# Patient Record
Sex: FEMALE | Race: WHITE
[De-identification: names, ages, dates, MRNs, and addresses within clinical notes are randomized per-mention and may not be internally consistent; named-entity substitution may affect disease eponyms.]

---

## 2018-05-11 ENCOUNTER — HOSPITAL ENCOUNTER (EMERGENCY)
Dept: HOSPITAL 97 - ER | Age: 83
Discharge: HOME | End: 2018-05-11
Payer: COMMERCIAL

## 2018-05-11 VITALS — DIASTOLIC BLOOD PRESSURE: 37 MMHG | SYSTOLIC BLOOD PRESSURE: 115 MMHG

## 2018-05-11 VITALS — OXYGEN SATURATION: 100 %

## 2018-05-11 VITALS — TEMPERATURE: 98.5 F

## 2018-05-11 DIAGNOSIS — I10: ICD-10-CM

## 2018-05-11 DIAGNOSIS — E78.5: ICD-10-CM

## 2018-05-11 DIAGNOSIS — Z88.8: ICD-10-CM

## 2018-05-11 DIAGNOSIS — N39.0: Primary | ICD-10-CM

## 2018-05-11 DIAGNOSIS — Z86.73: ICD-10-CM

## 2018-05-11 DIAGNOSIS — Z79.82: ICD-10-CM

## 2018-05-11 LAB
ALBUMIN SERPL BCP-MCNC: 3.4 G/DL (ref 3.2–5.5)
ALP SERPL-CCNC: 115 IU/L (ref 42–121)
ALT SERPL W P-5'-P-CCNC: 30 IU/L (ref 10–60)
AST SERPL W P-5'-P-CCNC: 36 IU/L (ref 10–42)
BUN BLD-MCNC: 84 MG/DL (ref 6–20)
GLUCOSE SERPLBLD-MCNC: 229 MG/DL (ref 65–120)
HCT VFR BLD CALC: 30.9 % (ref 36–45)
LYMPHOCYTES # SPEC AUTO: 1.1 K/UL (ref 0.7–4.9)
MCH RBC QN AUTO: 28.3 PG (ref 27–35)
MCV RBC: 84.8 FL (ref 80–100)
PMV BLD: 7.6 FL (ref 7.6–11.3)
POTASSIUM SERPL-SCNC: 4.7 MEQ/L (ref 3.6–5)
RBC # BLD: 3.65 M/UL (ref 3.86–4.86)
UA COMPLETE W REFLEX CULTURE PNL UR: (no result)
UA DIPSTICK W REFLEX MICRO PNL UR: (no result)

## 2018-05-11 PROCEDURE — 83970 ASSAY OF PARATHORMONE: CPT

## 2018-05-11 PROCEDURE — 82040 ASSAY OF SERUM ALBUMIN: CPT

## 2018-05-11 PROCEDURE — 87088 URINE BACTERIA CULTURE: CPT

## 2018-05-11 PROCEDURE — 36415 COLL VENOUS BLD VENIPUNCTURE: CPT

## 2018-05-11 PROCEDURE — 82962 GLUCOSE BLOOD TEST: CPT

## 2018-05-11 PROCEDURE — 80048 BASIC METABOLIC PNL TOTAL CA: CPT

## 2018-05-11 PROCEDURE — 82570 ASSAY OF URINE CREATININE: CPT

## 2018-05-11 PROCEDURE — 87186 SC STD MICRODIL/AGAR DIL: CPT

## 2018-05-11 PROCEDURE — 96360 HYDRATION IV INFUSION INIT: CPT

## 2018-05-11 PROCEDURE — 81015 MICROSCOPIC EXAM OF URINE: CPT

## 2018-05-11 PROCEDURE — 80053 COMPREHEN METABOLIC PANEL: CPT

## 2018-05-11 PROCEDURE — 81003 URINALYSIS AUTO W/O SCOPE: CPT

## 2018-05-11 PROCEDURE — 99284 EMERGENCY DEPT VISIT MOD MDM: CPT

## 2018-05-11 PROCEDURE — 87077 CULTURE AEROBIC IDENTIFY: CPT

## 2018-05-11 PROCEDURE — 85025 COMPLETE CBC W/AUTO DIFF WBC: CPT

## 2018-05-11 PROCEDURE — 82043 UR ALBUMIN QUANTITATIVE: CPT

## 2018-05-11 PROCEDURE — 87086 URINE CULTURE/COLONY COUNT: CPT

## 2018-05-11 PROCEDURE — 84550 ASSAY OF BLOOD/URIC ACID: CPT

## 2018-05-11 PROCEDURE — 84100 ASSAY OF PHOSPHORUS: CPT

## 2018-05-11 NOTE — ER
Nurse's Notes                                                                                     

 [facility]                                                                

Name: Deirdre Sauer                                                                               

Age: 84 yrs                                                                                       

Sex: Female                                                                                       

: 1933                                                                                   

MRN: [id number]                                                                                   

Arrival Date: 2018                                                                          

Time: 19:32                                                                                       

Account#: [id number]                                                                            

Bed 7                                                                                             

Private MD: Amanda Ledesma F                                                                     

Diagnosis: Hyperglycemia, unspecified;Urinary tract infection, site not specified                 

                                                                                                  

Presentation:                                                                                     

                                                                                             

19:37 Presenting complaint: Patient states: Sent by home health for blood sugar of 403 at       

      home. Transition of care: patient was not received from another setting of care. Onset      

      of symptoms was May 11, 2018. Care prior to arrival: None.                                  

19:37 Method Of Arrival: Ambulatory                                                             

19:37 Acuity: MESFIN 3                                                                             

21:39 Initial Sepsis Screen: Does the patient meet any 2 criteria? No. Patient's initial      jd3 

      sepsis screen is negative. Does the patient have a suspected source of infection? No.       

      Patient's initial sepsis screen is negative.                                                

                                                                                                  

Triage Assessment:                                                                                

19:38 General: Appears in no apparent distress. comfortable, Behavior is calm, cooperative,   aj  

      appropriate for age. Pain: Denies pain. Neuro: Level of Consciousness is awake, alert,      

      obeys commands, Oriented to person, place, time, situation, Appropriate for age.            

      Respiratory: Airway is patent Respiratory effort is even, unlabored, Respiratory            

      pattern is regular, symmetrical. GI: No signs and/or symptoms were reported involving       

      the gastrointestinal system. Derm: Skin is intact, is healthy with good turgor, Skin is     

      pink, warm \T\ dry. normal.                                                                 

                                                                                                  

Historical:                                                                                       

- Allergies:                                                                                      

19:38 blood thinners except ASA;                                                              aj  

19:38 Statins-Hmg-Coa Reductase Inhibitors;                                                     

- Home Meds:                                                                                      

19:38 aspirin 81 mg Oral TbEC 1 tab once daily [Active]; carvedilol 12.5 mg Oral tab 1 tab 2  aj  

      times per day [Active]; Claritin-D 24 Hour  mg Oral Tb24 1 tab once daily             

      [Active]; ezetimibe Oral once daily [Active]; Flonase 50 mcg/actuation Nasal spsn 1         

      spray 2 times per day [Active]; furosemide 40 mg Oral tab 1 tab once daily [Active];        

      glimepiride 4 mg Oral tab 1 tab twice a day [Active]; omeprazole 40 mg Oral cpDR 1 cap      

      once daily [Active]; pravastatin 10 mg Oral tab 1 tab once daily [Active]; Spectravite      

      Ultra Women's Sr 8 mg iron-400 mcg-300 mcg Oral tab [Active];                               

- PMHx:                                                                                           

19:38 CVA; Diabetes - NIDDM; Hyperlipidemia; Hypertension;                                    aj  

- PSHx:                                                                                           

19:38 Hysterectomy;                                                                           aj  

                                                                                                  

- Immunization history:: Adult Immunizations up to date.                                          

- Social history:: Smoking status: Patient/guardian denies using tobacco.                         

                                                                                                  

                                                                                                  

Screenin:39 Abuse screen: Denies threats or abuse. Nutritional screening: No deficits noted.        jd3 

      Tuberculosis screening: No symptoms or risk factors identified. Fall Risk Ambulatory        

      Aid- Crutches/Cane/Walker (15 pts). Gait- Weak (10 pts.). Mental Status- Oriented to        

      own ability (0 pts). Total Mckoy Fall Scale indicates Low Risk Score (25-44 pts). Fall      

      prevention measures have been instituted. Side Rails Up X 2 Placed close to Nursing         

      Station Frequent Obs/Assesments occuring Family Present and informed to notify staff if     

      they need to leave bedside.                                                                 

                                                                                                  

Assessment:                                                                                       

19:55 General: Appears in no apparent distress. Behavior is calm, cooperative, appropriate    jd3 

      for age, Reports high blood sugar. Pain: Denies pain. Neuro: Level of Consciousness is      

      awake, alert, obeys commands, Oriented to person, place, time, situation.                   

      Cardiovascular: Heart tones S1 S2 present Capillary refill < 3 seconds Patient's skin       

      is warm and dry. Respiratory: Airway is patent Respiratory effort is even, unlabored,       

      Respiratory pattern is regular, symmetrical, Breath sounds are clear bilaterally. GI:       

      Abdomen is round Bowel sounds present X 4 quads. Abd is soft and non tender X 4 quads.      

      Reports nausea. : No signs and/or symptoms were reported regarding the genitourinary      

      system. EENT: No signs and/or symptoms were reported regarding the EENT system. Derm:       

      Skin is intact, Skin is dry, Skin is normal, Skin temperature is warm. Musculoskeletal:     

      Circulation, motion, and sensation intact. Range of motion: intact in all extremities.      

20:30 Reassessment: Patient appears in no apparent distress at this time. Patient and/or      jd3 

      family updated on plan of care and expected duration. Pain level reassessed. Patient is     

      alert, oriented x 3, equal unlabored respirations, skin warm/dry/pink.                      

21:30 Reassessment: Patient appears in no apparent distress at this time. Patient and/or      jd3 

      family updated on plan of care and expected duration. Pain level reassessed. Patient is     

      alert, oriented x 3, equal unlabored respirations, skin warm/dry/pink. Patient denies       

      pain at this time. Patient states feeling better.                                           

22:30 Reassessment: Patient appears in no apparent distress at this time. Patient and/or      jd3 

      family updated on plan of care and expected duration. Pain level reassessed. Patient is     

      alert, oriented x 3, equal unlabored respirations, skin warm/dry/pink.                      

22:55 Reassessment: Patient appears in no apparent distress at this time. Patient and/or      jd3 

      family updated on plan of care and expected duration. Pain level reassessed. Patient is     

      alert, oriented x 3, equal unlabored respirations, skin warm/dry/pink. pt reported          

      understanding of discharge instructions, even and steady gait upon discharge. Patient       

      denies pain at this time. Patient states feeling better.                                    

                                                                                                  

Vital Signs:                                                                                      

19:38  / 56; Pulse 66; Resp 16; Temp 98.5; Pulse Ox 100% on R/A; Weight 69.4 kg; Height aj  

      5 ft. 2 in. (157.48 cm); Pain 0/10;                                                         

20:30  / 42; Pulse 60; Resp 16 S; Pulse Ox 99% on R/A; Pain 0/10;                       jd3 

21:37  / 49; Pulse 61; Resp 17; Pulse Ox 100% on R/A; Pain 0/10;                        jd3 

22:37  / 37; Pulse 63; Resp 15; Pulse Ox 100% on R/A;                                   mw2 

19:38 Body Mass Index 27.98 (69.40 kg, 157.48 cm)                                               

                                                                                                  

ED Course:                                                                                        

19:32 Patient arrived in ED.                                                                  am2 

19:32 Amanda Ledesma MD is Private Physician.                                                am2 

19:38 Triage completed.                                                                       aj  

19:38 Arm band placed on right wrist. Patient placed.                                         aj  

19:47 Abiel Cheney MD is Attending Physician.                                            tw4 

19:53 Karthik Charles, RN is Primary Nurse.                                                  jd3 

21:39 Patient has correct armband on for positive identification. Bed in low position. Call   jd3 

      light in reach. Side rails up X2. Adult w/ patient.                                         

22:13 No provider procedures requiring assistance completed. Inserted saline lock: 22 gauge   bp  

      in left forearm, using aseptic technique. Blood collected.                                  

22:45 Amanda Ledesma MD is Referral Physician.                                               tw4 

22:56 IV discontinued, intact, bleeding controlled, No redness/swelling at site. Pressure     jd3 

      dressing applied.                                                                           

                                                                                                  

Administered Medications:                                                                         

22:14 Drug: NS 0.9% 500 ml Route: IV; Rate: bolus; Site: left forearm;                        bp  

22:57 Follow up: Response: No adverse reaction; IV Status: Completed infusion; IV Intake:     jd3 

      500ml                                                                                       

                                                                                                  

                                                                                                  

Point of Care Testing:                                                                            

      Blood Glucose:                                                                              

19:53 Blood Glucose: 260 mg/dL;                                                               jd3 

      Ranges:                                                                                     

                                                                                                  

Intake:                                                                                           

22:57 IV: 500ml; Total: 500ml.                                                                jd3 

                                                                                                  

Outcome:                                                                                          

22:45 Discharge ordered by MD.                                                                tw4 

22:56 Discharged to home ambulatory, with family.                                             jd3 

22:56 Condition: stable                                                                           

22:56 Discharge instructions given to patient, family, Instructed on discharge instructions,      

      follow up and referral plans. medication usage, Demonstrated understanding of               

      instructions, follow-up care, medications, Prescriptions given X 1.                         

22:57 Patient left the ED.                                                                    jd3 

                                                                                                  

Addendum:                                                                                         

2018                                                                                        

     07:42 Addendum: Culture Results: Positive urine culture. No further action required. Bacteria i
w

           sensitive to prescribed antibiotic.                                                    

                                                                                                  

Signatures:                                                                                       

Yue Landry, RN                       Alissa Thompson RN RN iw Moreno, Amanda                               amKarthik Pina RN RN jd3 Peltier, Brian, RN RN bp Wadley, Terrence, MD MD   tw4                                                  

Murray Prakash                            2                                                  

                                                                                                  

**************************************************************************************************

## 2018-05-11 NOTE — EDPHYS
Physician Documentation                                                                           

 [facility]                                                                

Name: Deirdre Sauer                                                                               

Age: 84 yrs                                                                                       

Sex: Female                                                                                       

: 1933                                                                                   

MRN: [id number]                                                                                   

Arrival Date: 2018                                                                          

Time: 19:32                                                                                       

Account#: [id number]                                                                            

Bed 7                                                                                             

Private MD: Amanda Ledesma F                                                                     

ED Physician Abiel Cheney                                                                     

HPI:                                                                                              

                                                                                             

21:52 This 84 yrs old  Female presents to ER via Ambulatory with complaints of High  tw4 

      Blood Sugar.                                                                                

21:52 The patient or guardian reports hyperglycemia, that was potentially precipitated by no  tw4 

      particular event. Onset: The symptoms/episode began/occurred today. Associated signs        

      and symptoms: Pertinent positives: None. Pertinent negatives: None. Current symptoms:       

      In the emergency department the patient's symptoms have improved, blood sugar lower.        

      The patient has not experienced similar symptoms in the past.                               

                                                                                                  

Historical:                                                                                       

- Allergies:                                                                                      

19:38 blood thinners except ASA;                                                              aj  

19:38 Statins-Hmg-Coa Reductase Inhibitors;                                                   aj  

- Home Meds:                                                                                      

19:38 aspirin 81 mg Oral TbEC 1 tab once daily [Active]; carvedilol 12.5 mg Oral tab 1 tab 2  aj  

      times per day [Active]; Claritin-D 24 Hour  mg Oral Tb24 1 tab once daily             

      [Active]; ezetimibe Oral once daily [Active]; Flonase 50 mcg/actuation Nasal spsn 1         

      spray 2 times per day [Active]; furosemide 40 mg Oral tab 1 tab once daily [Active];        

      glimepiride 4 mg Oral tab 1 tab twice a day [Active]; omeprazole 40 mg Oral cpDR 1 cap      

      once daily [Active]; pravastatin 10 mg Oral tab 1 tab once daily [Active]; Spectravite      

      Ultra Women's Sr 8 mg iron-400 mcg-300 mcg Oral tab [Active];                               

- PMHx:                                                                                           

19:38 CVA; Diabetes - NIDDM; Hyperlipidemia; Hypertension;                                    aj  

- PSHx:                                                                                           

19:38 Hysterectomy;                                                                           aj  

                                                                                                  

- Immunization history:: Adult Immunizations up to date.                                          

- Social history:: Smoking status: Patient/guardian denies using tobacco.                         

                                                                                                  

                                                                                                  

ROS:                                                                                              

21:52 Constitutional: Negative for fever, chills, and weight loss, Cardiovascular: Negative   tw4 

      for chest pain, palpitations, and edema, Respiratory: Negative for shortness of breath,     

      cough, wheezing, and pleuritic chest pain, Back: Negative for injury and pain, :          

      Negative for injury, bleeding, discharge, and swelling, MS/Extremity: Negative for          

      injury and deformity.                                                                       

21:52 Endocrine: Negative for goiter, cold intolerance, heat intolerance, polydipsia,             

      polyphagia, polyuria, weight gain, weight loss.                                             

                                                                                                  

Exam:                                                                                             

21:52 Constitutional:  This is a well developed, well nourished patient who is awake, alert,  tw4 

      and in no acute distress. Head/Face:  Normocephalic, atraumatic. Chest/axilla:  Normal      

      chest wall appearance and motion.  Nontender with no deformity.  No lesions are             

      appreciated. Cardiovascular:  Regular rate and rhythm with a normal S1 and S2.  No          

      gallops, murmurs, or rubs.  Normal PMI, no JVD.  No pulse deficits. Respiratory:  Lungs     

      have equal breath sounds bilaterally, clear to auscultation and percussion.  No rales,      

      rhonchi or wheezes noted.  No increased work of breathing, no retractions or nasal          

      flaring. Abdomen/GI:  Soft, non-tender, with normal bowel sounds.  No distension or         

      tympany.  No guarding or rebound.  No evidence of tenderness throughout. MS/ Extremity:     

       Pulses equal, no cyanosis.  Neurovascular intact.  Full, normal range of motion.           

      Neuro:  Awake and alert, GCS 15, oriented to person, place, time, and situation.            

      Cranial nerves II-XII grossly intact.  Motor strength 5/5 in all extremities.  Sensory      

      grossly intact.  Cerebellar exam normal.  Normal gait.                                      

                                                                                                  

Vital Signs:                                                                                      

19:38  / 56; Pulse 66; Resp 16; Temp 98.5; Pulse Ox 100% on R/A; Weight 69.4 kg; Height aj  

      5 ft. 2 in. (157.48 cm); Pain 0/10;                                                         

20:30  / 42; Pulse 60; Resp 16 S; Pulse Ox 99% on R/A; Pain 0/10;                       jd3 

21:37  / 49; Pulse 61; Resp 17; Pulse Ox 100% on R/A; Pain 0/10;                        jd3 

22:37  / 37; Pulse 63; Resp 15; Pulse Ox 100% on R/A;                                   mw2 

19:38 Body Mass Index 27.98 (69.40 kg, 157.48 cm)                                             aj  

                                                                                                  

MDM:                                                                                              

19:47 Patient medically screened.                                                             4 

21:52 Data reviewed: vital signs, nurses notes. Counseling: I had a detailed discussion with  tw4 

      the patient and/or guardian regarding: the historical points, exam findings, and any        

      diagnostic results supporting the discharge/admit diagnosis.                                

                                                                                                  

                                                                                             

20:39 Order name: CBC with Diff; Complete Time: 21:47                                         tw4 

                                                                                             

20:39 Order name: CMP; Complete Time: 21:47                                                   tw4 

                                                                                             

20:39 Order name: Urinalysis; Complete Time: 21:47                                            [facility] 

                                                                                             

21:14 Order name: Urine Dipstick--Ancillary (enter results); Complete Time: 21:47             eb  

                                                                                             

21:27 Order name: Urine Microscopic Only; Complete Time: 21:47                                EDMS

                                                                                             

21:30 Order name: Urine Culture                                                               EDMS

                                                                                                  

Administered Medications:                                                                         

22:14 Drug: NS 0.9% 500 ml Route: IV; Rate: bolus; Site: left forearm;                        bp  

22:57 Follow up: Response: No adverse reaction; IV Status: Completed infusion; IV Intake:     jd3 

      500ml                                                                                       

                                                                                                  

                                                                                                  

Point of Care Testing:                                                                            

      Blood Glucose:                                                                              

19:53 Blood Glucose: 260 mg/dL;                                                               jd3 

      Ranges:                                                                                     

      Critical Glucose Levels:Adult <50 mg/dl or >400 mg/dl  <40 mg/dl or >180 mg/dl       

Disposition:                                                                                      

18 22:45 Discharged to Home. Impression: Hyperglycemia, unspecified, Urinary tract          

  infection, site not specified.                                                                  

- Condition is Stable.                                                                            

- Discharge Instructions: Hyperglycemia, Urinary Tract Infection, Urinary Tract                   

  Infection, Easy-to-Read, Hyperglycemia, Easy-to-Read.                                           

- Prescriptions for Macrobid 100 mg Oral Capsule - take 1 capsule by ORAL route every             

  12 hours for 10 days; 20 capsule.                                                               

- Medication Reconciliation Form, Thank You Letter, Antibiotic Education, Prescription            

  Opioid Use form.                                                                                

- Follow up: Amanda Ledesma MD; When: As needed; Reason: Recheck today's complaints,             

  Continuance of care, Re-evaluation by your physician.                                           

- Problem is new.                                                                                 

- Symptoms have improved.                                                                         

                                                                                                  

                                                                                                  

                                                                                                  

Signatures:                                                                                       

Dispatcher [employer]                           EDYue Thompson RN RN aj Davies, Jonathon, RN RN jd3 Peltier, Brian, RN RN bp Wadley, Terrence, MD MD   4                                                  

                                                                                                  

Corrections: (The following items were deleted from the chart)                                    

22:57 22:45 2018 22:45 Discharged to Home. Impression: Hyperglycemia, unspecified;      jd3 

      Urinary tract infection, site not specified. Condition is Stable. Forms are Medication      

      Reconciliation Form, Thank You Letter, Antibiotic Education, Prescription Opioid Use.       

      Follow up: Amanda Ledesma; When: As needed; Reason: Recheck today's complaints,              

      Continuance of care, Re-evaluation by your physician. Problem is new. Symptoms have         

      improved. tw4                                                                               

                                                                                                  

**************************************************************************************************

## 2018-09-22 ENCOUNTER — HOSPITAL ENCOUNTER (EMERGENCY)
Dept: HOSPITAL 97 - ER | Age: 83
Discharge: HOME | End: 2018-09-22
Payer: COMMERCIAL

## 2018-09-22 VITALS — OXYGEN SATURATION: 99 % | DIASTOLIC BLOOD PRESSURE: 55 MMHG | SYSTOLIC BLOOD PRESSURE: 146 MMHG

## 2018-09-22 VITALS — TEMPERATURE: 97.7 F

## 2018-09-22 DIAGNOSIS — Z88.8: ICD-10-CM

## 2018-09-22 DIAGNOSIS — R06.00: Primary | ICD-10-CM

## 2018-09-22 LAB
ALBUMIN SERPL BCP-MCNC: 2.6 G/DL (ref 3.4–5)
ALP SERPL-CCNC: 101 U/L (ref 45–117)
ALT SERPL W P-5'-P-CCNC: 55 U/L (ref 12–78)
AST SERPL W P-5'-P-CCNC: 62 U/L (ref 15–37)
BLD SMEAR INTERP: (no result)
BUN BLD-MCNC: 43 MG/DL (ref 7–18)
GLUCOSE SERPLBLD-MCNC: 228 MG/DL (ref 74–106)
HCT VFR BLD CALC: 28.4 % (ref 36–45)
INR BLD: 1.21
LYMPHOCYTES # SPEC AUTO: 0.6 K/UL (ref 0.7–4.9)
MAGNESIUM SERPL-MCNC: 2.1 MG/DL (ref 1.8–2.4)
MCH RBC QN AUTO: 30.4 PG (ref 27–35)
MCV RBC: 90.4 FL (ref 80–100)
MORPHOLOGY BLD-IMP: (no result)
NT-PROBNP SERPL-MCNC: 220 PG/ML (ref ?–450)
PMV BLD: 8.3 FL (ref 7.6–11.3)
POTASSIUM SERPL-SCNC: 4.8 MMOL/L (ref 3.5–5.1)
RBC # BLD: 3.14 M/UL (ref 3.86–4.86)
TROPONIN (EMERG DEPT USE ONLY): < 0.02 NG/ML (ref 0–0.04)

## 2018-09-22 PROCEDURE — 83880 ASSAY OF NATRIURETIC PEPTIDE: CPT

## 2018-09-22 PROCEDURE — 85610 PROTHROMBIN TIME: CPT

## 2018-09-22 PROCEDURE — 71045 X-RAY EXAM CHEST 1 VIEW: CPT

## 2018-09-22 PROCEDURE — 83735 ASSAY OF MAGNESIUM: CPT

## 2018-09-22 PROCEDURE — 36415 COLL VENOUS BLD VENIPUNCTURE: CPT

## 2018-09-22 PROCEDURE — 93005 ELECTROCARDIOGRAM TRACING: CPT

## 2018-09-22 PROCEDURE — 99284 EMERGENCY DEPT VISIT MOD MDM: CPT

## 2018-09-22 PROCEDURE — 85025 COMPLETE CBC W/AUTO DIFF WBC: CPT

## 2018-09-22 PROCEDURE — 80076 HEPATIC FUNCTION PANEL: CPT

## 2018-09-22 PROCEDURE — 84484 ASSAY OF TROPONIN QUANT: CPT

## 2018-09-22 PROCEDURE — 80048 BASIC METABOLIC PNL TOTAL CA: CPT

## 2018-09-22 NOTE — ER
Nurse's Notes                                                                                     

 [facility]                                                                

Name: Deirdre Sauer                                                                               

Age: 84 yrs                                                                                       

Sex: Female                                                                                       

: 1933                                                                                   

MRN: [id number]                                                                                   

Arrival Date: 2018                                                                          

Time: 12:57                                                                                       

Account#: [id number]                                                                            

Bed 19                                                                                            

Private MD: Amanda Ledesma F                                                                     

Diagnosis: Dyspnea, unspecified                                                                   

                                                                                                  

Presentation:                                                                                     

                                                                                             

12:59 Presenting complaint: Patient states: My kidney doctor took me off my lasix because my  la1 

      kidney function was not good but I have gained about 14 pounds in the last 10 days. Pt      

      states she gets SOB when she walks. Transition of care: patient was not received from       

      another setting of care. Onset of symptoms was 2018. Risk Assessment: Do      

      you want to hurt yourself or someone else? Patient reports no desire to harm self or        

      others. Initial Sepsis Screen: Does the patient meet any 2 criteria? No. Patient's          

      initial sepsis screen is negative. Does the patient have a suspected source of              

      infection? No. Patient's initial sepsis screen is negative. Care prior to arrival: None.    

12:59 Method Of Arrival: Ambulatory                                                           la1 

12:59 Acuity: MESFIN 3                                                                           la1 

                                                                                                  

Historical:                                                                                       

- Allergies:                                                                                      

12:59 blood thinners except ASA;                                                              la1 

12:59 Statins-Hmg-Coa Reductase Inhibitors;                                                   la1 

- PMHx:                                                                                           

12:59 CVA; Diabetes - NIDDM; Hyperlipidemia; Hypertension;                                    la1 

                                                                                                  

- Immunization history:: Adult Immunizations up to date.                                          

- Social history:: Smoking status: Patient/guardian denies using tobacco.                         

- Ebola Screening: : No symptoms or risks identified at this time.                                

                                                                                                  

                                                                                                  

Screenin:12 Abuse screen: Denies threats or abuse. Denies injuries from another. Nutritional        aj1 

      screening: No deficits noted. Tuberculosis screening: No symptoms or risk factors           

      identified.                                                                                 

15:12 Fall Risk None identified.                                                              aj1 

                                                                                                  

Assessment:                                                                                       

14:09 General: Appears in no apparent distress. comfortable, Behavior is calm, cooperative,   aj1 

      appropriate for age. Pain: Denies pain. Neuro: Level of Consciousness is awake, alert,      

      obeys commands. Cardiovascular: Patient's skin is warm and dry. Edema in bilateral          

      lower legs. Respiratory: Airway is patent Respiratory effort is even, unlabored,            

      Respiratory pattern is regular, symmetrical. GI: No signs and/or symptoms were reported     

      involving the gastrointestinal system. : No signs and/or symptoms were reported           

      regarding the genitourinary system. EENT: No signs and/or symptoms were reported            

      regarding the EENT system. Derm: No signs and/or symptoms reported regarding the            

      dermatologic system. Skin is pink, warm \T\ dry. normal. Musculoskeletal: No signs and/or   

      symptoms reported regarding the musculoskeletal system. Circulation, motion, and            

      sensation intact.                                                                           

15:01 Reassessment: Patient appears in no apparent distress at this time. No changes from     aj1 

      previously documented assessment. Patient and/or family updated on plan of care and         

      expected duration. Pain level reassessed. Patient is alert, oriented x 3, equal             

      unlabored respirations, skin warm/dry/pink.                                                 

15:06 Reassessment: Patient ambulated around unit with respiratory therapist.                 aj1 

16:15 Reassessment: No changes from previously documented assessment. General: Appears in no  aj1 

      apparent distress. comfortable, Behavior is calm, cooperative, appropriate for age.         

      Pain: Denies pain. Neuro: Level of Consciousness is awake, alert, obeys commands.           

      Cardiovascular: Patient's skin is warm and dry. Respiratory: Airway is patent               

      Respiratory effort is even, unlabored, Respiratory pattern is regular, symmetrical. GI:     

      No signs and/or symptoms were reported involving the gastrointestinal system. EENT: No      

      signs and/or symptoms were reported regarding the EENT system. Derm: Skin is pink, warm     

      \T\ dry. normal. Musculoskeletal: Circulation, motion, and sensation intact.                

17:31 Reassessment: Patient appears in no apparent distress at this time. No changes from     aj1 

      previously documented assessment. Patient and/or family updated on plan of care and         

      expected duration. Pain level reassessed. Patient is alert, oriented x 3, equal             

      unlabored respirations, skin warm/dry/pink.                                                 

                                                                                                  

Vital Signs:                                                                                      

13:03  / 50; Pulse 68; Resp 16; Temp 97.7(O); Pulse Ox 100% on R/A; Weight 83.46 kg;    la1 

14:09  / 62; Pulse 68; Resp 20; Pulse Ox 97% on R/A;                                    aj1 

15:01  / 61; Pulse 63; Resp 16; Pulse Ox 99% on R/A;                                    aj1 

16:15  / 65; Pulse 64; Resp 18; Pulse Ox 97% ;                                          aj1 

17:31  / 55; Pulse 62; Resp 18; Pulse Ox 99% ;                                          aj1 

                                                                                                  

ED Course:                                                                                        

12:57 Patient arrived in ED.                                                                  sb2 

12:58 Amanda Ledesma MD is Private Physician.                                                sb2 

13:00 Triage completed.                                                                       la1 

13:00 Arm band placed on left wrist.                                                          la1 

13:41 Fitz Hunt PA is PHCP.                                                              jmm 

13:41 Abraham Goldberg MD is Attending Physician.                                             jmm 

14:00 Stephanie Julio RN is Primary Nurse.                                                   aj1 

14:12 Patient has correct armband on for positive identification. Placed in gown. Bed in low  aj1 

      position. Call light in reach. Cardiac monitor on. Pulse ox on. NIBP on.                    

14:12 No provider procedures requiring assistance completed.                                  aj1 

14:17 Initial lab(s) drawn, by me, sent to lab. Inserted saline lock: 20 gauge in right       dh3 

      antecubital area, using aseptic technique. Blood collected.                                 

14:20 XRAY Chest (1 view) In Process Unspecified.                                             EDMS

16:55 Marjyo Rayo MD is Referral Physician.                                              jmm 

17:31 IV discontinued, intact, bleeding controlled, No redness/swelling at site. Pressure     aj1 

      dressing applied.                                                                           

                                                                                                  

Administered Medications:                                                                         

No medications were administered                                                                  

                                                                                                  

                                                                                                  

Outcome:                                                                                          

16:55 Discharge ordered by MD.                                                                [facility] 

17:33 Discharged to home ambulatory.                                                          aj1 

17:33 Condition: good                                                                             

17:33 Discharge instructions given to patient, Instructed on discharge instructions, follow       

      up and referral plans. Demonstrated understanding of instructions, follow-up care.          

17:33 Patient left the ED.                                                                    aj1 

                                                                                                  

Signatures:                                                                                       

Dispatcher [employer]                           EDMS                                                 

Stephanie Julio, RN                     RN   cookie1                                                  

Fitz Hunt PA PA   [facility]                                                  

Gil Parnell RN                         RN   la1                                                  

Nataly Whelan                              [facility]                                                  

Digna Mariscal                               sb2                                                  

                                                                                                  

Corrections: (The following items were deleted from the chart)                                    

17:32 15:11 Patient did not have IV access during this emergency room visit. aj1              aj1 

17:33 15:12 Discharged to home via wheelchair, aj1                                            aj1 

: 15:12 Condition: good aj1                                                               aj1 

17:33 15:12 Discharge instructions given to patient, Instructed on discharge instructions,    aj1 

      follow up and referral plans. medication usage, Demonstrated understanding of               

      instructions, follow-up care, medications, Prescriptions given X 3, aj1                     

                                                                                                  

**************************************************************************************************

## 2018-09-23 NOTE — RAD REPORT
EXAM DESCRIPTION:  RAD - Chest Single View - 9/22/2018 11:29 pm

 

CLINICAL HISTORY:  Chest pain, shortness of breath

 

 The final report was delayed due to PACs and/or Fluency technical problems that existed at the time 
of the study or during expected/usual dictation time period.

 

COMPARISON:  March 1, 2018

 

TECHNIQUE:  AP portable chest image was obtained 1414 hours .

 

FINDINGS:  No peripheral mass or consolidation. Interstitial markings are prominent. Pattern is sligh
tly less than the March comparison. Left pleural effusion is present but diminished compared to the p
rior study. Heart size and vasculature are mildly prominent. Heart size is slightly less than the com
parison. No pneumothorax. No acute aortic findings suspected.

 

IMPRESSION:  Heart, vasculature and lung markings are all prominent but less pronounced than seen in 
March. And minimal failure/ volume overload is suspected.

 

Small left pleural effusion much smaller than seen in March.

## 2018-09-24 NOTE — EKG
Test Date:    2018-09-22               Test Time:    14:27:44

Technician:   SBS                                    

                                                     

MEASUREMENT RESULTS:                                       

Intervals:                                           

Rate:         62                                     

IA:           230                                    

QRSD:         80                                     

QT:           432                                    

QTc:          438                                    

Axis:                                                

P:            33                                     

IA:           230                                    

QRS:          -8                                     

T:            16                                     

                                                     

INTERPRETIVE STATEMENTS:                                       

                                                     

Sinus rhythm with sinus arrhythmia with 1st degree AV block

Low voltage QRS

Cannot rule out Anterior infarct, age undetermined

Abnormal ECG

Compared to ECG 03/01/2018 15:08:05

First degree AV block now present

Low QRS voltage now present

Myocardial infarct finding still present



Electronically Signed On 09-24-18 08:16:08 CDT by Solitario Gomez

## 2018-11-27 ENCOUNTER — HOSPITAL ENCOUNTER (EMERGENCY)
Dept: HOSPITAL 97 - ER | Age: 83
Discharge: HOME | End: 2018-11-27
Payer: COMMERCIAL

## 2018-11-27 DIAGNOSIS — I10: ICD-10-CM

## 2018-11-27 DIAGNOSIS — Z88.3: ICD-10-CM

## 2018-11-27 DIAGNOSIS — J01.90: Primary | ICD-10-CM

## 2018-11-27 DIAGNOSIS — Z88.8: ICD-10-CM

## 2018-11-27 LAB
ALBUMIN SERPL BCP-MCNC: 3 G/DL (ref 3.4–5)
ALP SERPL-CCNC: 98 U/L (ref 45–117)
ALT SERPL W P-5'-P-CCNC: 30 U/L (ref 12–78)
AST SERPL W P-5'-P-CCNC: 31 U/L (ref 15–37)
BUN BLD-MCNC: 38 MG/DL (ref 7–18)
GLUCOSE SERPLBLD-MCNC: 244 MG/DL (ref 74–106)
HCT VFR BLD CALC: 34 % (ref 36–45)
LYMPHOCYTES # SPEC AUTO: 0.3 K/UL (ref 0.7–4.9)
MCH RBC QN AUTO: 29.2 PG (ref 27–35)
MCV RBC: 87.3 FL (ref 80–100)
PMV BLD: 8.1 FL (ref 7.6–11.3)
POTASSIUM SERPL-SCNC: 4.1 MMOL/L (ref 3.5–5.1)
RBC # BLD: 3.89 M/UL (ref 3.86–4.86)

## 2018-11-27 PROCEDURE — 80048 BASIC METABOLIC PNL TOTAL CA: CPT

## 2018-11-27 PROCEDURE — 71046 X-RAY EXAM CHEST 2 VIEWS: CPT

## 2018-11-27 PROCEDURE — 96374 THER/PROPH/DIAG INJ IV PUSH: CPT

## 2018-11-27 PROCEDURE — 87040 BLOOD CULTURE FOR BACTERIA: CPT

## 2018-11-27 PROCEDURE — 80076 HEPATIC FUNCTION PANEL: CPT

## 2018-11-27 PROCEDURE — 36415 COLL VENOUS BLD VENIPUNCTURE: CPT

## 2018-11-27 PROCEDURE — 87804 INFLUENZA ASSAY W/OPTIC: CPT

## 2018-11-27 PROCEDURE — 99284 EMERGENCY DEPT VISIT MOD MDM: CPT

## 2018-11-27 PROCEDURE — 85025 COMPLETE CBC W/AUTO DIFF WBC: CPT

## 2018-11-27 NOTE — EDPHYS
Physician Documentation                                                                           

 [facility]                                                                

Name: Deirdre Sauer                                                                               

Age: 85 yrs                                                                                       

Sex: Female                                                                                       

: 1933                                                                                   

MRN: [id number]                                                                                   

Arrival Date: 2018                                                                          

Time: 16:55                                                                                       

Account#: [id number]                                                                            

Bed 28                                                                                            

Private MD: Amanda Ledesma F                                                                     

ED Physician Abraham Goldberg                                                                      

HPI:                                                                                              

                                                                                             

20:00 This 85 yrs old  Female presents to ER via Wheelchair with complaints of       pm1 

      Fever, Cough, Sinus Pain.                                                                   

20:00 The patient reports fever, that was measured at 101 degrees Fahrenheit. Onset: The      pm1 

      symptoms/episode began/occurred 2 week(s) ago. Associated signs and symptoms: Pertinent     

      positives: cough, sinus congestion, sinus drainage, Pertinent negatives: chest pain,        

      shortness of breath. Severity of symptoms: in the emergency department the symptoms are     

      worse. The patient has been recently seen by a physician: the patient's primary care        

      provider, instructed to take allergy medications for sinus congestion.                      

20:00 Sinus congestion for two weeks with post nasal drainage and cough.                      pm1 

                                                                                                  

Historical:                                                                                       

- Allergies:                                                                                      

17:46 Statins-Hmg-Coa Reductase Inhibitors;                                                   aj1 

17:46 blood thinners except ASA;                                                              aj1 

17:46 Streptomycin;                                                                           aj1 

- PMHx:                                                                                           

17:46 CVA; Diabetes - NIDDM; Hyperlipidemia; Hypertension;                                    aj1 

                                                                                                  

- Immunization history:: Adult Immunizations up to date.                                          

- Social history:: Smoking status: Patient/guardian denies using tobacco.                         

                                                                                                  

                                                                                                  

ROS:                                                                                              

20:00 Constitutional: Negative for fever, chills, and weight loss.                            pm1 

20:00 Eyes: Negative for injury, pain, redness, and discharge, Neck: Negative for injury,         

      pain, and swelling, Cardiovascular: Negative for chest pain, palpitations, and edema.       

20:00 Abdomen/GI: Negative for abdominal pain, nausea, vomiting, diarrhea, and constipation,      

      Back: Negative for injury and pain, : Negative for injury, bleeding, discharge, and       

      swelling, MS/Extremity: Negative for injury and deformity, Skin: Negative for injury,       

      rash, and discoloration, Neuro: Negative for headache, weakness, numbness, tingling,        

      and seizure.                                                                                

20:00 ENT: Positive for sinus congestion, sinus pain, Negative for ear pain, difficulty           

      swallowing, difficulty handling secretions, hoarseness.                                     

20:00 Respiratory: Positive for cough, Negative for shortness of breath, sputum production,       

      wheezing.                                                                                   

                                                                                                  

Exam:                                                                                             

20:00 Constitutional:  This is a well developed, well nourished patient who is awake, alert,  pm1 

      and in no acute distress. Eyes:  Pupils equal round and reactive to light, extra-ocular     

      motions intact.  Lids and lashes normal.  Conjunctiva and sclera are non-icteric and        

      not injected.  Cornea within normal limits.  Periorbital areas with no swelling,            

      redness, or edema.                                                                          

20:00 ENT:  Nares patent. No nasal discharge, no septal abnormalities noted.  Tympanic            

      membranes are normal and external auditory canals are clear.  Oropharynx with no            

      redness, swelling, or masses, exudates, or evidence of obstruction, uvula midline.          

      Mucous membranes moist. Neck:  Trachea midline, no thyromegaly or masses palpated, and      

      no cervical lymphadenopathy.  Supple, full range of motion without nuchal rigidity, or      

      vertebral point tenderness.  No Meningismus. Chest/axilla:  Normal chest wall               

      appearance and motion.  Nontender with no deformity.  No lesions are appreciated.           

      Cardiovascular:  Regular rate and rhythm with a normal S1 and S2.  No gallops, murmurs,     

      or rubs.  No pulse deficits. Respiratory:  Lungs have equal breath sounds bilaterally,      

      clear to auscultation and percussion.  No rales, rhonchi or wheezes noted.  No              

      increased work of breathing, no retractions or nasal flaring. Abdomen/GI:  Soft,            

      non-tender, with normal bowel sounds.  No distension or tympany.  No guarding or            

      rebound.  No evidence of tenderness throughout. Back:  No spinal tenderness.  No            

      costovertebral tenderness.  Full range of motion. Skin:  Warm, dry with normal turgor.      

      Normal color with no rashes, no lesions, and no evidence of cellulitis. MS/ Extremity:      

      Pulses equal, no cyanosis.  Neurovascular intact.  Full, normal range of motion.            

20:00 Head/face: Sinus tenderness, that is moderate, is located over the  right frontal sinus     

      and left frontal sinus.                                                                     

20:00 Neuro: Orientation: is normal, Motor: is normal, moves all fours.                           

                                                                                                  

Vital Signs:                                                                                      

17:46  / 41; Pulse 81; Resp 18; Temp 98.9; Pulse Ox 95% on R/A; Weight 77.11 kg (R);    aj1 

      Height 5 ft. 2 in. (157.48 cm) (R); Pain 0/10;                                              

19:42  / 50; Pulse 83; Resp 18; Pulse Ox 94% on R/A; Pain 0/10;                         aa1 

20:34  / 58; Pulse 80; Resp 18; Pulse Ox 97% on R/A; Pain 0/10;                         aa1 

21:22  / 82; Pulse 82; Resp 18; Pulse Ox 97% on R/A; Pain 0/10;                         aa1 

22:30  / 71; Pulse 83; Resp 18; Temp 99.0; Pulse Ox 97% on R/A; Pain 0/10;              aa1 

17:46 Body Mass Index 31.09 (77.11 kg, 157.48 cm)                                             aj1 

                                                                                                  

MDM:                                                                                              

19:00 Patient medically screened.                                                             pm1 

21:53 Data reviewed: vital signs. Data interpreted: Pulse oximetry: on room air is 97 %.      pm1 

      Interpretation: normal. Counseling: I had a detailed discussion with the patient and/or     

      guardian regarding: the historical points, exam findings, and any diagnostic results        

      supporting the discharge/admit diagnosis, lab results, radiology results, the need for      

      outpatient follow up, to return to the emergency department if symptoms worsen or           

      persist or if there are any questions or concerns that arise at home.                       

                                                                                                  

                                                                                             

19:09 Order name: Basic Metabolic Panel; Complete Time: 20:20                                 pm1 

                                                                                             

19:09 Order name: CBC with Diff; Complete Time: 20:20                                         pm1 

                                                                                             

19:09 Order name: LFT's; Complete Time: 20:20                                                 pm1 

                                                                                             

19:09 Order name: Blood Culture Adult (2)                                                     pm1 

                                                                                             

19:09 Order name: Flu; Complete Time: 20:20                                                   pm1 

                                                                                             

19:09 Order name: Chest Pa And Lat (2 Views) XRAY                                             pm1 

                                                                                             

19:09 Order name: IV Saline Lock; Complete Time: 19:59                                        pm1 

                                                                                             

19:09 Order name: Labs collected and sent; Complete Time: 20:00                               pm1 

                                                                                             

19:09 Order name: O2 Per Protocol; Complete Time: 19:19                                       pm1 

                                                                                             

19:09 Order name: O2 Sat Monitoring; Complete Time: 19:19                                     pm1 

                                                                                                  

Administered Medications:                                                                         

22:15 Drug: Rocephin 1 grams Route: IV; Rate: calculated rate; Site: left antecubital;        aa1 

22:30 Follow up: IV Status: Completed infusion                                                aa1 

                                                                                                  

                                                                                                  

Disposition:                                                                                      

                                                                                             

05:46 Co-signature as Attending Physician, Abraham Goldberg MD I agree with the assessment and  cookie 

      plan of care.                                                                               

                                                                                                  

Disposition:                                                                                      

18 21:55 Discharged to Home. Impression: Acute sinusitis, Cough.                            

- Condition is Stable.                                                                            

- Discharge Instructions: Sinusitis, Adult, Cough, Adult.                                         

- Prescriptions for Zithromax Z- Kai 250 mg Oral Tablet - take 1 tablet by ORAL route             

  as directed for 5 days Day 1 - take two (2) tablets one time. Day 2, 3, 4 , 5 take              

  one (1) tablet once daily.; 6 tablet. Zyrtec- D 5-120 mg Oral Tablet Sustained                  

  Release 12 hr - take 1 tablet by ORAL route every 12 hours As needed; 20 tablet.                

- Medication Reconciliation Form, Thank You Letter, Antibiotic Education form.                    

- Follow up: Emergency Department; When: As needed; Reason: Worsening of condition.               

  Follow up: Amanda Ledesma MD; When: 2 - 3 days; Reason: Recheck today's complaints,            

  Continuance of care, Re-evaluation by your physician.                                           

- Problem is new.                                                                                 

- Symptoms have improved.                                                                         

                                                                                                  

                                                                                                  

                                                                                                  

Signatures:                                                                                       

Dispatcher [employer]                           EDMS                                                 

Stephanie Julio RN                     RN   aj1                                                  

Lida Gutiérrez RN                        RN   aa1                                                  

Abraham Goldberg MD MD cha Marinas, Patrick, MORIAH                    NP   pm1                                                  

                                                                                                  

Corrections: (The following items were deleted from the chart)                                    

                                                                                             

22:32 21:55 2018 21:55 Discharged to Home. Impression: Acute sinusitis; Cough.          aa1 

      Condition is Stable. Forms are Medication Reconciliation Form, Thank You Letter,            

      Antibiotic Education, Prescription Opioid Use. Follow up: Emergency Department; When:       

      As needed; Reason: Worsening of condition. Follow up: Amanda Ledesma; When: 2 - 3 days;      

      Reason: Recheck today's complaints, Continuance of care, Re-evaluation by your              

      physician. Problem is new. Symptoms have improved. pm1                                      

                                                                                                  

**************************************************************************************************

## 2018-11-28 VITALS — TEMPERATURE: 98.9 F

## 2018-11-28 VITALS — DIASTOLIC BLOOD PRESSURE: 82 MMHG | SYSTOLIC BLOOD PRESSURE: 123 MMHG

## 2018-11-28 VITALS — OXYGEN SATURATION: 97 %

## 2018-11-28 NOTE — RAD REPORT
EXAM DESCRIPTION:  RAD - Chest Pa And Lat (2 Views) - 11/27/2018 9:42 pm

 

CLINICAL HISTORY:  Cough, fever

 

COMPARISON:  September 20 seconds

 

TECHNIQUE:  PA and lateral views of the chest were obtained.

 

FINDINGS:  The lungs are normal volume.  Patient has diffusely prominent interstitial markings not cl
early different from comparison. Early interstitial edema or infiltrate could be masked in this setti
ng. Heart size is normal and central vasculature is within normal limits.  No pleural effusion or pne
umothorax seen.  No acute bony finding noted.  No aortic abnormality.

 

Final reporting was delayed due to technical malfunction.

 

IMPRESSION:  No focal consolidation or focal acute chest finding.

 

Chronic interstitial lung disease is present. This is not clearly different from prior imaging and co
uld potentially mask early interstitial edema or infiltrate.

## 2019-10-29 NOTE — ER
Nurse's Notes                                                                                     

 [facility]                                                                

Name: Deirdre Sauer                                                                               

Age: 85 yrs                                                                                       

Sex: Female                                                                                       

: 1933                                                                                   

MRN: [id number]                                                                                   

Arrival Date: 2018                                                                          

Time: 16:55                                                                                       

Account#: [id number]                                                                            

Bed 28                                                                                            

Private MD: Amanda Ledesma F                                                                     

Diagnosis: Acute sinusitis;Cough                                                                  

                                                                                                  

Presentation:                                                                                     

                                                                                             

17:43 Presenting complaint: Patient states: "I've had a sinus infection for the past 2 weeks. aj1 

      It was getting better but now its getting worse" Reports sinus pressure, cough, nasal       

      congestion. Reports fever up to 101 at home. Denies SOB. Transition of care: patient        

      was not received from another setting of care. Onset of symptoms was 2018.         

      Risk Assessment: Do you want to hurt yourself or someone else? Patient reports no           

      desire to harm self or others. Initial Sepsis Screen: Does the patient meet any 2           

      criteria? No. Patient's initial sepsis screen is negative. Does the patient have a          

      suspected source of infection? Yes: Productive cough/pneumonia. Care prior to arrival:      

      None.                                                                                       

17:43 Method Of Arrival: Wheelchair                                                           aj1 

17:43 Acuity: MESFIN 3                                                                           aj1 

                                                                                                  

Triage Assessment:                                                                                

17:46 Headache History: Other Denies headache. General: Appears in no apparent distress.      aj1 

      comfortable, Behavior is calm, cooperative, appropriate for age. Pain: Denies pain.         

      Neuro: Level of Consciousness is awake, alert, obeys commands. Cardiovascular:              

      Patient's skin is warm and dry. Respiratory: Airway is patent Respiratory effort is         

      even, unlabored, Respiratory pattern is regular, symmetrical.                               

                                                                                                  

Historical:                                                                                       

- Allergies:                                                                                      

17:46 Statins-Hmg-Coa Reductase Inhibitors;                                                   aj1 

17:46 blood thinners except ASA;                                                              aj1 

17:46 Streptomycin;                                                                           aj1 

- PMHx:                                                                                           

17:46 CVA; Diabetes - NIDDM; Hyperlipidemia; Hypertension;                                    aj1 

                                                                                                  

- Immunization history:: Adult Immunizations up to date.                                          

- Social history:: Smoking status: Patient/guardian denies using tobacco.                         

                                                                                                  

                                                                                                  

Screenin:10 Abuse screen: Denies threats or abuse. Denies injuries from another. Nutritional        aa1 

      screening: No deficits noted. Tuberculosis screening: No symptoms or risk factors           

      identified. Fall Risk None identified.                                                      

                                                                                                  

Assessment:                                                                                       

19:10 General: Appears in no apparent distress. comfortable, Behavior is calm, cooperative,   aa1 

      appropriate for age. Pain: Denies pain. Neuro: Level of Consciousness is awake, alert,      

      obeys commands, Oriented to person, place, time, situation, Moves all extremities.          

      Speech is normal. Cardiovascular: Denies chest pain, Heart tones S1 S2 present Rhythm       

      is regular. Respiratory: Reports cough that is productive, Airway is patent Respiratory     

      effort is even, unlabored, Respiratory pattern is regular, symmetrical, Breath sounds       

      are clear bilaterally. the patient has moderate shortness of breath. GI: No signs           

      and/or symptoms were reported involving the gastrointestinal system. : No signs           

      and/or symptoms were reported regarding the genitourinary system. EENT: Throat is clear     

      Reports nasal congestion. Derm: Skin is intact, is healthy with good turgor, Skin is        

      pink, warm \T\ dry. Musculoskeletal: Circulation, motion, and sensation intact. Capillary   

      refill < 3 seconds.                                                                         

19:43 Reassessment: Patient appears in no apparent distress at this time. Patient is alert,   aa1 

      oriented x 3, equal unlabored respirations, skin warm/dry/pink. Pt taken to x-ray at        

      this time.                                                                                  

20:00 Reassessment: Patient appears in no apparent distress at this time. Patient and/or      aa1 

      family updated on plan of care and expected duration. Pain level reassessed. Patient is     

      alert, oriented x 3, equal unlabored respirations, skin warm/dry/pink. Pt back from         

      x-ray. Awaiting lab results.                                                                

20:34 Reassessment: Patient appears in no apparent distress at this time. Patient and/or      aa1 

      family updated on plan of care and expected duration. Pain level reassessed. Patient is     

      alert, oriented x 3, equal unlabored respirations, skin warm/dry/pink. Awaiting x-ray       

      results.                                                                                    

21:35 Reassessment: Patient appears in no apparent distress at this time. Patient and/or      aa1 

      family updated on plan of care and expected duration. Pain level reassessed. Patient is     

      alert, oriented x 3, equal unlabored respirations, skin warm/dry/pink. Awaiting             

      provider reassessment.                                                                      

22:30 Reassessment: Patient appears in no apparent distress at this time. Patient is alert,   aa1 

      oriented x 3, equal unlabored respirations, skin warm/dry/pink. Discussed d/c \T\ f/u       

      instructions with pt \T\ family; denies questions or concerns at this time Patient denies   

      pain at this time.                                                                          

                                                                                                  

Vital Signs:                                                                                      

17:46  / 41; Pulse 81; Resp 18; Temp 98.9; Pulse Ox 95% on R/A; Weight 77.11 kg (R);    aj1 

      Height 5 ft. 2 in. (157.48 cm) (R); Pain 0/10;                                              

19:42  / 50; Pulse 83; Resp 18; Pulse Ox 94% on R/A; Pain 0/10;                         aa1 

20:34  / 58; Pulse 80; Resp 18; Pulse Ox 97% on R/A; Pain 0/10;                         aa1 

21:22  / 82; Pulse 82; Resp 18; Pulse Ox 97% on R/A; Pain 0/10;                         aa1 

22:30  / 71; Pulse 83; Resp 18; Temp 99.0; Pulse Ox 97% on R/A; Pain 0/10;              aa1 

17:46 Body Mass Index 31.09 (77.11 kg, 157.48 cm)                                             aj1 

                                                                                                  

ED Course:                                                                                        

16:55 Patient arrived in ED.                                                                  rg4 

16:56 Amanda Ledesma MD is Private Physician.                                                rg4 

17:45 Triage completed.                                                                       aj1 

17:46 Arm band placed on Patient placed in waiting room, Patient notified of wait time.       aj1 

19:00 Marco A Lee NP is PHCP.                                                           pm1 

19:00 Abraham Goldberg MD is Attending Physician.                                             pm1 

19:10 Patient has correct armband on for positive identification. Bed in low position. Call   aa1 

      light in reach. Side rails up X2. Pulse ox on. NIBP on. Warm blanket given.                 

19:15 Initial lab(s) drawn, by me, sent to lab. First set of blood cultures drawn by me.      aa1 

      Inserted saline lock: 20 gauge in left antecubital area, using aseptic technique. Blood     

      collected.                                                                                  

19:17 Lida Gutiérrez, RN is Primary Nurse.                                                      aa1 

19:33 Second set of blood cultures drawn by me.                                               aa1 

19:35 Flu and/or RSV swab sent to lab.                                                        aa1 

19:52 Chest Pa And Lat (2 Views) XRAY In Process Unspecified.                                 EDMS

20:00 Diet: Patient given ice chips.                                                          aa1 

21:54 Amanda Ledesma MD is Referral Physician.                                               pm1 

22:30 No provider procedures requiring assistance completed. IV discontinued, intact,         aa1 

      bleeding controlled, No redness/swelling at site. Pressure dressing applied.                

                                                                                                  

Administered Medications:                                                                         

22:15 Drug: Rocephin 1 grams Route: IV; Rate: calculated rate; Site: left antecubital;        aa1 

22:30 Follow up: IV Status: Completed infusion                                                aa1 

                                                                                                  

                                                                                                  

Outcome:                                                                                          

21:55 Discharge ordered by MD.                                                                pm1 

22:30 Discharged to home via wheelchair, with family.                                         aa1 

22:30 Condition: good                                                                             

22:30 Discharge instructions given to patient, family, Instructed on discharge instructions,      

      follow up and referral plans. medication usage, Demonstrated understanding of               

      instructions, follow-up care, medications, Prescriptions given X 2.                         

22:32 Patient left the ED.                                                                    aa1 

                                                                                                  

Signatures:                                                                                       

Dispatcher [employer]                           EDStephanie Murry RN                     RN   cookie1                                                  

Lida Gutiérrez RN                        RN   aa1                                                  

Marco A Lee NP                    NP   pm1                                                  

Stephanie Nunez                                 rg4                                                  

                                                                                                  

************************************************************************************************** What Type Of Note Output Would You Prefer (Optional)?: Bullet Format How Severe Is Your Acne?: mild Is This A New Presentation, Or A Follow-Up?: Acne

## 2020-02-27 ENCOUNTER — HOSPITAL ENCOUNTER (INPATIENT)
Dept: HOSPITAL 97 - ER | Age: 85
LOS: 2 days | Discharge: HOME | DRG: 291 | End: 2020-02-29
Attending: FAMILY MEDICINE | Admitting: HOSPITALIST
Payer: COMMERCIAL

## 2020-02-27 VITALS — BODY MASS INDEX: 33.4 KG/M2

## 2020-02-27 DIAGNOSIS — J18.9: ICD-10-CM

## 2020-02-27 DIAGNOSIS — N18.3: ICD-10-CM

## 2020-02-27 DIAGNOSIS — I11.0: ICD-10-CM

## 2020-02-27 DIAGNOSIS — I50.33: ICD-10-CM

## 2020-02-27 DIAGNOSIS — E11.22: ICD-10-CM

## 2020-02-27 DIAGNOSIS — I13.0: Primary | ICD-10-CM

## 2020-02-27 DIAGNOSIS — N39.0: ICD-10-CM

## 2020-02-27 DIAGNOSIS — E11.9: ICD-10-CM

## 2020-02-27 DIAGNOSIS — D64.9: ICD-10-CM

## 2020-02-27 DIAGNOSIS — I10: ICD-10-CM

## 2020-02-27 DIAGNOSIS — E78.5: ICD-10-CM

## 2020-02-27 DIAGNOSIS — Z86.73: ICD-10-CM

## 2020-02-27 DIAGNOSIS — D69.6: ICD-10-CM

## 2020-02-27 DIAGNOSIS — I50.9: ICD-10-CM

## 2020-02-27 LAB
ALBUMIN SERPL BCP-MCNC: 2.7 G/DL (ref 3.4–5)
ALP SERPL-CCNC: 72 U/L (ref 45–117)
ALT SERPL W P-5'-P-CCNC: 15 U/L (ref 12–78)
AST SERPL W P-5'-P-CCNC: 21 U/L (ref 15–37)
BUN BLD-MCNC: 50 MG/DL (ref 7–18)
GLUCOSE SERPLBLD-MCNC: 152 MG/DL (ref 74–106)
HCT VFR BLD CALC: 32.9 % (ref 36–45)
INR BLD: 1.3
LYMPHOCYTES # SPEC AUTO: 0.5 K/UL (ref 0.7–4.9)
NT-PROBNP SERPL-MCNC: 433 PG/ML (ref ?–450)
PMV BLD: 9.1 FL (ref 7.6–11.3)
POTASSIUM SERPL-SCNC: 3.6 MMOL/L (ref 3.5–5.1)
RBC # BLD: 3.69 M/UL (ref 3.86–4.86)
TROPONIN (EMERG DEPT USE ONLY): < 0.02 NG/ML (ref 0–0.04)
UA COMPLETE W REFLEX CULTURE PNL UR: (no result)

## 2020-02-27 PROCEDURE — 84145 PROCALCITONIN (PCT): CPT

## 2020-02-27 PROCEDURE — 80076 HEPATIC FUNCTION PANEL: CPT

## 2020-02-27 PROCEDURE — 93306 TTE W/DOPPLER COMPLETE: CPT

## 2020-02-27 PROCEDURE — 83880 ASSAY OF NATRIURETIC PEPTIDE: CPT

## 2020-02-27 PROCEDURE — 96375 TX/PRO/DX INJ NEW DRUG ADDON: CPT

## 2020-02-27 PROCEDURE — 87804 INFLUENZA ASSAY W/OPTIC: CPT

## 2020-02-27 PROCEDURE — 81003 URINALYSIS AUTO W/O SCOPE: CPT

## 2020-02-27 PROCEDURE — 80048 BASIC METABOLIC PNL TOTAL CA: CPT

## 2020-02-27 PROCEDURE — 82947 ASSAY GLUCOSE BLOOD QUANT: CPT

## 2020-02-27 PROCEDURE — 85610 PROTHROMBIN TIME: CPT

## 2020-02-27 PROCEDURE — 99284 EMERGENCY DEPT VISIT MOD MDM: CPT

## 2020-02-27 PROCEDURE — 85730 THROMBOPLASTIN TIME PARTIAL: CPT

## 2020-02-27 PROCEDURE — 93005 ELECTROCARDIOGRAM TRACING: CPT

## 2020-02-27 PROCEDURE — 84484 ASSAY OF TROPONIN QUANT: CPT

## 2020-02-27 PROCEDURE — 87088 URINE BACTERIA CULTURE: CPT

## 2020-02-27 PROCEDURE — 83605 ASSAY OF LACTIC ACID: CPT

## 2020-02-27 PROCEDURE — 71046 X-RAY EXAM CHEST 2 VIEWS: CPT

## 2020-02-27 PROCEDURE — 85025 COMPLETE CBC W/AUTO DIFF WBC: CPT

## 2020-02-27 PROCEDURE — 80053 COMPREHEN METABOLIC PANEL: CPT

## 2020-02-27 PROCEDURE — 84100 ASSAY OF PHOSPHORUS: CPT

## 2020-02-27 PROCEDURE — 87086 URINE CULTURE/COLONY COUNT: CPT

## 2020-02-27 PROCEDURE — 36415 COLL VENOUS BLD VENIPUNCTURE: CPT

## 2020-02-27 PROCEDURE — 87040 BLOOD CULTURE FOR BACTERIA: CPT

## 2020-02-27 PROCEDURE — 83735 ASSAY OF MAGNESIUM: CPT

## 2020-02-27 PROCEDURE — 96374 THER/PROPH/DIAG INJ IV PUSH: CPT

## 2020-02-27 PROCEDURE — 71045 X-RAY EXAM CHEST 1 VIEW: CPT

## 2020-02-27 PROCEDURE — 81015 MICROSCOPIC EXAM OF URINE: CPT

## 2020-02-27 RX ADMIN — Medication SCH ML: at 21:44

## 2020-02-27 RX ADMIN — POTASSIUM BICARBONATE SCH MEQ: 25 TABLET, EFFERVESCENT ORAL at 21:48

## 2020-02-27 NOTE — EDPHYS
Physician Documentation                                                                           

 [facility]                                                                 

Name: Deirdre Sauer                                                                               

Age: 86 yrs                                                                                       

Sex: Female                                                                                       

: 1933                                                                                   

MRN: [id number]                                                                                   

Arrival Date: 2020                                                                          

Time: 17:53                                                                                       

Account#: [id number]                                                                            

Bed 25                                                                                            

Private MD:                                                                                       

ED Physician Timoteo Lara                                                                      

HPI:                                                                                              

                                                                                             

18:30 This 86 yrs old  Female presents to ER via EMS with complaints of Shortness Of jr8 

      Breath.                                                                                     

18:30 The patient has shortness of breath at rest. Onset: The symptoms/episode began/occurred jr8 

      gradually, 1 week(s) ago. Duration: The symptoms are continuous. The patient's              

      shortness of breath is aggravated by talking, walking, is alleviated by nothing.            

      Associated signs and symptoms: Pertinent positives: productive cough, fever. Severity       

      of symptoms: At their worst the symptoms were mild in the emergency department the          

      symptoms are unchanged. The patient has not experienced similar symptoms in the past.       

      The patient has not recently seen a physician.                                              

                                                                                                  

Historical:                                                                                       

- Allergies:                                                                                      

18:10 Statins-Hmg-Coa Reductase Inhibitors;                                                   vc  

18:10 blood thinners except ASA;                                                              vc  

18:10 Streptomycin;                                                                           vc  

- Home Meds:                                                                                      

18:10 aspirin 81 mg Oral TbEC 1 tab once daily [Active]; pravastatin 10 mg Oral tab 1 tab     vc  

      once daily [Active]; omeprazole 40 mg Oral cpDR 1 cap once daily [Active]; glimepiride      

      4 mg Oral tab 1 tab twice a day [Active]; furosemide 40 mg Oral tab 1 tab once daily        

      [Active]; Albuterol Inhl [Active]; Allopurinol Oral [Active]; Coreg Oral [Active];          

      Tradjenta [Active];                                                                         

- PMHx:                                                                                           

18:10 CVA; Diabetes - NIDDM; Hypertension; Hyperlipidemia;                                    vc  

- PSHx:                                                                                           

18:10 Hysterectomy;                                                                           vc  

                                                                                                  

- Immunization history:: Adult Immunizations up to date.                                          

- Social history:: Smoking status: Patient denies any tobacco usage or history of.                

                                                                                                  

                                                                                                  

ROS:                                                                                              

18:30 Eyes: Negative for injury, pain, redness, and discharge, Neck: Negative for injury,     jr8 

      pain, and swelling, Cardiovascular: Negative for chest pain, palpitations, and edema,       

      Abdomen/GI: Negative for abdominal pain, nausea, vomiting, diarrhea, and constipation,      

      Back: Negative for injury and pain, MS/Extremity: Negative for injury and deformity,        

      Skin: Negative for injury, rash, and discoloration, Neuro: Negative for headache,           

      weakness, numbness, tingling, and seizure.                                                  

18:30 Constitutional: Positive for fever.                                                         

18:30 ENT: Positive for rhinorrhea.                                                               

18:30 Respiratory: Positive for cough, shortness of breath.                                       

                                                                                                  

Exam:                                                                                             

18:30 Eyes:  Pupils equal round and reactive to light, extra-ocular motions intact.  Lids and jr8 

      lashes normal.  Conjunctiva and sclera are non-icteric and not injected.  Cornea within     

      normal limits.  Periorbital areas with no swelling, redness, or edema. ENT:  Nares          

      patent. No nasal discharge, no septal abnormalities noted.  Tympanic membranes are          

      normal and external auditory canals are clear.  Oropharynx with no redness, swelling,       

      or masses, exudates, or evidence of obstruction, uvula midline.  Mucous membranes           

      moist. Neck:  Trachea midline, no thyromegaly or masses palpated, and no cervical           

      lymphadenopathy.  Supple, full range of motion without nuchal rigidity, or vertebral        

      point tenderness.  No Meningismus. Cardiovascular:  Regular rate and rhythm with a          

      normal S1 and S2.  No gallops, murmurs, or rubs.  Normal PMI, no JVD.  No pulse             

      deficits. Abdomen/GI:  Soft, non-tender, with normal bowel sounds.  No distension or        

      tympany.  No guarding or rebound.  No evidence of tenderness throughout. Back:  No          

      spinal tenderness.  No costovertebral tenderness.  Full range of motion. Skin:  Warm,       

      dry with normal turgor.  Normal color with no rashes, no lesions, and no evidence of        

      cellulitis. MS/ Extremity:  Pulses equal, no cyanosis.  Neurovascular intact.  Full,        

      normal range of motion. Neuro:  Awake and alert, GCS 15, oriented to person, place,         

      time, and situation.  Cranial nerves II-XII grossly intact.  Motor strength 5/5 in all      

      extremities.  Sensory grossly intact.  Cerebellar exam normal.  Normal gait.                

18:30 Respiratory: the patient does not display signs of respiratory distress,  Respirations:     

      normal, symetrical, no use of accessory muscles, no grunting, no evidence of nasal          

      flaring, no prolonged exhalations, no pursed lip breathing, no retractions, no shallow      

      respirations, no splinting, no tachypnea, Breath sounds: rhonchi, that are moderate,        

      are heard diffusely.                                                                        

                                                                                                  

Vital Signs:                                                                                      

17:54  / 49; Pulse 82; Resp 14; Temp 99.5(O); Pulse Ox 92% on R/A; Weight 83.01 kg;     vc  

      Height 5 ft. 2 in. (157.48 cm);                                                             

18:10  / 49; Pulse 82; Resp 14; Temp 99.5(O); Pulse Ox 92% on R/A; Weight 83.01 kg (R); vc  

      Height 5 ft. 2 in. (157.48 cm) (R);                                                         

18:10 Body Mass Index 33.47 (83.01 kg, 157.48 cm)                                             vc  

                                                                                                  

MDM:                                                                                              

17:59 Patient medically screened.                                                             jr8 

19:30 Data reviewed: vital signs, nurses notes, lab test result(s), EKG, radiologic studies,  jr8 

      plain films. Data interpreted: Pulse oximetry: on room air is 91 %. Interpretation:         

      hypoxia. Counseling: I had a detailed discussion with the patient and/or guardian           

      regarding: the historical points, exam findings, and any diagnostic results supporting      

      the discharge/admit diagnosis, lab results, radiology results, the need for further         

      work-up and treatment in the hospital. Physician consultation: Gagan Coffey MD was         

      called at 19:31, was contacted at 19:31, regarding admission, to the telemetry unit.        

      consult, patient's condition, and will see patient.                                         

                                                                                                  

                                                                                             

17:59 Order name: Basic Metabolic Panel; Complete Time: 19:10                                                                                                                              

17:59 Order name: Blood Culture Adult (2)                                                                                                                                                  

17:59 Order name: CBC with Diff; Complete Time: 18:48                                                                                                                                      

17:59 Order name: Lactate; Complete Time: 19:02                                                                                                                                            

17:59 Order name: LFT's; Complete Time: 19:10                                                                                                                                              

17:59 Order name: Procalcitonin; Complete Time: 19:34                                                                                                                                      

17:59 Order name: Protime (+inr); Complete Time: 19:02                                                                                                                                     

17:59 Order name: Ptt, Activated; Complete Time: 19:02                                                                                                                                     

17:59 Order name: Troponin (emerg Dept Use Only); Complete Time: 19:10                                                                                                                     

17:59 Order name: Urine Microscopic Only; Complete Time: 20:51                                27                                                                                             

17:59 Order name: BNP; Complete Time: 19:10                                                   [facility] 

                                                                                             

17:59 Order name: Influenza Screen (a \T\ B); Complete Time: 19:29                                                                                                                           

18:28 Order name: Glucose, Ancillary Testing; Complete Time: 18:32                            [facility]

                                                                                             

20:13 Order name: CBC with Automated Diff                                                     [facility]

                                                                                             

17:59 Order name: Chest Single View XRAY; Complete Time: 18:48                                [facility] 

                                                                                             

20:13 Order name: Echo with Doppler                                                           [facility]

                                                                                             

20:13 Order name: Echo with Doppler                                                           EDMS

                                                                                             

20:13 Order name: CBC with Automated Diff                                                     [facility]MS

                                                                                             

20:13 Order name: Comprehensive Metabolic Panel                                               EDMS

                                                                                             

20:13 Order name: Comprehensive Metabolic Panel                                               MS

                                                                                             

20:13 Order name: Magnesium                                                                   [facility]

                                                                                             

20:13 Order name: Magnesium                                                                   [facility]

                                                                                             

20:13 Order name: Phosphorus                                                                  [facility]MS

                                                                                             

20:13 Order name: Phosphorus                                                                  EDMS

                                                                                             

20:13 Order name: NT PRO-BNP                                                                  MS

                                                                                             

20:13 Order name: NT PRO-BNP                                                                  MS

                                                                                             

20:13 Order name: Troponin I                                                                  [facility]

                                                                                             

20:13 Order name: Troponin I                                                                  [facility]

                                                                                             

20:13 Order name: Troponin I                                                                  [facility]

                                                                                             

20:23 Order name: Urine Dipstick--Ancillary (enter results); Complete Time: 20:27             [facility] 

                                                                                             

17:59 Order name: Accucheck; Complete Time: 18:26                                                                                                                                          

17:59 Order name: Cardiac monitoring; Complete Time: 18:26                                                                                                                                 

17:59 Order name: EKG - Nurse/Tech; Complete Time: 18:36                                                                                                                                   

17:59 Order name: IV Saline Lock - Large Bore; Complete Time: 18:41                                                                                                                        

17:59 Order name: Labs collected and sent; Complete Time: 18:26                                                                                                                            

17:59 Order name: O2 Per Protocol; Complete Time: 18:26                                                                                                                                    

17:59 Order name: O2 Sat Monitoring; Complete Time: 18:26                                                                                                                                  

17:59 Order name: Urine Dipstick-Ancillary (obtain specimen); Complete Time: 21:02            jr 

                                                                                             

20:13 Order name: CONS Physician Consult                                                      [facility]

                                                                                             

20:13 Order name: Heart Healthy                                                               EDMS

                                                                                                  

Administered Medications:                                                                         

18:49 CANCELLED (Physician Discretion): NS 0.9% (30 ml/kg) 30 ml/kg IV at bolus once; Sepsis  8 

      Protocol                                                                                    

19:23 Drug: Lasix 40 mg Route: IVP; Site: left antecubital;                                   vc  

20:00 Follow up: Response: No adverse reaction                                                vc  

21:00 Drug: LevaQUIN 500 mg Volume: 100 ml; Route: IVPB; Infused Over: 60 mins; Site: right   vc  

      forearm;                                                                                    

21:00 Follow up: IV Status: Infusion continued upon admission                                 vc  

                                                                                                  

                                                                                                  

Disposition:                                                                                      

20 19:31 Hospitalization ordered by Gagan Coffey for Observation. Preliminary             

  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                   

  heart failure, Prerenal azotemia, Urinary tract infection, site not specified.                  

- Bed requested for Telemetry/MedSurg (observation).                                              

- Status is Observation.                                                                      mw2 

- Condition is Stable.                                                                            

- Problem is new.                                                                                 

- Symptoms have improved.                                                                         

                                                                                                  

                                                                                                  

                                                                                                  

Signatures:                                                                                       

Dispatcher [employer]                           EDMS                                                 

Federico Estrada PA PA   jr8                                                  

Murray Prakash                            mw2                                                  

Anu Garcia RN                    RN   vc                                                   

                                                                                                  

Corrections: (The following items were deleted from the chart)                                    

18:49 17:59 NS 0.9% (30 ml/kg) 30 ml/kg IV at bolus once; Sepsis Protocol ordered. 8        8 

20:17 19:31 Hospitalization Ordered by Gagan Coffey MD for Observation. Preliminary          mw2 

      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          

      failure; Prerenal azotemia. Bed requested for Telemetry/MedSurg (observation). Status       

      is Observation. Condition is Stable. Problem is new. Symptoms have improved. jr8            

20:51 20:17 2020 19:31 Hospitalization Ordered by Gagan Coffey MD for Observation.     jr8 

      Preliminary diagnosis is Acute combined systolic (congestive) and diastolic                 

      (congestive) heart failure; Prerenal azotemia. Bed requested for Telemetry/MedSurg          

      (observation). Status is Observation. Condition is Stable. Problem is new. Symptoms         

      have improved. mw2                                                                          

21:11 20:51 2020 19:31 Hospitalization Ordered by Gagan Coffey MD for Observation.     mw2 

      Preliminary diagnosis is Acute combined systolic (congestive) and diastolic                 

      (congestive) heart failure; Prerenal azotemia; Urinary tract infection, site not            

      specified. Bed requested for Telemetry/MedSurg (observation). Status is Observation.        

      Condition is Stable. Problem is new. Symptoms have improved. jr8                            

                                                                                                  

**************************************************************************************************

## 2020-02-27 NOTE — ER
Nurse's Notes                                                                                     

 [facility]                                                                 

Name: Deirdre Sauer                                                                               

Age: 86 yrs                                                                                       

Sex: Female                                                                                       

: 1933                                                                                   

MRN: [id number]                                                                                   

Arrival Date: 2020                                                                          

Time: 17:53                                                                                       

Account#: [id number]                                                                            

Bed 25                                                                                            

Private MD:                                                                                       

Diagnosis: Acute combined systolic (congestive) and diastolic (congestive) heart failure;Prerenal 

  azotemia;Urinary tract infection, site not specified                                            

                                                                                                  

Presentation:                                                                                     

                                                                                             

17:54 Chief complaint: Patient states: "I feel short of breath and I feel like my breathing   vc  

      is labored." EMS states: "The patients home health nurse called stating she was             

      severely hypertensive, on arrival blood pressure was 145/68. Patient was also running a     

      temperature of 101 and took some Tylenol at 1530, patient was complaining she has been      

      coughing up mucus and has some post nasal dripping.". Coronavirus screen: The patient       

      has NOT traveled to [place] in the past 14 days. Coronavirus screen: The patient has NOT      

      traveled to [place] in the past 14 days. Proceed with normal triage procedures. Ebola         

      Screen: No symptoms or risks identified at this time. Initial Sepsis Screen: Does the       

      patient meet any 2 criteria? No. Patient's initial sepsis screen is negative. Does the      

      patient have a suspected source of infection? Yes: Productive cough/pneumonia. Risk         

      Assessment: Do you want to hurt yourself or someone else? Patient reports no desire to      

      harm self or others.                                                                        

17:54 Method Of Arrival: EMS: [facility]                                                vc  

17:54 Acuity: M[facility] 3                                                                           vc  

                                                                                                  

Historical:                                                                                       

- Allergies:                                                                                      

18:10 Statins-Hmg-Coa Reductase Inhibitors;                                                   vc  

18:10 blood thinners except ASA;                                                              vc  

18:10 Streptomycin;                                                                           vc  

- Home Meds:                                                                                      

18:10 aspirin 81 mg Oral TbEC 1 tab once daily [Active]; pravastatin 10 mg Oral tab 1 tab     vc  

      once daily [Active]; omeprazole 40 mg Oral cpDR 1 cap once daily [Active]; glimepiride      

      4 mg Oral tab 1 tab twice a day [Active]; furosemide 40 mg Oral tab 1 tab once daily        

      [Active]; Albuterol Inhl [Active]; Allopurinol Oral [Active]; Coreg Oral [Active];          

      Tradjenta [Active];                                                                         

- PMHx:                                                                                           

18:10 CVA; Diabetes - NIDDM; Hypertension; Hyperlipidemia;                                    vc  

- PSHx:                                                                                           

18:10 Hysterectomy;                                                                           vc  

                                                                                                  

- Immunization history:: Adult Immunizations up to date.                                          

- Social history:: Smoking status: Patient denies any tobacco usage or history of.                

                                                                                                  

                                                                                                  

Screenin:00 Abuse screen: Denies threats or abuse. Nutritional screening: No deficits noted.        vc  

      Tuberculosis screening: No symptoms or risk factors identified. Fall Risk None              

      identified.                                                                                 

                                                                                                  

Vital Signs:                                                                                      

17:54  / 49; Pulse 82; Resp 14; Temp 99.5(O); Pulse Ox 92% on R/A; Weight 83.01 kg;     vc  

      Height 5 ft. 2 in. (157.48 cm);                                                             

18:10  / 49; Pulse 82; Resp 14; Temp 99.5(O); Pulse Ox 92% on R/A; Weight 83.01 kg (R); vc  

      Height 5 ft. 2 in. (157.48 cm) (R);                                                         

18:10 Body Mass Index 33.47 (83.01 kg, 157.48 cm)                                             vc  

                                                                                                  

ED Course:                                                                                        

17:53 Patient arrived in ED.                                                                  vc  

17:59 Federico Estrada PA is PHCP.                                                               jr8 

17:59 Timoteo Lara MD is Attending Physician.                                             jr8 

18:00 Triage completed.                                                                       vc  

18:21 Initial lab(s) drawn, by me, sent to lab. First set of blood cultures drawn by me,      lt1 

      Second set of blood cultures drawn by me, Flu and/or RSV swab sent to lab.                  

18:25 Inserted saline lock: 22 gauge in left antecubital area, using aseptic technique.       lt1 

18:26 Influenza Screen (a \T\ B) Sent.                                                          lt1

18:37 Chest Single View XRAY In Process Unspecified.                                          EDMS

18:41 Anu Garcia, OTTONIEL is Primary Nurse.                                                  vc  

19:31 Gagan Coffey MD is Hospitalizing Provider.                                           jr8 

                                                                                                  

Administered Medications:                                                                         

18:49 CANCELLED (Physician Discretion): NS 0.9% (30 ml/kg) 30 ml/kg IV at bolus once; Sepsis  jr8 

      Protocol                                                                                    

19:23 Drug: Lasix 40 mg Route: IVP; Site: left antecubital;                                   vc  

20:00 Follow up: Response: No adverse reaction                                                vc  

21:00 Drug: LevaQUIN 500 mg Volume: 100 ml; Route: IVPB; Infused Over: 60 mins; Site: right   vc  

      forearm;                                                                                    

21:00 Follow up: IV Status: Infusion continued upon admission                                 vc  

                                                                                                  

                                                                                                  

Outcome:                                                                                          

19:31 Decision to Hospitalize by Provider.                                                    jr8 

21:11 Patient left the ED.                                                                    mw2 

                                                                                                  

Signatures:                                                                                       

Dispatcher [employer]                           EDMS                                                 

Federico Estrada PA PA   jr8                                                  

Murray Prakash                            mw2                                                  

Chyna Smith                                   lt1                                                  

Anu Garcia RN                    RN   vc                                                   

                                                                                                  

**************************************************************************************************

## 2020-02-27 NOTE — RAD REPORT
EXAM DESCRIPTION:  RAD - Chest Single View - 2/27/2020 6:37 pm

 

CLINICAL HISTORY:  Dyspnea;Fever

Chest pain.

 

COMPARISON:  Chest Pa And Lat (2 Views) dated 11/27/2018; Chest Single View dated 9/22/2018; Chest Si
ngle View dated 3/1/2018; Chest Single View dated 12/22/2017

 

FINDINGS:  Portable technique limits examination quality.

 

Mild interstitial pulmonary edema is seen. The heart is mildly enlarged with a small left pleural eff
usion. No displaced fractures.

 

IMPRESSION:  Mild CHF suspected.

## 2020-02-28 LAB
ALBUMIN SERPL BCP-MCNC: 2.5 G/DL (ref 3.4–5)
ALP SERPL-CCNC: 72 U/L (ref 45–117)
ALT SERPL W P-5'-P-CCNC: 16 U/L (ref 12–78)
AST SERPL W P-5'-P-CCNC: 23 U/L (ref 15–37)
BLD SMEAR INTERP: (no result)
BUN BLD-MCNC: 43 MG/DL (ref 7–18)
GLUCOSE SERPLBLD-MCNC: 129 MG/DL (ref 74–106)
HCT VFR BLD CALC: 31.9 % (ref 36–45)
LYMPHOCYTES # SPEC AUTO: 0.6 K/UL (ref 0.7–4.9)
MAGNESIUM SERPL-MCNC: 1.8 MG/DL (ref 1.8–2.4)
MORPHOLOGY BLD-IMP: (no result)
NT-PROBNP SERPL-MCNC: 493 PG/ML (ref ?–450)
PMV BLD: 9 FL (ref 7.6–11.3)
POTASSIUM SERPL-SCNC: 3.7 MMOL/L (ref 3.5–5.1)
RBC # BLD: 3.55 M/UL (ref 3.86–4.86)
TROPONIN I: < 0.02 NG/ML (ref 0–0.04)

## 2020-02-28 RX ADMIN — FUROSEMIDE SCH MG: 10 INJECTION, SOLUTION INTRAVENOUS at 20:35

## 2020-02-28 RX ADMIN — Medication SCH ML: at 08:44

## 2020-02-28 RX ADMIN — POTASSIUM BICARBONATE SCH MEQ: 25 TABLET, EFFERVESCENT ORAL at 20:36

## 2020-02-28 RX ADMIN — POTASSIUM & SODIUM PHOSPHATES POWDER PACK 280-160-250 MG SCH PKT: 280-160-250 PACK at 08:44

## 2020-02-28 RX ADMIN — Medication SCH ML: at 20:37

## 2020-02-28 RX ADMIN — POTASSIUM BICARBONATE SCH MEQ: 25 TABLET, EFFERVESCENT ORAL at 08:44

## 2020-02-28 RX ADMIN — FUROSEMIDE SCH MG: 10 INJECTION, SOLUTION INTRAVENOUS at 08:50

## 2020-02-28 RX ADMIN — ASPIRIN SCH MG: 81 TABLET, COATED ORAL at 08:44

## 2020-02-28 RX ADMIN — POTASSIUM & SODIUM PHOSPHATES POWDER PACK 280-160-250 MG SCH PKT: 280-160-250 PACK at 10:47

## 2020-02-28 RX ADMIN — POTASSIUM & SODIUM PHOSPHATES POWDER PACK 280-160-250 MG SCH PKT: 280-160-250 PACK at 09:31

## 2020-02-28 NOTE — CON
Date of Consultation:  02/28/2020



Admitted on 02/27/2020 by Dr. Wagner.  I saw the patient on 02/28/2020.



Reason For Consultation:  Congestive heart failure.



History Of Present Illness:  Ms. Sauer is an 86-year-old woman.  She is known to me from previous off
ice visits and admission.  She has a history of diabetes, hypertension, congestive heart failure, dys
lipidemia, and CVA, came in with dyspnea on exertion.  Chest x-ray showed mild congestive heart failu
re.  EKG shows sinus rhythm with low voltage.  Denied chest pain, nausea, vomiting, diaphoresis.  Den
ied any orthopnea.  She had pedal edema.  She had no palpitation.  She had no syncope.  She has alrea
dy ruled out for an MI.  She is improving on IV Lasix.



Allergies:  SHE IS ALLERGIC TO STATIN AND BLOOD THINNERS.



Past Medical History:  Diabetes, hypertension, diastolic congestive heart failure, history of CVA, an
d dyslipidemia.



Medications:  Allopurinol, Coreg, Pravachol, Tradjenta, Amaryl, Lasix, and aspirin.



Review of Systems:

Negative.



Social History:  Negative.



Family History:  Negative.



Physical Examination:

General:  She was very pleasant. 

Vital Signs:  Stable, afebrile. 

HEENT:  Negative. 

Neck:  Supple.  No bruit. 

Chest:  Actually was clear to me to auscultation and percussion. 

Cardiac:  Revealed a regular rhythm and rate with S4 gallops. 

Abdomen:  Benign. 

Extremities:  Revealed no clubbing or cyanosis.  She had trace edema.



Diagnostic Data:  As stated earlier, but also she had a creatinine of 2.01.  Hemoglobin of 10.5.  Glu
cose was 207.  Her BNP was 493. 



Echocardiogram that was done was normal without any wall motion abnormalities.  No effusions.



Impression:  

1.Acute on chronic diastolic congestive heart failure.

2.Renal insufficiency.

3.Diabetes.

4.Hypertension, well controlled.

5.Dyslipidemia.

6.History of cerebrovascular accident.

7.Anemia.



Plan:  I would continue her present regimen.  Consider renal consultation.  Patient is not on any nep
hrotoxic drugs except for her Lasix.  I am comfortable with her going home whenever it is okay with LASHONDA Wagner.  I will see her in the office in the next week or 2.





NB/MODL

DD:  02/28/2020 16:29:36Voice ID:  [id number]

DT:  02/28/2020 21:29:01Report ID:  [id number]

## 2020-02-28 NOTE — RAD REPORT
EXAM DESCRIPTION:  RAD - Chest Pa And Lat (2 Views) - 2/28/2020 7:16 am

 

CLINICAL HISTORY:  follow up CHF

 

COMPARISON:  Chest Single View dated 2/27/2020; Chest Pa And Lat (2 Views) dated 11/27/2018

 

TECHNIQUE:  Frontal and lateral views of the chest were obtained.

 

FINDINGS:  The lungs are better aerated. Interstitial opacification is similar to the 2018 study. The
re is minimal remnant medial left base opacification that is probably atelectasis and a small amount 
of pleural fluid.   Heart size is normal range. Vasculature within normal limits.

 

IMPRESSION:  Mild CHF pattern has improved. Minimal remnant lung base pleural and parenchymal opacifi
cation.

## 2020-02-28 NOTE — P.PN
Subjective


Date of Service: 02/28/20


Primary Care Provider: Dr. Holley; Nephrology-Dr. Rayo


Chief Complaint: Shortness of breath


Subjective: Improving, Doing well





Physical Examination





- Vital Signs


Temperature: 99.2 F


Blood Pressure: 141/65


Pulse: 70


Respirations: 18


Pulse Ox (%): 93





- Physical Exam


General: Alert, In no apparent distress, Oriented x3, Cooperative


HEENT: Atraumatic


Neck: Supple


Respiratory: Crackles/rales (Minimal crackles to the bases)


Cardiovascular: Normal pulses, Regular rate/rhythm


Gastrointestinal: Normal bowel sounds, Soft and benign, Non-distended, No masses

, No rebound, No guarding


Musculoskeletal: No erythema, No tenderness, No warmth


Integumentary: No tenderness/swelling, No erythema, No warmth, No cyanosis


Neurological: Normal speech, Normal strength at 5/5 x4 extr, Normal tone, 

Normal affect





- Studies


Laboratory Data (last 24 hrs)





02/27/20 18:14: PT 15.2 H, INR 1.30, APTT 27.9


02/27/20 18:14: WBC 4.5, Hgb 10.7 L, Hct 32.9 L, Plt Count 62 L


02/27/20 18:14: Sodium 141, Potassium 3.6, BUN 50 H, Creatinine 2.03 H, Glucose 

152 H, Total Bilirubin 1.1 H, AST 21, ALT 15, Alkaline Phosphatase 72





Microbiology Data (last 24 hrs): 








02/27/20 18:43   Nasopharnyx   Influenza Type A Antigen Screen - Final


02/27/20 18:43   Nasopharnyx   Influenza Type B Antigen Screen - Final





Medications List Reviewed: Yes





Assessment & Plan


Discharge Plan: Home


Plan to discharge in: 24 Hours


Physician Review Additional Text: 





Impression:


Shortness of breath secondary to acute on chronic diastolic CHF


Fatigue with elevated pro calcitonin suspect related to left base pneumonia 

with possible UTI


Chronic renal disease stage II


Hypertension


Hyperlipidemia


Chronic thrombocytopenia





Plan:


Shortness of breath secondary to acute on chronic diastolic CHF:  Repeat x-ray 

shows improvement.  Patient on room air.  Continue diuresis.  Will teach on 

1500 cc per day fluid restriction and low-salt diet.  Patient not on a fluid 

restriction at home.  Patient takes Lasix at home.  Will ambulate.  Patient 

also receiving antibiotic therapy for possible pneumonia/UTI.  Pulmonology 

consulted.  Echocardiogram ordered.  Possible discharge as early as today with 

significant improvement.  Will discuss with pulmonology and nephrology.


Fatigue with elevated pro calcitonin suspect related to left base pneumonia 

with possible UTI:  Urine culture obtained.  Patient on Levaquin.  Continue 

antibiotic therapy.


Chronic renal disease stage II:  Overall stable.  Nephrology consulted.  

Continue diuresis.  Will discuss further with nephrology.


Hypertension:  Restart home medication.


Hyperlipidemia:  Restart home medication.


Thrombocytopenia, chronic:  This appears chronic.  Will send for peripheral 

smear.


Time Spent Managing Pts Care (In Minutes): 55

## 2020-02-28 NOTE — ECHO
HEIGHT: 5 ft 2 in   WEIGHT: 182 lb 14.4 oz   DATE OF STUDY: 02/28/2020   REFER DR: Gagan Coffey MD

2-DIMENSIONAL: YES

     M.MODE: YES

 DOPPLER: YES

COLOR FLOW: YES



                    TDS:  YES

PORTABLE: 

 DEFINITY:  

BUBBLE STUDY: 





DIAGNOSIS:  ACUTE CONGESTIVE HEART FAILURE



CARDIAC HISTORY:  

CATHERIZATION: NO

SURGERY: NO

PROSTHETIC VALVE: NO

PACEMAKER: NO





MEASUREMENTS (cm)

    DIASTOLIC (NORMALS)             SYSTOLIC (NORMALS)

IVSd                 1.0 (0.6-1.2)                    LA Diam 2.9 (1.9-4.0)     LVEF       
  64%  

LVIDd               3.9 (3.5-5.7)                        LVIDs      2.5 (2.0-3.5)     %FS  
        35%

LVPWd             1.0 (0.6-1.2)

Ao Diam           3.3 (2.0-3.7)



2 DIMENSIONAL ASSESSMENT:

RIGHT ATRIUM:                   NORMAL

LEFT ATRIUM:       NORMAL



RIGHT VENTRICLE:            NORMAL

LEFT VENTRICLE: NORMAL



TRICUSPID VALVE:             NORMAL

MITRAL VALVE:     MITRAL ANNULAR CALCIFICATION



PULMONIC VALVE:             NORMAL

AORTIC VALVE:     NORMAL



PERICARDIAL EFFUSION: NONE

AORTIC ROOT:      NORMAL





LEFT VENTRICULAR WALL MOTION:     NORMAL



DOPPLER/COLOR FLOW:     NORMAL



COMMENTS:      NORMAL LEFT VENTRICULAR SIZE AND FUNCTION.  MITRAL ANNULAR CALCIFICATION.  

                            NO WALL MOTION ABNORMALITY.  NO EFFUSION.



TECHNOLOGIST:   DAYO WOMACK

## 2020-02-28 NOTE — P.HP
Certification for Inpatient


Patient admitted to: Inpatient


With expected LOS: >2 Midnights


Patient will require the following post-hospital care: None


Practitioner: I am a practitioner with admitting privileges, knowledge of 

patient current condition, hospital course, and medical plan of care.


Services: Services provided to patient in accordance with Admission 

requirements found in Title 42 Section 412.3 of the Code of Federal Regulations





Patient History


Date of Service: 02/27/20


Reason for admission: Shortness of breath


History of Present Illness: 





Patient is an 86-year-old female came to the hospital with shortness of breath.

  Patient states she has been sick for the last couple of days and has been 

getting progressively worse.  She came into the emergency room for further 

evaluation.  In the ER she was found have congestive heart failure with 

bilateral pulmonary edema. However, she did also have an elevated procalcitonin 

level.  She does state that she has been coughing up some purulent sputum which 

has been greenish in color.  She has had some shakes and chills as well.  Will 

cover her with some antibiotics and get an echocardiogram.  She also has some 

renal dysfunction and will get her nephrologist to evaluate her so we can 

monitor her renal function closely while we diurese her.  Echocardiogram is 

pending at this time.  Patient will be admitted for further evaluation.


Allergies





blood thinner Allergy (Mild, Uncoded 02/27/20 21:12)


 swelling


blood thinners Allergy (Uncoded 02/27/20 21:12)


 Unknown


Statins-Hmg-Coa Redu Allergy (Uncoded 02/27/20 21:12)


 Unknown





Home Medications: 








Aspirin [Aspirin EC 81 MG] 81 mg PO DAILY 03/02/18 


Furosemide [Lasix*] 1.5 mg PO DAILY 03/02/18 


Glimepiride [Amaryl] 4 mg PO BID 03/02/18 


Pravastatin Sodium 10 mg PO BEDTIME 03/02/18 


carvediloL [Coreg*] 6.25 mg PO BEDTIME 03/02/18 


Allopurinol 100 mg PO DAILY 02/27/20 


Cetirizine HCl 10 mg PO DAILY 02/27/20 


Linagliptin [Tradjenta] 1 tab PO DAILY 02/27/20 








- Past Medical/Surgical History


Has patient received pneumonia vaccine in the past: Yes


Diabetic: Yes


-: DM


-: HTN


-: Hyperlipidemia


-: CVA 2015


-: Gastric Ulcer


-: Full Hysterectomy





- Family History


  ** Mother


Medical History: Diabetes





- Social History


Smoking Status: Never smoker


Alcohol use: No


CD- Drugs: No


Caffeine use: No


Place of Residence: Home





Review of Systems


10-point ROS is otherwise unremarkable





Physical Examination





- Vital Signs


Temperature: 99.2 F


Blood Pressure: 141/65


Pulse: 70


Respirations: 18


Pulse Ox (%): 93





- Physical Exam


General: Alert, In no apparent distress, Oriented x3


HEENT: Atraumatic, PERRLA, Mucous membr. moist/pink, EOMI, Sclerae nonicteric


Neck: Supple, 2+ carotid pulse no bruit, No LAD, Without JVD or thyroid 

abnormality


Respiratory: Diminished, Crackles/rales


Cardiovascular: Regular rate/rhythm, Normal S1 S2, No murmurs


Gastrointestinal: Normal bowel sounds, Soft and benign, Non-distended, No 

tenderness


Musculoskeletal: No clubbing, No swelling, No tenderness


Integumentary: No rashes


Neurological: Normal gait, Normal speech, Normal strength at 5/5 x4 extr, 

Normal tone, Sensation intact, Cranial nerves 3-12 intact, Normal affect


Lymphatics: No axilla or inguinal lymphadenopathy





- Studies


Laboratory Data (last 24 hrs)





02/27/20 18:14: PT 15.2 H, INR 1.30, APTT 27.9


02/27/20 18:14: WBC 4.5, Hgb 10.7 L, Hct 32.9 L, Plt Count 62 L


02/27/20 18:14: Sodium 141, Potassium 3.6, BUN 50 H, Creatinine 2.03 H, Glucose 

152 H, Total Bilirubin 1.1 H, AST 21, ALT 15, Alkaline Phosphatase 72





Microbiology Data (last 24 hrs): 








02/27/20 18:43   Nasopharnyx   Influenza Type A Antigen Screen - Final


02/27/20 18:43   Nasopharnyx   Influenza Type B Antigen Screen - Final








Assessment & Plan





- Problems (Diagnosis)


(1) Acute exacerbation of CHF (congestive heart failure)


Current Visit: Yes   Status: Acute   





(2) Pneumonia


Current Visit: Yes   Status: Acute   





(3) Elevated procalcitonin


Current Visit: Yes   Status: Acute   





(4) Acute on chronic renal insufficiency


Current Visit: Yes   Status: Acute   





- Plan





1. Echocardiogram 


2. Continue with IV antibiotic therapy


3. Renal consultation


4. Cardiology consultation


5. Aggressive diuresis


6. Strict I's and O's


7. Repeat CXR


8. Daily weights


9. Education regarding diet and treatment of congestive heart failure


Discharge Plan: Home


Plan to discharge in: Greater than 2 days





- Advance Directives


Does patient have a Living Will: Yes


Does patient have a Durable POA for Healthcare: Yes





- Code Status/Comfort Care


Code Status Assessed: Yes


Code Status: Full Code


Critical Care: No


Time Spent Managing PTS Care (In Minutes): 45

## 2020-02-29 VITALS — OXYGEN SATURATION: 93 %

## 2020-02-29 VITALS — DIASTOLIC BLOOD PRESSURE: 53 MMHG | TEMPERATURE: 98.2 F | SYSTOLIC BLOOD PRESSURE: 110 MMHG

## 2020-02-29 LAB
BUN BLD-MCNC: 50 MG/DL (ref 7–18)
GLUCOSE SERPLBLD-MCNC: 140 MG/DL (ref 74–106)
HCT VFR BLD CALC: 29.3 % (ref 36–45)
LYMPHOCYTES # SPEC AUTO: 0.7 K/UL (ref 0.7–4.9)
MAGNESIUM SERPL-MCNC: 1.8 MG/DL (ref 1.8–2.4)
PMV BLD: 9.4 FL (ref 7.6–11.3)
POTASSIUM SERPL-SCNC: 3.8 MMOL/L (ref 3.5–5.1)
RBC # BLD: 3.31 M/UL (ref 3.86–4.86)

## 2020-02-29 RX ADMIN — POTASSIUM BICARBONATE SCH MEQ: 25 TABLET, EFFERVESCENT ORAL at 08:13

## 2020-02-29 RX ADMIN — FUROSEMIDE SCH MG: 10 INJECTION, SOLUTION INTRAVENOUS at 08:14

## 2020-02-29 RX ADMIN — Medication SCH ML: at 08:15

## 2020-02-29 RX ADMIN — POTASSIUM & SODIUM PHOSPHATES POWDER PACK 280-160-250 MG SCH PKT: 280-160-250 PACK at 09:58

## 2020-02-29 RX ADMIN — POTASSIUM & SODIUM PHOSPHATES POWDER PACK 280-160-250 MG SCH PKT: 280-160-250 PACK at 08:12

## 2020-02-29 RX ADMIN — ASPIRIN SCH MG: 81 TABLET, COATED ORAL at 08:13

## 2020-02-29 RX ADMIN — POTASSIUM & SODIUM PHOSPHATES POWDER PACK 280-160-250 MG SCH PKT: 280-160-250 PACK at 09:01

## 2020-02-29 NOTE — CON
Date of Consultation:  02/28/2020



Chief Complaint:  Acute-on-chronic kidney injury.



History Of Present Illness:  Patient is an 86-year-old woman, who came to the hospital because of shaye
rtness of breath.  She was not doing well over last couple days and she developed progressively worse
 fatigue, malaise, chest discomfort, shortness of breath.  She came to the hospital for further evalu
ation.  ER workup showed congestive heart failure with bilateral pulmonary edema.  Patient although w
as found to have elevated calcitonin level and was initiated on antibiotic and workup was started for
 pneumonia and respiratory failure as well as possible sepsis.  Patient had productive cough with yel
lowish sputum.  She also was complaining of chill and rigors. 



Patient has nonoliguric urine output.  Patient has history of chronic kidney disease stage 3 due to d
iabetes mellitus and hypertension.



Review of Systems:

General:  Denies syncope.  Had some fever and chills. 

Eyes:  Denies vision changes. 

Ears, Nose, Mouth and Throat:  Denies sore throat, earache. 

Respiratory:  Denies wheezing, shortness of breath. 

GI:  Denies nausea, vomiting. 

:  Denies dysuria, hematuria. 

Musculoskeletal:  Denies muscle aches or joint swelling. 



All other systems reviewed and all are negative.



Past Medical History:  Diabetes mellitus, hypertension, hyperlipidemia, gastric ulcer, hysterectomy.



Social History:  Denies tobacco, alcohol, or illicit drugs.



Family History:  No kidney disease in the family.



Physical Examination:

Vital Signs:  Blood pressure 141/65, heart rate 70, respiratory rate 18, SpO2 of 93%. 

Eyes:  Anicteric sclerae.  EOMI. 

Ears, Nose, Mouth and Throat:  Oral mucosa moist.  No pallor. 

Neck:  Supple.  No bruits. 

Lungs:  Diminished breath sounds in bases.  Crackles and rales present bilaterally.  No wheezing. 

Gastrointestinal:  Abdomen is soft, benign, nontender.  Normal bowel sounds present. 

Extremities:  Edema present in both legs. 

Neurological:  Moving extremities.  Cranial nerves intact. 

Psychiatric:  Alert, oriented x3.  Normal affect.



Laboratory Data:  __________ Sodium 141, potassium 3.6, BUN __________, creatinine 2.03, glucose 152.
  Hemoglobin 10.7, WBC 4.5.



Assessment:  

1.Acute on chronic congestive heart failure exacerbation, pneumonia, elevated procalcitonin, acute-o
n-chronic kidney injury.  Patient will continue diuretics for cardiorenal syndrome.  Monitor electrol
ytes.  Adjust replacement as needed.

2.Chronic kidney disease, stage 3.  Monitor fluid balance and then check renal ultrasound to rule ou
t an evidence of obstructive uropathy.

3.Patient has congestive heart failure exacerbation.  Patient will require diuretics.  Monitor magne
sium and adjust replacement.

4.Patient has history of chronic kidney disease.  Plan is to monitor albumin level and urine protein
 to creatinine ratio level.

5.Possible sepsis.  Continue antibiotics.  Awaiting cultures.



Plan:  

1.__________ BUN is 31, creatinine is 2.01, sodium 141, potassium 3.7, chloride 105, CO2 of 43, magn
esium 1.8, calcium 6.2.

2.Patient has chronic kidney disease, stage 3.  Baseline creatinine level is ranging from 1.8 to 2.2
.  On arrival to the hospital, patient was found to have creatinine of 2.03 and 2.01.  There is some 
BUN creatinine ratio elevation due to prerenal azotemia.  

Patient will continue diuretic and treatment will be adjusted with adequate diuretic dose as needed.





DANIEL/MODL

DD:  02/28/2020 22:31:17Voice ID:  [id number]

DT:  02/29/2020 03:09:17Report ID:  [id number]

## 2020-02-29 NOTE — P.DS
Admission Date: 02/27/20


Discharge Date: 02/29/20


Primary Care Provider: Dr. Holley; Nephrology-Dr. Rayo


Disposition: ROUTINE DISCHARGE


Discharge Condition: GOOD


Reason for Admission: Shortness of breath


Consultations: 





Cardiology-Dr. Hilario


Nephrology-Dr. Dotson





Procedures: 





CXR: 


FINDINGS:  The lungs are better aerated. Interstitial opacification is similar 

to the 2018 study. There is minimal remnant medial left base opacification that 

is probably atelectasis and a small amount of pleural fluid.   Heart size is 

normal range. Vasculature within normal limits. 


IMPRESSION:  Mild CHF pattern has improved. Minimal remnant lung base pleural 

and parenchymal opacification.





ECHO: 


EF 64%


LEFT VENTRICULAR WALL MOTION:     NORMAL


DOPPLER/COLOR FLOW:     NORMAL


COMMENTS:      NORMAL LEFT VENTRICULAR SIZE AND FUNCTION.  MITRAL ANNULAR 

CALCIFICATION.  


                            NO WALL MOTION ABNORMALITY.  NO EFFUSION.





Medical Problem List: 


Shortness of breath secondary to acute on chronic diastolic CHF


Fatigue with elevated pro calcitonin likely related to UTI


Chronic renal disease stage II


Hypertension


Hyperlipidemia


Chronic thrombocytopenia





Brief History of Present Illness: 





86-year-old  female presented to the emergency room with increasing 

shortness of breath and edema to the lower extremity.  Patient found to have 

acute CHF.  Patient admitted for further evaluation.  Pro calcitonin elevated.  

UTI was suspected.


Hospital Course: 





Patient presented with shortness of breath secondary to acute on chronic 

diastolic CHF.  Patient previously on Lasix.  Patient was admitted for 

treatment.  Patient continue with IV diuretic therapy.  Patient was placed on a 

fluid restriction.  Echocardiogram shows normal ejection fraction.  Cardiology 

was consulted.  No further intervention was required.  Patient has responded 

well to therapy.  At discharge she will continue with a 1500 cc per day fluid 

restriction and low-salt diet.  She is to monitor her weight daily.  If her 

weight increases by more than 5 lb she is to contact cardiology or her PCP to 

further address.  At discharge will increase Lasix to 40 mg 1 pill twice daily.

  Recommend to recheck lab-BMP in 1-2 weeks to monitor progress.  Recommend 

follow up with cardiology in 2-4 weeks to monitor progress.  Recommend follow 

up with PCP in 1 week to follow up this hospitalization.





If patient also reported some fatigue.  Patient with elevated pro calcitonin.  

There was some suspicion of pneumonia but repeat x-ray showed no pneumonia.  

Patient found to have UTI.  Patient has responded well.  Pro calcitonin 

improved.  White count within normal range.  At discharge patient will continue 

with Levaquin 250 mg 1 pill every 48 hr up to 3 doses.  UTI prevention 

provided.  Urine culture pending at discharge. This can be followed up by her 

PCP.  Recommend recheck urinalysis in 1-2 week to monitor resolution.





Patient with chronic renal disease stage II.  This has remained stable.  

Nephrology was consulted.  No intervention was required.  Patient may continue 

with her current medication.  Future medications will need to be renally dosed.

  Recommend no further use of nonsteroidal anti-inflammatories.  Patient may 

continue with allopurinol 100 mg daily.  Patient may follow up with nephrology 

in 1-2 weeks.





Patient with hypertension.  This has remained stable.  At discharge she will 

continue with carvedilol 6.25 mg 1 pill twice daily.  Recommend to maintain 

blood pressures less 150/80.  Further adjustment can be done by her PCP.





Patient with hyperlipidemia.  At discharge she may continue with pravastatin 10 

mg daily.





Patient with diabetes mellitus type 2 non-insulin dependent.  This has remained 

stable.  At discharge she will continue with Amaryl 4 mg twice daily and 

Tradjenta 5 mg daily.  Recommend to maintain blood sugars less 140 fasting and 

less than 200 meals.  Further adjustment can be done by her PCP.  Hypoglycemia 

education will be provided.  May need to hold Amaryl if blood sugar remains 

less than 100. Recommend follow up with her PCP to further address.





Patient with chronic thrombocytopenia.  This appears stable.  Some improvement 

noted. Peripheral smear sent for pathology.  This can be followed up with her 

PCP.  Will recommend evaluation by Hematology as an outpatient to further 

address.  Information on hematology will be provided.


Vital Signs/Physical Exam: 














Temp Pulse Resp BP Pulse Ox


 


 98.2 F   75   20   110/53 L  92 


 


 02/29/20 07:49  02/29/20 08:14  02/29/20 07:49  02/29/20 08:14  02/29/20 07:49








General: Alert, In no apparent distress, Oriented x3, Cooperative


HEENT: Atraumatic


Neck: Supple


Respiratory: Clear to auscultation bilaterally, Normal air movement


Cardiovascular: Normal pulses, Regular rate/rhythm


Gastrointestinal: Normal bowel sounds, Soft and benign, Non-distended, No 

tenderness, No masses, No rebound, No guarding


Musculoskeletal: No erythema, No tenderness, No warmth


Integumentary: No tenderness/swelling, No erythema, No warmth, No cyanosis


Neurological: Normal speech, Normal strength at 5/5 x4 extr, Normal tone, 

Normal affect


Laboratory Data at Discharge: 














WBC  3.2 K/uL (4.3-10.9)  L D 02/29/20  05:13    


 


Hgb  9.7 g/dL (12.0-15.0)  L  02/29/20  05:13    


 


Hct  29.3 % (36.0-45.0)  L  02/29/20  05:13    


 


Plt Count  66 K/uL (152-406)  L  02/29/20  05:13    


 


PT  15.2 SECONDS (9.5-12.5)  H  02/27/20  18:14    


 


INR  1.30   02/27/20  18:14    


 


APTT  27.9 SECONDS (24.3-36.9)   02/27/20  18:14    


 


Sodium  141 mmol/L (136-145)   02/29/20  05:13    


 


Potassium  3.8 mmol/L (3.5-5.1)   02/29/20  05:13    


 


BUN  50 mg/dL (7-18)  H  02/29/20  05:13    


 


Creatinine  2.21 mg/dL (0.55-1.3)  H  02/29/20  05:13    


 


Glucose  140 mg/dL ()  H  02/29/20  05:13    


 


Phosphorus  2.1 mg/dL (2.5-4.9)  L  02/29/20  05:13    


 


Magnesium  1.8 mg/dL (1.8-2.4)   02/29/20  05:13    


 


Total Bilirubin  1.0 mg/dL (0.2-1.0)   02/28/20  05:27    


 


AST  23 U/L (15-37)   02/28/20  05:27    


 


ALT  16 U/L (12-78)   02/28/20  05:27    


 


Alkaline Phosphatase  72 U/L ()   02/28/20  05:27    


 


Troponin I  < 0.02 ng/mL (0.0-0.045)   02/28/20  05:27    








Home Medications: 








Aspirin [Aspirin EC 81 MG] 81 mg PO DAILY 03/02/18 


Glimepiride [Amaryl] 4 mg PO BID 03/02/18 


Pravastatin Sodium 10 mg PO BEDTIME 03/02/18 


carvediloL [Coreg*] 6.25 mg PO BEDTIME 03/02/18 


Allopurinol 100 mg PO DAILY 02/27/20 


Cetirizine HCl 10 mg PO DAILY 02/27/20 


Linagliptin [Tradjenta] 1 tab PO DAILY 02/27/20 


Furosemide [Lasix*] 40 mg PO BID #60 tab 02/29/20 


Multivit with Calcium,Iron,Min [Multiple Vitamins For Women] 1 each PO DAILY #

90 tablet 02/29/20 


levoFLOXacin [Levaquin] 250 mg PO SEECOM #3 tab 02/29/20 





New Medications: 


Furosemide [Lasix*] 40 mg PO BID #60 tab


levoFLOXacin [Levaquin] 250 mg PO SEECOM #3 tab


Multivit with Calcium,Iron,Min [Multiple Vitamins For Women] 1 each PO DAILY #

90 tablet


Patient Discharge Instructions: 1.  Recommend follow up with her PCP in 1 week 

to follow up this hospitalization.  2.  Patient presented with shortness of 

breath secondary to acute on chronic diastolic CHF.  Patient previously on 

Lasix.  Patient was admitted for treatment.  Patient continue with IV diuretic 

therapy.  Patient was placed on a fluid restriction.  Echocardiogram shows 

normal ejection fraction.  Cardiology was consulted.  No further intervention 

was required.  Patient has responded well to therapy.  At discharge she will 

continue with a 1500 cc per day fluid restriction and low-salt diet.  She is to 

monitor her weight daily.  If her weight increases by more than 5 lb she is to 

contact cardiology or her PCP to further address.  At discharge will increase 

Lasix to 40 mg 1 pill twice daily.  Recommend to recheck lab-BMP in 1-2 weeks 

to monitor progress.  Recommend follow up with cardiology in 2-4 weeks to 

monitor progress.  Recommend follow up with PCP in 1 week to follow up this 

hospitalization.  3.  If patient also reported some fatigue.  Patient with 

elevated pro calcitonin.  There was some suspicion of pneumonia but repeat x-

ray showed no pneumonia.  Patient found to have UTI.  Patient has responded 

well.  Pro calcitonin improved.  White count within normal range.  At discharge 

patient will continue with Levaquin 250 mg 1 pill every 48 hr up to 3 doses.  

UTI prevention provided.  Urine culture pending at discharge. This can be 

followed up by her PCP.  Recommend recheck urinalysis in 1-2 week to monitor 

resolution.  4.  Patient with chronic renal disease stage II.  This has 

remained stable.  Nephrology was consulted.  No intervention was required.  

Patient may continue with her current medication.  Future medications will need 

to be renally dosed.  Recommend no further use of nonsteroidal anti-

inflammatories.  Patient may continue with allopurinol 100 mg daily.  Patient 

may follow up with nephrology in 1-2 weeks.  5.  Patient with hypertension.  

This has remained stable.  At discharge she will continue with carvedilol 6.25 

mg 1 pill twice daily.  Recommend to maintain blood pressures less 150/80.  

Further adjustment can be done by her PCP.  6.  Patient with hyperlipidemia.  

At discharge she may continue with pravastatin 10 mg daily.  7.  Patient with 

diabetes mellitus type 2 non-insulin dependent.  This has remained stable.  At 

discharge she will continue with Amaryl 4 mg twice daily and Tradjenta 5 mg 

daily.  Recommend to maintain blood sugars less 140 fasting and less than 200 

meals.  Further adjustment can be done by her PCP.  Hypoglycemia education will 

be provided.  May need to hold Amaryl if blood sugar remains less than 100. 

Recommend follow up with her PCP to further address.  8.  Patient with chronic 

thrombocytopenia.  This appears stable.  Some improvement noted. Peripheral 

smear sent for pathology.  This can be followed up with her PCP.  Will 

recommend evaluation by Hematology as an outpatient to further address.  

Information on hematology will be provided.


Diet: AHA


Activity: Fall precautions


Time spent managing pt's care (in minutes): 55

## 2020-03-02 NOTE — EKG
Test Date:    2020-02-27               Test Time:    18:34:20

Technician:   CAMERONT                                    

                                                     

MEASUREMENT RESULTS:                                       

Intervals:                                           

Rate:         76                                     

MI:           210                                    

QRSD:         90                                     

QT:           402                                    

QTc:          452                                    

Axis:                                                

P:            55                                     

MI:           210                                    

QRS:          -2                                     

T:            13                                     

                                                     

INTERPRETIVE STATEMENTS:                                       

                                                     

Sinus rhythm with 1st degree AV block

Cannot rule out Anterior infarct, age undetermined

Abnormal ECG

Compared to ECG 09/22/2018 14:27:44

Sinus arrhythmia no longer present

Myocardial infarct finding still present



Electronically Signed On 03-02-20 15:40:44 CST by Solitario Gomez

## 2021-03-06 ENCOUNTER — HOSPITAL ENCOUNTER (EMERGENCY)
Dept: HOSPITAL 97 - ER | Age: 86
Discharge: HOME | End: 2021-03-06
Payer: COMMERCIAL

## 2021-03-06 VITALS — OXYGEN SATURATION: 96 %

## 2021-03-06 VITALS — DIASTOLIC BLOOD PRESSURE: 63 MMHG | SYSTOLIC BLOOD PRESSURE: 115 MMHG | TEMPERATURE: 98.9 F

## 2021-03-06 DIAGNOSIS — Z86.73: ICD-10-CM

## 2021-03-06 DIAGNOSIS — E11.9: ICD-10-CM

## 2021-03-06 DIAGNOSIS — I10: ICD-10-CM

## 2021-03-06 DIAGNOSIS — Z88.8: ICD-10-CM

## 2021-03-06 DIAGNOSIS — Z79.82: ICD-10-CM

## 2021-03-06 DIAGNOSIS — Z88.1: ICD-10-CM

## 2021-03-06 DIAGNOSIS — E78.5: ICD-10-CM

## 2021-03-06 DIAGNOSIS — U07.1: Primary | ICD-10-CM

## 2021-03-06 LAB
BUN BLD-MCNC: 39 MG/DL (ref 7–18)
GLUCOSE SERPLBLD-MCNC: 199 MG/DL (ref 74–106)
HCT VFR BLD CALC: 32.9 % (ref 36–45)
LYMPHOCYTES # SPEC AUTO: 1 K/UL (ref 0.7–4.9)
MORPHOLOGY BLD-IMP: (no result)
PMV BLD: 9.7 FL (ref 7.6–11.3)
POTASSIUM SERPL-SCNC: 4 MMOL/L (ref 3.5–5.1)
RBC # BLD: 3.66 M/UL (ref 3.86–4.86)
SARS-COV-2 RNA RESP QL NAA+PROBE: POSITIVE

## 2021-03-06 PROCEDURE — 36415 COLL VENOUS BLD VENIPUNCTURE: CPT

## 2021-03-06 PROCEDURE — 80048 BASIC METABOLIC PNL TOTAL CA: CPT

## 2021-03-06 PROCEDURE — 0240U: CPT

## 2021-03-06 PROCEDURE — 85025 COMPLETE CBC W/AUTO DIFF WBC: CPT

## 2021-03-06 PROCEDURE — 99284 EMERGENCY DEPT VISIT MOD MDM: CPT

## 2021-03-06 NOTE — ER
Nurse's Notes                                                                                     

 [facility]                                                                 

Name: Deirdre Sauer                                                                               

Age: 87 yrs                                                                                       

Sex: Female                                                                                       

: 1933                                                                                   

MRN: [id number]                                                                                   

Arrival Date: 2021                                                                          

Time: 12:44                                                                                       

Account#: [id number]                                                                            

Bed 30                                                                                            

Private MD:                                                                                       

Diagnosis: Coronavirus infection, unspecified                                                     

                                                                                                  

Presentation:                                                                                     

                                                                                             

13:07 Chief complaint: Patient's son or daughter states: pt has had diarrhea and feeling      iw  

      nauseous and no appetite since the beginning of the week, her grandson tested positive      

      for COVID today and he lives in the household , had a temp of 99 today. Coronavirus         

      screen: Client presents with at least one sign or symptom that may indicate                 

      coronavirus-19. Standard/surgical mask placed on the client. Provider contacted for         

      isolation considerations. Ebola Screen: Patient negative for fever greater than or          

      equal to 101.5 degrees Fahrenheit, and additional compatible Ebola Virus Disease            

      symptoms Patient denies exposure to infectious person. Patient denies travel to an          

      Ebola-affected area in the 21 days before illness onset. No symptoms or risks               

      identified at this time. Initial Sepsis Screen: Does the patient meet any 2 criteria?       

      No. Patient's initial sepsis screen is negative. Does the patient have a suspected          

      source of infection? No. Patient's initial sepsis screen is negative. Risk Assessment:      

      Do you want to hurt yourself or someone else? Patient reports no desire to harm self or     

      others. Onset of symptoms was 2021.                                               

13:07 Method Of Arrival: Wheelchair                                                           iw  

13:07 Acuity: MESFIN 3                                                                           iw  

                                                                                                  

Triage Assessment:                                                                                

13:15 General: Appears distressed, uncomfortable, Behavior is cooperative, appropriate for    bp  

      age, anxious. Pain: Complains of pain in right leg and left leg. EENT: No deficits          

      noted. Neuro: Level of Consciousness is awake, alert, obeys commands, Oriented to           

      Appropriate for age Reports weakness. Cardiovascular: Rhythm is sinus rhythm.               

      Respiratory: No deficits noted. GI: Reports diarrhea. : No signs and/or symptoms were     

      reported regarding the genitourinary system. Derm: No deficits noted. Musculoskeletal:      

      No deficits noted.                                                                          

                                                                                                  

Historical:                                                                                       

- Allergies:                                                                                      

13:11 blood thinners except ASA;                                                              iw  

13:11 Statins-Hmg-Coa Reductase Inhibitors;                                                   iw  

13:11 Streptomycin;                                                                           iw  

- Home Meds:                                                                                      

13:11 Albuterol Inhl [Active]; Allopurinol Oral [Active]; aspirin 81 mg Oral TbEC 1 tab once  iw  

      daily [Active]; carvedilol 12.5 mg Oral tab 1 tab 2 times per day [Active]; Claritin-D      

      24 Hour  mg Oral Tb24 1 tab once daily [Active]; Flonase 50 mcg/actuation Nasal       

      spsn 1 spray 2 times per day [Active]; furosemide 40 mg Oral tab 1 tab once daily           

      [Active]; glimepiride 4 mg Oral tab 1 tab twice a day [Active]; omeprazole 40 mg Oral       

      cpDR 1 cap once daily [Active]; pravastatin 10 mg Oral tab 1 tab once daily [Active];       

      Spectravite Ultra Women's Sr 8 mg iron-400 mcg-300 mcg Oral tab [Active]; Tradjenta         

      [Active]; ezetimibe Oral once daily [Active];                                               

- PMHx:                                                                                           

13:11 CVA; Diabetes - NIDDM; Hyperlipidemia; Hypertension;                                    iw  

- PSHx:                                                                                           

13:11 Hysterectomy;                                                                           iw  

                                                                                                  

- Immunization history:: Pneumococcal vaccine is up to date, Flu vaccine is up to date.           

- Social history:: Smoking status: .                                                              

                                                                                                  

                                                                                                  

Screenin:15 Abuse screen: Denies threats or abuse. Denies injuries from another. Nutritional        bp  

      screening: No deficits noted. Tuberculosis screening: No symptoms or risk factors           

      identified. Fall Risk None identified.                                                      

                                                                                                  

Assessment:                                                                                       

13:15 General: SEE TRIAGE NOTE.                                                               bp  

14:57 Reassessment: Patient appears in no apparent distress at this time. No changes from     bp  

      previously documented assessment. Patient and/or family updated on plan of care and         

      expected duration. Pain level reassessed. ALL CURRENT ORDERS COMPLETED.                     

                                                                                                  

Vital Signs:                                                                                      

13:07  / 58; Pulse 65; Resp 16; Temp 99.1; Pulse Ox 96% on R/A; Weight 82.1 kg; Height  iw  

      5 ft. 2 in. (157.48 cm);                                                                    

14:30  / 51; Pulse 67; Resp 16; Pulse Ox 98% ;                                          bp  

15:30  / 56; Pulse 65; Resp 17; Pulse Ox 96% ;                                          bp  

16:30  / 63; Pulse 66; Resp 19; Temp 98.9; Pulse Ox 96% ;                               bp  

13:07 Body Mass Index 33.10 (82.10 kg, 157.48 cm)                                             iw  

                                                                                                  

ED Course:                                                                                        

12:44 Patient arrived in ED.                                                                  as  

13:09 Triage completed.                                                                       iw  

13:11 Arm band placed on.                                                                     iw  

13:15 Patient has correct armband on for positive identification. Bed in low position. Call   bp  

      light in reach. Side rails up X2.                                                           

13:16 Bernard Goins, RN is Primary Nurse.                                                    bp  

13:29 Trupti Camarillo FNP-C is PHCP.                                                        kb  

13:29 Abraham Goldberg MD is Attending Physician.                                             kb  

14:25 Inserted saline lock: 22 gauge in left forearm, using aseptic technique. Blood          bp  

      collected.                                                                                  

                                                                                                  

Administered Medications:                                                                         

No medications were administered                                                                  

                                                                                                  

                                                                                                  

Outcome:                                                                                          

16:38 Discharge ordered by MD.                                                                kb  

18:04 Patient left the ED.                                                                    iw  

                                                                                                  

Signatures:                                                                                       

Trupti Camarillo FNP-C FNP-Toya Glynn                             as                                                   

Alissa Prabhakar, RN                     RN   iw                                                   

Bernard Goins, RN                      RN   bp                                                   

                                                                                                  

**************************************************************************************************

## 2021-03-06 NOTE — EDPHYS
Physician Documentation                                                                           

 [facility]                                                                 

Name: Deirdre Sauer                                                                               

Age: 87 yrs                                                                                       

Sex: Female                                                                                       

: 1933                                                                                   

MRN: [id number]                                                                                   

Arrival Date: 2021                                                                          

Time: 12:44                                                                                       

Account#: [id number]                                                                            

Bed 30                                                                                            

Private MD:                                                                                       

ED Physician Abraham Goldberg                                                                      

HPI:                                                                                              

                                                                                             

13:39 This 87 yrs old  Female presents to ER via Wheelchair with complaints of       kb  

      Fever, Weakness, Leg Pain, Diarrhea.                                                        

13:39 The patient or guardian reports flu symptoms, low-grade fever, myalgias, no appetite.   kb  

      Onset: The symptoms/episode began/occurred 1 week(s) ago. Severity of symptoms: At          

      their worst the symptoms were mild, moderate, in the emergency department the symptoms      

      are unchanged. Modifying factors: The symptoms are alleviated by nothing, the symptoms      

      are aggravated by nothing. Associated signs and symptoms: Pertinent positives:              

      diarrhea, fever, nausea, rhinorrhea. The patient has not experienced similar symptoms       

      in the past. The patient has not recently seen a physician. Pt reports malaise,             

      bodyaches, low grade fever and no appetite for a week. Reports runny nose and               

      congestion for a few days. Has had nausea and diarrhea today. Grandson has covid. .         

                                                                                                  

Historical:                                                                                       

- Allergies:                                                                                      

13:11 blood thinners except ASA;                                                              iw  

13:11 Statins-Hmg-Coa Reductase Inhibitors;                                                   iw  

13:11 Streptomycin;                                                                           iw  

- Home Meds:                                                                                      

13:11 Albuterol Inhl [Active]; Allopurinol Oral [Active]; aspirin 81 mg Oral TbEC 1 tab once  iw  

      daily [Active]; carvedilol 12.5 mg Oral tab 1 tab 2 times per day [Active]; Claritin-D      

      24 Hour  mg Oral Tb24 1 tab once daily [Active]; Flonase 50 mcg/actuation Nasal       

      spsn 1 spray 2 times per day [Active]; furosemide 40 mg Oral tab 1 tab once daily           

      [Active]; glimepiride 4 mg Oral tab 1 tab twice a day [Active]; omeprazole 40 mg Oral       

      cpDR 1 cap once daily [Active]; pravastatin 10 mg Oral tab 1 tab once daily [Active];       

      Spectravite Ultra Women's Sr 8 mg iron-400 mcg-300 mcg Oral tab [Active]; Tradjenta         

      [Active]; ezetimibe Oral once daily [Active];                                               

- PMHx:                                                                                           

13:11 CVA; Diabetes - NIDDM; Hyperlipidemia; Hypertension;                                    iw  

- PSHx:                                                                                           

13:11 Hysterectomy;                                                                           iw  

                                                                                                  

- Immunization history:: Pneumococcal vaccine is up to date, Flu vaccine is up to date.           

- Social history:: Smoking status: .                                                              

                                                                                                  

                                                                                                  

ROS:                                                                                              

13:41 Cardiovascular: Negative for chest pain, palpitations, and edema, Respiratory: Negative kb  

      for shortness of breath, cough, wheezing, and pleuritic chest pain, MS/Extremity:           

      Negative for injury and deformity, Skin: Negative for injury, rash, and discoloration,      

      Neuro: Negative for headache, weakness, numbness, tingling, and seizure.                    

13:41 Constitutional: Positive for body aches, fatigue, fever, malaise, poor PO intake.           

13:41 ENT: Positive for rhinorrhea, sinus congestion.                                             

13:41 Abdomen/GI: Positive for nausea, diarrhea.                                                  

                                                                                                  

Exam:                                                                                             

13:41 Constitutional:  This is a well developed, well nourished patient who is awake, alert,  kb  

      and in no acute distress. Head/Face:  Normocephalic, atraumatic. Chest/axilla:  Normal      

      chest wall appearance and motion.  Cardiovascular:  Regular rate and rhythm with a          

      normal S1 and S2.  No gallops, murmurs, or rubs.  No pulse deficits. Respiratory:           

      Lungs have equal breath sounds bilaterally, clear to auscultation.  No rales, rhonchi       

      or wheezes noted.  No increased work of breathing, no retractions or nasal flaring.         

      Abdomen/GI:  Soft, non-tender, with normal bowel sounds.  No distension.  No guarding       

      or rebound.  No evidence of tenderness throughout. Skin:  Warm, dry with normal turgor.     

       Normal color with no rashes, no lesions, and no evidence of cellulitis. MS/ Extremity:     

       Pulses equal, no cyanosis.  Neurovascular intact.  Full, normal range of motion.           

      Neuro:  Awake and alert, GCS 15, oriented to person, place, time, and situation.            

      Cranial nerves II-XII grossly intact. Moves all extremities. Sensory grossly intact.        

      Cerebellar exam normal.  Normal gait.                                                       

                                                                                                  

Vital Signs:                                                                                      

13:07  / 58; Pulse 65; Resp 16; Temp 99.1; Pulse Ox 96% on R/A; Weight 82.1 kg; Height  iw  

      5 ft. 2 in. (157.48 cm);                                                                    

14:30  / 51; Pulse 67; Resp 16; Pulse Ox 98% ;                                          bp  

15:30  / 56; Pulse 65; Resp 17; Pulse Ox 96% ;                                          bp  

16:30  / 63; Pulse 66; Resp 19; Temp 98.9; Pulse Ox 96% ;                               bp  

13:07 Body Mass Index 33.10 (82.10 kg, 157.48 cm)                                             iw  

                                                                                                  

MDM:                                                                                              

13:29 Patient medically screened.                                                             kb  

13:41 Data reviewed: vital signs, nurses notes. Data interpreted: Pulse oximetry: on room air kb  

      is 98 %. Interpretation: normal.                                                            

16:37 Counseling: I had a detailed discussion with the patient and/or guardian regarding: the kb  

      historical points, exam findings, and any diagnostic results supporting the                 

      discharge/admit diagnosis, lab results, the need for outpatient follow up, a family         

      practitioner, to return to the emergency department if symptoms worsen or persist or if     

      there are any questions or concerns that arise at home.                                     

                                                                                                  

                                                                                             

13:38 Order name: CBC with Diff; Complete Time: 17:47                                         kb  

                                                                                             

13:38 Order name: Basic Metabolic Panel; Complete Time: 14:57                                 kb  

                                                                                             

16:26 Order name: COVID-19/FLU A+B; Complete Time: 16:27                                      EDMS

                                                                                             

16:58 Order name: Manual Differential; Complete Time: 17:47                                   EDMS

                                                                                             

13:38 Order name: IV Start; Complete Time: 14:26                                              kb  

                                                                                                  

Administered Medications:                                                                         

No medications were administered                                                                  

                                                                                                  

                                                                                                  

Disposition:                                                                                      

                                                                                             

09:05 Co-signature as Attending Physician, Abraham Goldberg MD I agree with the assessment and  cookie 

      plan of care.                                                                               

                                                                                                  

Disposition:                                                                                      

21 16:38 Discharged to Home. Impression: Coronavirus infection, unspecified.                

- Condition is Stable.                                                                            

- Discharge Instructions: Viral Respiratory Infection, Easy-To-Read, COVID-19.                    

- Prescriptions for Zofran 4 mg Oral Tablet - take 1 tablet by ORAL route every 6 hours           

  As needed; 20 tablet.                                                                           

- Medication Reconciliation Form, Thank You Letter, Antibiotic Education, Prescription            

  Opioid Use form.                                                                                

- Follow up: Emergency Department; When: As needed; Reason: Worsening of condition.               

  Follow up: Private Physician; When: 2 - 3 days; Reason: Recheck today's complaints,             

  Continuance of care, Re-evaluation by your physician.                                           

                                                                                                  

                                                                                                  

                                                                                                  

Signatures:                                                                                       

Dispatcher [employer]                           EDTong Girardistin, FNP-C                 FNP-Abraham Deluca MD MD cha Williams, Irene, RN                     RN   iw                                                   

                                                                                                  

Corrections: (The following items were deleted from the chart)                                    

03                                                                                             

15:23 13:31 CORONAVIRUS+MR.LAB.BRZ ordered. EDMS                                              EDMS

15:23 13:31 Influenza Screen (A \T\ B)+BA.LAB.BRZ ordered. EDMS                                 EDMS

18:04 16:38 2021 16:38 Discharged to Home. Impression: Coronavirus infection,           iw  

      unspecified. Condition is Stable. Forms are Medication Reconciliation Form, Thank You       

      Letter, Antibiotic Education, Prescription Opioid Use. Follow up: Emergency Department;     

      When: As needed; Reason: Worsening of condition. Follow up: Private Physician; When: 2      

      - 3 days; Reason: Recheck today's complaints, Continuance of care, Re-evaluation by         

      your physician. kb                                                                          

                                                                                                  

**************************************************************************************************

## 2021-07-02 ENCOUNTER — HOSPITAL ENCOUNTER (INPATIENT)
Dept: HOSPITAL 97 - ER | Age: 86
LOS: 2 days | Discharge: HOME | DRG: 177 | End: 2021-07-04
Attending: HOSPITALIST | Admitting: HOSPITALIST
Payer: COMMERCIAL

## 2021-07-02 VITALS — BODY MASS INDEX: 26.4 KG/M2

## 2021-07-02 DIAGNOSIS — I50.43: ICD-10-CM

## 2021-07-02 DIAGNOSIS — E11.22: ICD-10-CM

## 2021-07-02 DIAGNOSIS — Z86.73: ICD-10-CM

## 2021-07-02 DIAGNOSIS — U07.1: Primary | ICD-10-CM

## 2021-07-02 DIAGNOSIS — E78.5: ICD-10-CM

## 2021-07-02 DIAGNOSIS — I13.0: ICD-10-CM

## 2021-07-02 DIAGNOSIS — J12.82: ICD-10-CM

## 2021-07-02 DIAGNOSIS — D61.818: ICD-10-CM

## 2021-07-02 DIAGNOSIS — E11.9: ICD-10-CM

## 2021-07-02 DIAGNOSIS — I10: ICD-10-CM

## 2021-07-02 DIAGNOSIS — K25.9: ICD-10-CM

## 2021-07-02 DIAGNOSIS — N18.30: ICD-10-CM

## 2021-07-02 LAB
ALBUMIN SERPL BCP-MCNC: 2.7 G/DL (ref 3.4–5)
ALP SERPL-CCNC: 71 U/L (ref 45–117)
ALT SERPL W P-5'-P-CCNC: 22 U/L (ref 12–78)
AST SERPL W P-5'-P-CCNC: 36 U/L (ref 15–37)
BLD SMEAR INTERP: (no result)
BUN BLD-MCNC: 45 MG/DL (ref 7–18)
GLUCOSE SERPLBLD-MCNC: 104 MG/DL (ref 74–106)
HCT VFR BLD CALC: 31.9 % (ref 36–45)
INR BLD: 1.28
LYMPHOCYTES # SPEC AUTO: 0.8 K/UL (ref 0.7–4.9)
MAGNESIUM SERPL-MCNC: 2 MG/DL (ref 1.8–2.4)
MORPHOLOGY BLD-IMP: (no result)
NT-PROBNP SERPL-MCNC: 2354 PG/ML (ref ?–450)
PMV BLD: 8.5 FL (ref 7.6–11.3)
POTASSIUM SERPL-SCNC: 4 MMOL/L (ref 3.5–5.1)
RBC # BLD: 3.48 M/UL (ref 3.86–4.86)
TROPONIN (EMERG DEPT USE ONLY): 0.13 NG/ML (ref 0–0.04)

## 2021-07-02 PROCEDURE — 36415 COLL VENOUS BLD VENIPUNCTURE: CPT

## 2021-07-02 PROCEDURE — 99285 EMERGENCY DEPT VISIT HI MDM: CPT

## 2021-07-02 PROCEDURE — 87040 BLOOD CULTURE FOR BACTERIA: CPT

## 2021-07-02 PROCEDURE — 84484 ASSAY OF TROPONIN QUANT: CPT

## 2021-07-02 PROCEDURE — 84145 PROCALCITONIN (PCT): CPT

## 2021-07-02 PROCEDURE — 83735 ASSAY OF MAGNESIUM: CPT

## 2021-07-02 PROCEDURE — 82947 ASSAY GLUCOSE BLOOD QUANT: CPT

## 2021-07-02 PROCEDURE — 80061 LIPID PANEL: CPT

## 2021-07-02 PROCEDURE — 71045 X-RAY EXAM CHEST 1 VIEW: CPT

## 2021-07-02 PROCEDURE — 80076 HEPATIC FUNCTION PANEL: CPT

## 2021-07-02 PROCEDURE — 80048 BASIC METABOLIC PNL TOTAL CA: CPT

## 2021-07-02 PROCEDURE — 85610 PROTHROMBIN TIME: CPT

## 2021-07-02 PROCEDURE — 85025 COMPLETE CBC W/AUTO DIFF WBC: CPT

## 2021-07-02 PROCEDURE — 84100 ASSAY OF PHOSPHORUS: CPT

## 2021-07-02 PROCEDURE — 83880 ASSAY OF NATRIURETIC PEPTIDE: CPT

## 2021-07-02 PROCEDURE — 96365 THER/PROPH/DIAG IV INF INIT: CPT

## 2021-07-02 PROCEDURE — 80069 RENAL FUNCTION PANEL: CPT

## 2021-07-02 PROCEDURE — 93005 ELECTROCARDIOGRAM TRACING: CPT

## 2021-07-02 PROCEDURE — 85379 FIBRIN DEGRADATION QUANT: CPT

## 2021-07-02 PROCEDURE — 96375 TX/PRO/DX INJ NEW DRUG ADDON: CPT

## 2021-07-02 PROCEDURE — 86140 C-REACTIVE PROTEIN: CPT

## 2021-07-02 PROCEDURE — 82728 ASSAY OF FERRITIN: CPT

## 2021-07-02 RX ADMIN — FAMOTIDINE SCH MG: 10 INJECTION, SOLUTION INTRAVENOUS at 20:30

## 2021-07-02 RX ADMIN — Medication SCH ML: at 20:30

## 2021-07-02 RX ADMIN — METHYLPREDNISOLONE SODIUM SUCCINATE SCH MG: 40 INJECTION, POWDER, FOR SOLUTION INTRAMUSCULAR; INTRAVENOUS at 20:30

## 2021-07-02 NOTE — P.HP
Certification for Inpatient


Patient admitted to: Observation


With expected LOS: <2 Midnights


Practitioner: I am a practitioner with admitting privileges, knowledge of 

patient current condition, hospital course, and medical plan of care.


Services: Services provided to patient in accordance with Admission requirements

found in Title 42 Section 412.3 of the Code of Federal Regulations





Patient History


Date of Service: 07/02/21


Reason for admission: Shortness of breath


History of Present Illness: 


87-year-old woman with a history of hypertension, diabetes mellitus, and gastric

ulcer presented to the emergency department with a complaint of shortness of 

breath of 3 days duration, preceded by upper respiratory symptom including nasal

congestion, sneezing, sore throat.  Patient also reports fever and night sweats.

CBC shows leukopenia and anemia.  Blood chemistry unremarkable.  Troponin mildly

elevated to 0.13.  Chest x-ray demonstrates mild CHF pattern, bilateral 

opacities and cardiomegaly.  Patient tested positive for COVID 19. She has 

received 2 doses for Pfizer vaccine.  She also report getting COVID infection in

March 2021. Patient is admitted for further management.





Allergies





blood thinner Allergy (Mild, Uncoded 02/27/20 21:12)


   swelling


blood thinners Allergy (Uncoded 02/27/20 21:12)


   Unknown


Statins-Hmg-Coa Redu Allergy (Uncoded 02/27/20 21:12)


   Unknown





Home Medications: 








Aspirin [Aspirin EC 81 MG] 81 mg PO DAILY 03/02/18 


Glimepiride [Amaryl] 4 mg PO BID 03/02/18 


Pravastatin Sodium 10 mg PO BEDTIME 03/02/18 


carvediloL [Coreg*] 6.25 mg PO BEDTIME 03/02/18 


Allopurinol 100 mg PO DAILY 02/27/20 


Cetirizine HCl 10 mg PO DAILY 02/27/20 


Linagliptin [Tradjenta] 1 tab PO DAILY 02/27/20 


Furosemide [Lasix*] 40 mg PO BID #60 tab 02/29/20 


Multivit with Calcium,Iron,Min [Multiple Vitamins For Women] 1 each PO DAILY #90

tablet 02/29/20 


levoFLOXacin [Levaquin] 250 mg PO SEECOM #3 tab 02/29/20 








- Past Medical/Surgical History


Diabetic: Yes


-: DM


-: HTN


-: Hyperlipidemia


-: CVA 2015


-: Gastric Ulcer


-: Full Hysterectomy





- Family History


  ** Mother


-: Diabetes





- Social History


Alcohol use: No


CD- Drugs: No


Caffeine use: No





Review of Systems


Other: 


Except as documented, all other systems reviewed and negative.








Physical Examination





- Physical Exam


General: Alert, In no apparent distress, Oriented x3


HEENT: Atraumatic, PERRLA, Mucous membr. moist/pink, Sclerae nonicteric


Neck: Supple, No Thyromegaly


Respiratory: Normal air movement, Other (Nonlabored breathing)


Cardiovascular: No edema, Regular rate/rhythm, Normal S1 S2


Gastrointestinal: Normal bowel sounds, Soft and benign, Non-distended, No 

tenderness


Musculoskeletal: No swelling, No erythema


Integumentary: No rashes, No erythema


Neurological: Normal speech, Normal strength at 5/5 x4 extr, Cranial nerves 3-12

 intact


Lymphatics: No axilla or inguinal lymphadenopathy





- Studies


Laboratory Data (last 24 hrs)





07/02/21 16:09: PT 14.8 H, INR 1.28


07/02/21 16:09: WBC 2.70 L, Hgb 10.3 L, Hct 31.9 L, Plt Count 51 L


07/02/21 16:09: Sodium 140, Potassium 4.0, BUN 45 H, Creatinine 1.98 H, Glucose 

104, Magnesium 2.0, Total Bilirubin 0.7, AST 36, ALT 22, Alkaline Phosphatase 71








Assessment and Plan





- Problems (Diagnosis)


(1) Pneumonia due to COVID-19 virus


Current Visit: Yes   Status: Acute   





(2) DM type 2 (diabetes mellitus, type 2)


Current Visit: Yes   Status: Acute   





(3) Acute exacerbation of CHF (congestive heart failure)


Current Visit: No   Status: Acute   





(4) Chronic kidney disease, stage 3


Current Visit: Yes   Status: Acute   





- Plan


Admit to the medical floor.


Start IV steroid, vitamin-C, vitamin-D, zinc supplementation.


Supplemental oxygen as needed


Consult to pulmonary.


Will treat for CHF exacerbation with IV Lasix.


I suspect Elevated troponin is secondary to demand ischemia.


Trend troponin.


Monitor renal function on IV Lasix.


Blood thinner listed as allergy for patient, she also has thrombocytopenia


I will avoid anticoagulation for now.





- Advance Directives


Does patient have a Living Will: Yes


Does patient have a Durable POA for Healthcare: Yes

## 2021-07-02 NOTE — RAD REPORT
EXAM DESCRIPTION:  Francisco Single View7/2/2021 4:27 pm

 

CLINICAL HISTORY:  Shortness breath

 

COMPARISON:  2020

 

FINDINGS:   Mild bilateral pulmonary opacities.

 

Small to moderate left pleural effusion may be present.

 

Heart is enlarged

 

IMPRESSION:   These findings likely represent CHF

## 2021-07-02 NOTE — EDPHYS
Physician Documentation                                                                           

 [facility]                                                                 

Name: Deirdre Sauer                                                                               

Age: 87 yrs                                                                                       

Sex: Female                                                                                       

: 1933                                                                                   

MRN: [id number]                                                                                   

Arrival Date: 2021                                                                          

Time: 14:28                                                                                       

Account#: [id number]                                                                            

Bed 16                                                                                            

Private MD: KOBY PEREZ                                                                         

ED Physician Dmitry Murdock                                                                       

HPI:                                                                                              

                                                                                             

16:49 This 87 yrs old  Female presents to ER via Wheelchair with complaints of       jr8 

      Fever, Cough.                                                                               

16:49 The patient reports fever, with an emergency department temperature of 99.5 degrees     jr8 

      Fahrenheit. Onset: The symptoms/episode began/occurred gradually, 2 day(s) ago, and         

      became worse and became persistent. Modifying factors: there are no obvious modifying       

      factors. Associated signs and symptoms: Pertinent positives: cough. Severity of             

      symptoms: At their worst the symptoms were moderate in the emergency department the         

      symptoms are unchanged. It is unknown whether or not the patient has had similar            

      symptoms in the past. The patient has not recently seen a physician.                        

                                                                                                  

Historical:                                                                                       

- Allergies:                                                                                      

14:59 Streptomycin;                                                                           ll1 

14:59 blood thinners except ASA;                                                              ll1 

14:59 Statins-Hmg-Coa Reductase Inhibitors;                                                   ll1 

- Home Meds:                                                                                      

17:15 carvedilol 6.25 mg oral tab 2 times per day [Active]; allopurinol 100 mg oral tab once  iw  

      daily [Active]; ferrous sulfate 325 mg (65 mg iron) Oral cpER daily [Active];               

      furosemide 40 mg Oral tab 1 tab once daily [Active]; pravastatin 10 mg Oral tab 1 tab       

      once daily [Active]; Tradjenta 5 mg oral tab 1 tab once daily [Active]; montelukast 10      

      mg oral tab 1 tab once daily [Active]; glimepiride 4 mg Oral tab twice a day [Active];      

      aspirin 81 mg Oral TbEC 1 tab once daily [Active]; cetirizine 10 mg oral tab 1 tab once     

      daily [Active]; Vitamin C Oral daily [Active]; Vitamin D Oral daily [Active]; Vitamin       

      B-12 Oral daily [Active];                                                                   

- PMHx:                                                                                           

14:59 CVA; Diabetes - NIDDM; Hyperlipidemia; Hypertension;                                    ll1 

- PSHx:                                                                                           

14:59 hysterectomy;                                                                           ll1 

                                                                                                  

- Immunization history:: Flu vaccine is up to date.                                               

- Social history:: Smoking status: Patient denies any tobacco usage or history of.                

                                                                                                  

                                                                                                  

ROS:                                                                                              

16:49 Eyes: Negative for injury, pain, redness, and discharge, ENT: Negative for injury,      jr8 

      pain, and discharge, Neck: Negative for injury, pain, and swelling, Cardiovascular:         

      Negative for chest pain, palpitations, and edema, Abdomen/GI: Negative for abdominal        

      pain, nausea, vomiting, diarrhea, and constipation, Back: Negative for injury and pain,     

      MS/Extremity: Negative for injury and deformity, Skin: Negative for injury, rash, and       

      discoloration, Neuro: Negative for headache, weakness, numbness, tingling, and seizure.     

16:49 Constitutional: Positive for fever.                                                         

16:49 Respiratory: Positive for cough, shortness of breath, wheezing.                             

                                                                                                  

Exam:                                                                                             

16:49 Constitutional:  This is a well developed, well nourished patient who is awake, alert,  jr8 

      and in no acute distress. ENT:  Nares patent. No nasal discharge, no septal                 

      abnormalities noted.  Tympanic membranes are normal and external auditory canals are        

      clear.  Oropharynx with no redness, swelling, or masses, exudates, or evidence of           

      obstruction, uvula midline.  Mucous membranes moist. Neck:  Trachea midline, no             

      thyromegaly or masses palpated, and no cervical lymphadenopathy.  Supple, full range of     

      motion without nuchal rigidity, or vertebral point tenderness.  No Meningismus.             

      Cardiovascular:  Regular rate and rhythm with a normal S1 and S2.  No gallops, murmurs,     

      or rubs.  Normal PMI, no JVD.  No pulse deficits. Abdomen/GI:  Soft, non-tender, with       

      normal bowel sounds.  No distension or tympany.  No guarding or rebound.  No evidence       

      of tenderness throughout. Back:  No spinal tenderness.  No costovertebral tenderness.       

      Full range of motion. Skin:  Warm, dry with normal turgor.  Normal color with no            

      rashes, no lesions, and no evidence of cellulitis. MS/ Extremity:  Pulses equal, no         

      cyanosis.  Neurovascular intact.  Full, normal range of motion. Neuro:  Awake and           

      alert, GCS 15, oriented to person, place, time, and situation.  Cranial nerves II-XII       

      grossly intact.  Motor strength 5/5 in all extremities.  Sensory grossly intact.            

16:49 Respiratory: the patient does not display signs of respiratory distress,  Respirations:     

      normal, Breath sounds: wheezing: expiratory that is mild, is heard diffusely.               

                                                                                                  

Vital Signs:                                                                                      

15:00  / 34; Pulse 63; Resp 18; Temp 99.5; Pulse Ox 92% on R/A; Weight 81.19 kg; Height ll1 

      5 ft. 2 in. (157.48 cm); Pain 3/10;                                                         

16:32  / 55; Pulse 64; Resp 17 S; Pulse Ox 100% on 2 lpm NC;                            ca1 

17:30  / 41; Pulse 67; Resp 23 S; Pulse Ox 100% on R/A;                                 ca1 

18:30 BP 99 / 71; Pulse 79; Resp 25 S; Pulse Ox 100% on R/A;                                  ca1 

19:27  / 51; Pulse 65; Resp 18 S; Pulse Ox 100% on 2 lpm NC;                            ca1 

15:00 Body Mass Index 32.74 (81.19 kg, 157.48 cm)                                             ll1 

                                                                                                  

MDM:                                                                                              

16:12 Patient medically screened.                                                             jr8 

17:14 Data reviewed: vital signs, nurses notes, lab test result(s), EKG, radiologic studies,  jr8 

      plain films. Data interpreted: Pulse oximetry: on room air is 100 %. Interpretation:        

      normal. Counseling: I had a detailed discussion with the patient and/or guardian            

      regarding: the historical points, exam findings, and any diagnostic results supporting      

      the discharge/admit diagnosis, lab results, radiology results, the need for further         

      work-up and treatment in the hospital. ED course: Dr. concepcion called and will admit .        

                                                                                                  

                                                                                             

15:48 Order name: Basic Metabolic Panel; Complete Time: 16:52                                 [facility] 

                                                                                             

15:48 Order name: CBC with Diff; Complete Time: 17:06                                         [facility] 

                                                                                             

15:48 Order name: LFT's; Complete Time: 16:52                                                                                                                                              

15:48 Order name: Magnesium; Complete Time: 16:52                                             [facility] 

                                                                                             

15:48 Order name: NT PRO-BNP; Complete Time: 16:52                                                                                                                                         

15:48 Order name: PT-INR; Complete Time: 16:52                                                                                                                                             

15:48 Order name: Troponin (emerg Dept Use Only); Complete Time: 16:52                        [facility] 

                                                                                             

15:48 Order name: XRAY Chest (1 view); Complete Time: 17:11                                   [facility] 

                                                                                             

15:48 Order name: Procalcitonin; Complete Time: 17:31                                         8 

                                                                                             

15:48 Order name: Blood Culture Adult (2)                                                     8 

                                                                                             

16:54 Order name: CBC Smear Scan; Complete Time: 17:06                                        [facility]

                                                                                             

17:54 Order name: SARS-COV-2 RT PCR; Complete Time: 17:54                                     [facility]

                                                                                             

15:48 Order name: EKG; Complete Time: 15:49                                                                                                                                                

15:48 Order name: Cardiac monitoring; Complete Time: 16:35                                    8 

                                                                                             

15:48 Order name: EKG - Nurse/Tech; Complete Time: 16:35                                                                                                                                   

15:48 Order name: IV Saline Lock; Complete Time: 16:18                                                                                                                                     

15:48 Order name: Labs collected and sent; Complete Time: 16:18                                                                                                                            

15:48 Order name: O2 Per Protocol; Complete Time: 16:18                                                                                                                                    

15:48 Order name: O2 Sat Monitoring; Complete Time: 16:18                                      

                                                                                                  

Administered Medications:                                                                         

16:40 Drug: LevaQUIN (levofloxacin) 500 mg Volume: 100 ml; Route: IVPB; Infused Over: 60      ca1 

      mins; Site: left antecubital;                                                               

17:40 Follow up: Response: No adverse reaction; IV Status: Completed infusion; IV Intake:     ca1 

      100ml                                                                                       

16:40 Drug: Albuterol - atroVENT (ipratropium) (3:1) (2.5 mg - 0.5 mg) 3 ml Route: Nebulizer; ca1 

18:00 Follow up: Response: No adverse reaction; Marked relief of symptoms                     ca1 

17:12 Drug: Lasix (furosemide) 40 mg Route: IVP; Site: left antecubital;                      ca1 

18:00 Follow up: Response: No adverse reaction                                                ca1 

                                                                                                  

                                                                                                  

Disposition Summary:                                                                              

21 17:15                                                                                    

Hospitalization Ordered                                                                           

      Hospitalization Status: Inpatient Admission                                             jr8 

      Provider: Shoaib Concepcion 

      Condition: Fair                                                                         jr8 

      Problem: new                                                                            jr8 

      Symptoms: have improved                                                                 jr8 

      Bed/Room Type: Standard                                                                 [facility] 

      Location: Intensive Care Unit(21 18:33)                                           iw  

      Room Assignment: 7-(21 18:33)                                                     iw  

      Diagnosis                                                                                   

        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 

      failure                                                                                     

        - Chronic kidney disease, stage 4 (severe)                                            jr8 

        - Other pancytopenia                                                                  jr8 

      Forms:                                                                                      

        - Medication Reconciliation Form                                                      jr8 

        - SBAR form                                                                           jr8 

Addendum:                                                                                         

2021                                                                                        

     13:53 Co-signature as Attending Physician, Dmitry Murdock MD I agree with the assessment and   k
dr

           plan of care.                                                                          

                                                                                                  

Signatures:                                                                                       

Dispatcher [employer]                           EDMS                                                 

Dmitry Murdock MD MD   [facility]                                                  

Alissa Prabhakar RN                     RN                                                      

Federico Estrada PA                        PA   jr8                                                  

Fatoumata Reed RN                        RN   [facility]                                                  

Devin Soria RN                       RN   ll1                                                  

                                                                                                  

Corrections: (The following items were deleted from the chart)                                    

                                                                                             

16:56 15:49 CORONAVIRUS+MR.LAB.BRZ ordered. [facility]

17:18 17:15 Home Meds: Tradjenta; iw                                                          iw  

18:33 17:15 Telemetry/MedSurg (Inpatient) [facility]                                                 iw  

18:33 17:15 jr8                                                                               iw  

                                                                                                  

**************************************************************************************************

## 2021-07-02 NOTE — ER
Nurse's Notes                                                                                     

 [facility]                                                                 

Name: Deirdre Sauer                                                                               

Age: 87 yrs                                                                                       

Sex: Female                                                                                       

: 1933                                                                                   

MRN: [id number]                                                                                   

Arrival Date: 2021                                                                          

Time: 14:28                                                                                       

Account#: [id number]                                                                            

Bed 16                                                                                            

Private MD: KOBY PEREZ                                                                         

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       

  failure;Chronic kidney disease, stage 4 (severe);Other pancytopenia                             

                                                                                                  

Presentation:                                                                                     

                                                                                             

15:00 Chief complaint: Patient states: Started with allergies Monday. Now has cough,          ll1 

      congestion, weakness, fatigue, low grade fever. Coronavirus screen: Client denies           

      travel out of the [place] in the last 14 days. chills, cough unrelated to allergies,           

      fatigue, fever, headache, nausea, Client presents with at least one sign or symptom         

      that may indicate coronavirus-19. Standard/surgical mask placed on the client. Ebola        

      Screen: Patient denies travel to an Ebola-affected area in the 21 days before illness       

      onset. Initial Sepsis Screen: Does the patient meet any 2 criteria? No. Patient's           

      initial sepsis screen is negative. Does the patient have a suspected source of              

      infection? Yes: Productive cough/pneumonia. Risk Assessment: Do you want to hurt            

      yourself or someone else? Patient reports no desire to harm self or others. Onset of        

      symptoms was 2021.                                                                 

15:00 Method Of Arrival: Wheelchair                                                           ll1 

15:00 Acuity: MESFIN 3                                                                           ll1 

                                                                                                  

Historical:                                                                                       

- Allergies:                                                                                      

14:59 Streptomycin;                                                                           ll1 

14:59 blood thinners except ASA;                                                              ll1 

14:59 Statins-Hmg-Coa Reductase Inhibitors;                                                   ll1 

- Home Meds:                                                                                      

17:15 carvedilol 6.25 mg oral tab 2 times per day [Active]; allopurinol 100 mg oral tab once  iw  

      daily [Active]; ferrous sulfate 325 mg (65 mg iron) Oral cpER daily [Active];               

      furosemide 40 mg Oral tab 1 tab once daily [Active]; pravastatin 10 mg Oral tab 1 tab       

      once daily [Active]; Tradjenta 5 mg oral tab 1 tab once daily [Active]; montelukast 10      

      mg oral tab 1 tab once daily [Active]; glimepiride 4 mg Oral tab twice a day [Active];      

      aspirin 81 mg Oral TbEC 1 tab once daily [Active]; cetirizine 10 mg oral tab 1 tab once     

      daily [Active]; Vitamin C Oral daily [Active]; Vitamin D Oral daily [Active]; Vitamin       

      B-12 Oral daily [Active];                                                                   

- PMHx:                                                                                           

14:59 CVA; Diabetes - NIDDM; Hyperlipidemia; Hypertension;                                    ll1 

- PSHx:                                                                                           

14:59 hysterectomy;                                                                           ll1 

                                                                                                  

- Immunization history:: Flu vaccine is up to date.                                               

- Social history:: Smoking status: Patient denies any tobacco usage or history of.                

                                                                                                  

                                                                                                  

Screening:                                                                                        

15:40 Abuse screen: Denies threats or abuse. Denies injuries from another. Nutritional        ca1 

      screening: No deficits noted. Tuberculosis screening: No symptoms or risk factors           

      identified. Fall Risk IV access (20 points). Ambulatory Aid- Crutches/Cane/Walker (15       

      pts). Total Mckoy Fall Scale indicates Low Risk Score (25-44 pts). Fall prevention          

      measures have been instituted. Side Rails Up X 2 Frequent Obs/Assesments occuring As        

      available Patient and Family Educated on Fall Prevention Program and strategies.            

                                                                                                  

Assessment:                                                                                       

15:40 General: Appears in no apparent distress. comfortable, Behavior is calm, cooperative,   ca1 

      appropriate for age, Reports fever for > 3 days, feeling ill for > 3 days. Pain: Denies     

      pain. Neuro: Level of Consciousness is awake, alert, obeys commands, Oriented to            

      person, place, time, situation. Cardiovascular: Heart tones S1 S2 present Capillary         

      refill < 3 seconds Patient's skin is warm and dry. Rhythm is sinus rhythm. Respiratory:     

      Reports shortness of breath cough that is productive, since 5 days PTA Airway is patent     

      Respiratory effort is even, unlabored, Respiratory pattern is regular, symmetrical,         

      Breath sounds with wheezes bilaterally. GI: Abdomen is round non-distended, Bowel           

      sounds present X 4 quads. Abd is soft and non tender X 4 quads. : No signs and/or         

      symptoms were reported regarding the genitourinary system. EENT: Reports nasal              

      congestion nasal discharge since 5 days PTA. Derm: Skin is intact, is healthy with good     

      turgor, Skin is pink, warm \T\ dry. Musculoskeletal: Circulation, motion, and sensation     

      intact. Capillary refill < 3 seconds.                                                       

16:32 Reassessment: Patient appears in no apparent distress at this time. Patient and/or      ca1 

      family updated on plan of care and expected duration. Pain level reassessed. Patient is     

      alert, oriented x 3, equal unlabored respirations, skin warm/dry/pink.                      

17:30 Reassessment: Patient appears in no apparent distress at this time. Patient and/or      ca1 

      family updated on plan of care and expected duration. Pain level reassessed. Patient is     

      alert, oriented x 3, equal unlabored respirations, skin warm/dry/pink.                      

18:32 Reassessment: Patient appears in no apparent distress at this time. Patient and/or      ca1 

      family updated on plan of care and expected duration. Pain level reassessed. Patient is     

      alert, oriented x 3, equal unlabored respirations, skin warm/dry/pink.                      

18:46 Reassessment: Attempted to call report. Nurses on shift change at this time.            ca1 

19:27 Reassessment: Patient appears in no apparent distress at this time. Patient is alert,   ca1 

      oriented x 3, equal unlabored respirations, skin warm/dry/pink.                             

                                                                                                  

Vital Signs:                                                                                      

15:00  / 34; Pulse 63; Resp 18; Temp 99.5; Pulse Ox 92% on R/A; Weight 81.19 kg; Height ll1 

      5 ft. 2 in. (157.48 cm); Pain 3/10;                                                         

16:32  / 55; Pulse 64; Resp 17 S; Pulse Ox 100% on 2 lpm NC;                            ca1 

17:30  / 41; Pulse 67; Resp 23 S; Pulse Ox 100% on R/A;                                 ca1 

18:30 BP 99 / 71; Pulse 79; Resp 25 S; Pulse Ox 100% on R/A;                                  ca1 

19:27  / 51; Pulse 65; Resp 18 S; Pulse Ox 100% on 2 lpm NC;                            ca1 

15:00 Body Mass Index 32.74 (81.19 kg, 157.48 cm)                                             ll1 

                                                                                                  

ED Course:                                                                                        

14:28 Patient arrived in ED.                                                                  ds1 

14:28 KOBY PEREZ is Private Physician.                                                     ds1 

15:02 Triage completed.                                                                       ll1 

15:02 Arm band placed on.                                                                     ll1 

15:24 Patient placed in an exam room, on a stretcher.                                         ca1 

15:26 Fatoumata Reed, RN is Primary Nurse.                                                      ca1 

15:40 Patient has correct armband on for positive identification. Bed in low position. Call   ca1 

      light in reach. Side rails up X2. Pulse ox on. NIBP on. Warm blanket given.                 

15:47 Roszak, Federico, PA is PHCP.                                                               8 

15:47 Dmitry Murdock MD is Attending Physician.                                              [id number] 

16:05 First set of blood cultures drawn by me. Inserted saline lock: 20 gauge in left         [facility] 

      antecubital area, using aseptic technique. Blood collected.                                 

16:09 Initial lab(s) drawn, by me, sent to lab. Second set of blood cultures drawn by me.     [facility] 

16:27 XRAY Chest (1 view) In Process Unspecified.                                             EDMS

16:38 EKG done, by ED staff, reviewed by Federico NORTH.                                      [facility] 

17:14 Shoaib Concepcion is Hospitalizing Provider.                                               [email or web address] 

19:27 No provider procedures requiring assistance completed. Patient admitted, IV remains in  ca1 

      place.                                                                                      

                                                                                                  

Administered Medications:                                                                         

16:40 Drug: LevaQUIN (levofloxacin) 500 mg Volume: 100 ml; Route: IVPB; Infused Over: 60      ca1 

      mins; Site: left antecubital;                                                               

17:40 Follow up: Response: No adverse reaction; IV Status: Completed infusion; IV Intake:     ca1 

      100ml                                                                                       

16:40 Drug: Albuterol - atroVENT (ipratropium) (3:1) (2.5 mg - 0.5 mg) 3 ml Route: Nebulizer; ca1 

18:00 Follow up: Response: No adverse reaction; Marked relief of symptoms                     ca1 

17:12 Drug: Lasix (furosemide) 40 mg Route: IVP; Site: left antecubital;                      ca1 

18:00 Follow up: Response: No adverse reaction                                                ca1 

                                                                                                  

                                                                                                  

Intake:                                                                                           

17:40 IV: 100ml; Total: 100ml.                                                                ca1 

                                                                                                  

Outcome:                                                                                          

17:15 Decision to Hospitalize by Provider.                                                    [id number] 

19:28 Admitted to ICU accompanied by tech, via wheelchair, room 7, with oxygen, with chart,   ca1 

      Report called to  OTTONIEL Mcnulty                                                                  

19:28 Condition: stable                                                                           

19:28 Instructed on the need for admit.                                                           

19:41 Patient left the ED.                                                                    ca1 

                                                                                                  

Signatures:                                                                                       

Dispatcher [employer]                           EDMS                                                 

Amber Chamberlain                                ds1                                                  

Alissa Prabhakar RN RN   iw                                                   

Federico Estrada PA PA   jr8                                                  

Nataly Whelan                              3                                                  

Fatoumata Reed RN RN   ca1                                                  

Devin Soria RN                       RN   ll1                                                  

                                                                                                  

Corrections: (The following items were deleted from the chart)                                    

16:50 15:40 General: Appears in no apparent distress. comfortable, Behavior is calm,          ca1 

      cooperative, appropriate for age, ca1                                                       

16:51 15:40 Respiratory: Reports shortness of breath cough that is productive, since 5 days   ca1 

      PTA Airway is patent Respiratory effort is even, unlabored, Respiratory pattern is          

      regular, symmetrical, tachypnea Breath sounds with wheezes bilaterally. ca1                 

17:18 17:15 Home Meds: Tradjenta; iw                                                          iw  

                                                                                                  

**************************************************************************************************

## 2021-07-03 LAB
ANISOCYTOSIS BLD QL: (no result)
BUN BLD-MCNC: 49 MG/DL (ref 7–18)
CRP SERPL-MCNC: 14.5 MG/L (ref ?–3)
FERRITIN SERPL-MCNC: 89.5 NG/ML (ref 8–388)
GLUCOSE SERPLBLD-MCNC: 170 MG/DL (ref 74–106)
HCT VFR BLD CALC: 31.4 % (ref 36–45)
HDLC SERPL-MCNC: 28 MG/DL (ref 40–60)
LDLC SERPL CALC-MCNC: 73 MG/DL (ref ?–130)
LYMPHOCYTES # SPEC AUTO: 0.3 K/UL (ref 0.7–4.9)
MAGNESIUM SERPL-MCNC: 1.9 MG/DL (ref 1.8–2.4)
MORPHOLOGY BLD-IMP: (no result)
PMV BLD: 9.4 FL (ref 7.6–11.3)
POTASSIUM SERPL-SCNC: 4.3 MMOL/L (ref 3.5–5.1)
RBC # BLD: 3.42 M/UL (ref 3.86–4.86)

## 2021-07-03 RX ADMIN — HUMAN INSULIN SCH UNIT: 100 INJECTION, SOLUTION SUBCUTANEOUS at 16:29

## 2021-07-03 RX ADMIN — Medication SCH ML: at 08:13

## 2021-07-03 RX ADMIN — FAMOTIDINE SCH MG: 10 INJECTION, SOLUTION INTRAVENOUS at 20:23

## 2021-07-03 RX ADMIN — HUMAN INSULIN SCH UNIT: 100 INJECTION, SOLUTION SUBCUTANEOUS at 20:22

## 2021-07-03 RX ADMIN — Medication SCH ML: at 20:22

## 2021-07-03 RX ADMIN — METHYLPREDNISOLONE SODIUM SUCCINATE SCH MG: 40 INJECTION, POWDER, FOR SOLUTION INTRAMUSCULAR; INTRAVENOUS at 08:12

## 2021-07-03 RX ADMIN — METHYLPREDNISOLONE SODIUM SUCCINATE SCH MG: 40 INJECTION, POWDER, FOR SOLUTION INTRAMUSCULAR; INTRAVENOUS at 20:23

## 2021-07-03 NOTE — P.CNS
Date of Consult: 07/03/21


Reason for Consult: Corona virus infection


Chief Complaint: Shortness of breath


History of Present Illness: 


Patient is 87 years of age multiple medical problems diabetes hypertension 

gastric ulcer presented with shortness of breath nasal congestion reporting some

fever she has pancytopenia which appears to be chronic mild interstitial changes

on the x-ray patient has received 2 doses of the Pfizer vaccine also was tested 

positive for corona virus in March showed





Allergies





blood thinner Allergy (Mild, Uncoded 02/27/20 21:12)


   swelling


blood thinners Allergy (Uncoded 02/27/20 21:12)


   Unknown


Statins-Hmg-Coa Redu Allergy (Uncoded 02/27/20 21:12)


   Unknown





Home Medications: 








Aspirin [Aspirin EC 81 MG] 81 mg PO DAILY 03/02/18 


Glimepiride [Amaryl] 4 mg PO BID 03/02/18 


Pravastatin Sodium 10 mg PO BEDTIME 03/02/18 


carvediloL [Coreg*] 6.25 mg PO BEDTIME 03/02/18 


Allopurinol 100 mg PO DAILY 02/27/20 


Cetirizine HCl 10 mg PO DAILY 02/27/20 


Linagliptin [Tradjenta] 1 tab PO DAILY 02/27/20 


Furosemide [Lasix*] 40 mg PO BID #60 tab 02/29/20 


Multivit with Calcium,Iron,Min [Multiple Vitamins For Women] 1 each PO DAILY #90

tablet 02/29/20 


levoFLOXacin [Levaquin] 250 mg PO SEECOM #3 tab 02/29/20 








- Past Medical/Surgical History


Diabetic: Yes


-: DM


-: HTN


-: Hyperlipidemia


-: CVA 2015


-: Gastric Ulcer


-: Chronic renal failure


-: Diastolic heart failure


-: Full Hysterectomy





- Family History


  ** Mother


Medical History: Diabetes





- Social History


Alcohol use: No


CD- Drugs: No


Caffeine use: No





Review of Systems


10-point ROS is otherwise unremarkable


General: Weakness


Respiratory: Shortness of Breath





Physical Examination














Temp Pulse Resp BP Pulse Ox


 


 97.4 F   77   16   114/82   100 


 


 07/03/21 08:00  07/03/21 08:12  07/03/21 08:00  07/03/21 08:12  07/03/21 08:00








Laboratory Data (last 24 hrs)





07/02/21 16:09: PT 14.8 H, INR 1.28


07/02/21 16:09: WBC 2.70 L, Hgb 10.3 L, Hct 31.9 L, Plt Count 51 L


07/02/21 16:09: Sodium 140, Potassium 4.0, BUN 45 H, Creatinine 1.98 H, Glucose 

104, Magnesium 2.0, Total Bilirubin 0.7, AST 36, ALT 22, Alkaline Phosphatase 71








- Problems


(1) Pneumonia due to COVID-19 virus


Current Visit: Yes   Status: Acute   


Plan: 


Patient is 87 years of age admitted with dyspnea mildly hypoxic she has received

 2 doses of the Pfizer vaccine and was tested positive for corona virus in March

 patient has pancytopenia appears that she has had thrombocytopenia before 

chronic renal failure vital signs stable oxygenation appears to be satisfactory 

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 

doubt to this is related to corona virus infection change over to walk oral 

prednisone hematology Consul to

## 2021-07-03 NOTE — P.PN
Subjective


Date of Service: 07/03/21


Chief Complaint: Shortness of breath


Patient complaining of nonproductive cough.


She is tolerating 1 L of oxygen with 100% SaO2.


She is needing some assistance with transfer.








Physical Examination





- Vital Signs


Temperature: 98.8 F


Blood Pressure: 128/41


Pulse: 74


Respirations: 19


Pulse Ox (%): 97





- Physical Exam


General: Alert, In no apparent distress, Oriented x3


HEENT: Mucous membr. moist/pink


Neck: JVD not distended


Respiratory: Other (Nonlabored breathing)


Cardiovascular: No edema, Regular rate/rhythm, Normal S1 S2


Gastrointestinal: Normal bowel sounds, Soft and benign, No tenderness


Musculoskeletal: No swelling


Integumentary: No rashes


Neurological: Normal strength at 5/5 x4 extr, Cranial nerves 3-12 intact





Assessment And Plan





- Current Problems (Diagnosis)


(1) Pneumonia due to COVID-19 virus


Current Visit: Yes   Status: Acute   





(2) DM type 2 (diabetes mellitus, type 2)


Current Visit: Yes   Status: Acute   





(3) Acute exacerbation of CHF (congestive heart failure)


Current Visit: No   Status: Acute   





(4) Chronic kidney disease, stage 3


Current Visit: Yes   Status: Acute   





(5) Pancytopenia


Current Visit: Yes   Status: Acute   





(6) Elevated troponin


Current Visit: Yes   Status: Acute   





- Plan


Continue steroid, vitamin-C, vitamin-D, zinc supplementation.


Supplemental oxygen as needed


Pulmonary input appreciated.


Patient with pancytopenia.  Review of past medical records shows intermittent 

leukopenia and thrombocytopenia.


Avoid anticoagulation.


Status post IV Lasix for CHF exacerbation.


Patient appeared compensated for CHF.


Troponin trended down.  Elevated troponin likely secondary to demand ischemia.


Trend troponin.


Monitor renal function.

## 2021-07-03 NOTE — EKG
Test Date:    2021-07-02               Test Time:    16:31:55

Technician:   ERIC                                     

                                                     

MEASUREMENT RESULTS:                                       

Intervals:                                           

Rate:         65                                     

MA:           234                                    

QRSD:         84                                     

QT:           438                                    

QTc:          455                                    

Axis:                                                

P:            59                                     

MA:           234                                    

QRS:          35                                     

T:            41                                     

                                                     

INTERPRETIVE STATEMENTS:                                       

                                                     

Sinus rhythm with 1st degree AV block

Cannot rule out Anterior infarct, age undetermined

Abnormal ECG

Compared to ECG 02/27/2020 18:34:20

No significant changes



Electronically Signed On 07-03-21 10:25:36 CDT by Raúl Hilario

## 2021-07-04 VITALS — SYSTOLIC BLOOD PRESSURE: 150 MMHG | DIASTOLIC BLOOD PRESSURE: 45 MMHG | TEMPERATURE: 98.4 F

## 2021-07-04 VITALS — OXYGEN SATURATION: 99 %

## 2021-07-04 LAB
ALBUMIN SERPL BCP-MCNC: 2.5 G/DL (ref 3.4–5)
BUN BLD-MCNC: 69 MG/DL (ref 7–18)
CRP SERPL-MCNC: 16 MG/L (ref ?–3)
FERRITIN SERPL-MCNC: 82 NG/ML (ref 8–388)
GLUCOSE SERPLBLD-MCNC: 236 MG/DL (ref 74–106)
HCT VFR BLD CALC: 30.8 % (ref 36–45)
LYMPHOCYTES # SPEC AUTO: 0.4 K/UL (ref 0.7–4.9)
PMV BLD: 8.8 FL (ref 7.6–11.3)
POTASSIUM SERPL-SCNC: 4.2 MMOL/L (ref 3.5–5.1)
RBC # BLD: 3.38 M/UL (ref 3.86–4.86)

## 2021-07-04 RX ADMIN — HUMAN INSULIN SCH UNIT: 100 INJECTION, SOLUTION SUBCUTANEOUS at 12:36

## 2021-07-04 RX ADMIN — METHYLPREDNISOLONE SODIUM SUCCINATE SCH MG: 40 INJECTION, POWDER, FOR SOLUTION INTRAMUSCULAR; INTRAVENOUS at 08:18

## 2021-07-04 RX ADMIN — Medication SCH ML: at 08:19

## 2021-07-04 RX ADMIN — HUMAN INSULIN SCH UNIT: 100 INJECTION, SOLUTION SUBCUTANEOUS at 08:19

## 2021-07-04 NOTE — P.PN
Subjective


Date of Service: 07/04/21


Chief Complaint: Shortness of breath


Subjective: Improving (No new complaints feeling better)





Review of Systems


General: Weakness


Respiratory: Shortness of Breath





Physical Examination





- Vital Signs


Temperature: 97.3 F


Blood Pressure: 135/50


Pulse: 70


Respirations: 21


Pulse Ox (%): 97





- Physical Exam


General: Alert, In no apparent distress, Oriented x3


Respiratory: Clear to auscultation bilaterally, Diminished


Cardiovascular: No edema, Regular rate/rhythm





Assessment & Plan





- Problems (Diagnosis)


(1) Pneumonia due to COVID-19 virus


Current Visit: Yes   Status: Acute   


Plan: 


Patient is improving plan to discharge minimal elevation of the creatinine I 

suspect is due to diuretics patient has chronic renal failure was not require 

home O2 discharged on 10 b.i.d. of prednisone to follow-up with hematology is on

outpatient labs reviewed

## 2021-07-06 ENCOUNTER — HOSPITAL ENCOUNTER (EMERGENCY)
Dept: HOSPITAL 97 - ER | Age: 86
LOS: 1 days | Discharge: HOME | End: 2021-07-07
Payer: COMMERCIAL

## 2021-07-06 DIAGNOSIS — I10: ICD-10-CM

## 2021-07-06 DIAGNOSIS — Z91.018: ICD-10-CM

## 2021-07-06 DIAGNOSIS — E11.65: Primary | ICD-10-CM

## 2021-07-06 DIAGNOSIS — Z79.82: ICD-10-CM

## 2021-07-06 DIAGNOSIS — Z88.8: ICD-10-CM

## 2021-07-06 PROCEDURE — 80076 HEPATIC FUNCTION PANEL: CPT

## 2021-07-06 PROCEDURE — 82947 ASSAY GLUCOSE BLOOD QUANT: CPT

## 2021-07-06 PROCEDURE — 85025 COMPLETE CBC W/AUTO DIFF WBC: CPT

## 2021-07-06 PROCEDURE — 84484 ASSAY OF TROPONIN QUANT: CPT

## 2021-07-06 PROCEDURE — 93005 ELECTROCARDIOGRAM TRACING: CPT

## 2021-07-06 PROCEDURE — 81003 URINALYSIS AUTO W/O SCOPE: CPT

## 2021-07-06 PROCEDURE — 80048 BASIC METABOLIC PNL TOTAL CA: CPT

## 2021-07-06 PROCEDURE — 36415 COLL VENOUS BLD VENIPUNCTURE: CPT

## 2021-07-07 VITALS — OXYGEN SATURATION: 98 %

## 2021-07-07 VITALS — DIASTOLIC BLOOD PRESSURE: 61 MMHG | SYSTOLIC BLOOD PRESSURE: 143 MMHG

## 2021-07-07 VITALS — TEMPERATURE: 98.5 F

## 2021-07-07 LAB
ALBUMIN SERPL BCP-MCNC: 2.8 G/DL (ref 3.4–5)
ALP SERPL-CCNC: 107 U/L (ref 45–117)
ALT SERPL W P-5'-P-CCNC: 47 U/L (ref 12–78)
AST SERPL W P-5'-P-CCNC: 46 U/L (ref 15–37)
BUN BLD-MCNC: 74 MG/DL (ref 7–18)
GLUCOSE SERPLBLD-MCNC: 337 MG/DL (ref 74–106)
HCT VFR BLD CALC: 32.7 % (ref 36–45)
LYMPHOCYTES # SPEC AUTO: 0.3 K/UL (ref 0.7–4.9)
MORPHOLOGY BLD-IMP: (no result)
PMV BLD: 9 FL (ref 7.6–11.3)
POTASSIUM SERPL-SCNC: 4.1 MMOL/L (ref 3.5–5.1)
RBC # BLD: 3.58 M/UL (ref 3.86–4.86)
TROPONIN (EMERG DEPT USE ONLY): < 0.02 NG/ML (ref 0–0.04)

## 2021-07-07 NOTE — ER
Nurse's Notes                                                                                     

 [facility]                                                                 

Name: Deirdre Sauer                                                                               

Age: 87 yrs                                                                                       

Sex: Female                                                                                       

: 1933                                                                                   

MRN: [id number]                                                                                   

Arrival Date: 2021                                                                          

Time: 22:57                                                                                       

Account#: [id number]                                                                            

Bed 15                                                                                            

Private MD:                                                                                       

Diagnosis: Hyperglycemia, unspecified                                                             

                                                                                                  

Presentation:                                                                                     

                                                                                             

23:05 Chief complaint: Patient states: pt recently discharged for COVID pneumonia and was     bb  

      sent home on steroids she was told to follow-up with her physician but has not been         

      able to make an appointment. She was told to go to the ED if her BGL was over 400.          

      Tonight it was 420. Coronavirus screen: pt recently discharged  after stay for        

      COVID pneumonia. Ebola Screen: No symptoms or risks identified at this time. Initial        

      Sepsis Screen: Does the patient meet any 2 criteria? No. Patient's initial sepsis           

      screen is negative. Does the patient have a suspected source of infection? No.              

      Patient's initial sepsis screen is negative. Risk Assessment: Do you want to hurt           

      yourself or someone else? Patient reports no desire to harm self or others. Onset of        

      symptoms was 2021.                                                                 

23:05 Method Of Arrival: Wheelchair                                                           bb  

23:05 Acuity: MESFIN 3                                                                           bb  

                                                                                                  

Triage Assessment:                                                                                

23:10 General: Appears in no apparent distress. Behavior is calm, cooperative. Pain:          bb  

      Complains of pain in back. Neuro: Level of Consciousness is awake, alert, obeys             

      commands, Oriented to person, place, time, situation. Cardiovascular: Capillary refill      

      < 3 seconds Patient's skin is warm and dry. Respiratory: Respiratory effort is even,        

      unlabored, Respiratory pattern is regular. GI: No signs and/or symptoms were reported       

      involving the gastrointestinal system. Derm: Skin is dry, Skin is pale, Skin                

      temperature is warm. Musculoskeletal: Capillary refill < 3 seconds.                         

                                                                                                  

Historical:                                                                                       

- Allergies:                                                                                      

23:10 blood thinners except ASA;                                                              bb  

23:10 Statins-Hmg-Coa Reductase Inhibitors;                                                   bb  

23:10 Streptomycin;                                                                           bb  

23:10 caffeine;                                                                               bb  

- Home Meds:                                                                                      

23:10 Albuterol Inhl [Active]; allopurinol 100 mg Oral tab once daily [Active]; aspirin 81 mg bb  

      Oral TbEC 1 tab once daily [Active]; carvedilol 6.25 mg Oral tab 2 times per day            

      [Active]; cetirizine 10 mg Oral tab 1 tab once daily [Active]; Claritin-D 24 Hour           

       mg Oral Tb24 1 tab once daily [Active]; Coreg Oral [Active]; ezetimibe Oral once     

      daily [Active]; ferrous sulfate 325 mg (65 mg iron) Oral cpER daily [Active]; Flonase       

      50 mcg/actuation Nasal spsn 1 spray 2 times per day [Active]; furosemide 40 mg Oral tab     

      1 tab once daily [Active]; glimepiride 4 mg Oral tab twice a day [Active]; montelukast      

      10 mg Oral tab 1 tab once daily [Active]; omeprazole 40 mg Oral cpDR 1 cap once daily       

      [Active]; pravastatin 10 mg Oral tab 1 tab once daily [Active]; Spectravite Ultra           

      Women's Sr 8 mg iron-400 mcg-300 mcg Oral tab [Active]; Tradjenta 5 mg Oral tab 1 tab       

      once daily [Active]; Tradjenta [Active]; Vitamin B-12 Oral daily [Active]; Vitamin C        

      Oral daily [Active]; Vitamin D Oral daily [Active];                                         

- PMHx:                                                                                           

23:10 CVA; Diabetes - NIDDM; Hyperlipidemia; Hypertension;                                    bb  

- PSHx:                                                                                           

23:10 hysterectomy;                                                                           bb  

                                                                                                  

- Immunization history:: Adult Immunizations up to date.                                          

- Social history:: Smoking status: Patient denies any tobacco usage or history of.                

                                                                                                  

                                                                                                  

Screenin/07                                                                                             

04:02 Abuse screen: Denies threats or abuse. Denies injuries from another. Nutritional        bs2 

      screening: No deficits noted. Tuberculosis screening: No symptoms or risk factors           

      identified. Fall Risk None identified.                                                      

                                                                                                  

Assessment:                                                                                       

02:15 General: Appears in no apparent distress. comfortable, obese, well groomed, well        bs2 

      developed, well nourished, Behavior is calm, cooperative, appropriate for age. General:     

      pt state she was just released from hospital on  and was told to follow up with       

      PCP on Monday, but Dr office was closed on Monday. pt was instructed to come to ER if       

      Blood sugar went over 400, pt states blood sugar at home was 420, she has no other S/S,     

      just had checked her sugar before bed. Pt knows blood sugars may be off d/t her illness     

      and being on steroids for illness.. Pain: Denies pain. Neuro: No deficits noted.            

      Cardiovascular: No deficits noted. GI: No deficits noted. No signs and/or symptoms were     

      reported involving the gastrointestinal system. : No deficits noted. No signs and/or      

      symptoms were reported regarding the genitourinary system.                                  

07:10 Reassessment: Patient appears in no apparent distress at this time. No changes from     tw2 

      previously documented assessment. Patient and/or family updated on plan of care and         

      expected duration. Pain level reassessed. Patient is alert, oriented x 3, equal             

      unlabored respirations, skin warm/dry/pink.                                                 

08:10 Reassessment: Patient appears in no apparent distress at this time. No changes from     tw2 

      previously documented assessment. Patient and/or family updated on plan of care and         

      expected duration. Pain level reassessed. Patient is alert, oriented x 3, equal             

      unlabored respirations, skin warm/dry/pink.                                                 

08:54 Reassessment: provider at bedside at this time.                                         tw2 

09:35 Reassessment: Patient appears in no apparent distress at this time. No changes from     tw2 

      previously documented assessment. Patient and/or family updated on plan of care and         

      expected duration. Pain level reassessed. Patient is alert, oriented x 3, equal             

      unlabored respirations, skin warm/dry/pink.                                                 

                                                                                                  

Vital Signs:                                                                                      

                                                                                             

23:05  / 86; Pulse 76; Resp 16 S; Temp 98.5(O); Pulse Ox 98% on R/A; Weight 80.29 kg    bb  

      (R); Height 5 ft. 2 in. (157.48 cm) (R); Pain 5/10;                                         

                                                                                             

07:20  / 61; Pulse 67; Resp 17; Pulse Ox 97% on R/A;                                    tw2 

08:34 Pulse 66; Resp 17; Pulse Ox 100% on R/A;                                                tw2 

08:54  / 50; Pulse 58; Resp 17; Pulse Ox 98% on R/A;                                    tw2 

09:35  / 61; Pulse 67; Resp 17; Pulse Ox 98% on R/A;                                    tw2 

                                                                                             

23:05 Body Mass Index 32.37 (80.29 kg, 157.48 cm)                                               

                                                                                                  

ED Course:                                                                                        

                                                                                             

22:57 Patient arrived in ED.                                                                  bp1 

23:10 Triage completed.                                                                       bb  

23:10 Arm band placed on Patient placed in waiting room, Patient notified of wait time.       bb  

                                                                                             

02:11 Ventura Jett MD is Attending Physician.                                             [facility] 

02:15 Inserted saline lock: 20 gauge in left antecubital area, using aseptic technique. Blood bs2 

      collected.                                                                                  

04:02 Patient has correct armband on for positive identification. Bed in low position. Call   bs2 

      light in reach. Side rails up X2. Pulse ox on. NIBP on. Door closed. Warm blanket given.    

05:25 Troponin (emerg Dept Use Only) Sent.                                                    bs2 

05:25 Liver (Hepatic) Function Sent.                                                          bs2 

05:25 Basic Metabolic Panel Sent.                                                             bs2 

05:25 CBC with Automated Diff Sent.                                                           bs2 

05:25 LFT's Sent.                                                                             bs2 

05:25 Basic Metabolic Panel Sent.                                                             bs2 

05:25 CBC with Diff Sent.                                                                     bs2 

05:48 Notified ED physician of a critical lab result(s). WBCs of 1.9 Dr Jett notified.      bb  

07:04 Sara Crawley RN is Primary Nurse.                                                        tw2 

07:06 Report received from OTTONIEL Pham, noted 500 ml NS hanging in room and completed at this  tw2 

      time, pt nad.                                                                               

07:47 Fitz Hunt PA is PHCP.                                                              jmm 

09:35 No provider procedures requiring assistance completed. IV discontinued, intact,         tw2 

      bleeding controlled, No redness/swelling at site. Pressure dressing applied.                

                                                                                                  

Administered Medications:                                                                         

04:02 Drug: NS 0.9% 500 ml Route: IV; Rate: bolus; Site: left antecubital;                    bs2 

07:06 Follow up: Response: No adverse reaction; IV Status: Completed infusion; IV Intake:     tw2 

      500ml                                                                                       

07:18 Drug: Insulin Regular Human 6 units {Co-Signature: tr6 (Nelida Tran RN).} Route:   tw2 

      Sub-Q; Site: left upper arm;                                                                

09:36 Follow up: Response: No adverse reaction                                                tw2 

                                                                                                  

                                                                                                  

Point of Care Testing:                                                                            

      Blood Glucose:                                                                              

                                                                                             

23:16 Blood Glucose: 361 mg/dL;                                                               bb  

      Ranges:                                                                                     

                                                                                                  

Intake:                                                                                           

                                                                                             

07:06 IV: 500ml; Total: 500ml.                                                                tw2 

                                                                                                  

Outcome:                                                                                          

09:16 Discharge ordered by MD.                                                                jmm 

09:35 Discharged to home via wheelchair.                                                      tw2 

09:35 Condition: stable                                                                           

09:35 Discharge instructions given to patient, Instructed on discharge instructions, follow       

      up and referral plans. Demonstrated understanding of instructions, follow-up care.          

09:36 Patient left the ED.                                                                    tw2 

                                                                                                  

Signatures:                                                                                       

Fitz Hunt PA PA jmm Ballard, Brenda, RN                     RN   bb                                                   

Sara Crawley RN                          RN   tw2                                                  

Marine Humphrey Maurice, MD MD   7                                                  

Vero Rehman                               2                                                  

Nelida Tran RN                          tr6                                                  

                                                                                                  

**************************************************************************************************

## 2021-07-07 NOTE — EDPHYS
Physician Documentation                                                                           

 [facility]                                                                 

Name: Deirdre Sauer                                                                               

Age: 87 yrs                                                                                       

Sex: Female                                                                                       

: 1933                                                                                   

MRN: [id number]                                                                                   

Arrival Date: 2021                                                                          

Time: 22:57                                                                                       

Account#: [id number]                                                                            

Bed 15                                                                                            

Private MD:                                                                                       

ED Physician Ventura Jett                                                                      

HPI:                                                                                              

                                                                                             

03:28 This 87 yrs old  Female presents to ER via Wheelchair with complaints of High  mh7 

      Blood Sugar.                                                                                

03:28 The patient or guardian reports hyperglycemia, that was potentially precipitated by     mh7 

      Steroids, with the patient's symptoms witnessed by no one, Treatment prior to arrival       

      includes:. Onset: The symptoms/episode began/occurred last night. Associated signs and      

      symptoms: Pertinent negatives: anorexia, constipation, decreased urine output,              

      diaphoresis, diarrhea, dry skin, hair loss, ketones in urine, nausea, polydipsia,           

      polyphagia, polyuria, seizure activity, skin flushing, urinary incontinence, vomiting.      

      Current symptoms: In the emergency department the patient's symptoms are unchanged from     

      the initial presentation.                                                                   

                                                                                                  

Historical:                                                                                       

- Allergies:                                                                                      

                                                                                             

23:10 blood thinners except ASA;                                                              bb  

23:10 Statins-Hmg-Coa Reductase Inhibitors;                                                   bb  

23:10 Streptomycin;                                                                           bb  

23:10 caffeine;                                                                               bb  

- Home Meds:                                                                                      

23:10 Albuterol Inhl [Active]; allopurinol 100 mg Oral tab once daily [Active]; aspirin 81 mg bb  

      Oral TbEC 1 tab once daily [Active]; carvedilol 6.25 mg Oral tab 2 times per day            

      [Active]; cetirizine 10 mg Oral tab 1 tab once daily [Active]; Claritin-D 24 Hour           

       mg Oral Tb24 1 tab once daily [Active]; Coreg Oral [Active]; ezetimibe Oral once     

      daily [Active]; ferrous sulfate 325 mg (65 mg iron) Oral cpER daily [Active]; Flonase       

      50 mcg/actuation Nasal spsn 1 spray 2 times per day [Active]; furosemide 40 mg Oral tab     

      1 tab once daily [Active]; glimepiride 4 mg Oral tab twice a day [Active]; montelukast      

      10 mg Oral tab 1 tab once daily [Active]; omeprazole 40 mg Oral cpDR 1 cap once daily       

      [Active]; pravastatin 10 mg Oral tab 1 tab once daily [Active]; Spectravite Ultra           

      Women's Sr 8 mg iron-400 mcg-300 mcg Oral tab [Active]; Tradjenta 5 mg Oral tab 1 tab       

      once daily [Active]; Tradjenta [Active]; Vitamin B-12 Oral daily [Active]; Vitamin C        

      Oral daily [Active]; Vitamin D Oral daily [Active];                                         

- PMHx:                                                                                           

23:10 CVA; Diabetes - NIDDM; Hyperlipidemia; Hypertension;                                    bb  

- PSHx:                                                                                           

23:10 hysterectomy;                                                                           bb  

                                                                                                  

- Immunization history:: Adult Immunizations up to date.                                          

- Social history:: Smoking status: Patient denies any tobacco usage or history of.                

                                                                                                  

                                                                                                  

ROS:                                                                                              

                                                                                             

03:28 Constitutional: Negative for fever, chills, and weight loss, Eyes: Negative for injury, mh7 

      pain, redness, and discharge, ENT: Negative for injury, pain, and discharge, Neck:          

      Negative for injury, pain, and swelling, Cardiovascular: Negative for chest pain,           

      palpitations, and edema, Respiratory: Negative for shortness of breath, cough,              

      wheezing, and pleuritic chest pain, Abdomen/GI: Negative for abdominal pain, nausea,        

      vomiting, diarrhea, and constipation, Back: Negative for injury and pain, : Negative      

      for injury, bleeding, discharge, and swelling, MS/Extremity: Negative for injury and        

      deformity, Skin: Negative for injury, rash, and discoloration, Neuro: Negative for          

      headache, weakness, numbness, tingling, and seizure, Psych: Negative for depression,        

      anxiety, suicide ideation, homicidal ideation, and hallucinations, Allergy/Immunology:      

      Negative for hives, rash, and allergies, Hematologic/Lymphatic: Negative for swollen        

      nodes, abnormal bleeding, and unusual bruising.                                             

                                                                                                  

Exam:                                                                                             

03:28 Constitutional:  This is a well developed, well nourished patient who is awake, alert,  mh7 

      and in no acute distress. Head/Face:  Normocephalic, atraumatic. Eyes:  Pupils equal        

      round and reactive to light, extra-ocular motions intact.  Lids and lashes normal.          

      Conjunctiva and sclera are non-icteric and not injected.  Cornea within normal limits.      

      Periorbital areas with no swelling, redness, or edema. Neck:  Trachea midline, no           

      thyromegaly or masses palpated, and no cervical lymphadenopathy.  Supple, full range of     

      motion without nuchal rigidity, or vertebral point tenderness.  No Meningismus.             

      Chest/axilla:  Normal chest wall appearance and motion.  Nontender with no deformity.       

      No lesions are appreciated. Cardiovascular:  Regular rate and rhythm with a normal S1       

      and S2.  No gallops, murmurs, or rubs.  Normal PMI, no JVD.  No pulse deficits.             

      Respiratory:  Lungs have equal breath sounds bilaterally, clear to auscultation and         

      percussion.  No rales, rhonchi or wheezes noted.  No increased work of breathing, no        

      retractions or nasal flaring. Abdomen/GI:  Soft, non-tender, with normal bowel sounds.      

      No distension or tympany.  No guarding or rebound.  No evidence of tenderness               

      throughout. Back:  No spinal tenderness.  No costovertebral tenderness.  Full range of      

      motion. Skin:  Warm, dry with normal turgor.  Normal color with no rashes, no lesions,      

      and no evidence of cellulitis. MS/ Extremity:  Pulses equal, no cyanosis.                   

      Neurovascular intact.  Full, normal range of motion. Neuro:  Awake and alert, GCS 15,       

      oriented to person, place, time, and situation.  Cranial nerves II-XII grossly intact.      

      Motor strength 5/5 in all extremities.  Sensory grossly intact.  Cerebellar exam            

      normal.  Normal gait. Psych:  Awake, alert, with orientation to person, place and time.     

       Behavior, mood, and affect are within normal limits.                                       

                                                                                                  

Vital Signs:                                                                                      

                                                                                             

23:05  / 86; Pulse 76; Resp 16 S; Temp 98.5(O); Pulse Ox 98% on R/A; Weight 80.29 kg    bb  

      (R); Height 5 ft. 2 in. (157.48 cm) (R); Pain 5/10;                                         

                                                                                             

07:20  / 61; Pulse 67; Resp 17; Pulse Ox 97% on R/A;                                    tw2 

08:34 Pulse 66; Resp 17; Pulse Ox 100% on R/A;                                                tw2 

08:54  / 50; Pulse 58; Resp 17; Pulse Ox 98% on R/A;                                    tw2 

09:35  / 61; Pulse 67; Resp 17; Pulse Ox 98% on R/A;                                    tw2 

                                                                                             

23:05 Body Mass Index 32.37 (80.29 kg, 157.48 cm)                                               

                                                                                                  

MDM:                                                                                              

07:50 Patient medically screened.                                                             laquita 

08:49 Data reviewed: vital signs, nurses notes.                                               [facility] 

09:14 Counseling: I had a detailed discussion with the patient and/or guardian regarding: the [facility] 

      historical points, exam findings, and any diagnostic results supporting the                 

      discharge/admit diagnosis, the need for outpatient follow up, to return to the              

      emergency department if symptoms worsen or persist or if there are any questions or         

      concerns that arise at home. ED course: Advised to discontinue steroids until she is        

      able to follow up with pcp. Patient is otherwise given strict return precautions.           

      patient understood and agrees with the plan of care. .                                      

                                                                                                  

                                                                                             

23:27 Order name: Glucose, Ancillary Testing; Complete Time: 02:18                            [facility]

                                                                                             

02:43 Order name: CBC with Diff                                                               [facility] 

                                                                                             

02:43 Order name: Basic Metabolic Panel                                                       [facility] 

                                                                                             

02:43 Order name: LFT's                                                                       [facility] 

                                                                                             

02:43 Order name: CBC with Automated Diff; Complete Time: 09:17                               [facility]

                                                                                             

02:43 Order name: Basic Metabolic Panel; Complete Time: 06:00                                 [facility]

                                                                                             

02:43 Order name: Liver (Hepatic) Function; Complete Time: 06:00                              [facility]

                                                                                             

03:35 Order name: Troponin (emerg Dept Use Only)                                              [facility] 

                                                                                             

04:22 Order name: Troponin (Emerg Dept Use Only); Complete Time: 06:00                        [facility]

                                                                                             

05:49 Order name: Manual Differential; Complete Time: 09:17                                   [facility]

                                                                                             

08:13 Order name: Urine Dipstick-Ancillary; Complete Time: 08:19                              [facility]

                                                                                             

02:43 Order name: Urine Dipstick-Ancillary (obtain specimen); Complete Time: 08:30            [facility] 

                                                                                             

02:43 Order name: EKG - Nurse/Tech; Complete Time: 05:25                                      [facility] 

                                                                                             

08:48 Order name: EKG Electrocardiogram                                                       [facility]

                                                                                                  

Administered Medications:                                                                         

04:02 Drug: NS 0.9% 500 ml Route: IV; Rate: bolus; Site: left antecubital;                    bs2 

07:06 Follow up: Response: No adverse reaction; IV Status: Completed infusion; IV Intake:     tw2 

      500ml                                                                                       

07:18 Drug: Insulin Regular Human 6 units {Co-Signature: tr6 (Nelida Tran RN).} Route:   tw2 

      Sub-Q; Site: left upper arm;                                                                

09:36 Follow up: Response: No adverse reaction                                                tw2 

                                                                                                  

                                                                                                  

Point of Care Testing:                                                                            

      Blood Glucose:                                                                              

                                                                                             

23:16 Blood Glucose: 361 mg/dL;                                                               bb  

      Ranges:                                                                                     

      Critical Glucose Levels:Adult <50 mg/dl or >400 mg/dl  <40 mg/dl or >180 mg/dl       

Disposition Summary:                                                                              

21 09:16                                                                                    

Discharge Ordered                                                                                 

      Location: Home                                                                          [facility] 

      Condition: Stable                                                                       jm 

      Diagnosis                                                                                   

        - Hyperglycemia, unspecified                                                          jm 

      Followup:                                                                               [facility] 

        - With: Private Physician                                                                  

        - When: 2 - 3 days                                                                         

        - Reason: Recheck today's complaints, Continuance of care, Re-evaluation by your           

      physician                                                                                   

      Discharge Instructions:                                                                     

        - Discharge Summary Sheet                                                             jm 

        - Hyperglycemia                                                                       [facility] 

      Forms:                                                                                      

        - Medication Reconciliation Form                                                      [facility] 

        - Thank You Letter                                                                    [facility] 

        - Antibiotic Education                                                                [facility] 

        - Prescription Opioid Use                                                             [facility] 

Signatures:                                                                                       

Dispatcher [employer]                           [facility]MS                                                 

Fitz Hunt PA PA jmm Ballard, Brenda RN                     RN   bb                                                   

Sara Crawley RN                          RN   tw2                                                  

Ventura Jett MD MD   [facility]                                                  

Vero Rehman                               2                                                  

Nelida Tran RN                          tr6                                                  

                                                                                                  

Corrections: (The following items were deleted from the chart)                                    

                                                                                             

04:22 03:36 Troponin (Emerg Dept Use Only) ordered. [facility]MS                                      [facility]

                                                                                                  

**************************************************************************************************

## 2021-07-07 NOTE — EKG
Test Date:    2021-07-07               Test Time:    03:27:09

Technician:   KOMAL                                     

                                                     

MEASUREMENT RESULTS:                                       

Intervals:                                           

Rate:         67                                     

SD:           160                                    

QRSD:         82                                     

QT:           430                                    

QTc:          454                                    

Axis:                                                

P:            28                                     

SD:           160                                    

QRS:          7                                      

T:            16                                     

                                                     

INTERPRETIVE STATEMENTS:                                       

                                                     

Normal sinus rhythm

Cannot rule out Anterior infarct, age undetermined

Abnormal ECG

Compared to ECG 07/02/2021 16:31:55

First degree AV block no longer present

Myocardial infarct finding still present



Electronically Signed On 07-07-21 12:58:51 CDT by Raúl Hilario

## 2022-04-26 ENCOUNTER — HOSPITAL ENCOUNTER (INPATIENT)
Dept: HOSPITAL 97 - ER | Age: 87
LOS: 6 days | Discharge: HOME HEALTH | DRG: 292 | End: 2022-05-02
Attending: INTERNAL MEDICINE | Admitting: INTERNAL MEDICINE
Payer: COMMERCIAL

## 2022-04-26 VITALS — BODY MASS INDEX: 30.3 KG/M2

## 2022-04-26 DIAGNOSIS — R79.89: ICD-10-CM

## 2022-04-26 DIAGNOSIS — I50.32: ICD-10-CM

## 2022-04-26 DIAGNOSIS — E11.22: ICD-10-CM

## 2022-04-26 DIAGNOSIS — N18.30: ICD-10-CM

## 2022-04-26 DIAGNOSIS — Z86.16: ICD-10-CM

## 2022-04-26 DIAGNOSIS — D63.8: ICD-10-CM

## 2022-04-26 DIAGNOSIS — E66.9: ICD-10-CM

## 2022-04-26 DIAGNOSIS — Z86.73: ICD-10-CM

## 2022-04-26 DIAGNOSIS — D61.818: ICD-10-CM

## 2022-04-26 DIAGNOSIS — J44.9: ICD-10-CM

## 2022-04-26 DIAGNOSIS — J90: ICD-10-CM

## 2022-04-26 DIAGNOSIS — I13.0: Primary | ICD-10-CM

## 2022-04-26 DIAGNOSIS — Z20.822: ICD-10-CM

## 2022-04-26 LAB
ALBUMIN SERPL BCP-MCNC: 2.8 G/DL (ref 3.4–5)
ALP SERPL-CCNC: 103 U/L (ref 45–117)
ALT SERPL W P-5'-P-CCNC: 18 U/L (ref 12–78)
AST SERPL W P-5'-P-CCNC: 29 U/L (ref 15–37)
BUN BLD-MCNC: 51 MG/DL (ref 7–18)
GLUCOSE SERPLBLD-MCNC: 171 MG/DL (ref 74–106)
HCT VFR BLD CALC: 32.7 % (ref 36–45)
INR BLD: 1.22
LYMPHOCYTES # SPEC AUTO: 0.4 K/UL (ref 0.7–4.9)
MAGNESIUM SERPL-MCNC: 2 MG/DL (ref 1.8–2.4)
NT-PROBNP SERPL-MCNC: 213 PG/ML (ref ?–450)
PMV BLD: 8.2 FL (ref 7.6–11.3)
POTASSIUM SERPL-SCNC: 3.8 MMOL/L (ref 3.5–5.1)
RBC # BLD: 3.78 M/UL (ref 3.86–4.86)
TROPONIN I SERPL HS-MCNC: 6.1 PG/ML (ref ?–58.9)

## 2022-04-26 PROCEDURE — 87015 SPECIMEN INFECT AGNT CONCNTJ: CPT

## 2022-04-26 PROCEDURE — 82945 GLUCOSE OTHER FLUID: CPT

## 2022-04-26 PROCEDURE — 87102 FUNGUS ISOLATION CULTURE: CPT

## 2022-04-26 PROCEDURE — 87040 BLOOD CULTURE FOR BACTERIA: CPT

## 2022-04-26 PROCEDURE — 84165 PROTEIN E-PHORESIS SERUM: CPT

## 2022-04-26 PROCEDURE — 88108 CYTOPATH CONCENTRATE TECH: CPT

## 2022-04-26 PROCEDURE — 87088 URINE BACTERIA CULTURE: CPT

## 2022-04-26 PROCEDURE — 80076 HEPATIC FUNCTION PANEL: CPT

## 2022-04-26 PROCEDURE — 0240U: CPT

## 2022-04-26 PROCEDURE — 84466 ASSAY OF TRANSFERRIN: CPT

## 2022-04-26 PROCEDURE — 84157 ASSAY OF PROTEIN OTHER: CPT

## 2022-04-26 PROCEDURE — 82274 ASSAY TEST FOR BLOOD FECAL: CPT

## 2022-04-26 PROCEDURE — 83970 ASSAY OF PARATHORMONE: CPT

## 2022-04-26 PROCEDURE — 71045 X-RAY EXAM CHEST 1 VIEW: CPT

## 2022-04-26 PROCEDURE — 80048 BASIC METABOLIC PNL TOTAL CA: CPT

## 2022-04-26 PROCEDURE — 82947 ASSAY GLUCOSE BLOOD QUANT: CPT

## 2022-04-26 PROCEDURE — 97161 PT EVAL LOW COMPLEX 20 MIN: CPT

## 2022-04-26 PROCEDURE — 93005 ELECTROCARDIOGRAM TRACING: CPT

## 2022-04-26 PROCEDURE — 83540 ASSAY OF IRON: CPT

## 2022-04-26 PROCEDURE — 87070 CULTURE OTHR SPECIMN AEROBIC: CPT

## 2022-04-26 PROCEDURE — 88305 TISSUE EXAM BY PATHOLOGIST: CPT

## 2022-04-26 PROCEDURE — 85044 MANUAL RETICULOCYTE COUNT: CPT

## 2022-04-26 PROCEDURE — 83880 ASSAY OF NATRIURETIC PEPTIDE: CPT

## 2022-04-26 PROCEDURE — 87116 MYCOBACTERIA CULTURE: CPT

## 2022-04-26 PROCEDURE — 83605 ASSAY OF LACTIC ACID: CPT

## 2022-04-26 PROCEDURE — 85027 COMPLETE CBC AUTOMATED: CPT

## 2022-04-26 PROCEDURE — 94640 AIRWAY INHALATION TREATMENT: CPT

## 2022-04-26 PROCEDURE — 71250 CT THORAX DX C-: CPT

## 2022-04-26 PROCEDURE — 36415 COLL VENOUS BLD VENIPUNCTURE: CPT

## 2022-04-26 PROCEDURE — 51702 INSERT TEMP BLADDER CATH: CPT

## 2022-04-26 PROCEDURE — 85025 COMPLETE CBC W/AUTO DIFF WBC: CPT

## 2022-04-26 PROCEDURE — 87206 SMEAR FLUORESCENT/ACID STAI: CPT

## 2022-04-26 PROCEDURE — 84145 PROCALCITONIN (PCT): CPT

## 2022-04-26 PROCEDURE — 97116 GAIT TRAINING THERAPY: CPT

## 2022-04-26 PROCEDURE — 87086 URINE CULTURE/COLONY COUNT: CPT

## 2022-04-26 PROCEDURE — 84311 SPECTROPHOTOMETRY: CPT

## 2022-04-26 PROCEDURE — 83735 ASSAY OF MAGNESIUM: CPT

## 2022-04-26 PROCEDURE — 82668 ASSAY OF ERYTHROPOIETIN: CPT

## 2022-04-26 PROCEDURE — 82784 ASSAY IGA/IGD/IGG/IGM EACH: CPT

## 2022-04-26 PROCEDURE — 85610 PROTHROMBIN TIME: CPT

## 2022-04-26 PROCEDURE — 32555 ASPIRATE PLEURA W/ IMAGING: CPT

## 2022-04-26 PROCEDURE — 76770 US EXAM ABDO BACK WALL COMP: CPT

## 2022-04-26 PROCEDURE — 99285 EMERGENCY DEPT VISIT HI MDM: CPT

## 2022-04-26 PROCEDURE — 83615 LACTATE (LD) (LDH) ENZYME: CPT

## 2022-04-26 PROCEDURE — 93970 EXTREMITY STUDY: CPT

## 2022-04-26 PROCEDURE — 82728 ASSAY OF FERRITIN: CPT

## 2022-04-26 PROCEDURE — 84484 ASSAY OF TROPONIN QUANT: CPT

## 2022-04-26 PROCEDURE — 89050 BODY FLUID CELL COUNT: CPT

## 2022-04-26 PROCEDURE — 83010 ASSAY OF HAPTOGLOBIN QUANT: CPT

## 2022-04-26 PROCEDURE — 81003 URINALYSIS AUTO W/O SCOPE: CPT

## 2022-04-26 PROCEDURE — 71046 X-RAY EXAM CHEST 2 VIEWS: CPT

## 2022-04-26 PROCEDURE — 83036 HEMOGLOBIN GLYCOSYLATED A1C: CPT

## 2022-04-26 PROCEDURE — 82607 VITAMIN B-12: CPT

## 2022-04-26 PROCEDURE — 83516 IMMUNOASSAY NONANTIBODY: CPT

## 2022-04-26 PROCEDURE — 97530 THERAPEUTIC ACTIVITIES: CPT

## 2022-04-26 PROCEDURE — 94760 N-INVAS EAR/PLS OXIMETRY 1: CPT

## 2022-04-26 NOTE — EDPHYS
Physician Documentation                                                                           

 [facility]                                                                 

Name: Deirdre Sauer                                                                               

Age: 88 yrs                                                                                       

Sex: Female                                                                                       

: 1933                                                                                   

MRN: [id number]                                                                                   

Arrival Date: 2022                                                                          

Time: 22:16                                                                                       

Account#: [id number]                                                                            

Bed 6                                                                                             

Private MD: Porfirio Valdez V                                                                      

ED Physician Abraham Goldberg                                                                      

HPI:                                                                                              

                                                                                             

23:43 This 88 yrs old  Female presents to ER via Wheelchair with complaints of       cookie 

      Cough, Congestion.                                                                          

23:43 The patient or guardian reports cough, difficulty breathing.                            cookie 

                                                                                                  

Historical:                                                                                       

- Allergies:                                                                                      

22:30 blood thinners except ASA;                                                              jb4 

22:30 caffeine;                                                                               jb4 

22:30 Statins-Hmg-Coa Reductase Inhibitors;                                                   jb4 

22:30 Streptomycin;                                                                           jb4 

- PMHx:                                                                                           

22:30 CVA; Hyperlipidemia; Diabetes - NIDDM; Hypertension; CHF; Kidney problems;              jb4 

- PSHx:                                                                                           

22:30 hysterectomy;                                                                           jb4 

                                                                                                  

- Immunization history:: Adult Immunizations up to date.                                          

- Social history:: Smoking status: Patient denies any tobacco usage or history of.                

                                                                                                  

                                                                                                  

ROS:                                                                                              

23:45 Constitutional: Negative for fever, chills, and weight loss, Eyes: Negative for injury, cookie 

      pain, redness, and discharge, ENT: Negative for injury, pain, and discharge, Neck:          

      Negative for injury, pain, and swelling, Cardiovascular: Negative for chest pain,           

      palpitations, and edema, Abdomen/GI: Negative for abdominal pain, nausea, vomiting,         

      diarrhea, and constipation, Back: Negative for injury and pain, : Negative for            

      injury, bleeding, discharge, and swelling, MS/Extremity: Negative for injury and            

      deformity, Skin: Negative for injury, rash, and discoloration, Neuro: Negative for          

      headache, weakness, numbness, tingling, and seizure, Psych: Negative for depression,        

      anxiety, suicide ideation, homicidal ideation, and hallucinations, Allergy/Immunology:      

      Negative for hives, rash, and allergies, Endocrine: Negative for neck swelling,             

      polydipsia, polyuria, polyphagia, and marked weight changes, Hematologic/Lymphatic:         

      Negative for swollen nodes, abnormal bleeding, and unusual bruising.                        

23:45 Respiratory: Positive for cough, shortness of breath, at rest.                              

                                                                                                  

Exam:                                                                                             

23:45 Constitutional:  This is a well developed, well nourished patient who is awake, alert,  cookie 

      and in no acute distress. Head/Face:  Normocephalic, atraumatic. Eyes:  Pupils equal        

      round and reactive to light, extra-ocular motions intact.  Lids and lashes normal.          

      Conjunctiva and sclera are non-icteric and not injected.  Cornea within normal limits.      

      Periorbital areas with no swelling, redness, or edema. ENT:  Nares patent. No nasal         

      discharge, no septal abnormalities noted.  Tympanic membranes are normal and external       

      auditory canals are clear.  Oropharynx with no redness, swelling, or masses, exudates,      

      or evidence of obstruction, uvula midline.  Mucous membranes moist. Neck:  Trachea          

      midline, no thyromegaly or masses palpated, and no cervical lymphadenopathy.  Supple,       

      full range of motion without nuchal rigidity, or vertebral point tenderness.  No            

      Meningismus. Chest/axilla:  Normal chest wall appearance and motion.  Nontender with no     

      deformity.  No lesions are appreciated. Cardiovascular:  Regular rate and rhythm with a     

      normal S1 and S2.  No gallops, murmurs, or rubs.  Normal PMI, no JVD.  No pulse             

      deficits. Abdomen/GI:  Soft, non-tender, with normal bowel sounds.  No distension or        

      tympany.  No guarding or rebound.  No evidence of tenderness throughout. Back:  No          

      spinal tenderness.  No costovertebral tenderness.  Full range of motion. Female :         

      Normal external genitalia. Skin:  Warm, dry with normal turgor.  Normal color with no       

      rashes, no lesions, and no evidence of cellulitis. MS/ Extremity:  Pulses equal, no         

      cyanosis.  Neurovascular intact.  Full, normal range of motion. Neuro:  Awake and           

      alert, GCS 15, oriented to person, place, time, and situation.  Cranial nerves II-XII       

      grossly intact.  Motor strength 5/5 in all extremities.  Sensory grossly intact.            

      Cerebellar exam normal.  Normal gait. Psych:  Awake, alert, with orientation to person,     

      place and time.  Behavior, mood, and affect are within normal limits.                       

23:45 ECG was reviewed by the Attending Physician.                                                

23:45 Respiratory: mild respiratory distress is noted,  Respirations: tachypnea, Breath           

      sounds: bronchial sounds, that are moderate, decreased breath sounds, that are mild,        

      rhonchi, that are mild, are scattered, stridor, is not appreciated, + upper airway          

      congestion. wheezing: expiratory is scattered.                                              

                                                                                                  

Vital Signs:                                                                                      

22:33  / 67; Pulse 92; Resp 29; Temp 98.8(O); Pulse Ox 93% on R/A; Weight 75.3 kg (R);  jb4 

      Height 5 ft. 2 in. (157.48 cm);                                                             

23:30  / 52; Pulse 92; Resp 21; Pulse Ox 100% on 15% Nebulizer Mask;                    al4 

23:45  / 51; Pulse 87; Resp 19 S; Pulse Ox 100% on R/A;                                 al4 

                                                                                             

00:15  / 50; Pulse 85; Resp 16; Pulse Ox 100% ; Pain 0/10;                              al4 

00:30  / 41; Pulse 87; Resp 17; Pulse Ox 99% on R/A; Pain 0/10;                         al4 

01:00  / 92; Pulse 89; Resp 20 S; Pulse Ox 90% on R/A; Pain 0/10;                       al4 

01:30  / 54; Pulse 90; Resp 24; Pulse Ox 98% on 2 lpm NC; Pain 0/10;                    al4 

                                                                                             

22:33 Body Mass Index 30.36 (75.30 kg, 157.48 cm)                                             [facility] 

                                                                                             

23:30 patient getting breathing treatment at this time                                        al4 

                                                                                                  

MDM:                                                                                              

22:41 Patient medically screened.                                                             [facility] 

23:48 Differential Diagnosis: Bronchitis Influenza Upper Respiratory Infection Sinusitis      cookie 

      Pharyngitis Viral Syndrome Pneumonia. Data reviewed: vital signs, nurses notes, lab         

      test result(s), EKG, radiologic studies, CT scan, plain films. Data interpreted:            

      Cardiac monitor: rate is 93 beats/min, rhythm is regular, Pulse oximetry: on room air       

      is 94 %. Test interpretation: by ED physician or midlevel provider: ECG, plain              

      radiologic studies. Counseling: I had a detailed discussion with the patient and/or         

      guardian regarding: the historical points, exam findings, and any diagnostic results        

      supporting the discharge/admit diagnosis, lab results, radiology results, the need for      

      further work-up and treatment in the hospital.                                              

                                                                                                  

                                                                                             

22:47 Order name: Basic Metabolic Panel; Complete Time: 23:53                                 [facility] 

                                                                                             

22:47 Order name: CBC with Diff                                                               [facility] 

                                                                                             

22:47 Order name: LFT's; Complete Time: 23:53                                                 [facility] 

                                                                                             

22:47 Order name: Magnesium; Complete Time: 23:53                                             [facility] 

                                                                                             

22:47 Order name: NT PRO-BNP; Complete Time: 23:53                                            [facility] 

                                                                                             

22:47 Order name: PT-INR; Complete Time: 23:41                                                [facility] 

                                                                                             

22:47 Order name: Troponin HS; Complete Time: 23:53                                           [facility] 

                                                                                             

22:47 Order name: Blood Culture Adult (2)                                                     [facility] 

                                                                                             

22:47 Order name: Lactate; Complete Time: 23:41                                               [facility] 

                                                                                             

22:47 Order name: Procalcitonin                                                               [facility] 

                                                                                             

22:47 Order name: Urine Culture                                                               [facility] 

                                                                                             

22:47 Order name: COVID-19/FLU A+B (Document "Date of Onset" if Symptomatic)                  [facility] 

                                                                                             

23:36 Order name: CBC Smear Scan                                                              [facility]

                                                                                             

01:02 Order name: Urine Dipstick-Ancillary                                                    [facility]

                                                                                             

22:47 Order name: XRAY Chest (1 view)                                                         [facility] 

                                                                                             

22:47 Order name: EKG; Complete Time: 22:47                                                   [facility] 

                                                                                             

22:47 Order name: Cardiac monitoring; Complete Time: 22:50                                    [facility] 

                                                                                             

22:47 Order name: EKG - Nurse/Tech; Complete Time: 22:56                                      [facility] 

                                                                                             

22:47 Order name: IV Saline Lock; Complete Time: 23:00                                        [facility] 

                                                                                             

23:57 Order name: CONS Physician Consult                                                      [facility]

                                                                                             

22:47 Order name: Labs collected and sent; Complete Time: 23:00                               [facility] 

                                                                                             

22:47 Order name: O2 Per Protocol; Complete Time: 22:50                                       [facility] 

                                                                                             

22:47 Order name: O2 Sat Monitoring; Complete Time: 22:50                                     [facility] 

                                                                                             

22:47 Order name: Urine Dipstick-Ancillary (obtain specimen); Complete Time: 01:00            [facility] 

                                                                                                  

EC:45 Rate is 93 beats/min. Rhythm is regular. QRS Axis is Normal. GA interval is normal. QRS cookie 

      interval is normal. QT interval is normal. No Q waves. T waves are Normal. No ST            

      changes noted. Clinical impression: NSR w/ Non-specific ST/T Changes and No evidence of     

      ischemia. Interpreted by me. Reviewed by me.                                                

                                                                                                  

Administered Medications:                                                                         

23:25 Drug: Zithromax (azithromycin) 500 mg Route: IVPB; Infused Over: 1 hrs; Site: left      al4 

      antecubital;                                                                                

                                                                                             

00:30 Follow up: Response: No adverse reaction; IV Status: Completed infusion; IV Intake:     al4 

      250ml                                                                                       

                                                                                             

23:30 Drug: Xopenex (levalbuterol) 1.25 mg Route: Inhalation;                                 al4 

                                                                                             

00:00 Follow up: Response: No adverse reaction                                                al4 

                                                                                             

23:30 Drug: AtroVENT (ipratropium) Aerosol 0.5 mg Route: Inhalation;                          al4 

                                                                                             

00:00 Follow up: Response: No adverse reaction                                                al4 

                                                                                             

23:32 Drug: Rocephin (cefTRIAXone) 1 grams Route: IV; Rate: per protocol; Site: right         al4 

      antecubital;                                                                                

                                                                                             

00:00 Follow up: Response: No adverse reaction; IV Status: Completed infusion; IV Intake: [id number] 

00:02 CANCELLED (Duplicate Order): SOLU-Medrol (methylPrednisoLONE) 125 mg IVP once           cookie 

00:10 Drug: Xopenex (levalbuterol) 1.25 mg Route: Inhalation;                                 al4 

00:43 Follow up: Response: No adverse reaction                                                al4 

00:10 Drug: Pepcid (famotidine) 20 mg Route: IVP; Site: right antecubital;                    al4 

00:43 Follow up: Response: No adverse reaction                                                al4 

00:13 Drug: Zofran (Ondansetron) 4 mg Route: IVP; Site: right antecubital;                    al4 

00:43 Follow up: Response: No adverse reaction; Marked relief of symptoms                     al4 

00:32 Not Given (Patient Refused): SOLU-Medrol (methylPrednisoLONE) 60 mg IVP once            al4 

                                                                                                  

                                                                                                  

Disposition Summary:                                                                              

22 23:50                                                                                    

Hospitalization Ordered                                                                           

      Hospitalization Status: Inpatient Admission                                             cookie 

      Provider: Porfirio Valdez cha 

      Location: Telemetry/MedSurg (Inpatient)                                                 cookie 

      Condition: Fair                                                                         cookie 

      Problem: new                                                                            cookie 

      Symptoms: have improved                                                                 cookie 

      Bed/Room Type: Standard                                                                 cookie 

      Room Assignment: 202(22 00:54)                                                      

      Diagnosis                                                                                   

        - Dyspnea                                                                             cookie 

        - Pneumonia, unspecified organism                                                     cookie 

        - Pleural effusion in other conditions classified elsewhere                           cookie 

        - Unspecified kidney failure - chronic                                                cookie 

      Forms:                                                                                      

        - Medication Reconciliation Form                                                      cookie 

        - SBAR form                                                                           cookie 

Signatures:                                                                                       

Dispatcher [employer]                           EDMS                                                 

Jamila Isidro RN RN mw Anderson, Corey, MD MD cha Attema, Lee, FNP-C                      FNP-Cla1                                                  

Herbert Villalta, RN                       RN   jb4                                                  

Reynaldo Fountain4                                                  

                                                                                                  

Corrections: (The following items were deleted from the chart)                                    

00:02  23:45 SOLU-Medrol (methylPrednisoLONE) 125 mg IVP once ordered. cookie gary 

                                                                                             

00:54  23:50 cookie estrada  

                                                                                                  

**************************************************************************************************

## 2022-04-27 LAB
BLD SMEAR INTERP: (no result)
BLD SMEAR INTERP: (no result)
BUN BLD-MCNC: 52 MG/DL (ref 7–18)
FERRITIN SERPL-MCNC: 58.6 NG/ML (ref 8–388)
GLUCOSE SERPLBLD-MCNC: 203 MG/DL (ref 74–106)
HCT VFR BLD CALC: 30.4 % (ref 36–45)
IRON SERPL-MCNC: 23 UG/DL (ref 50–170)
LYMPHOCYTES # SPEC AUTO: 0.3 K/UL (ref 0.7–4.9)
MORPHOLOGY BLD-IMP: (no result)
MORPHOLOGY BLD-IMP: (no result)
NT-PROBNP SERPL-MCNC: 424 PG/ML (ref ?–450)
PMV BLD: 8.5 FL (ref 7.6–11.3)
POTASSIUM SERPL-SCNC: 4 MMOL/L (ref 3.5–5.1)
RBC # BLD: 3.09 M/UL (ref 3.86–4.86)
RBC # BLD: 3.48 M/UL (ref 3.86–4.86)
SARS-COV-2 RNA RESP QL NAA+PROBE: NEGATIVE
TRANSFERRIN SERPL-MCNC: 168 MG/DL (ref 200–360)

## 2022-04-27 RX ADMIN — CEFTRIAXONE SCH MLS: 1 INJECTION, POWDER, FOR SOLUTION INTRAMUSCULAR; INTRAVENOUS at 20:28

## 2022-04-27 RX ADMIN — APIXABAN SCH: 2.5 TABLET, FILM COATED ORAL at 20:28

## 2022-04-27 RX ADMIN — Medication SCH ML: at 09:00

## 2022-04-27 RX ADMIN — APIXABAN SCH: 2.5 TABLET, FILM COATED ORAL at 09:00

## 2022-04-27 RX ADMIN — GLIMEPIRIDE SCH MG: 2 TABLET ORAL at 09:07

## 2022-04-27 RX ADMIN — GLIMEPIRIDE SCH MG: 2 TABLET ORAL at 17:30

## 2022-04-27 RX ADMIN — MONTELUKAST SODIUM SCH MG: 10 TABLET, FILM COATED ORAL at 09:07

## 2022-04-27 RX ADMIN — CEFTRIAXONE SCH MLS: 1 INJECTION, POWDER, FOR SOLUTION INTRAMUSCULAR; INTRAVENOUS at 08:59

## 2022-04-27 RX ADMIN — HUMAN INSULIN SCH UNIT: 100 INJECTION, SOLUTION SUBCUTANEOUS at 17:30

## 2022-04-27 RX ADMIN — CHOLECALCIFEROL CAP 125 MCG (5000 UNIT) SCH UNIT: 125 CAP at 09:07

## 2022-04-27 RX ADMIN — Medication SCH ML: at 20:25

## 2022-04-27 RX ADMIN — HUMAN INSULIN SCH UNIT: 100 INJECTION, SOLUTION SUBCUTANEOUS at 20:24

## 2022-04-27 NOTE — RAD REPORT
EXAM DESCRIPTION:  USExtrem Venous W Compress Bil4/27/2022 3:14 pm

 

CLINICAL HISTORY:  Leg pain

 

COMPARISON:  2017

 

FINDINGS:  The common femoral, superficial femoral, popliteal and posterior tibial veins bilaterally 
are compressible and demonstrate augmentation.

 

Doppler demonstrates good flow.

 

Grayscale, color and spectral analysis performed on all vessels

 

IMPRESSION:  No evidence of deep venous thrombosis involving  either lower extremity.

## 2022-04-27 NOTE — P.CNS
Date of Consult: 04/27/22


Reason for Consult: Shortness of breath


Chief Complaint: Shortness of breath


History of Present Illness: 





Patient is 88 years of age admitted with acute dyspnea she is having some 

shortness of breath since her COVID infection since July last year not smoke pro

ductive cough lower extremity edema


Allergies





blood thinner Allergy (Mild, Uncoded 02/27/20 21:12)


   swelling


blood thinners Allergy (Uncoded 02/27/20 21:12)


   Unknown


Statins-Hmg-Coa Redu Allergy (Uncoded 02/27/20 21:12)


   Unknown





Home Medications: 








Glimepiride [Amaryl] 4 mg PO BID 03/02/18 


Pravastatin Sodium 10 mg PO BEDTIME 03/02/18 


carvediloL [Coreg*] 6.25 mg PO BID 03/02/18 


Allopurinol 100 mg PO DAILY 02/27/20 


Linagliptin [Tradjenta] 1 tab PO DAILY 02/27/20 


Montelukast Sodium 10 mg PO DAILY 07/03/21 


Ubidecarenone [Co Q-10] 200 mg PO DAILY 07/03/21 


Vit C/Tariq AC/Lut/Copper/Znox [Preservision Lutein Softgel] 2 each PO BID 

07/03/21 


Cholecalciferol (Vitamin D3) [Vitamin D3] 5,000 unit PO DAILY #30 07/04/21 


Furosemide [Lasix*] 40 mg PO DAILY 07/04/21 


Aspirin [Vazalore] 81 mg PO ONCE 04/27/22 


Cyanocobalamin (Vitamin B-12) [Vitamin B12] 1,000 mcg PO DAILY 04/27/22 


Linagliptin [Tradjenta] 5 mg PO ONCE 04/27/22 








- Past Medical/Surgical History


Diabetic: Yes


-: DM


-: HTN


-: Hyperlipidemia


-: CVA 2015


-: Gastric Ulcer


-: Chronic renal failure


-: Diastolic heart failure


-: COVID infection July 2021


-: Full Hysterectomy





- Family History


  ** Mother


Medical History: Diabetes





- Social History


Alcohol use: No


CD- Drugs: No


Caffeine use: No


Place of Residence: Home





Review of Systems


General: Weakness


Respiratory: Cough, Shortness of Breath


Cardiovascular: Edema





Physical Examination














Temp Pulse Resp BP Pulse Ox


 


 98.2 F   94 H  16   124/59 L  97 


 


 04/27/22 12:00  04/27/22 12:00  04/27/22 12:00  04/27/22 12:00  04/27/22 12:00








General: Alert, In no apparent distress, Oriented x3


Respiratory: Clear to auscultation bilaterally, Diminished (On the left side 

predominantly)


Cardiovascular: Regular rate/rhythm, Normal S1 S2, Edema


Gastrointestinal: Normal bowel sounds, Soft and benign


Musculoskeletal: No clubbing, Swelling


Laboratory Data (last 24 hrs)





04/26/22 22:45: PT 13.5 H, INR 1.22


04/26/22 22:45: WBC 4.1 L, Hgb 10.5 L, Hct 32.7 L, Plt Count 61 L


04/26/22 22:45: Sodium 140, Potassium 3.8, BUN 51 H, Creatinine 1.77 H, Glucose 

171 H, Magnesium 2.0, Total Bilirubin 0.9, AST 29, ALT 18, Alkaline Phosphatase 

103








- Problems


(1) Pleural effusion


Current Visit: Yes   Status: Acute   


Plan: 


Patient is 88 years of age admitted with shortness of breath and has a new left-

sided moderate pleural effusion chronic thrombocytopenia mild anemia chronic 

renal failure blood pressure oxygenation satisfactory patient is on diuretics at

 home patient is on Eliquis antibiotics no clinical evidence of sepsis have 

ordered bilateral decubitus of the chest in addition to a CT scan doubt sepsis 

high risk for thromboembolism Daily labs

## 2022-04-27 NOTE — RAD REPORT
EXAM DESCRIPTION:  RAD - Chest Lateral Decubitus - 4/27/2022 3:54 pm

 

CLINICAL HISTORY:  Bilateral decubitus

 

COMPARISON:  Chest Single View dated 4/26/2022

 

FINDINGS:  Lines: None.

Lungs: Presumably underlying atelectasis as a result of the effusion.

Pleural: Layering left pleural effusion in decubitus position.

Cardiac: The heart size is within normal limits.

Bones: No acute fractures.

Other:

 

IMPRESSION:  Moderate free-flowing left pleural effusion.

## 2022-04-27 NOTE — RAD REPORT
EXAM DESCRIPTION:  RAD - Chest Single View - 4/26/2022 11:30 pm

 

CLINICAL HISTORY:  88 years, Female, Cough

 

COMPARISON:  None.

 

FINDINGS:  Single view of the chest was obtained portable. No prior films are available for compariso
n.   There is a moderate-sized left pleural effusion with compressive atelectatic changes/or infiltra
te. The heart is nonenlarged. The thoracic aorta is unremarkable. Right lung is clear. External EKG l
alden within the field-of-view limits diagnosis..   The rest of the soft tissue and bony structures de
monstrate to be unremarkable.

 

IMPRESSION:  Moderate-sized left pleural effusion with compressive atelectatic changes/or infiltrate.


 

Electronically signed by:   German Soliz MD   4/27/2022 12:55 AM CDT Workstation: [id number]

 

 

 

 

Due to temporary technical issues with the PACS/Fluency reporting system, reports are being signed by
 the in house radiologists without review as a courtesy to insure prompt reporting. The interpreting 
radiologist is fully responsible for the content of the report.

## 2022-04-27 NOTE — P.HP
Certification for Inpatient


Patient admitted to: Inpatient


With expected LOS: >2 Midnights


Practitioner: I am a practitioner with admitting privileges, knowledge of 

patient current condition, hospital course, and medical plan of care.


Services: Services provided to patient in accordance with Admission requirements

found in Title 42 Section 412.3 of the Code of Federal Regulations





Patient History


Date of Service: 04/27/22


Reason for admission: Shortness of breath


History of Present Illness: 





FABIOLA IS A DIABETIC WHO SINCE COVID HAS SHORTNESS OF BREATH.  SHE HAS NO COUGH

AND CHEST PAIN. NO FEVER. 


Allergies





blood thinner Allergy (Mild, Uncoded 02/27/20 21:12)


   swelling


blood thinners Allergy (Uncoded 02/27/20 21:12)


   Unknown


Statins-Hmg-Coa Redu Allergy (Uncoded 02/27/20 21:12)


   Unknown





Home Medications: 








Glimepiride [Amaryl] 4 mg PO BID 03/02/18 


Pravastatin Sodium 10 mg PO BEDTIME 03/02/18 


carvediloL [Coreg*] 6.25 mg PO BID 03/02/18 


Allopurinol 100 mg PO DAILY 02/27/20 


Linagliptin [Tradjenta] 1 tab PO DAILY 02/27/20 


Montelukast Sodium 10 mg PO DAILY 07/03/21 


Ubidecarenone [Co Q-10] 200 mg PO DAILY 07/03/21 


Vit C/Tariq AC/Lut/Copper/Znox [Preservision Lutein Softgel] 2 each PO BID 

07/03/21 


Cholecalciferol (Vitamin D3) [Vitamin D3] 5,000 unit PO DAILY #30 07/04/21 


Furosemide [Lasix*] 40 mg PO DAILY 07/04/21 


Aspirin [Vazalore] 81 mg PO ONCE 04/27/22 


Cyanocobalamin (Vitamin B-12) [Vitamin B12] 1,000 mcg PO DAILY 04/27/22 


Linagliptin [Tradjenta] 5 mg PO ONCE 04/27/22 








- Past Medical/Surgical History


Diabetic: Yes


-: DM


-: HTN


-: Hyperlipidemia


-: CVA 2015


-: Gastric Ulcer


-: Chronic renal failure


-: Diastolic heart failure


-: COVID infection July 2021


-: Full Hysterectomy





- Family History


  ** Mother


-: Diabetes





- Social History


Smoking Status: Never smoker


Alcohol use: No


CD- Drugs: No


Caffeine use: No


Place of Residence: Home





Review of Systems


10-point ROS is otherwise unremarkable


General: Weakness


Respiratory: SOB with Excertion, As per HPI





Physical Examination





- Vital Signs


Temperature: 97.5 F


Blood Pressure: 132/60


Pulse: 82


Respirations: 18


Pulse Ox (%): 96





- Physical Exam


General: Oriented x3, Cachectic, Mild distress


HEENT: Atraumatic, PERRLA, Mucous membr. moist/pink, EOMI, Sclerae nonicteric


Neck: Supple, 2+ carotid pulse no bruit, No LAD, Without JVD or thyroid 

abnormality


Respiratory: Clear to auscultation bilaterally, Normal air movement


Cardiovascular: Regular rate/rhythm, Normal S1 S2


Gastrointestinal: Normal bowel sounds, No tenderness


Musculoskeletal: No tenderness


Integumentary: No rashes


Neurological: Normal gait, Normal speech, Normal strength at 5/5 x4 extr, Normal

 tone, Normal affect


Lymphatics: No axilla or inguinal lymphadenopathy





- Studies


Laboratory Data (last 24 hrs)





04/26/22 22:45: PT 13.5 H, INR 1.22


04/26/22 22:45: WBC 4.1 L, Hgb 10.5 L, Hct 32.7 L, Plt Count 61 L


04/26/22 22:45: Sodium 140, Potassium 3.8, BUN 51 H, Creatinine 1.77 H, Glucose 

171 H, Magnesium 2.0, Total Bilirubin 0.9, AST 29, ALT 18, Alkaline Phosphatase 

103








Assessment and Plan





- Problems (Diagnosis)


(1) Dyspnea


Current Visit: Yes   Status: Chronic   


Plan: 


WORSE LATELY. 


SHE HAS L SIDE PLEURAL EFFUSION. 


WILL DO CT SCAN WHEN CREATNINE IS DOWN FROM 1.7


I STOPPED LASIX AND GAVE GENTLE HYDRATION. 








(2) Pleural effusion


Current Visit: Yes   Status: Chronic   


Plan: 


AS ABOVE.








(3) Chronic kidney disease, stage 3


Current Visit: No   Status: Acute   


Plan: 


CREAT COMING DOWN


WILL DO CT WHEN LESS THAN 1.5








(4) DM type 2 (diabetes mellitus, type 2)


Current Visit: No   Status: Chronic   


Qualifiers: 


   Diabetes mellitus complication status: without complication 





(5) Bicytopenia


Current Visit: Yes   Status: Chronic   


Plan: 


ANEMIA IS ABOUT 9-10 GM CHRONIC. 


PLATELETS ABOUT 90K AND NOW IT IS DOWN TO 47K. 





WILL WATCH DAILY. 


MAY BE FROM SEPSIS 


OR HEME DISORDER. 





WILL NEED HEMATOLOGIST. 














- Advance Directives


Does patient have a Living Will: No


Does patient have a Durable POA for Healthcare: No

## 2022-04-28 LAB
BUN BLD-MCNC: 60 MG/DL (ref 7–18)
GLUCOSE SERPLBLD-MCNC: 82 MG/DL (ref 74–106)
HCT VFR BLD CALC: 28.7 % (ref 36–45)
PMV BLD: 8 FL (ref 7.6–11.3)
POTASSIUM SERPL-SCNC: 4.1 MMOL/L (ref 3.5–5.1)
RBC # BLD: 3.26 M/UL (ref 3.86–4.86)

## 2022-04-28 RX ADMIN — SODIUM CHLORIDE SCH MLS: 0.45 INJECTION, SOLUTION INTRAVENOUS at 08:25

## 2022-04-28 RX ADMIN — MONTELUKAST SODIUM SCH MG: 10 TABLET, FILM COATED ORAL at 08:24

## 2022-04-28 RX ADMIN — CHOLECALCIFEROL CAP 125 MCG (5000 UNIT) SCH UNIT: 125 CAP at 08:24

## 2022-04-28 RX ADMIN — GLIMEPIRIDE SCH MG: 2 TABLET ORAL at 08:23

## 2022-04-28 RX ADMIN — HUMAN INSULIN SCH UNIT: 100 INJECTION, SOLUTION SUBCUTANEOUS at 17:25

## 2022-04-28 RX ADMIN — HUMAN INSULIN SCH: 100 INJECTION, SOLUTION SUBCUTANEOUS at 11:30

## 2022-04-28 RX ADMIN — CEFTRIAXONE SCH MLS: 1 INJECTION, POWDER, FOR SOLUTION INTRAMUSCULAR; INTRAVENOUS at 08:25

## 2022-04-28 RX ADMIN — GLIMEPIRIDE SCH MG: 2 TABLET ORAL at 16:24

## 2022-04-28 RX ADMIN — Medication SCH ML: at 08:25

## 2022-04-28 RX ADMIN — Medication SCH ML: at 22:13

## 2022-04-28 RX ADMIN — HUMAN INSULIN SCH UNIT: 100 INJECTION, SOLUTION SUBCUTANEOUS at 22:13

## 2022-04-28 RX ADMIN — HUMAN INSULIN SCH: 100 INJECTION, SOLUTION SUBCUTANEOUS at 07:30

## 2022-04-28 NOTE — RAD REPORT
EXAM DESCRIPTION:  US - Renal Ultrasound-Complete - 4/28/2022 10:17 am

 

CLINICAL HISTORY:  Acute renal failure

 

COMPARISON:  2018

 

FINDINGS:  The right kidney measures 9 cm with an increased echotexture.

 

The left kidney measures 9 cm with an increased echotexture. A 3 millimeter echogenic structure equiv
ocal for a nonobstructing calculus

 

Bilateral renal cortical thinning.

 

Hydronephrosis is not seen.

 

Bladder poorly visualized as it probably is decompressed.

 

Small amount of ascites seen

 

IMPRESSION:  Increased renal echotexture consistent with parenchymal disease

## 2022-04-28 NOTE — P.PN
Subjective


Date of Service: 04/28/22


Chief Complaint: Shortness of breath


Subjective: No C/O voiced





SHE HAS NO NEW COMPLAINTS. I ASKED HER IF SHE WANTS ME TO TALK TO HER FAMILY AND

SHE SAID NOW.  I EXPLAINED TO HER THAT WITH IV FLUIDS HER KIDNEYS WILL IMPROVE 

AND WE MAY BE ABLE TO DO CT SCAN WITH CONTRAST WE NEED.  DR. SALAS WANTS TO DO

THORACENTASIS ALSO. 





Review of Systems


10-point ROS is otherwise unremarkable


General: Weakness





Physical Examination





- Vital Signs


Temperature: 97.8 F


Blood Pressure: 126/41


Pulse: 70


Respirations: 19


Pulse Ox (%): 100





- Physical Exam


General: Oriented x3, Mild distress


HEENT: Atraumatic, PERRLA, EOMI


Neck: Supple, JVD not distended


Respiratory: Clear to auscultation bilaterally, Normal air movement, Inspiratory

wheezes (L ANT CHEST.)


Cardiovascular: Regular rate/rhythm, Normal S1 S2


Gastrointestinal: Normal bowel sounds, No tenderness


Musculoskeletal: No tenderness


Integumentary: No rashes


Neurological: Normal speech, Normal tone, Normal affect


Lymphatics: No axilla or inguinal lymphadenopathy





- Studies


Medications List Reviewed: Yes





Assessment And Plan





- Current Problems (Diagnosis)


(1) Dyspnea


Current Visit: Yes   Status: Chronic   


Plan: 


WORSE LATELY. 


SHE HAS L SIDE PLEURAL EFFUSION. 


WILL DO CT SCAN WHEN CREATNINE IS DOWN FROM 1.7


I STOPPED LASIX AND GAVE GENTLE HYDRATION. 








(2) Pleural effusion


Current Visit: Yes   Status: Chronic   


Plan: 


AS ABOVE.





THORACENTASIS SOON. 








(3) Chronic kidney disease, stage 3


Current Visit: No   Status: Acute   


Plan: 


CREAT COMING DOWN


WILL DO CT WHEN LESS THAN 1.5





RESTART IV. 


WITH LASIX HER BP DROPPED AND CREAT RAISED. 





STOP LASIX.








(4) DM type 2 (diabetes mellitus, type 2)


Current Visit: No   Status: Chronic   


Qualifiers: 


   Diabetes mellitus complication status: without complication 





(5) Bicytopenia


Current Visit: Yes   Status: Chronic   


Plan: 


ANEMIA IS ABOUT 9-10 GM CHRONIC. 


PLATELETS ABOUT 90K AND NOW IT IS DOWN TO 47K. 





WILL WATCH DAILY. 


MAY BE FROM SEPSIS 


OR HEME DISORDER. 





WILL NEED HEMATOLOGIST.

## 2022-04-29 LAB
BUN BLD-MCNC: 64 MG/DL (ref 7–18)
GLUCOSE SERPLBLD-MCNC: 167 MG/DL (ref 74–106)
HCT VFR BLD CALC: 27.2 % (ref 36–45)
PMV BLD: 9.1 FL (ref 7.6–11.3)
POTASSIUM SERPL-SCNC: 4.1 MMOL/L (ref 3.5–5.1)
RBC # BLD: 3.07 M/UL (ref 3.86–4.86)

## 2022-04-29 RX ADMIN — GLIMEPIRIDE SCH MG: 2 TABLET ORAL at 17:30

## 2022-04-29 RX ADMIN — HUMAN INSULIN SCH UNIT: 100 INJECTION, SOLUTION SUBCUTANEOUS at 12:30

## 2022-04-29 RX ADMIN — Medication SCH ML: at 10:00

## 2022-04-29 RX ADMIN — MONTELUKAST SODIUM SCH MG: 10 TABLET, FILM COATED ORAL at 10:00

## 2022-04-29 RX ADMIN — CHOLECALCIFEROL CAP 125 MCG (5000 UNIT) SCH UNIT: 125 CAP at 09:00

## 2022-04-29 RX ADMIN — SODIUM CHLORIDE SCH MLS: 0.45 INJECTION, SOLUTION INTRAVENOUS at 11:40

## 2022-04-29 RX ADMIN — HUMAN INSULIN SCH UNIT: 100 INJECTION, SOLUTION SUBCUTANEOUS at 20:33

## 2022-04-29 RX ADMIN — GLIMEPIRIDE SCH MG: 2 TABLET ORAL at 07:30

## 2022-04-29 RX ADMIN — Medication SCH ML: at 20:33

## 2022-04-29 RX ADMIN — HUMAN INSULIN SCH UNIT: 100 INJECTION, SOLUTION SUBCUTANEOUS at 17:47

## 2022-04-29 RX ADMIN — CEFTRIAXONE SCH MLS: 1 INJECTION, POWDER, FOR SOLUTION INTRAMUSCULAR; INTRAVENOUS at 10:00

## 2022-04-29 RX ADMIN — HUMAN INSULIN SCH: 100 INJECTION, SOLUTION SUBCUTANEOUS at 07:30

## 2022-04-29 NOTE — P.PN
Subjective


Date of Service: 04/29/22


Chief Complaint: Shortness of breath


Subjective: Improving





SHE HAS NO NEW COMPLAINTS. I ASKED HER IF SHE WANTS ME TO TALK TO HER FAMILY AND

SHE SAID NOW.  I EXPLAINED TO HER THAT WITH IV FLUIDS HER KIDNEYS WILL IMPROVE 

AND WE MAY BE ABLE TO DO CT SCAN WITH CONTRAST WE NEED.  DR. SALAS WANTS TO DO

THORACENTASIS ALSO. 





SHE HAS NO NEW ISSUES .


WE ARE WAITING FOR THORACENTASIS AND CT CHEST THAT WILL BE DONE ON MONDAY. 





Physical Examination





- Vital Signs


Temperature: 98.1 F


Blood Pressure: 135/54


Pulse: 75


Respirations: 18


Pulse Ox (%): 100





- Physical Exam


General: Oriented x3, Mild distress


HEENT: Atraumatic, PERRLA, EOMI


Neck: Supple, JVD not distended


Respiratory: Clear to auscultation bilaterally, Normal air movement


Cardiovascular: Regular rate/rhythm, Normal S1 S2


Gastrointestinal: Normal bowel sounds, No tenderness


Musculoskeletal: No tenderness


Integumentary: No rashes


Neurological: Normal speech, Normal tone, Normal affect


Lymphatics: No axilla or inguinal lymphadenopathy





- Studies


Medications List Reviewed: Yes





Assessment And Plan





- Current Problems (Diagnosis)


(1) Dyspnea


Current Visit: Yes   Status: Chronic   


Plan: 


WORSE LATELY. 


SHE HAS L SIDE PLEURAL EFFUSION. 


WILL DO CT SCAN WHEN CREATNINE IS DOWN FROM 1.7


I STOPPED LASIX AND GAVE GENTLE HYDRATION. 





CONT ABX 


CONT NEBS. 


TAP AND CT ON MONDAY. 








(2) Pleural effusion


Current Visit: Yes   Status: Chronic   


Plan: 


AS ABOVE.





THORACENTASIS SOON. 








(3) Chronic kidney disease, stage 3


Current Visit: No   Status: Acute   


Plan: 


CREAT COMING DOWN


WILL DO CT WHEN LESS THAN 1.5





RESTART IV. 


WITH LASIX HER BP DROPPED AND CREAT RAISED. 





STOP LASIX.





MAY COME DOWN TO LESS THAN 1.5 WITH GENTLE HYDRATION 








(4) DM type 2 (diabetes mellitus, type 2)


Current Visit: No   Status: Chronic   


Qualifiers: 


   Diabetes mellitus complication status: without complication 





(5) Bicytopenia


Current Visit: Yes   Status: Chronic   


Plan: 


ANEMIA IS ABOUT 9-10 GM CHRONIC. 


PLATELETS ABOUT 90K AND NOW IT IS DOWN TO 47K. 





WILL WATCH DAILY. 


MAY BE FROM SEPSIS 


OR HEME DISORDER. 





WILL NEED HEMATOLOGIST.

## 2022-04-30 LAB
BUN BLD-MCNC: 58 MG/DL (ref 7–18)
GLUCOSE SERPLBLD-MCNC: 106 MG/DL (ref 74–106)
HCT VFR BLD CALC: 28.2 % (ref 36–45)
PMV BLD: 8.2 FL (ref 7.6–11.3)
POTASSIUM SERPL-SCNC: 3.9 MMOL/L (ref 3.5–5.1)
RBC # BLD: 3.22 M/UL (ref 3.86–4.86)

## 2022-04-30 RX ADMIN — HUMAN INSULIN SCH UNIT: 100 INJECTION, SOLUTION SUBCUTANEOUS at 11:30

## 2022-04-30 RX ADMIN — IPRATROPIUM BROMIDE SCH MG: 0.5 SOLUTION RESPIRATORY (INHALATION) at 13:44

## 2022-04-30 RX ADMIN — ALBUTEROL SULFATE SCH: 2.5 SOLUTION RESPIRATORY (INHALATION) at 10:31

## 2022-04-30 RX ADMIN — MONTELUKAST SODIUM SCH MG: 10 TABLET, FILM COATED ORAL at 08:41

## 2022-04-30 RX ADMIN — HUMAN INSULIN SCH UNIT: 100 INJECTION, SOLUTION SUBCUTANEOUS at 21:16

## 2022-04-30 RX ADMIN — SODIUM CHLORIDE SCH: 0.45 INJECTION, SOLUTION INTRAVENOUS at 13:20

## 2022-04-30 RX ADMIN — ALBUTEROL SULFATE SCH MG: 2.5 SOLUTION RESPIRATORY (INHALATION) at 13:44

## 2022-04-30 RX ADMIN — FLUTICASONE PROPIONATE SCH SPRAY: 50 SPRAY, METERED NASAL at 21:16

## 2022-04-30 RX ADMIN — ALBUTEROL SULFATE PRN MG: 2.5 SOLUTION RESPIRATORY (INHALATION) at 00:40

## 2022-04-30 RX ADMIN — Medication SCH: at 21:00

## 2022-04-30 RX ADMIN — IPRATROPIUM BROMIDE SCH: 0.5 SOLUTION RESPIRATORY (INHALATION) at 10:37

## 2022-04-30 RX ADMIN — IPRATROPIUM BROMIDE PRN MG: 0.5 SOLUTION RESPIRATORY (INHALATION) at 00:40

## 2022-04-30 RX ADMIN — FLUTICASONE PROPIONATE SCH SPRAY: 50 SPRAY, METERED NASAL at 08:39

## 2022-04-30 RX ADMIN — TRAZODONE HYDROCHLORIDE SCH MG: 50 TABLET ORAL at 21:15

## 2022-04-30 RX ADMIN — IPRATROPIUM BROMIDE PRN MG: 0.5 SOLUTION RESPIRATORY (INHALATION) at 08:00

## 2022-04-30 RX ADMIN — GLIMEPIRIDE SCH MG: 2 TABLET ORAL at 16:34

## 2022-04-30 RX ADMIN — Medication SCH ML: at 08:42

## 2022-04-30 RX ADMIN — HUMAN INSULIN SCH UNIT: 100 INJECTION, SOLUTION SUBCUTANEOUS at 16:34

## 2022-04-30 RX ADMIN — CHOLECALCIFEROL CAP 125 MCG (5000 UNIT) SCH UNIT: 125 CAP at 08:41

## 2022-04-30 RX ADMIN — ALBUTEROL SULFATE PRN MG: 2.5 SOLUTION RESPIRATORY (INHALATION) at 08:00

## 2022-04-30 RX ADMIN — SODIUM CHLORIDE SCH MLS: 0.45 INJECTION, SOLUTION INTRAVENOUS at 00:43

## 2022-04-30 RX ADMIN — SODIUM CHLORIDE SCH MLS: 0.45 INJECTION, SOLUTION INTRAVENOUS at 21:16

## 2022-04-30 RX ADMIN — CEFTRIAXONE SCH MLS: 1 INJECTION, POWDER, FOR SOLUTION INTRAMUSCULAR; INTRAVENOUS at 08:42

## 2022-04-30 RX ADMIN — IPRATROPIUM BROMIDE SCH MG: 0.5 SOLUTION RESPIRATORY (INHALATION) at 19:30

## 2022-04-30 RX ADMIN — GLIMEPIRIDE SCH MG: 2 TABLET ORAL at 08:41

## 2022-04-30 RX ADMIN — ALBUTEROL SULFATE SCH MG: 2.5 SOLUTION RESPIRATORY (INHALATION) at 19:30

## 2022-04-30 RX ADMIN — SODIUM CHLORIDE SCH: 0.45 INJECTION, SOLUTION INTRAVENOUS at 00:00

## 2022-04-30 RX ADMIN — HUMAN INSULIN SCH: 100 INJECTION, SOLUTION SUBCUTANEOUS at 07:30

## 2022-04-30 NOTE — P.PN
Subjective


Date of Service: 04/30/22


Chief Complaint: Shortness of breath


Subjective: No new changes





SHE HAS NO NEW COMPLAINTS. I ASKED HER IF SHE WANTS ME TO TALK TO HER FAMILY AND

SHE SAID NOW.  I EXPLAINED TO HER THAT WITH IV FLUIDS HER KIDNEYS WILL IMPROVE 

AND WE MAY BE ABLE TO DO CT SCAN WITH CONTRAST WE NEED.  DR. SALAS WANTS TO DO

THORACENTASIS ALSO. 





SHE HAS NO NEW ISSUES .


WE ARE WAITING FOR THORACENTASIS AND CT CHEST THAT WILL BE DONE ON MONDAY. 





NOSE CONGESTION, SOME BLEEDING FROM OXYGEN CANULA.  WE WILL CANCEL IT FOR NOW AS

OXYGEN IS GOOD. 


SHE HAS NO OTHER COMPLAINTS. 





Review of Systems


10-point ROS is otherwise unremarkable


General: Weakness, As per HPI





Physical Examination





- Vital Signs


Temperature: 97.3 F


Blood Pressure: 132/60


Pulse: 71


Respirations: 19


Pulse Ox (%): 100





- Physical Exam


General: Oriented x3, Mild distress, Obese


HEENT: Atraumatic, PERRLA, EOMI


Neck: Supple, JVD not distended


Respiratory: Clear to auscultation bilaterally, Normal air movement, Other 

(REDUCED BREATH SOUNDS L SIDE. )


Cardiovascular: Regular rate/rhythm, Normal S1 S2


Gastrointestinal: Normal bowel sounds, No tenderness


Musculoskeletal: No tenderness


Integumentary: No rashes


Neurological: Normal speech, Normal tone, Normal affect


Lymphatics: No axilla or inguinal lymphadenopathy





- Studies


Medications List Reviewed: Yes





Assessment And Plan





- Current Problems (Diagnosis)


(1) Dyspnea


Current Visit: Yes   Status: Chronic   


Plan: 


WORSE LATELY. 


SHE HAS L SIDE PLEURAL EFFUSION. 


WILL DO CT SCAN WHEN CREATNINE IS DOWN FROM 1.7


I STOPPED LASIX AND GAVE GENTLE HYDRATION. 





CONT ABX 


CONT NEBS. 


TAP AND CT ON MONDAY. 





AS ABOVE. 


NO CHANGES IN PLAN 


CREAT COMING DOWN.








(2) Pleural effusion


Current Visit: Yes   Status: Chronic   


Plan: 


AS ABOVE.





THORACENTASIS SOON. 








(3) Chronic kidney disease, stage 3


Current Visit: No   Status: Acute   


Plan: 


CREAT COMING DOWN


WILL DO CT WHEN LESS THAN 1.5





RESTART IV. 


WITH LASIX HER BP DROPPED AND CREAT RAISED. 





STOP LASIX.





MAY COME DOWN TO LESS THAN 1.5 WITH GENTLE HYDRATION 





PRERENAL AZOTEMIA. 


CREAT BETTER TODAY DOWN TO 1.7











(4) DM type 2 (diabetes mellitus, type 2)


Current Visit: No   Status: Chronic   


Qualifiers: 


   Diabetes mellitus complication status: without complication 





(5) Bicytopenia


Current Visit: Yes   Status: Chronic   


Plan: 


ANEMIA IS ABOUT 9-10 GM CHRONIC. 


PLATELETS ABOUT 90K AND NOW IT IS DOWN TO 47K. 





WILL WATCH DAILY. 


MAY BE FROM SEPSIS 


OR HEME DISORDER. 





WILL NEED HEMATOLOGIST.

## 2022-04-30 NOTE — P.PN
Subjective


Date of Service: 04/30/22


Chief Complaint: Left-sided pleural effusion wheezing


Subjective: Improving (Patient feels subjectively better although she complains 

of wheezing appetite better has lower extremity edema complaining of cough and 

congestion)





Review of Systems


General: Weakness


Respiratory: Cough, Shortness of Breath





Physical Examination





- Vital Signs


Temperature: 97.3 F


Blood Pressure: 131/60


Pulse: 71


Respirations: 19


Pulse Ox (%): 100





- Physical Exam


General: Alert, Oriented x3, Mild distress


Respiratory: Expiratory wheezes


Cardiovascular: Normal S1 S2, Edema


Gastrointestinal: Normal bowel sounds, Soft and benign





- Studies


Medications List Reviewed: Yes





Assessment And Plan





- Current Problems (Diagnosis)


(1) Pleural effusion


Current Visit: Yes   Status: Chronic   


Plan: 


Patient has left-sided pleural effusion awaiting thoracentesis anticoagulants 

have been stopped renal function platelet count improving








(2) Wheezing


Current Visit: Yes   Status: Acute   


Plan: 


Patient is wheezing have chest congestion some antibiotics and nebulizers

## 2022-05-01 LAB
BUN BLD-MCNC: 51 MG/DL (ref 7–18)
GLUCOSE SERPLBLD-MCNC: 121 MG/DL (ref 74–106)
HCT VFR BLD CALC: 26.3 % (ref 36–45)
LYMPHOCYTES # SPEC AUTO: 0.6 K/UL (ref 0.7–4.9)
MAGNESIUM SERPL-MCNC: 2 MG/DL (ref 1.8–2.4)
PMV BLD: 7.9 FL (ref 7.6–11.3)
POTASSIUM SERPL-SCNC: 3.7 MMOL/L (ref 3.5–5.1)
RBC # BLD: 3.01 M/UL (ref 3.86–4.86)

## 2022-05-01 RX ADMIN — MONTELUKAST SODIUM SCH MG: 10 TABLET, FILM COATED ORAL at 08:59

## 2022-05-01 RX ADMIN — SODIUM CHLORIDE SCH: 0.45 INJECTION, SOLUTION INTRAVENOUS at 01:56

## 2022-05-01 RX ADMIN — GLIMEPIRIDE SCH MG: 2 TABLET ORAL at 09:00

## 2022-05-01 RX ADMIN — ALBUTEROL SULFATE SCH MG: 2.5 SOLUTION RESPIRATORY (INHALATION) at 01:24

## 2022-05-01 RX ADMIN — ALBUTEROL SULFATE SCH MG: 2.5 SOLUTION RESPIRATORY (INHALATION) at 20:00

## 2022-05-01 RX ADMIN — ALBUTEROL SULFATE SCH MG: 2.5 SOLUTION RESPIRATORY (INHALATION) at 14:31

## 2022-05-01 RX ADMIN — FLUTICASONE PROPIONATE SCH SPRAY: 50 SPRAY, METERED NASAL at 09:01

## 2022-05-01 RX ADMIN — HUMAN INSULIN SCH UNIT: 100 INJECTION, SOLUTION SUBCUTANEOUS at 11:24

## 2022-05-01 RX ADMIN — IPRATROPIUM BROMIDE SCH MG: 0.5 SOLUTION RESPIRATORY (INHALATION) at 14:31

## 2022-05-01 RX ADMIN — CHOLECALCIFEROL CAP 125 MCG (5000 UNIT) SCH UNIT: 125 CAP at 09:00

## 2022-05-01 RX ADMIN — HUMAN INSULIN SCH UNIT: 100 INJECTION, SOLUTION SUBCUTANEOUS at 20:58

## 2022-05-01 RX ADMIN — SODIUM CHLORIDE SCH MLS: 0.45 INJECTION, SOLUTION INTRAVENOUS at 17:05

## 2022-05-01 RX ADMIN — FLUTICASONE PROPIONATE SCH SPRAY: 50 SPRAY, METERED NASAL at 20:58

## 2022-05-01 RX ADMIN — ALBUTEROL SULFATE SCH MG: 2.5 SOLUTION RESPIRATORY (INHALATION) at 08:29

## 2022-05-01 RX ADMIN — Medication SCH ML: at 09:00

## 2022-05-01 RX ADMIN — IPRATROPIUM BROMIDE SCH MG: 0.5 SOLUTION RESPIRATORY (INHALATION) at 20:00

## 2022-05-01 RX ADMIN — IPRATROPIUM BROMIDE SCH MG: 0.5 SOLUTION RESPIRATORY (INHALATION) at 08:29

## 2022-05-01 RX ADMIN — Medication SCH: at 20:58

## 2022-05-01 RX ADMIN — HUMAN INSULIN SCH UNIT: 100 INJECTION, SOLUTION SUBCUTANEOUS at 17:04

## 2022-05-01 RX ADMIN — TRAZODONE HYDROCHLORIDE SCH MG: 50 TABLET ORAL at 20:58

## 2022-05-01 RX ADMIN — HUMAN INSULIN SCH: 100 INJECTION, SOLUTION SUBCUTANEOUS at 07:30

## 2022-05-01 RX ADMIN — GLIMEPIRIDE SCH MG: 2 TABLET ORAL at 17:04

## 2022-05-01 RX ADMIN — IPRATROPIUM BROMIDE SCH MG: 0.5 SOLUTION RESPIRATORY (INHALATION) at 01:24

## 2022-05-01 NOTE — P.PN
Subjective


Date of Service: 05/01/22


Chief Complaint: COUGH, WHEEZES


Subjective: Improving





SHE HAS NO NEW COMPLAINTS. I ASKED HER IF SHE WANTS ME TO TALK TO HER FAMILY AND

SHE SAID NOW.  I EXPLAINED TO HER THAT WITH IV FLUIDS HER KIDNEYS WILL IMPROVE 

AND WE MAY BE ABLE TO DO CT SCAN WITH CONTRAST WE NEED.  DR. SALAS WANTS TO DO

THORACENTASIS ALSO. 





SHE HAS NO NEW ISSUES .


WE ARE WAITING FOR THORACENTASIS AND CT CHEST THAT WILL BE DONE ON MONDAY. 





NOSE CONGESTION, SOME BLEEDING FROM OXYGEN CANULA.  WE WILL CANCEL IT FOR NOW AS

OXYGEN IS GOOD. 


SHE HAS NO OTHER COMPLAINTS. 





SHE FEELS A LITTLE BETTER TODAY AFTER FLONASE AND LEVAQUIN. 


SHE IS WALKING TO BATHROOM. 





Review of Systems


10-point ROS is otherwise unremarkable


General: Weakness


Respiratory: Cough





Physical Examination





- Vital Signs


Temperature: 97.1 F


Blood Pressure: 112/53


Pulse: 76


Respirations: 14


Pulse Ox (%): 97





- Physical Exam


General: Oriented x3, Mild distress, Obese


HEENT: Atraumatic, PERRLA, EOMI


Neck: Supple, JVD not distended


Respiratory: Diminished


Cardiovascular: Regular rate/rhythm, Normal S1 S2


Gastrointestinal: Normal bowel sounds, No tenderness


Musculoskeletal: No tenderness


Integumentary: No rashes


Neurological: Normal speech, Normal tone, Normal affect


Lymphatics: No axilla or inguinal lymphadenopathy





- Studies


Medications List Reviewed: Yes





Assessment And Plan





- Current Problems (Diagnosis)


(1) Dyspnea


Current Visit: Yes   Status: Chronic   


Plan: 


WORSE LATELY. 


SHE HAS L SIDE PLEURAL EFFUSION. 


WILL DO CT SCAN WHEN CREATNINE IS DOWN FROM 1.7


I STOPPED LASIX AND GAVE GENTLE HYDRATION. 





CONT ABX 


CONT NEBS. 


TAP AND CT ON MONDAY. 





AS ABOVE. 


NO CHANGES IN PLAN 


CREAT COMING DOWN.





ORDER FOR THORACENTASIS GIVEN. 


WILL DO CT SCAN AFTER CREAT IS DOWN. 








(2) Pleural effusion


Current Visit: Yes   Status: Chronic   


Plan: 


AS ABOVE.





THORACENTASIS SOON. 








(3) Chronic kidney disease, stage 3


Current Visit: No   Status: Acute   


Plan: 


CREAT COMING DOWN


WILL DO CT WHEN LESS THAN 1.5





RESTART IV. 


WITH LASIX HER BP DROPPED AND CREAT RAISED. 





STOP LASIX.





MAY COME DOWN TO LESS THAN 1.5 WITH GENTLE HYDRATION 





PRERENAL AZOTEMIA. 


CREAT BETTER TODAY DOWN TO 1.7








CREAT IS DOWN TO 1.5 NOW.


CONT HYDRATION. 








(4) DM type 2 (diabetes mellitus, type 2)


Current Visit: No   Status: Chronic   


Qualifiers: 


   Diabetes mellitus complication status: without complication 





(5) Bicytopenia


Current Visit: Yes   Status: Chronic   


Plan: 


ANEMIA IS ABOUT 9-10 GM CHRONIC. 


PLATELETS ABOUT 90K AND NOW IT IS DOWN TO 47K. 





WILL WATCH DAILY. 


MAY BE FROM SEPSIS 


OR HEME DISORDER. 





WILL NEED HEMATOLOGIST.

## 2022-05-02 VITALS — DIASTOLIC BLOOD PRESSURE: 52 MMHG | TEMPERATURE: 97.3 F | SYSTOLIC BLOOD PRESSURE: 109 MMHG

## 2022-05-02 VITALS — OXYGEN SATURATION: 98 %

## 2022-05-02 LAB
BUN BLD-MCNC: 49 MG/DL (ref 7–18)
GLUCOSE SERPLBLD-MCNC: 158 MG/DL (ref 74–106)
HCT VFR BLD CALC: 27.2 % (ref 36–45)
INR BLD: 1.28
PMV BLD: 8.4 FL (ref 7.6–11.3)
POTASSIUM SERPL-SCNC: 4.2 MMOL/L (ref 3.5–5.1)
RBC # BLD: 3.13 M/UL (ref 3.86–4.86)

## 2022-05-02 PROCEDURE — 0W9B3ZX DRAINAGE OF LEFT PLEURAL CAVITY, PERCUTANEOUS APPROACH, DIAGNOSTIC: ICD-10-PCS

## 2022-05-02 RX ADMIN — ALBUTEROL SULFATE SCH MG: 2.5 SOLUTION RESPIRATORY (INHALATION) at 14:07

## 2022-05-02 RX ADMIN — HUMAN INSULIN SCH UNIT: 100 INJECTION, SOLUTION SUBCUTANEOUS at 16:55

## 2022-05-02 RX ADMIN — GLIMEPIRIDE SCH MG: 2 TABLET ORAL at 16:55

## 2022-05-02 RX ADMIN — Medication SCH: at 09:00

## 2022-05-02 RX ADMIN — SODIUM CHLORIDE SCH MLS: 0.45 INJECTION, SOLUTION INTRAVENOUS at 09:02

## 2022-05-02 RX ADMIN — SODIUM CHLORIDE SCH: 0.45 INJECTION, SOLUTION INTRAVENOUS at 05:20

## 2022-05-02 RX ADMIN — CHOLECALCIFEROL CAP 125 MCG (5000 UNIT) SCH: 125 CAP at 09:00

## 2022-05-02 RX ADMIN — IPRATROPIUM BROMIDE SCH MG: 0.5 SOLUTION RESPIRATORY (INHALATION) at 14:07

## 2022-05-02 RX ADMIN — ALBUTEROL SULFATE SCH MG: 2.5 SOLUTION RESPIRATORY (INHALATION) at 07:32

## 2022-05-02 RX ADMIN — MONTELUKAST SODIUM SCH: 10 TABLET, FILM COATED ORAL at 09:00

## 2022-05-02 RX ADMIN — IPRATROPIUM BROMIDE SCH MG: 0.5 SOLUTION RESPIRATORY (INHALATION) at 07:32

## 2022-05-02 RX ADMIN — FLUTICASONE PROPIONATE SCH SPRAY: 50 SPRAY, METERED NASAL at 09:03

## 2022-05-02 RX ADMIN — GLIMEPIRIDE SCH: 2 TABLET ORAL at 07:30

## 2022-05-02 RX ADMIN — HUMAN INSULIN SCH: 100 INJECTION, SOLUTION SUBCUTANEOUS at 11:22

## 2022-05-02 RX ADMIN — ALBUTEROL SULFATE SCH MG: 2.5 SOLUTION RESPIRATORY (INHALATION) at 01:55

## 2022-05-02 RX ADMIN — HUMAN INSULIN SCH: 100 INJECTION, SOLUTION SUBCUTANEOUS at 07:30

## 2022-05-02 RX ADMIN — IPRATROPIUM BROMIDE SCH MG: 0.5 SOLUTION RESPIRATORY (INHALATION) at 01:55

## 2022-05-02 NOTE — RAD REPORT
EXAM DESCRIPTION:  CT - Thorax Wo Con - 5/2/2022 1:53 pm

 

CLINICAL HISTORY:  Pleural effusion s/p thoracentesis

 

COMPARISON:  Chest Single View dated 5/2/2022; Thoracentesis w/ US Guide dated 5/2/2022

 

TECHNIQUE:  Axial 5 mm thick images of the chest were obtained without IV contrast.

 

All CT scans are performed using dose optimization technique as appropriate and may include automated
 exposure control or mA/KV adjustment according to patient size.

 

FINDINGS:  A minimal amount of air is present in the pleural cavity in the medial left apex and in th
e anterior left base. This is a less than 5% pneumothorax. A small to moderate amount of left-sided p
leural fluid remains. There is partial atelectasis in the left lower lobe. Airspace opacification is 
present in the lateral aspect of the lingula left upper lobe and in the inferior aspect of the left l
ower lobe. This is probably unresolved atelectasis. Post thoracentesis alveolar edema not.

 

Soft tissue opacification in the posterior gutter could be part of the atelectasis process. A trace a
mount of pneumothorax is possible.

 

Right hemithorax shows small focus of scarring in the posteromedial right upper lung field with no boyle
spicious lung parenchymal finding seen. No right-sided pneumothorax or pleural fluid collection.

 

No abnormal mediastinal or hilar masses or lymphadenopathy seen. No gross aortic or pulmonary artery 
finding suspected.  Assessment is limited in the absence of IV contrast.

 

No chest wall mass or abnormal axillary lymphadenopathy. Prominent thyroid gland is present.

 

 

IMPRESSION:  A 5% or less pneumothorax is present with minimal amounts of air in the pleural cavity a
t the medial left apex and anterior left lung base.

 

Ill-defined opacification in the left posterior gutter could be part of the atelectasis process. Pleu
ral cavity mass is not suspected. A small amount of hemothorax is possible.

 

A small amount of hemothorax (questionable but not definitive) and a very minimal pneumothorax are no
t outside of commonly encountered post thoracentesis findings.

 

Small to moderate residual left-side pleural effusion.

## 2022-05-02 NOTE — RAD REPORT
EXAM DESCRIPTION:  [place] Single View5/2/2022 5:55 am

 

CLINICAL HISTORY:  Chest pain

 

COMPARISON:  April 27, 2022

 

FINDINGS:  The clinical history states status post thoracentesis. However, there does not appear to h
ave been any significant reduction in the size of the moderate left pleural effusion.

 

No pneumothorax.

 

Atelectasis left lung base

 

Right lung appears clear. Heart remains enlarged

## 2022-05-02 NOTE — RAD REPORT
EXAM DESCRIPTION:  US - Thoracentesis w/ US Guide - 5/2/2022 10:19 am

 

CLINICAL HISTORY:  Left pleural effusion

 

COMPARISON:  X-ray April 27, 2022

 

TECHNIQUE:  The risks, benefits alternatives to the procedure were explained to the patient and infor
med consent obtained.

 

Skiin ,subcutaneous tissues and pleura anesthetized with lidocaine.

 

Under sonographic guidance, an 8 Martiniquais catheter was placed into the posterior lower left pleural spa
ce.

 

1.2 liters of yellow fluid removed and given to pathology

 

Patient experienced no immediate complication

 

IMPRESSION:  Thoracentesis

## 2022-05-02 NOTE — P.DS
Admission Date: 04/26/22


Discharge Date: 05/02/22


Disposition: ROUTINE DISCHARGE


Discharge Condition: FAIR


Reason for Admission: COUGH, WHEEZES





- Problems


(1) Dyspnea


Current Visit: Yes   Status: Chronic   





(2) Pleural effusion


Current Visit: Yes   Status: Chronic   





(3) Chronic kidney disease, stage 3


Current Visit: No   Status: Acute   





(4) DM type 2 (diabetes mellitus, type 2)


Current Visit: No   Status: Chronic   


Qualifiers: 


   Diabetes mellitus complication status: without complication 





(5) Bicytopenia


Current Visit: Yes   Status: Chronic   


Brief History of Present Illness: 





FABIOLA IS A DIABETIC WHO SINCE COVID HAS SHORTNESS OF BREATH.  SHE HAS NO COUGH

AND CHEST PAIN. NO FEVER. 


Hospital Course: 





FABIOLA COMES WITH DYSPNEA, HAS L SIDE LOWER PLEURAL EFFUSION.  TAP WAS 

PERFORMED TO DAY DIAGNOSTIC AND THERAPEUTIC TAP. FLUID LOOKS TRANSUDATIVE.  SHE 

NOW HAS BACK PAIN.  SHE WILL WALK BEFORE SHE GOES HOME. SHE HAS BEEN WALKING 

DAILY WITH HELP. SHE IS 88, HAS ARTHRITIS AND COPD WITH OBESITY.  SHE IS STABLE 

TO GO HOME IF SHE IS ABLE TO AMBULATE. 


Vital Signs/Physical Exam: 














Temp Pulse Resp BP Pulse Ox


 


 97.3 F   82   18   109/52 L  98 


 


 05/02/22 16:00  05/02/22 16:00  05/02/22 16:00  05/02/22 16:00  05/02/22 16:00








Laboratory Data at Discharge: 














WBC  Cancelled   05/02/22  07:00    


 


Hgb  Cancelled   05/02/22  07:00    


 


Hct  Cancelled   05/02/22  07:00    


 


Plt Count  Cancelled   05/02/22  07:00    


 


PT  14.1 SECONDS (9.5-12.5)  H  05/02/22  04:15    


 


INR  1.28   05/02/22  04:15    


 


Sodium  138 mmol/L (136-145)   05/02/22  12:15    


 


Potassium  4.2 mmol/L (3.5-5.1)   05/02/22  12:15    


 


BUN  49 mg/dL (7-18)  H  05/02/22  12:15    


 


Creatinine  1.56 mg/dL (0.55-1.3)  H  05/02/22  12:15    


 


Glucose  158 mg/dL ()  H  05/02/22  12:15    


 


Magnesium  2.0 mg/dL (1.8-2.4)   05/01/22  07:24    


 


Total Bilirubin  0.9 mg/dL (0.2-1.0)   04/26/22  22:45    


 


AST  29 U/L (15-37)   04/26/22  22:45    


 


ALT  18 U/L (12-78)   04/26/22  22:45    


 


Alkaline Phosphatase  103 U/L ()   04/26/22  22:45    


 


Cholesterol  Cancelled   05/01/22  10:19    








Home Medications: 








Glimepiride [Amaryl] 4 mg PO BID 03/02/18 


Pravastatin Sodium 10 mg PO BEDTIME 03/02/18 


carvediloL [Coreg*] 6.25 mg PO BID 03/02/18 


Allopurinol 100 mg PO DAILY 02/27/20 


Linagliptin [Tradjenta] 1 tab PO DAILY 02/27/20 


Montelukast Sodium 10 mg PO DAILY 07/03/21 


Ubidecarenone [Co Q-10] 200 mg PO DAILY 07/03/21 


Vit C/Tariq AC/Lut/Copper/Znox [Preservision Lutein Softgel] 2 each PO BID 

07/03/21 


Cholecalciferol (Vitamin D3) [Vitamin D3] 5,000 unit PO DAILY #30 07/04/21 


Aspirin [Vazalore] 81 mg PO ONCE 04/27/22 


Cyanocobalamin (Vitamin B-12) [Vitamin B12] 1,000 mcg PO DAILY 04/27/22 


Linagliptin [Tradjenta] 5 mg PO ONCE 04/27/22 


levoFLOXacin [Levaquin*] 750 mg PO Q48H #3 tab 05/02/22 





New Medications: 


levoFLOXacin [Levaquin*] 750 mg PO Q48H #3 tab


Followup: 


Porfirio Valdez MD [Primary Care Provider] -

## 2022-05-02 NOTE — RAD REPORT
EXAM DESCRIPTION:  Francisco Single View5/2/2022 10:20 am

 

CLINICAL HISTORY:   thoracentesis

 

IMPRESSION:  A left pneumothorax is not present status post thoracentesis.

## 2022-05-03 LAB — IGA FLD-MCNC: 271 MG/DL (ref 70–320)

## 2022-05-04 LAB
ALBUMIN SERPL ELPH-MCNC: 2.9 G/DL (ref 3.8–4.8)
LDH PLR-CCNC: 33 U/L
PROT PATTERN SERPL ELPH-IMP: (no result)
PROT PLR-MCNC: <3 G/DL

## 2022-08-29 ENCOUNTER — HOSPITAL ENCOUNTER (INPATIENT)
Dept: HOSPITAL 97 - ER | Age: 87
LOS: 2 days | Discharge: HOME HEALTH | DRG: 871 | End: 2022-08-31
Attending: INTERNAL MEDICINE | Admitting: INTERNAL MEDICINE
Payer: COMMERCIAL

## 2022-08-29 VITALS — BODY MASS INDEX: 26.9 KG/M2

## 2022-08-29 DIAGNOSIS — Z86.73: ICD-10-CM

## 2022-08-29 DIAGNOSIS — Z90.710: ICD-10-CM

## 2022-08-29 DIAGNOSIS — J15.9: ICD-10-CM

## 2022-08-29 DIAGNOSIS — D61.818: ICD-10-CM

## 2022-08-29 DIAGNOSIS — Z20.822: ICD-10-CM

## 2022-08-29 DIAGNOSIS — N25.81: ICD-10-CM

## 2022-08-29 DIAGNOSIS — Z79.899: ICD-10-CM

## 2022-08-29 DIAGNOSIS — Z95.828: ICD-10-CM

## 2022-08-29 DIAGNOSIS — Z88.1: ICD-10-CM

## 2022-08-29 DIAGNOSIS — Z79.82: ICD-10-CM

## 2022-08-29 DIAGNOSIS — E87.1: ICD-10-CM

## 2022-08-29 DIAGNOSIS — Z91.09: ICD-10-CM

## 2022-08-29 DIAGNOSIS — E78.5: ICD-10-CM

## 2022-08-29 DIAGNOSIS — I13.2: ICD-10-CM

## 2022-08-29 DIAGNOSIS — Z86.16: ICD-10-CM

## 2022-08-29 DIAGNOSIS — D69.59: ICD-10-CM

## 2022-08-29 DIAGNOSIS — I50.32: ICD-10-CM

## 2022-08-29 DIAGNOSIS — A41.9: Primary | ICD-10-CM

## 2022-08-29 DIAGNOSIS — K74.60: ICD-10-CM

## 2022-08-29 DIAGNOSIS — E87.6: ICD-10-CM

## 2022-08-29 DIAGNOSIS — Z79.4: ICD-10-CM

## 2022-08-29 DIAGNOSIS — D46.9: ICD-10-CM

## 2022-08-29 DIAGNOSIS — Z99.2: ICD-10-CM

## 2022-08-29 DIAGNOSIS — E11.22: ICD-10-CM

## 2022-08-29 DIAGNOSIS — Z79.84: ICD-10-CM

## 2022-08-29 DIAGNOSIS — Z88.8: ICD-10-CM

## 2022-08-29 DIAGNOSIS — N18.6: ICD-10-CM

## 2022-08-29 DIAGNOSIS — J96.90: ICD-10-CM

## 2022-08-29 DIAGNOSIS — D63.8: ICD-10-CM

## 2022-08-29 LAB
BLD SMEAR INTERP: (no result)
BUN BLD-MCNC: 33 MG/DL (ref 7–18)
GLUCOSE SERPLBLD-MCNC: 186 MG/DL (ref 74–106)
HCT VFR BLD CALC: 31.2 % (ref 36–45)
LYMPHOCYTES # SPEC AUTO: 0.2 K/UL (ref 0.7–4.9)
MAGNESIUM SERPL-MCNC: 2 MG/DL (ref 1.8–2.4)
MCV RBC: 91 FL (ref 80–100)
MORPHOLOGY BLD-IMP: (no result)
PMV BLD: 7.2 FL (ref 7.6–11.3)
POTASSIUM SERPL-SCNC: 3.8 MMOL/L (ref 3.5–5.1)
RBC # BLD: 3.43 M/UL (ref 3.86–4.86)
TROPONIN I SERPL HS-MCNC: 12.6 PG/ML (ref ?–58.9)

## 2022-08-29 PROCEDURE — 90935 HEMODIALYSIS ONE EVALUATION: CPT

## 2022-08-29 PROCEDURE — 71250 CT THORAX DX C-: CPT

## 2022-08-29 PROCEDURE — 87102 FUNGUS ISOLATION CULTURE: CPT

## 2022-08-29 PROCEDURE — 93005 ELECTROCARDIOGRAM TRACING: CPT

## 2022-08-29 PROCEDURE — 85610 PROTHROMBIN TIME: CPT

## 2022-08-29 PROCEDURE — 36415 COLL VENOUS BLD VENIPUNCTURE: CPT

## 2022-08-29 PROCEDURE — 80048 BASIC METABOLIC PNL TOTAL CA: CPT

## 2022-08-29 PROCEDURE — 82465 ASSAY BLD/SERUM CHOLESTEROL: CPT

## 2022-08-29 PROCEDURE — 99285 EMERGENCY DEPT VISIT HI MDM: CPT

## 2022-08-29 PROCEDURE — 86850 RBC ANTIBODY SCREEN: CPT

## 2022-08-29 PROCEDURE — 87205 SMEAR GRAM STAIN: CPT

## 2022-08-29 PROCEDURE — 83735 ASSAY OF MAGNESIUM: CPT

## 2022-08-29 PROCEDURE — 94760 N-INVAS EAR/PLS OXIMETRY 1: CPT

## 2022-08-29 PROCEDURE — 83880 ASSAY OF NATRIURETIC PEPTIDE: CPT

## 2022-08-29 PROCEDURE — 87116 MYCOBACTERIA CULTURE: CPT

## 2022-08-29 PROCEDURE — 88108 CYTOPATH CONCENTRATE TECH: CPT

## 2022-08-29 PROCEDURE — 85730 THROMBOPLASTIN TIME PARTIAL: CPT

## 2022-08-29 PROCEDURE — 87206 SMEAR FLUORESCENT/ACID STAI: CPT

## 2022-08-29 PROCEDURE — 87040 BLOOD CULTURE FOR BACTERIA: CPT

## 2022-08-29 PROCEDURE — 86901 BLOOD TYPING SEROLOGIC RH(D): CPT

## 2022-08-29 PROCEDURE — 84484 ASSAY OF TROPONIN QUANT: CPT

## 2022-08-29 PROCEDURE — 87804 INFLUENZA ASSAY W/OPTIC: CPT

## 2022-08-29 PROCEDURE — 71045 X-RAY EXAM CHEST 1 VIEW: CPT

## 2022-08-29 PROCEDURE — 89050 BODY FLUID CELL COUNT: CPT

## 2022-08-29 PROCEDURE — 86900 BLOOD TYPING SEROLOGIC ABO: CPT

## 2022-08-29 PROCEDURE — 84157 ASSAY OF PROTEIN OTHER: CPT

## 2022-08-29 PROCEDURE — 82945 GLUCOSE OTHER FLUID: CPT

## 2022-08-29 PROCEDURE — 85379 FIBRIN DEGRADATION QUANT: CPT

## 2022-08-29 PROCEDURE — 82947 ASSAY GLUCOSE BLOOD QUANT: CPT

## 2022-08-29 PROCEDURE — 85025 COMPLETE CBC W/AUTO DIFF WBC: CPT

## 2022-08-29 PROCEDURE — 32555 ASPIRATE PLEURA W/ IMAGING: CPT

## 2022-08-29 PROCEDURE — 83615 LACTATE (LD) (LDH) ENZYME: CPT

## 2022-08-29 PROCEDURE — 87015 SPECIMEN INFECT AGNT CONCNTJ: CPT

## 2022-08-29 PROCEDURE — 87070 CULTURE OTHR SPECIMN AEROBIC: CPT

## 2022-08-29 PROCEDURE — 88305 TISSUE EXAM BY PATHOLOGIST: CPT

## 2022-08-29 PROCEDURE — 78582 LUNG VENTILAT&PERFUS IMAGING: CPT

## 2022-08-29 RX ADMIN — Medication SCH ML: at 21:54

## 2022-08-29 RX ADMIN — CEFTRIAXONE SCH MLS: 1 INJECTION, POWDER, FOR SOLUTION INTRAMUSCULAR; INTRAVENOUS at 21:53

## 2022-08-29 NOTE — RAD REPORT
EXAM DESCRIPTION:  CT - Thorax  Con - 8/29/2022 7:45 pm

 

CLINICAL HISTORY:  Cough, persistent

 

COMPARISON:  Thorax Wo Con dated 5/2/2022

 

TECHNIQUE:  Axial 5 mm thick images of the chest were obtained without IV contrast.

 

All CT scans are performed using dose optimization technique as appropriate and may include automated
 exposure control or mA/KV adjustment according to patient size.

 

FINDINGS:  Right lung field is clear. Left upper lobe is clear. There is a large left pleural effusio
n occupying approximately 50% of the left hemithorax. There is complete atelectasis of the left lower
 lobe. An obstructive mass is not suspected. Endobronchial lesion is not confirmed. No right-sided pl
eural effusion or pleural thickening. No pneumothorax.

 

No abnormal mediastinal or hilar masses or lymphadenopathy seen. No gross aortic or pulmonary artery 
finding suspected.  Assessment is limited in the absence of IV contrast.

 

No chest wall mass or abnormal axillary lymphadenopathy.

 

Limited upper abdomen imaging shows nodular liver capsule contour suspicious for cirrhosis or other d
iffuse hepatic parenchymal disease process. Splenomegaly is present. Ascites is present but not fully
 evaluated.

 

IMPRESSION:  Large left pleural effusion occupying 50% level left hemithorax. There is complete left 
lower lobe atelectasis.

 

No obstructing hilar mass suspected. Right lung field is clear.

 

Partially imaged abdomen shows cirrhosis or diffuse hepatic parenchymal changes with ascites.

## 2022-08-29 NOTE — XMS REPORT
Continuity of Care Document

                           Created on:2022



Patient:FABIOLA CRANE

Sex:Female

:1933

External Reference #:[id number]





Demographics







                          Address                   [address]



                                                    [place] [address]

 

                          Home Phone                [phone]

 

                          Email Address             [email or web address]

 

                          Preferred Language        English

 

                          Marital Status            Unknown

 

                          Jehovah's witness Affiliation     Unknown

 

                          Race                      Unknown

 

                          Additional Race(s)        Unavailable



                                                    Unavailable

 

                          Ethnic Group              Unknown









Author







                          Organization              [facility]

t

 

                          Address                   [address]



                                                    [place] [address]

 

                          Phone                     [phone]









Support







                Name            Relationship    Address         Phone

 

                PATTY KING    RELA            Unavailable     [phone]

 

                NEGIN KING   RELA            [address]  [phone]



                                                [place] [address] 

 

                MAYE ROJAS GC              Unavailable     [phone]









Care Team Providers







                    Name                Role                Phone

 

                    Lyndsay Love Anavella Attending Clinician Unava

ilable

 

                    Alma Mirza Attending Clinician Unavailable

 

                    GC_BAHC_Awais_MARCEL      Attending Clinician Unavailable

 

                    ANA OVALLES        Attending Clinician Unavailable

 

                    Chuy Love Anav Admitting Clinician Unavailable

 

                    Porfirio Valdez     Admitting Clinician Unavailable

 

                    Alma Mirza Admitting Clinician Unavailable

 

                    ТАТЬЯНА_BAHC_Todd_MARCEL      Admitting Clinician Unavailable

 

                    ELSIEO WOMACK    Admitting Clinician Unavailable









Payers







           Payer Name Policy Type Policy Number Effective Date Expiration Date S

eriberto

 

           [facility]        [id number]                       

 

           AET        AET        [id number]                       

 

           MEDICARE [place]:            [id number] 1998-10-01            



           [employer]                       00:00:00              







Problems







       Condition Condition Condition Status Onset  Resolution Last   Treating Co

mments 

Source



       Name   Details Category        Date   Date   Treatment Clinician        



                                                 Date                 

 

       Acute on  Acute on Finding Active -2018               

[facility]



       chronic chronic               3-          19:36:50               l



       congestive congestive               00:00:                             He

rmann



       heart  heart                00                                 



       failure failure                                                  



              Active                                                  



              2018                                                  



              Finding                                                  



              2018                                                  



              [facility]                                                  



              [place]                                                  

 

       Acute on  Acute on Problem                      2021               

[facility]



       chronic chronic                             19:18:00               l



       congestive congestive                                                  He

rmann



       heart  heart                                                   



       failure failure                                                  



              Problem                                                  



              2021                                                  



              [facility]                                                  



              [facility]                                                  



              [place]                                                  

 

       Elevated  Elevated Problem                      2021               

[facility]



       procalcito procalcito                             19:18:00               

l



       isa    isa                                                     [place]



              Problem                                                  



              2021                                                  



              [facility]                                                  



              [facility]                                                  



              [place]                                                  

 

       Acute on   Acute on Problem                      2021              

 [facility]



       chronic chronic                             19:18:00               l



       renal  renal                                                   [place]



       insufficie insufficie                                                  



       ncy    ncy                                                     



              Problem                                                  



              2021                                                  



               [facility]                                                  



              [facility]                                                  



              [place]                                                  

 

       Pneumonia  Pneumonia Problem                      2021             

  [facility]



               Problem                             19:18:00               l



              2021                                                  [facility]

n



              [facility]                                                  



              [facility]                                                  



              [place]                                                  

 

       Acute on  Acute on Condition                      2020             

  [facility]



       chronic chronic                             17:37:00               l



       heart  heart                                                   [place]



       failure failure                                                  



              Condition                                                  



              2020                                                  



               [facility]                                                  



              [facility]                                                  



              [place]                                                  

 

       Mild renal  Mild  Condition                      2020              

 [facility]



       insufficie renal                              17:37:00               l



       ncy    insufficie                                                  [facility]

n



              ncy                                                     



              Condition                                                  



              2020                                                  



              [facility]                                                  



              [facility]                                                  



              [place]                                                  

 

       Elevated  Elevated Condition                      2020             

  [facility]



       brain  brain                              17:37:00               l



       natriureti natriureti                                                  He

rmann



       c peptide c peptide                                                  



       (BNP)  (BNP)                                                   



       level  level                                                   



              Condition                                                  



              2020                                                  



              [facility]                                                  



              [facility]                                                  



              [place]                                                  



                                                                      

 

       Postobstru  Postobstr Condition                      2020          

     [facility]



       ctive  uctive                             17:37:00               l



       pneumonia pneumonia                                                  [facility]

estevan



              Condition                                                  



              2020                                                  



              [facility]                                                  



              [facility]                                                  



              [place]                                                  

 

       Pneumonia  Pneumonia Problem                      2021             

  [facility]



       due to due to                             19:18:00               l



       COVID-19 COVID-19                                                  [facility]

n



       virus  virus                                                   



              Problem                                                  



              2021                                                  



               [facility]                                                  



              [facility]                                                  



              [place]                                                  

 

       Stage 3  Stage 3 Problem                      2021               Me

moria



       chronic chronic                             19:18:00               l



       kidney kidney                                                  [place]



       disease disease                                                  



              Problem                                                  



              2021                                                  



              [facility]                                                  



              [facility]                                                  



              [place]                                                  

 

       Pancytopen  Pancytope Problem                      2021            

   [facility]



       ia     lolly                                19:18:00               l



              Problem                                                  [place]



              2021                                                  



              [facility]                                                  



              [facility]                                                  



              [place]                                                  

 

       Elevated  Elevated Problem                      2021               

[facility]



       troponin troponin                             19:18:00               l



       level  level                                                   [place]



              Problem                                                  



              2021                                                  



               [facility]                                                  



              [facility]                                                  



              [place]                                                  

 

       Stage 3  Stage 3 Condition                      2021               

[facility]



       chronic chronic                             19:18:00               l



       kidney kidney                                                  [place]



       disease disease                                                  



              Condition                                                  



              2021                                                  



              [facility]                                                  



              [facility]                                                  



              [place]                                                  



                                                                      

 

       Type 2  Type 2 Condition                      2021               Me

moria



       diabetes diabetes                             19:18:00               l



       mellitus mellitus                                                  [facility]

n



              Condition                                                  



              2021                                                  



              [facility]                                                  



              [facility]                                                  



              [place]                                                  

 

       Elevated  Elevated Condition                      2021             

  [facility]



       troponin I troponin I                             19:18:00               

l



       level  level                                                   [place]



              Condition                                                  



              2021                                                  



               [facility]                                                  



              [facility] [facility]                                                  



              [place]                                                  

 

       Acquired  Acquired Condition                      2021             

  [facility]



       pancytopen pancytopen                             19:18:00               

l



       ia     ia                                                      [place]



              Condition                                                  



              2021                                                  



              [facility]                                                  



              [facility]                                                  



              [place]                                                  

 

       Infection  Infection Condition                      2021           

    [facility]



       due to due to                             19:18:00               l



       2019 novel 2019 novel                                                  [facility]



       coronaviru coronaviru                                                  



       s      s                                                       



              Condition                                                  



              2021                                                  



              [facility]                                                  



              [facility]                                                  



              [place]                                                  







History of Past Illness







       Condition Condition Condition Status Onset  Resolution Last   Treating Co

mments 

Source



       Name   Details Category        Date   Date   Treatment Clinician        



                                                 Date                 

 

       Abnormal  Abnormal Problem        2021           

    [facility]



       kidney kidney               3-02   19:18:00 19:18:00               l



       function function               00:00:                             Fritz

n



              2018               00                                 



              Problem                                                  



              2021                                                  



              [facility]                                                  



              [place]                                                  



              [place]                                                  







Allergies, Adverse Reactions, Alerts







       Allergy Allergy Status Severity Reaction(s) Onset  Inactive Treating Comm

ents 

Source



       Name   Type                        Date   Date   Clinician        

 

       Statins- Statins- Active               2020-0                      Memori

a



       Hmg-Coa Hmg-Coa                      2-28                        l



       Redu   Redu                        03:12:                      Tha



                                          33                          

 

       blood  blood  Active Mild          2020-0                      [facility]



       thinner thinner                      2-28                        l



                                          03:12:                      Tha



                                          33                          

 

       blood  blood  Active               2020-0                      [facility]



       thinners thinners                      2-28                        l



                                          03:12:                      Tha



                                          33                          







Social History







           Social Habit Start Date Stop Date  Quantity   Comments   Source

 

           Social History 2021                       [facility]

viri



                      19:20:00   19:20:00                         







Medications







       Ordered Filled Start  Stop   Current Ordering Indication Dosage Frequency

 Signature

                    Comments            Components          Source



     Medication Medication Date Date Medication? Clinician                (SIG) 

          



     Name Name                                                   

 

     Cholecalcif            Yes                                     [facility]

a



     clarita      7-04                                              l



     (Vitamin      18:00:                                              Tha



     D3)       12                                                



     (Vitamin                                                        



     D3) 125 MCG                                                        



     Capsule                                                        

 

     Cyanocobala            Yes                                     Mem[facility]

a



     min       7-04                                              l



     (Vitamin      18:00:                                              Tha



     B-12)      12                                                



     (Vitamin                                                        



     B12) 2,500                                                        



     MCG Tablet                                                        

 

     Prednisone            Yes                                     [facility]



     (Deltasone*      7-04                                              l



     ) 10 MG Tab      17:59:                                              Fritz garcia



               25                                                

 

     Furosemide            Yes                                     [facility]



     (Lasix*) 40      7-04                                              l



     MG Tab      05:00:                                              Tha



               45                                                

 

     Ascorbic            Yes                                     [facility]



     Acid      7-04                                              l



     (Vitamin C)      04:47:                                              Fritz

n



     1,000 MG      30                                                



     Tablet                                                        

 

     Cholecalcif            Yes                                     [facility]

a



     clarita      7-04                                              l



     (Vitamin      04:47:                                              Tha



     D3)       30                                                



     (Vitamin                                                        



     D3) 125 MCG                                                        



     Capsule                                                        

 

     Cyanocobala            Yes                                     [facility]

a



     min       7-04                                              l



     (Vitamin      04:47:                                              Tha PARSONS-12)      30                                                



     (Vitamin                                                        



     B12) 2,500                                                        



     MCG Tablet                                                        

 

     Ferrous            Yes                                     [facility]



     Sulfate      7-04                                              l



     (Ferrous      04:47:                                              Tha



     Sulfate*)      30                                                



     325 MG Tab                                                        

 

     Montelukast            Yes                                     [facility]

a



     Sodium      7-04                                              l



               04:47:                                              Tha



               30                                                

 

     Ubidecareno            Yes                                     [facility]

a



     ne (Co      7-04                                              l



     Q-10) 200      04:47:                                              Tha



     MG Capsule      30                                                

 

     Vit C/Tariq            Yes                                     [facility]



     Ac/Lut/Cruz      7-04                                              l



     er/Znox      04:47:                                              Tha



     (Preservisi      30                                                



     on Lutein                                                        



     Softgel) 1                                                        



     EACH                                                        



     Capsule                                                        

 

     Furosemide            Yes                                     [facility]



     (Lasix*) 40      2-29                                              l



     MG Tab      14:39:                                              Tha



               48                                                

 

     Cetirizine      -0      Yes                                     [facility]



     Hcl       2-28                                              l



               03:32:                                              Tha



               57                                                

 

     Cetirizine      -0      Yes                                     [facility]



     Hcl       2-28                                              l



               03:32:                                              Tha



               00                                                

 

     Allopurinol      -0      Yes                                     [facility]

a



               2-28                                              l



               03:25:                                              Tha



               13                                                

 

     Linagliptin      -0      Yes                                     [facility]

a



     (Tradjenta)      2-28                                              l



     5 MG Tablet      03:25:                                              Fritz garcia



               13                                                

 

     Linagliptin      -0      Yes                                     [facility]

a



               2-28                                              l



               03:25:                                              Tha



               00                                                

 

     Spironolact            Yes                                     [facility]

a



     one       3-04                                              l



     (Aldactone*      19:31:                                              Fritz

n



     ) 25 MG Tab      53                                                

 

     Spironolact      2018-0      Yes  Mouin F                TWICE           Me

moria



     one       3-04           Callie                DAILY           l



               00:00:                                                                                              

 

     Carvedilol      2018-0      Yes                                     [facility]



     (Coreg*)      3-02                                              l



     6.25 MG Tab      06:00:                                              Fritz garcia



               08                                                

 

     Glimepiride      2018-0      Yes                                     [facility]

a



     (Amaryl) 4      3-02                                              l



     MG Tablet      06:00:                                              Tha



               08                                                

 

     Furosemide      2018-0      Yes                                     [facility]



     (Lasix*) 40      3-02                                              l



     MG Tab      06:00:                                              [place]



                                                               

 

     Multivitami      2018-0      Yes                                     [facility]

a



     n (Multiple      3-02                                              l



     Vitamins)      06:00:                                              Tha



     Tablet      08                                                

 

     Omeprazole      2018-0      Yes                                     [facility]



     (Prilosec)      3-02                                              l



     40 MG      06:00:                                              Tha



     Capsule.      08                                                

 

     Pravastatin      2018-0      Yes                                     [facility]

a



     Sodium      3-02                                              l



               06:00:                                              [place]                                                

 

     Ezetimibe      2018-0      Yes                                     [facility]



     (Zetia*) 10      3-02                                              l



     MG Tab      06:00:                                              [place]                                                

 

     Aspirin      2018-0      Yes                                     [facility]



               3-02                                              l



               06:00:                                                                                              

 

     Carvedilol      2018-0      Yes                                     [facility]



               3-02                                              l



               06:00:                                                                                              

 

     Glimepiride      2018-0      Yes                                     [facility]

a



               3-02                                              l



               06:00:                                                                                              

 

     Furosemide      2018-0      Yes                                     [facility]



               3-02                                              l



               06:00:                                                                                              

 

     Multivitami      2018-0      Yes                                     [facility]

a



     n         3-02                                              l



               06:00:                                                                                              

 

     Omeprazole      2018-0      Yes                                     [facility]



               3-02                                              l



               06:00:                                                                                              

 

     Pravastatin      2018-0      Yes                                     [facility]

a



     Sodium      3-02                                              l



               06:00:                                                                                              

 

     Ezetimibe      2018-0      Yes                                     [facility]



               3-02                                              l



               06:00:                                                                                              

 

     Aspirin      2018-0      Yes                      DAILY           [facility]



               3-02                                              l



               00:00:                                                                                              

 

     Carvedilol      2018-0      Yes                      AT BEDTIME           M

[facility]



               3-02                                              l



               00:00:                                                                                              

 

     Glimepiride      2018-0      Yes                      TWICE           [facility]

ia



               3-02                               DAILY           l



               00:00:                                                                                              

 

     Furosemide      2018-0      Yes                      DAILY           [facility]

a



               3-02                                              l



               00:00:                                                                                              

 

     Multivitami      2018-0      Yes                      DAILY           [facility]

ia



     n         3-02                                              l



               00:00:                                              Tha



               00                                                

 

     Omeprazole      2018-0      Yes                      DAILY           [facility]

a



               3-02                                              l



               00:00:                                              Tha



               00                                                

 

     Pravastatin      2018-0      Yes                      AT BEDTIME           

[facility]



     Sodium      3-02                                              l



               00:00:                                              [place]



               00                                                

 

     Ezetimibe      2018-0      Yes                      DAILY           [facility]



               3-02                                              l



               00:00:                                              [place]



               00                                                







Vital Signs







             Vital Name   Observation Time Observation Value Comments     Source

 

             Temperature Oral (F) 2021 17:00:00 98.4 F                    

[facility] [place]

 

             Heart Rate   2021 17:00:00                           [facility]

 [facility]

 

             Respitory Rate 2021 17:00:00                           [facility]

al Tha

 

             Systolic (mm Hg) 2021 17:00:00                           [facility]

rial Hta

 

             Diastolic (mm Hg) 2021 17:00:00                           [facility]

[facility]

 

             Height       2021 10:00:00                           [facility]

 [facility]

 

             Weight       2021 10:00:00                           [facility]

 [place]

 

             Heart Rate   2020 14:14:00                           [facility]

 [place]

 

             Systolic (mm Hg) 2020 14:14:00                           [facility]

rial [place]

 

             Diastolic (mm Hg) 2020 14:14:00                           [facility]

orial Tha

 

             Temperature Oral (F) 2020 13:49:00 98.2 F                    

[facility]

 

             Respitory Rate 2020 13:49:00                           [facility]

al [place]

 

             Height       2020 14:51:00                           [facility]

 [facility]

 

             Weight       2020 14:51:00                           [facility]

 [place]

 

             Heart Rate   2018 08:41:00                           [facility]

 [facility]

 

             Systolic (mm Hg) 2018 08:41:00                           Angel

rial Tha

 

             Diastolic (mm Hg) 2018 08:41:00                           [facility]

orial Tha

 

             Temperature Oral (F) 2018 07:45:00 97.6 F                    

[facility]

 

             Respitory Rate 2018 07:45:00                           [facility]

al [place]

 

             Weight       2018 05:00:00                           [facility]

 [place]

 

             Height       2018 12:23:00                           [facility]

 Th[facility]







Procedures







                Procedure       Date / Time Performed Performing Clinician Sour

bala

 

                [id number]         2022 00:00:00                 [facility]

 

                Chest Single View 2021 20:48:00                 [facility]

[facility]

 

                Anaerobic Blood Culture 2021 00:00:00                 Angelgarret kim Tha

 

                Aerobic Blood Culture 2021 00:00:00                 Angelica Baer

 

                Coronavirus COVID-19 PCR 2021 00:00:00                 [facility]

[facility] [place]

 

                [place] Count    2021 00:00:00                 [facility] 

curiel

 

                Chest Pa And Lat (2 2020 00:00:00                 [facility]



                Views)                                          

 

                Influenza Type B Antigen 2020 00:00:00                 [facility]

orial Tha



                Screen                                          

 

                Influenza Type A Antigen 2020 00:00:00                 [facility]

orichun Zamoraann



                Screen                                          

 

                [place] Count    2020 00:00:00                 [facility] curiel

 

                [id number]       2018 00:00:00                 [facility] Her curiel

 

                [place] Count    2018 00:00:00                 [facility]

curiel







Plan of Care







             Planned Activity Planned Date Details      Comments     Source

 

             Future Scheduled Test              Instructions Creatinine         

     [facility]



                                       Congestive Heart Failure              



                                       [code = Instructions              



                                       Creatinine Congestive              



                                       Heart Failure]              

 

             Future Scheduled Test              Instructions Creatinine         

     [facility]



                                       Congestive Heart Failure              



                                       [code = Instructions              



                                       Creatinine Congestive              



                                       Heart Failure]              







Encounters







        Start   End     Encounter Admission Attending Care    Care    Encounter 

Source



        Date/Time Date/Time Type    Type    Clinicians Facility Department ID   

   

 

        2022 Inpatient 3       [facility]   CVA     [id number]

 [facility]



        17:30:00 18:40:00                 yashira                    JimRuben Umana                         

 

        2022 Outpatient         [facility]   LABO    [id number]

[facility]



        12:05:00 12:05:00                 Merna Gillis



                                                                        [facility]

 

        2022 Outpatient         [facility] PRIV    PRIV    241

[id number] [facility]



        09:56:00 09:56:00                 dCheoJ                     [id number] [facility]

l

 

        2022 Outpatient         [facility] PRIV    PRIV    241

[id number] [facility]



        04:43:00 04:43:00                 Mei                     [id number] [facility]

l

 

        2022 Outpatient         [facility] PRIV    PRIV    241

[id number] [facility]



        11:14:00 11:14:00                 dCINDY                     [id number] [facility]

l

 

        2022 Outpatient         [facility] PRIV    PRIV    241

[id number] [facility]



        05:23:00 05:23:00                 d_MARCEL                     [id number] [facility]

l

 

        2022 Inpatient BALA OVALLES  LEYDIBL    MED     [id number]    

BL



        14:52:00 19:20:00                 ANA                           

 

        2022 Inpatient 3       [facility]   CVA     [id number]

 ENC[facility]



        18:20:00 10:58:00                 hez,                    [id number]    



                                        Anavella                         

 

        2021 Discharged                 [facility] [address] 

[id number] [facility]



        23:24:00 19:17:00 Inpatient                 r       [facility]  [facility]      l



                                                        [facility]

[facility]



                                                        INTENSIVE         



                                                        CARE UNIT         

 

        2021 Registered                 Maria Del Rosarioo [address] 

[id number] [facility]



        18:23:00 18:23:00 Referred                 r       [facility]  [facility]      l



                                                        [facility]

[facility]



                                                        LABORATORY         



                                                        NON-PATIENT         

 

        2020 Discharged                 nullKevino [address] 

[id number] [facility]



        02:48:00 17:37:00 Inpatient                 r       [facility]  [facility]      l



                                                        [facility]

est[facility]an



                                                        MED/SURG         



                                                        FLOOR           

 

        2019 Registered                 nullFlavo [address] 

[id number] [facility]



        18:59:00 18:59:00 Referred                 r       [facility]  78      l



                                                        [facility]

est[facility]an



                                                        LABORATORY         



                                                        NON-PATIENT         

 

        2018 Discharged                 nullFlavo [address] 

[id number] [facility]



        16:10:00 13:36:00 Inpatient                 r       [facility]  76      l



                                                        [facility]         Florina

nn

 

        2017 Departed                 nullFlavo [address] [id number]

[id number] [facility]



        10:18:00 16:46:00 Emergency                 r       [facility]  20      l



                                                        [facility]         Florina

nn







Results







           Test Description Test Time  Test Comments Results    Result Comments 

Source









                    CBC W/AUTO DIFF     2022 15:06:00 









                      Test Item  Value      Reference Range Interpretation Comme

nts









             WHITE BLOOD CELL (test code = 2.3 K/mm3    3.5-11.0     LL         

  



             WBC)                                                

 

             RED BLOOD CELL (test code = 2.82 M/mm3   4.70-6.10    L            



             RBC)                                                

 

             HEMOGLOBIN (test code = HGB) 7.3 G/DL     10.4-14.9    L           

 

 

             HEMATOCRIT (test code = HCT) 24.6 %       31.5-44.1    L           

 

 

             MEAN CELL VOLUME (test code = 87.2 Fl      84.5-98.6    N          

  



             MCV)                                                

 

             MEAN CELL HGB (test code = 25.9 pg      27.0-34.2    L            



             MCH)                                                

 

             MEAN CELL HGB CONCETRATION 29.7 G/DL    31.5-34.0    L            



             (test code = MCHC)                                        

 

             RED CELL DISTRIBUTION WIDTH 15.7 SD      11.5-14.5    H            



             (test code = RDW)                                        

 

             PLATELET COUNT (test code = 78 K/mm3     150-450      L            



             PLT)                                                

 

             MEAN PLATELET VOLUME (test 11.00 fL     7.0-10.5     H            



             code = MPV)                                         

 

             NEUTROPHIL % (test code = NT%) 68.6 %       40-76        N         

   

 

             IMMATURE GRANULOCYTE % (test 0.0 %        0.0-5.0      N           

 



             code = IG%)                                         

 

             LYMPHOCYTE % (test code = LY%) 18.1 %       20.5-51.1    L         

   

 

             MONOCYTE % (test code = MO%) 9.5 %        1.7-9.3      H           

 

 

             EOSINOPHIL % (test code = EO%) 3.4 %        0.0-6.0      N         

   

 

             BASOPHIL % (test code = BA%) 0.4 %        0.0-2.0      N           

 

 

             NUCLEATED RBC % (test code = 0.0 /100WBC% 0.0-1.0      N           

 



             NRBC%)                                              

 

             NEUTROPHIL # (test code = NT#) 1.6 K/mm3    1.8-7.6      L         

   

 

             IMMATURE GRANULOCYTE # (test 0.00 x10 3/uL 0.00-0.03    N          

  



             code = IG#)                                         

 

             LYMPHOCYTE # (test code = LY#) 0.4 K/mm3    0.6-3.2      L         

   

 

             MONOCYTE # (test code = MO#) 0.2 K/mm3    0.3-1.1      L           

 

 

             EOSINOPHIL # (test code = EO#) 0.1 K/mm3    0.0-0.4      N         

   

 

             BASOPHIL # (test code = BA#) 0.0 K/mm3    0.0-0.1      N           

 

 

             NUCLEATED RBC # (test code = 0.0 K/mm3    0.0-0.1      N           

 



             NRBC#)                                              

 

             MANUAL DIFF REQUIRED (test NO DIFF/SCN  CRITERIA                  S

LIDE REVIEW CONSISTANT WITH



             code = MDIFF)                                        AUTO DIFFERENT

IAL.



RBC [id number] 15:06:00





             Test Item    Value        Reference Range Interpretation Comments

 

             PLATELET ESTIMATE DECREASED THOUSAND ADEQUATE                  PLAT

ELET COUNT



             (test code =                                        REVIEWED AND



             PLTEST)                                             VERIFIED.PLT ES

T



                                                                 []

 

             PLATELET MORPHOLOGY NORMAL                                 



             (test code =                                        



             PLTMORPH)                                           



CBC W/AUTO [id number] 15:04:00





             Test Item    Value        Reference Range Interpretation Comments

 

             WHITE BLOOD CELL (test code = 3.0 K/mm3    3.5-11.0     L          

  



             WBC)                                                

 

             RED BLOOD CELL (test code = 2.82 M/mm3   4.70-6.10    L            



             RBC)                                                

 

             HEMOGLOBIN (test code = HGB) 7.4 G/DL     10.4-14.9    L           

 

 

             HEMATOCRIT (test code = HCT) 24.8 %       31.5-44.1    L           

 

 

             MEAN CELL VOLUME (test code = 87.9 Fl      84.5-98.6    N          

  



             MCV)                                                

 

             MEAN CELL HGB (test code = MCH) 26.2 pg      27.0-34.2    L        

    

 

             MEAN CELL HGB CONCETRATION 29.8 G/DL    31.5-34.0    L            



             (test code = MCHC)                                        

 

             RED CELL DISTRIBUTION WIDTH 15.9 SD      11.5-14.5    H            



             (test code = RDW)                                        

 

             PLATELET COUNT (test code = 73 K/mm3     150-450      L            



             PLT)                                                

 

             MEAN PLATELET VOLUME (test code 11.10 fL     7.0-10.5     H        

    



             = MPV)                                              

 

             NEUTROPHIL % (test code = NT%) 62.0 %       40-76        N         

   

 

             IMMATURE GRANULOCYTE % (test 1.3 %        0.0-5.0      N           

 



             code = IG%)                                         

 

             LYMPHOCYTE % (test code = LY%) 22.7 %       20.5-51.1    N         

   

 

             MONOCYTE % (test code = MO%) 10.0 %       1.7-9.3      H           

 

 

             EOSINOPHIL % (test code = EO%) 3.0 %        0.0-6.0      N         

   

 

             BASOPHIL % (test code = BA%) 1.0 %        0.0-2.0      N           

 

 

             NUCLEATED RBC % (test code = 0.0 /100WBC% 0.0-1.0      N           

 



             NRBC%)                                              

 

             NEUTROPHIL # (test code = NT#) 1.9 K/mm3    1.8-7.6      N         

   

 

             IMMATURE GRANULOCYTE # (test 0.04 x10 3/uL 0.00-0.03    H          

  



             code = IG#)                                         

 

             LYMPHOCYTE # (test code = LY#) 0.7 K/mm3    0.6-3.2      N         

   

 

             MONOCYTE # (test code = MO#) 0.3 K/mm3    0.3-1.1      N           

 

 

             EOSINOPHIL # (test code = EO#) 0.1 K/mm3    0.0-0.4      N         

   

 

             BASOPHIL # (test code = BA#) 0.0 K/mm3    0.0-0.1      N           

 

 

             NUCLEATED RBC # (test code = 0.0 K/mm3    0.0-0.1      N           

 



             NRBC#)                                              

 

             MANUAL DIFF REQUIRED (test code NO DIFF/SCN  CRITERIA              

    



             = MDIFF)                                            



RBC [id number] 15:04:00





             Test Item    Value        Reference Range Interpretation Comments

 

             ANISOCYTOSIS (test NORMAL       NONE                      



             code = ANISO)                                        

 

             PLATELET ESTIMATE DECREASED    ADEQUATE                  PLT EST CO

UNT



             (test code = PLTEST) THOUSAND                               36,000-

45,000

 

             PLATELET MORPHOLOGY NORMAL                                 



             (test code =                                        



             PLTMORPH)                                           



BASIC METABOLIC [id number] 13:19:00





             Test Item    Value        Reference Range Interpretation Comments

 

             SODIUM (test code = NA) 140 mmol/L   134-147      N            

 

             POTASSIUM (test code = K) 4.1 mmol/L   3.4-5.0      N            

 

             CHLORIDE (test code = CL) 104 mmol/L   100-108      N            

 

             CARBON DIOXIDE (test code = CO2) 28 mmol/L    21-32        N       

     

 

             ANION GAP (test code = GAP) 8.0 GAP calc 4.0-15.0     N            

 

             GLUCOSE (test code = GLU) 314 MG/DL           H            

 

             BLOOD UREA NITROGEN (test code = 57 MG/DL     7-18         H       

     



             BUN)                                                

 

             GLOMERULAR FILTRATION RATE (test 15 estGFR    >60          L       

     



             code = GFR)                                         

 

             CREATININE (test code = CREAT) 3.1 MG/DL    0.6-1.0      H         

   

 

             CALCIUM (test code = CA) 8.6 MG/DL    8.5-10.1     N            



Absolute lymphocyte [id number] 10:01:00





             Test Item    Value        Reference Range Interpretation Comments

 

             Absolute lymphocyte count (test code = 0.4          0.7-4.9        

           



             Absolute lymphocyte count)                                        



[facility] Serum or Plasma by Bromocresol purple 
(BCP) dye binding [id number] 10:01:00





             Test Item    Value        Reference Range Interpretation Comments

 

             Albumin [Mass/volume] in Serum or 2.5          3.4-5.0             

      



             Plasma by Bromocresol purple (BCP) dye                             

           



             binding meth (test code = Albumin                                  

      



             [Mass/volume] in Serum or Plasma by                                

        



             Bromocresol purple (BCP) dye binding                               

         



             meth)                                               



[facility]Bas[facility] %2021 10:01:00





             Test Item    Value        Reference Range Interpretation Comments

 

             Basophil % (test code = 0.2          See_Comment                [Au

tomated message] The



             Basophil %)                                         system which ge

nerated



                                                                 this result tra

nsmitted



                                                                 reference range

: <=1.3.



                                                                 The reference r

sharona was



                                                                 not used to int

erpret



                                                                 this result as



                                                                 normal/abnormal

.



[facility] NoahannBlood erythrocytes count (number/volume)2021 10:01:00





             Test Item    Value        Reference Range Interpretation Comments

 

             Blood erythrocytes count 3.38         3.86-4.86                 



             (number/volume) (test code = Blood                                 

       



             erythrocytes count (number/volume))                                

        



[facility] NoahannBlood hematocrit (volume fraction)2021 10:01:00





             Test Item    Value        Reference Range Interpretation Comments

 

             Blood hematocrit (volume fraction) 30.8         36.0-45.0          

       



             (test code = Blood hematocrit (volume                              

          



             fraction))                                          



[facility] NoahannBlood platelet mean [id number] 10:01:00





             Test Item    Value        Reference Range Interpretation Comments

 

             Blood platelet mean volume (test code = 8.8          7.6-11.3      

            



             Blood platelet mean volume)                                        



[facility] HermannC reactive protein [Mass/volume] in Serum or [id number] 
10:01:00





             Test Item    Value        Reference Range Interpretation Comments

 

             C reactive protein [Mass/volume] in 16.00                          

        



             Serum or Plasma (test code = C reactive                            

            



             protein [Mass/volume] in Serum or                                  

      



             Plasma)                                             



[facility] HermannCalcium [Mass/volume] in Serum or [id number] 10:01:00





             Test Item    Value        Reference Range Interpretation Comments

 

             Calcium [Mass/volume] in Serum or 7.7          8.5-10.1            

      



             Plasma (test code = Calcium                                        



             [Mass/volume] in Serum or Plasma)                                  

      



[facility] HermannCarbon dioxide, total [Moles/volume] in Serum or Plasma
2021 10:01:00





             Test Item    Value        Reference Range Interpretation Comments

 

             Carbon dioxide, total [Moles/volume] in 30           21-32         

            



             Serum or Plasma (test code = Carbon                                

        



             dioxide, total [Moles/volume] in Serum                             

           



             or Plasma)                                          



[facility]annChemistry [id number] 10:01:00





             Test Item    Value        Reference Range Interpretation Comments

 

             Chemistry procedure (test code = 91.1                        

     



             Chemistry procedure)                                        



[facility]annChloride [Moles/volume] in Serum or [id number] 10:01:00





             Test Item    Value        Reference Range Interpretation Comments

 

             Chloride [Moles/volume] in Serum or 104                      

        



             Plasma (test code = Chloride                                       

 



             [Moles/volume] in Serum or Plasma)                                 

       



[facility]annCreatinine [Mass/volume] in Serum or [id number] 10:01:00





             Test Item    Value        Reference Range Interpretation Comments

 

             Creatinine [Mass/volume] in Serum or 2.11         0.55-1.3         

         



             Plasma (test code = Creatinine                                     

   



             [Mass/volume] in Serum or Plasma)                                  

      



[facility]annFerritin [Mass/volume] in Serum or [id number] 10:01:00





             Test Item    Value        Reference Range Interpretation Comments

 

             Ferritin [Mass/volume] in Serum or 82.0         8-388              

       



             Plasma (test code = Ferritin                                       

 



             [Mass/volume] in Serum or Plasma)                                  

      



[facility]annGlucose [Mass/volume] in Serum or [id number] 10:01:00





             Test Item    Value        Reference Range Interpretation Comments

 

             Glucose [Mass/volume] in Serum or 236                        

      



             Plasma (test code = Glucose                                        



             [Mass/volume] in Serum or Plasma)                                  

      



[facility]annLymphocyte [id number] 10:01:00





             Test Item    Value        Reference Range Interpretation Comments

 

             Lymphocyte percent (test code = 3.30         4.3-10.9              

    



             Lymphocyte percent)                                        



[facility]annPhosphate [Mass/volume] in Serum or [id number] 10:01:00





             Test Item    Value        Reference Range Interpretation Comments

 

             Phosphate [Mass/volume] in Serum or 3.0          2.5-4.9           

        



             Plasma (test code = Phosphate                                      

  



             [Mass/volume] in Serum or Plasma)                                  

      



[facility]annPotassium [Moles/volume] in Serum or [id number] 10:01:00





             Test Item    Value        Reference Range Interpretation Comments

 

             Potassium [Moles/volume] in Serum or 4.2          3.5-5.1          

         



             Plasma (test code = Potassium                                      

  



             [Moles/volume] in Serum or Plasma)                                 

       



[facility]annSerum or plasma sodium measurement (moles/volume)2021 
10:01:00





             Test Item    Value        Reference Range Interpretation Comments

 

             Serum or plasma sodium measurement 139          136-145            

       



             (moles/volume) (test code = Serum or                               

         



             plasma sodium measurement                                        



             (moles/volume))                                        



[facility]annUrea nitrogen [Mass/volume] in Serum or [id number] 
10:01:00





             Test Item    Value        Reference Range Interpretation Comments

 

             Urea nitrogen [Mass/volume] in Serum or 69           7-18          

            



             Plasma (test code = Urea nitrogen                                  

      



             [Mass/volume] in Serum or Plasma)                                  

      



[facility]annTotal cholesterol/cholesterol in HDL (percentile)2021 
09:51:00





             Test Item    Value        Reference Range Interpretation Comments

 

             Total cholesterol/cholesterol in HDL 4.25 1                        

         



             (percentile) (test code = Total                                    

    



             cholesterol/cholesterol in HDL                                     

   



             (percentile))                                        



[facility]annTriglyceride [Mass/volume] in Serum or [id number] 09:51:00





             Test Item    Value        Reference Range Interpretation Comments

 

             Triglyceride [Mass/volume] in Serum or 91                          

           



             Plasma (test code = Triglyceride                                   

     



             [Mass/volume] in Serum or Plasma)                                  

      



[facility]annBlood anisocytosis [id number] 09:51:00





             Test Item    Value        Reference Range Interpretation Comments

 

             Blood anisocytosis detection (test code 1+                         

            



             = Blood anisocytosis detection)                                    

    



[facility]annBlood morphology interpretation [id number] 09:51:00





             Test Item    Value        Reference Range Interpretation Comments

 

             Blood morphology interpretation Noted                              

    



             narrative (test code = Blood morphology                            

            



             interpretation narrative)                                        



[facility]annCholesterol in HDL [Mass/volume] in Serum or [id number] 
09:51:00





             Test Item    Value        Reference Range Interpretation Comments

 

             Cholesterol in HDL [Mass/volume] in 28           40-60             

        



             Serum or Plasma (test code =                                       

 



             Cholesterol in HDL [Mass/volume] in                                

        



             Serum or Plasma)                                        



[facility]annCholesterol [Mass/volume] in Serum or [id number] 09:51:00





             Test Item    Value        Reference Range Interpretation Comments

 

             Cholesterol [Mass/volume] in Serum or 119                          

          



             Plasma (test code = Cholesterol                                    

    



             [Mass/volume] in Serum or Plasma)                                  

      



[facility]ne[facility]Lymph[facility] [id number] 09:51:00





             Test Item    Value        Reference Range Interpretation Comments

 

             Lymphocyte percent (test code = 21           15-42                 

    



             Lymphocyte percent)                                        



[facility]annMagnesium [Mass/volume] in Serum or [id number] 09:51:00





             Test Item    Value        Reference Range Interpretation Comments

 

             Magnesium [Mass/volume] in Serum or 1.9          1.8-2.4           

        



             Plasma (test code = Magnesium                                      

  



             [Mass/volume] in Serum or Plasma)                                  

      



[facility]annPlatelet [id number] 09:51:00





             Test Item    Value        Reference Range Interpretation Comments

 

             Platelet estimate (test code = Platelet Decr                       

            



             estimate)                                           



[facility]annSerum or plasma cholesterol in LDL measurement by calculation 
(mass/volume)2021 09:51:00





             Test Item    Value        Reference Range Interpretation Comments

 

             Serum or plasma cholesterol in LDL 73 1                            

       



             measurement by calculation                                        



             (mass/volume) (test code = Serum or                                

        



             plasma cholesterol in LDL measurement                              

          



             by calculation (mass/volume))                                      

  



[facility]annTroponin I.cardiac [Mass/volume] in Serum or [id number] 
05:11:00





             Test Item    Value        Reference Range Interpretation Comments

 

             Troponin I.cardiac 0.08         See_Comment                [Automat

ed message] The



             [Mass/volume] in Serum                                        syste

m which generated



             or Plasma (test code =                                        this 

result transmitted



             Troponin I.cardiac                                        reference

 range: <=0.045.



             [Mass/volume] in Serum                                        The r

eference range was



             or Plasma)                                          not used to int

erpret



                                                                 this result as



                                                                 normal/abnormal

.



[facility] GddfafhCKMM-XfX-6 (COVID-19) RNA [Presence] in Respiratory specimen by 
ANJUM with probe [id number] 21:20:00





             Test Item    Value        Reference Range Interpretation Comments

 

             SARS-CoV-2 (COVID-19) RNA [Presence] Positive                      

         



             in Respiratory specimen by ANJUM with                                

        



             probe detect (test code = SARS-CoV-2                               

         



             (COVID-19) RNA [Presence] in                                       

 



             Respiratory specimen by ANJUM with                                   

     



             probe detect)                                        



[facility]annAlanine aminotransferase [Enzymatic activity/volume] in Serum or
Plasma by With P-5'-2021 21:09:00





             Test Item    Value        Reference Range Interpretation Comments

 

             Alanine aminotransferase [Enzymatic 22           12-78             

        



             activity/volume] in Serum or Plasma by                             

           



             With P-5'- (test code = Alanine                                    

    



             aminotransferase [Enzymatic                                        



             activity/volume] in Serum or Plasma by                             

           



             With P-5'-)                                         



[facility] NoahannAlkaline phosphatase [Enzymatic activity/volume] in Serum or 
[id number] 21:09:00





             Test Item    Value        Reference Range Interpretation Comments

 

             Alkaline phosphatase [Enzymatic 71                           

    



             activity/volume] in Serum or Plasma                                

        



             (test code = Alkaline phosphatase                                  

      



             [Enzymatic activity/volume] in Serum or                            

            



             Plasma)                                             



[facility]annAspartate aminotransferase [Enzymatic activity/volume] in Serum 
or Plasma by With [id number] 21:09:00





             Test Item    Value        Reference Range Interpretation Comments

 

             Aspartate aminotransferase [Enzymatic 36           15-37           

          



             activity/volume] in Serum or Plasma by                             

           



             With P-5 (test code = Aspartate                                    

    



             aminotransferase [Enzymatic                                        



             activity/volume] in Serum or Plasma by                             

           



             With P-5)                                           



[facility]annBilirubin.direct [Mass/volume] in Serum or [id number] 
21:09:00





             Test Item    Value        Reference Range Interpretation Comments

 

             Bilirubin.direct 0.3          See_Comment                [Automated

 message] The



             [Mass/volume] in Serum                                        syste

m which generated



             or Plasma (test code =                                        this 

result transmitted



             Bilirubin.direct                                        reference r

sharona: <=0.2.



             [Mass/volume] in Serum                                        The r

eference range was



             or Plasma)                                          not used to int

erpret this



                                                                 result as fabiano

l/abnormal.



[facility]annBilirubin.total [Mass/volume] in Serum or [id number] 
21:09:00





             Test Item    Value        Reference Range Interpretation Comments

 

             Bilirubin.total [Mass/volume] in Serum 0.7          0.2-1.0        

           



             or Plasma (test code = Bilirubin.total                             

           



             [Mass/volume] in Serum or Plasma)                                  

      



[facility]annINR in Blood by Coagulation [id number] 21:09:00





             Test Item    Value        Reference Range Interpretation Comments

 

             INR in Blood by Coagulation assay 1.28 1                           

      



             (test code = INR in Blood by                                       

 



             Coagulation assay)                                        



[facility] HermannNatriuretic peptide.B prohormone N-Terminal [Mass/volume] in 
Serum or [id number] 21:09:00





             Test Item    Value        Reference Range Interpretation Comments

 

             Natriuretic peptide.B prohormone 2354                              

     



             N-Terminal [Mass/volume] in Serum or                               

         



             Plasma (test code = Natriuretic                                    

    



             peptide.B prohormone N-Terminal                                    

    



             [Mass/volume] in Serum or Plasma)                                  

      



[facility] HermannPeripheral blood smear examination by light [id number]
 21:09:00





             Test Item    Value        Reference Range Interpretation Comments

 

             Peripheral blood smear examination by Ok                           

          



             light microscopy (test code =                                      

  



             Peripheral blood smear examination by                              

          



             light microscopy)                                        



[facility]annProtein [Mass/volume] in Serum or [id number] 21:09:00





             Test Item    Value        Reference Range Interpretation Comments

 

             Protein [Mass/volume] in Serum or 6.6          6.4-8.2             

      



             Plasma (test code = Protein                                        



             [Mass/volume] in Serum or Plasma)                                  

      



[facility]annTroponin I.cardiac [Mass/volume] in Serum or [id number] 
21:09:00





             Test Item    Value        Reference Range Interpretation Comments

 

             Troponin I.cardiac 0.13         See_Comment                [Automat

ed message] The



             [Mass/volume] in Serum                                        syste

m which generated



             or Plasma (test code =                                        this 

result transmitted



             Troponin I.cardiac                                        reference

 range: <=0.045.



             [Mass/volume] in Serum                                        The r

eference range was



             or Plasma)                                          not used to int

erpret



                                                                 this result as



                                                                 normal/abnormal

.



[facility]annAlbumin [Mass/volume] in Serum or Plasma by Bromocresol purple 
(BCP) dye binding [id number] 17:30:00





             Test Item    Value        Reference Range Interpretation Comments

 

             Albumin [Mass/volume] in Serum or 2.8          3.4-5.0             

      



             Plasma by Bromocresol purple (BCP) dye                             

           



             binding meth (test code = Albumin                                  

      



             [Mass/volume] in Serum or Plasma by                                

        



             Bromocresol purple (BCP) dye binding                               

         



             meth)                                               



[facility]annBacteria detection in urine sediment by light microscopy
2021 17:30:00





             Test Item    Value        Reference Range Interpretation Comments

 

             Bacteria detection in urine sediment <20 /HPF                      

         



             by light microscopy (test code =                                   

     



             Bacteria detection in urine sediment                               

         



             by light microscopy)                                        



[facility]annCalcium [Mass/volume] in Serum or [id number] 17:30:00





             Test Item    Value        Reference Range Interpretation Comments

 

             Calcium [Mass/volume] in Serum or 8.2          8.5-10.1            

      



             Plasma (test code = Calcium                                        



             [Mass/volume] in Serum or Plasma)                                  

      



[facility]annCarbon dioxide, total [Moles/volume] in Serum or Plasma
2021 17:30:00





             Test Item    Value        Reference Range Interpretation Comments

 

             Carbon dioxide, total [Moles/volume] in 32           21-32         

            



             Serum or Plasma (test code = Carbon                                

        



             dioxide, total [Moles/volume] in Serum                             

           



             or Plasma)                                          



[facility] Serum or [id number] 17:30:00





             Test Item    Value        Reference Range Interpretation Comments

 

             Chloride [Moles/volume] in Serum or 107                      

        



             Plasma (test code = Chloride                                       

 



             [Moles/volume] in Serum or Plasma)                                 

       



[facility]inine [Mass/volume] in Serum or [id number] 17:30:00





             Test Item    Value        Reference Range Interpretation Comments

 

             Creatinine [Mass/volume] in Serum or 1.51         0.55-1.3         

         



             Plasma (test code = Creatinine                                     

   



             [Mass/volume] in Serum or Plasma)                                  

      



[facility]Glucose [Mass/volume] in Serum or [id number] 17:30:00





             Test Item    Value        Reference Range Interpretation Comments

 

             Glucose [Mass/volume] in Serum or 133                        

      



             Plasma (test code = Glucose                                        



             [Mass/volume] in Serum or Plasma)                                  

      



[facility]annLymphocyte [id number] 17:30:00





             Test Item    Value        Reference Range Interpretation Comments

 

             Lymphocyte percent (test BETWEEN 10,000 and                        

   



             code = Lymphocyte 100,000 CFU/ML                           



             percent)                                            



[facility]annPhosphate [Mass/volume] in Serum or [id number] 17:30:00





             Test Item    Value        Reference Range Interpretation Comments

 

             Phosphate [Mass/volume] in Serum or 3.0          2.5-4.9           

        



             Plasma (test code = Phosphate                                      

  



             [Mass/volume] in Serum or Plasma)                                  

      



[facility]Potassium [Moles/volume] in Serum or [id number] 17:30:00





             Test Item    Value        Reference Range Interpretation Comments

 

             Potassium [Moles/volume] in Serum or 3.6          3.5-5.1          

         



             Plasma (test code = Potassium                                      

  



             [Moles/volume] in Serum or Plasma)                                 

       



[facility]Protein [Mass/volume] in [id number] 17:30:00





             Test Item    Value        Reference Range Interpretation Comments

 

             Protein [Mass/volume] in Urine (test 20                            

         



             code = Protein [Mass/volume] in Urine)                             

           



[facility]annSer[facility] or plasma sodium measurement (moles/volume)2021 
17:30:00





             Test Item    Value        Reference Range Interpretation Comments

 

             Serum or plasma sodium measurement 144          136-145            

       



             (moles/volume) (test code = Serum or                               

         



             plasma sodium measurement                                        



             (moles/volume))                                        



[facility]annUrate [Mass/volume] in Serum or [id number] 17:30:00





             Test Item    Value        Reference Range Interpretation Comments

 

             Urate [Mass/volume] in Serum or Plasma 5.8          2.6-6.0        

           



             (test code = Urate [Mass/volume] in                                

        



             Serum or Plasma)                                        



[facility]/volume[facility] Serum or [id number] 
17:30:00





             Test Item    Value        Reference Range Interpretation Comments

 

             Urea nitrogen [Mass/volume] in Serum or 36           7-18          

            



             Plasma (test code = Urea nitrogen                                  

      



             [Mass/volume] in Serum or Plasma)                                  

      



[facility]annUrinalysis with [id number] 17:30:00





             Test Item    Value        Reference Range Interpretation Comments

 

             Urinalysis with microscopy (test Negative                          

     



             code = Urinalysis with microscopy)                                 

       



[facility] [id number] 17:30:00





             Test Item    Value        Reference Range Interpretation Comments

 

             Urine appearance determination (test Clear                         

         



             code = Urine appearance determination)                             

           



[facility] bilirubin [id number] 17:30:00





             Test Item    Value        Reference Range Interpretation Comments

 

             Urine bilirubin detection (test code Negative                      

         



             = Urine bilirubin detection)                                       

 



[facility] blood [id number] 17:30:00





             Test Item    Value        Reference Range Interpretation Comments

 

             Urine blood detection (test code = Negative                        

       



             Urine blood detection)                                        



[facility]annU[facility] [id number] 17:30:00





             Test Item    Value        Reference Range Interpretation Comments

 

             Urine color (test code = Urine color) Yellow                       

          



[facility] glucose [id number] 17:30:00





             Test Item    Value        Reference Range Interpretation Comments

 

             Urine glucose detection (test code = Negative                      

         



             Urine glucose detection)                                        



[facility]annU[facility] [id number] 17:30:00





             Test Item    Value        Reference Range Interpretation Comments

 

             Urine pH (test code = Urine pH) 5.5 1                              

    



[facility]annU[facility] sediment erythrocyte count by microscopy (number/high 
power field)2021 17:30:00





             Test Item    Value        Reference Range Interpretation Comments

 

             Urine sediment erythrocyte None seen /HPF                          

 



             count by microscopy                                        



             (number/high power field)                                        



             (test code = Urine sediment                                        



             erythrocyte count by                                        



             microscopy (number/high power                                      

  



             field))                                             



[facility]annU[facility] specific gravity [id number] 17:30:00





             Test Item    Value        Reference Range Interpretation Comments

 

             Urine specific gravity measurement 1.015 1                         

       



             (test code = Urine specific gravity                                

        



             measurement)                                        



[facility]annUrine urobilinogen [id number] 17:30:00





             Test Item    Value        Reference Range Interpretation Comments

 

             Urine urobilinogen detection (test code 0.2                        

            



             = Urine urobilinogen detection)                                    

    



[facility]annUrine [id number] 17:30:00





             Test Item    Value        Reference Range Interpretation Comments

 

             Urine culture (test code = Urine MIXED KEVIN.                      

     



             culture)                                            



[facility]annAbsolute lymphocyte [id number] 11:13:00





             Test Item    Value        Reference Range Interpretation Comments

 

             Absolute lymphocyte count (test code = 0.7          0.7-4.9        

           



             Absolute lymphocyte count)                                        



[facility]annBas[facility] %2020 11:13:00





             Test Item    Value        Reference Range Interpretation Comments

 

             Basophil % (test code = 0.6          See_Comment                [Au

tomated message] The



             Basophil %)                                         system which ge

nerated



                                                                 this result tra

nsmitted



                                                                 reference range

: <=1.3.



                                                                 The reference r

sharona was



                                                                 not used to int

erpret



                                                                 this result as



                                                                 normal/abnormal

.



[facility] NoahannBlood erythrocytes count (number/volume)2020 11:13:00





             Test Item    Value        Reference Range Interpretation Comments

 

             Blood erythrocytes count 3.31         3.86-4.86                 



             (number/volume) (test code = Blood                                 

       



             erythrocytes count (number/volume))                                

        



[facility] NoahannBlood hematocrit (volume fraction)2020 11:13:00





             Test Item    Value        Reference Range Interpretation Comments

 

             Blood hematocrit (volume fraction) 29.3         36.0-45.0          

       



             (test code = Blood hematocrit (volume                              

          



             fraction))                                          



[facility]annBlood platelet mean [id number] 11:13:00





             Test Item    Value        Reference Range Interpretation Comments

 

             Blood platelet mean volume (test code = 9.4          7.6-11.3      

            



             Blood platelet mean volume)                                        



[facility]annCalcium [Mass/volume] in Serum or [id number] 11:13:00





             Test Item    Value        Reference Range Interpretation Comments

 

             Calcium [Mass/volume] in Serum or 7.8          8.5-10.1            

      



             Plasma (test code = Calcium                                        



             [Mass/volume] in Serum or Plasma)                                  

      



[facility]annCarbon dioxide, total [Moles/volume] in Serum or Plasma
2020 11:13:00





             Test Item    Value        Reference Range Interpretation Comments

 

             Carbon dioxide, total [Moles/volume] in 33           21-32         

            



             Serum or Plasma (test code = Carbon                                

        



             dioxide, total [Moles/volume] in Serum                             

           



             or Plasma)                                          



[facility]annChemistry [id number] 11:13:00





             Test Item    Value        Reference Range Interpretation Comments

 

             Chemistry procedure (test code = 88.6                        

     



             Chemistry procedure)                                        



[facility]annChloride [Moles/volume] in Serum or [id number] 11:13:00





             Test Item    Value        Reference Range Interpretation Comments

 

             Chloride [Moles/volume] in Serum or 103                      

        



             Plasma (test code = Chloride                                       

 



             [Moles/volume] in Serum or Plasma)                                 

       



[facility]annCreatinine [Mass/volume] in Serum or [id number] 11:13:00





             Test Item    Value        Reference Range Interpretation Comments

 

             Creatinine [Mass/volume] in Serum or 2.21         0.55-1.3         

         



             Plasma (test code = Creatinine                                     

   



             [Mass/volume] in Serum or Plasma)                                  

      



[facility]annGlucose [Mass/volume] in Serum or [id number] 11:13:00





             Test Item    Value        Reference Range Interpretation Comments

 

             Glucose [Mass/volume] in Serum or 140                        

      



             Plasma (test code = Glucose                                        



             [Mass/volume] in Serum or Plasma)                                  

      



[facility]annMagnesium [Mass/volume] in Serum or [id number] 11:13:00





             Test Item    Value        Reference Range Interpretation Comments

 

             Magnesium [Mass/volume] in Serum or 1.8          1.8-2.4           

        



             Plasma (test code = Magnesium                                      

  



             [Mass/volume] in Serum or Plasma)                                  

      



[facility]annPhosphate [Mass/volume] in Serum or [id number] 11:13:00





             Test Item    Value        Reference Range Interpretation Comments

 

             Phosphate [Mass/volume] in Serum or 2.1          2.5-4.9           

        



             Plasma (test code = Phosphate                                      

  



             [Mass/volume] in Serum or Plasma)                                  

      



[facility]annPotassium [Moles/volume] in Serum or [id number] 11:13:00





             Test Item    Value        Reference Range Interpretation Comments

 

             Potassium [Moles/volume] in Serum or 3.8          3.5-5.1          

         



             Plasma (test code = Potassium                                      

  



             [Moles/volume] in Serum or Plasma)                                 

       



[facility]annSer[facility] or plasma sodium measurement (moles/volume)2020 
11:13:00





             Test Item    Value        Reference Range Interpretation Comments

 

             Serum or plasma sodium measurement 141          136-145            

       



             (moles/volume) (test code = Serum or                               

         



             plasma sodium measurement                                        



             (moles/volume))                                        



[facility] HermannUrea nitrogen [Mass/volume] in Serum or [id number] 
11:13:00





             Test Item    Value        Reference Range Interpretation Comments

 

             Urea nitrogen [Mass/volume] in Serum or 50           7-18          

            



             Plasma (test code = Urea nitrogen                                  

      



             [Mass/volume] in Serum or Plasma)                                  

      



[facility]annUrine dipstick testing at point-of-[id number] 11:13:00





             Test Item    Value        Reference Range Interpretation Comments

 

             Urine dipstick testing at point-of-care 3.2          4.3-10.9      

            



             (test code = Urine dipstick testing at                             

           



             point-of-care)                                        



[facility]annAlanine aminotransferase [Enzymatic activity/volume] in Serum or
Plasma by With P-5'-2020 11:27:00





             Test Item    Value        Reference Range Interpretation Comments

 

             Alanine aminotransferase [Enzymatic 16           12-78             

        



             activity/volume] in Serum or Plasma by                             

           



             With P-5'- (test code = Alanine                                    

    



             aminotransferase [Enzymatic                                        



             activity/volume] in Serum or Plasma by                             

           



             With P-5'-)                                         



[facility]annAlbumin [Mass/volume] in Serum or Plasma by Bromocresol purple 
(BCP) dye binding [id number] 11:27:00





             Test Item    Value        Reference Range Interpretation Comments

 

             Albumin [Mass/volume] in Serum or 2.5          3.4-5.0             

      



             Plasma by Bromocresol purple (BCP) dye                             

           



             binding meth (test code = Albumin                                  

      



             [Mass/volume] in Serum or Plasma by                                

        



             Bromocresol purple (BCP) dye binding                               

         



             meth)                                               



[facility]annAlkaline phosphatase [Enzymatic activity/volume] in Serum or 
[id number] 11:27:00





             Test Item    Value        Reference Range Interpretation Comments

 

             Alkaline phosphatase [Enzymatic 72                           

    



             activity/volume] in Serum or Plasma                                

        



             (test code = Alkaline phosphatase                                  

      



             [Enzymatic activity/volume] in Serum or                            

            



             Plasma)                                             



[facility]annAspartate aminotransferase [Enzymatic activity/volume] in Serum 
or Plasma by With [id number] 11:27:00





             Test Item    Value        Reference Range Interpretation Comments

 

             Aspartate aminotransferase [Enzymatic 23           15-37           

          



             activity/volume] in Serum or Plasma by                             

           



             With P-5 (test code = Aspartate                                    

    



             aminotransferase [Enzymatic                                        



             activity/volume] in Serum or Plasma by                             

           



             With P-5)                                           



[facility]annBilirubin.total [Mass/volume] in Serum or [id number] 
11:27:00





             Test Item    Value        Reference Range Interpretation Comments

 

             Bilirubin.total [Mass/volume] in Serum 1.0          0.2-1.0        

           



             or Plasma (test code = Bilirubin.total                             

           



             [Mass/volume] in Serum or Plasma)                                  

      



[facility]annBlood morphology interpretation [id number] 11:27:00





             Test Item    Value        Reference Range Interpretation Comments

 

             Blood morphology interpretation Not seen                           

    



             narrative (test code = Blood                                       

 



             morphology interpretation narrative)                               

         



[facility] HermannNatriuretic peptide.B prohormone N-Terminal [Mass/volume] in 
Serum or [id number] 11:27:00





             Test Item    Value        Reference Range Interpretation Comments

 

             Natriuretic peptide.B prohormone 493                               

     



             N-Terminal [Mass/volume] in Serum or                               

         



             Plasma (test code = Natriuretic                                    

    



             peptide.B prohormone N-Terminal                                    

    



             [Mass/volume] in Serum or Plasma)                                  

      



[facility]annProtein [Mass/volume] in Serum or [id number] 11:27:00





             Test Item    Value        Reference Range Interpretation Comments

 

             Protein [Mass/volume] in Serum or 6.7          6.4-8.2             

      



             Plasma (test code = Protein                                        



             [Mass/volume] in Serum or Plasma)                                  

      



[facility]annTroponin I.cardiac [Mass/volume] in Serum or [id number] 
11:27:00





             Test Item    Value        Reference Range Interpretation Comments

 

             Troponin I.cardiac < 0.02       See_Comment                [Automat

ed message] The



             [Mass/volume] in Serum                                        syste

m which generated



             or Plasma (test code =                                        this 

result transmitted



             Troponin I.cardiac                                        reference

 range: <=0.045.



             [Mass/volume] in Serum                                        The r

eference range was



             or Plasma)                                          not used to int

erpret



                                                                 this result as



                                                                 normal/abnormal

.



[facility]annUrinal[facility] with [id number] 02:23:00





             Test Item    Value        Reference Range Interpretation Comments

 

             Urinalysis with microscopy (test Negative                          

     



             code = Urinalysis with microscopy)                                 

       



[facility] blood [id number] 02:23:00





             Test Item    Value        Reference Range Interpretation Comments

 

             Urine blood detection (test code = Negative                        

       



             Urine blood detection)                                        



[facility] [id number] 02:23:00





             Test Item    Value        Reference Range Interpretation Comments

 

             Urine glucose detection (test code = Negative                      

         



             Urine glucose detection)                                        



[facility] SystemU[facility] [id number] 02:23:00





             Test Item    Value        Reference Range Interpretation Comments

 

             Urine pH (test code = Urine pH) 5.0 1                              

    



[facility]annUrine specific gravity uhkjymlssfb8423-85-01 02:23:00





             Test Item    Value        Reference Range Interpretation Comments

 

             Urine specific gravity measurement 1.015 1                         

       



             (test code = Urine specific gravity                                

        



             measurement)                                        



[facility]annUrinalysis with reflex to [id number] 02:14:00





             Test Item    Value        Reference Range Interpretation Comments

 

             Urinalysis with reflex to culture Reflexed                         

      



             (test code = Urinalysis with reflex                                

        



             to culture)                                         



[facility] by [facility] (number/high 
power field)2020 02:14:00





             Test Item    Value        Reference Range Interpretation Comments

 

             Urine sediment erythrocyte count by <5                             

        



             microscopy (number/high power field)                               

         



             (test code = Urine sediment erythrocyte                            

            



             count by microscopy (number/high power                             

           



             field))                                             



[facility]annBilirubin.direct [Mass/volume] in Serum or [id number] 
00:14:00





             Test Item    Value        Reference Range Interpretation Comments

 

             Bilirubin.direct 0.3          See_Comment                [Automated

 message] The



             [Mass/volume] in Serum                                        syste

m which generated



             or Plasma (test code =                                        this 

result transmitted



             Bilirubin.direct                                        reference r

sharona: <=0.2.



             [Mass/volume] in Serum                                        The r

eference range was



             or Plasma)                                          not used to int

erpret this



                                                                 result as fabiano

l/abnormal.



[facility] Coagulation [id number] 00:14:00





             Test Item    Value        Reference Range Interpretation Comments

 

             INR in Blood by Coagulation assay 1.30 1                           

      



             (test code = INR in Blood by                                       

 



             Coagulation assay)                                        



[facility]annLactate [Moles/volume] in Serum or [id number] 00:14:00





             Test Item    Value        Reference Range Interpretation Comments

 

             Lactate [Moles/volume] in Serum or 1.1          0.4-2.0            

       



             Plasma (test code = Lactate                                        



             [Moles/volume] in Serum or Plasma)                                 

       



[facility]Mass/volume] in Serum or [id number] 
00:14:00





             Test Item    Value        Reference Range Interpretation Comments

 

             Troponin I.cardiac < 0.02       See_Comment                [Automat

ed message] The



             [Mass/volume] in Serum                                        syste

m which generated



             or Plasma (test code =                                        this 

result transmitted



             Troponin I.cardiac                                        reference

 range: <=0.045.



             [Mass/volume] in Serum                                        The r

eference range was



             or Plasma)                                          not used to int

erpret



                                                                 this result as



                                                                 normal/abnormal

.



[facility]annAlbumin [Mass/volume] in Serum or Plasma by Bromocresol purple 
(BCP) dye binding [id number] 17:00:00





             Test Item    Value        Reference Range Interpretation Comments

 

             Albumin [Mass/volume] in Serum or 2.9          3.4-5.0             

      



             Plasma by Bromocresol purple (BCP) dye                             

           



             binding meth (test code = Albumin                                  

      



             [Mass/volume] in Serum or Plasma by                                

        



             Bromocresol purple (BCP) dye binding                               

         



             meth)                                               



[facility]annBlood hematocrit (volume fraction)2019 17:00:00





             Test Item    Value        Reference Range Interpretation Comments

 

             Blood hematocrit (volume fraction) 31.2         36.0-45.0          

       



             (test code = Blood hematocrit (volume                              

          



             fraction))                                          



[facility]annCalcium [Mass/volume] in Serum or [id number] 17:00:00





             Test Item    Value        Reference Range Interpretation Comments

 

             Calcium [Mass/volume] in Serum or 8.6          8.5-10.1            

      



             Plasma (test code = Calcium                                        



             [Mass/volume] in Serum or Plasma)                                  

      



[facility]annCarbon dioxide, total [Moles/volume] in Serum or Plasma
2019 17:00:00





             Test Item    Value        Reference Range Interpretation Comments

 

             Carbon dioxide, total [Moles/volume] in 31           21-32         

            



             Serum or Plasma (test code = Carbon                                

        



             dioxide, total [Moles/volume] in Serum                             

           



             or Plasma)                                          



[facility]annChloride [Moles/volume] in Serum or [id number] 17:00:00





             Test Item    Value        Reference Range Interpretation Comments

 

             Chloride [Moles/volume] in Serum or 108                      

        



             Plasma (test code = Chloride                                       

 



             [Moles/volume] in Serum or Plasma)                                 

       



[facility]annCreatinine [Mass/volume] in Serum or [id number] 17:00:00





             Test Item    Value        Reference Range Interpretation Comments

 

             Creatinine [Mass/volume] in Serum or 1.83         0.55-1.3         

         



             Plasma (test code = Creatinine                                     

   



             [Mass/volume] in Serum or Plasma)                                  

      



[facility]annCreatinine [Mass/volume] in [id number] 17:00:00





             Test Item    Value        Reference Range Interpretation Comments

 

             Creatinine [Mass/volume] in Urine (test 50.0                 

            



             code = Creatinine [Mass/volume] in                                 

       



             Urine)                                              



[facility]annGlucose [Mass/volume] in Serum or [id number] 17:00:00





             Test Item    Value        Reference Range Interpretation Comments

 

             Glucose [Mass/volume] in Serum or 132                        

      



             Plasma (test code = Glucose                                        



             [Mass/volume] in Serum or Plasma)                                  

      



[facility]annParathyrin.intact [Mass/volume] in Serum or [id number] 
17:00:00





             Test Item    Value        Reference Range Interpretation Comments

 

             Parathyrin.intact [Mass/volume] in 59.2         18.4-80.1          

       



             Serum or Plasma (test code =                                       

 



             Parathyrin.intact [Mass/volume] in                                 

       



             Serum or Plasma)                                        



[facility]annPhosphate [Mass/volume] in Serum or [id number] 17:00:00





             Test Item    Value        Reference Range Interpretation Comments

 

             Phosphate [Mass/volume] in Serum or 3.3          2.5-4.9           

        



             Plasma (test code = Phosphate                                      

  



             [Mass/volume] in Serum or Plasma)                                  

      



[facility]annPotassium [Moles/volume] in Serum or [id number] 17:00:00





             Test Item    Value        Reference Range Interpretation Comments

 

             Potassium [Moles/volume] in Serum or 3.5          3.5-5.1          

         



             Plasma (test code = Potassium                                      

  



             [Moles/volume] in Serum or Plasma)                                 

       



[facility]annProtein [Mass/volume] in [id number] 17:00:00





             Test Item    Value        Reference Range Interpretation Comments

 

             Protein [Mass/volume] in Urine (test 6                             

         



             code = Protein [Mass/volume] in Urine)                             

           



[facility]annSerum or plasma sodium measurement (moles/volume)2019 
17:00:00





             Test Item    Value        Reference Range Interpretation Comments

 

             Serum or plasma sodium measurement 145          136-145            

       



             (moles/volume) (test code = Serum or                               

         



             plasma sodium measurement                                        



             (moles/volume))                                        



[facility]annUrate [Mass/volume] in Serum or [id number] 17:00:00





             Test Item    Value        Reference Range Interpretation Comments

 

             Urate [Mass/volume] in Serum or Plasma 10.4         2.6-6.0        

           



             (test code = Urate [Mass/volume] in                                

        



             Serum or Plasma)                                        



[facility]annUrea nitrogen [Mass/volume] in Serum or [id number] 
17:00:00





             Test Item    Value        Reference Range Interpretation Comments

 

             Urea nitrogen [Mass/volume] in Serum or 54           7-18          

            



             Plasma (test code = Urea nitrogen                                  

      



             [Mass/volume] in Serum or Plasma)                                  

      



[facility]of-[id number] 17:00:00





             Test Item    Value        Reference Range Interpretation Comments

 

             Urine dipstick testing at point-of-care 10.5         12.0-15.0     

            



             (test code = Urine dipstick testing at                             

           



             point-of-care)                                        



[facility]2018-03-05 11:19:00





             Test Item    Value        Reference Range Interpretation Comments

 

             Bedside Glucose (test code = Bedside 271                     

         



             Glucose)                                            



[facility]2018-03-04 05:00:00





             Test Item    Value        Reference Range Interpretation Comments

 

             Thyroid Stimulating Hormone (TSH) (test 1.28         0.34-5.60     

            



             code = Thyroid Stimulating Hormone                                 

       



             (TSH))                                              



[facility]2018-03-04 05:00:00





             Test Item    Value        Reference Range Interpretation Comments

 

             Sodium Level (test code = Sodium Level) 139          135-145       

            



[facility]2018-03-04 05:00:00





             Test Item    Value        Reference Range Interpretation Comments

 

             Potassium Level (test code = Potassium 3.8          3.6-5.0        

           



             Level)                                              



[facility]2018-03-04 05:00:00





             Test Item    Value        Reference Range Interpretation Comments

 

             Glucose Level (test code = Glucose 116                       

       



             Level)                                              



[facility]2018-03-04 05:00:00





             Test Item    Value        Reference Range Interpretation Comments

 

             Estimat Glomerular 29           See_Comment                [Automat

ed message] The



             Filtration Rate (test                                        system

 which generated



             code = Estimat                                        this result t

ransmitted



             Glomerular Filtration                                        refere

nce range: >=90.



             Rate)                                               The reference r

sharona was



                                                                 not used to int

erpret



                                                                 this result as



                                                                 normal/abnormal

.



[facility]2018-03-04 05:00:00





             Test Item    Value        Reference Range Interpretation Comments

 

             Creatinine (test code = Creatinine) 1.69         0.44-1.00         

        



[facility]2018-03-04 05:00:00





             Test Item    Value        Reference Range Interpretation Comments

 

             Chloride Level (test code = Chloride 102          101-111          

         



             Level)                                              



[facility]2018-03-04 05:00:00





             Test Item    Value        Reference Range Interpretation Comments

 

             Carbon Dioxide Level (test code = 32           21-31               

      



             Carbon Dioxide Level)                                        



[facility]2018-03-04 05:00:00





             Test Item    Value        Reference Range Interpretation Comments

 

             Calcium Level (test code = Calcium 8.4          8.5-10.5           

       



             Level)                                              



[facility]2018-03-04 05:00:00





             Test Item    Value        Reference Range Interpretation Comments

 

             Blood Urea Nitrogen (test code = Blood 33           6-20           

           



             Urea Nitrogen)                                        



[facility]2018-03-04 05:00:00





             Test Item    Value        Reference Range Interpretation Comments

 

             White Blood Count (test code = White 2.2          4.3-10.9         

         



             Blood Count)                                        



[facility]2018-03-04 05:00:00





             Test Item    Value        Reference Range Interpretation Comments

 

             Red Cell Distribution Width (test code 14.5         12.1-15.2      

           



             = Red Cell Distribution Width)                                     

   



[facility]2018-03-04 05:00:00





             Test Item    Value        Reference Range Interpretation Comments

 

             Red Blood Count (test code = Red Blood 3.18         3.86-4.86      

           



             Count)                                              



[facility]2018-03-04 05:00:00





             Test Item    Value        Reference Range Interpretation Comments

 

             Platelet Count (test code = Platelet 95           152-406          

         



             Count)                                              



[facility]2018-03-04 05:00:00





             Test Item    Value        Reference Range Interpretation Comments

 

             Neutrophils % (test code = Neutrophils 48.8         41.7-73.7      

           



             %)                                                  



[facility]2018-03-04 05:00:00





             Test Item    Value        Reference Range Interpretation Comments

 

             Monocytes % (test code = Monocytes %) 10.2         3.3-12.3        

          



[facility]2018-03-04 05:00:00





             Test Item    Value        Reference Range Interpretation Comments

 

             Mean Platelet Volume (test code = Mean 8.3          7.6-11.3       

           



             Platelet Volume)                                        



[facility]2018-03-04 05:00:00





             Test Item    Value        Reference Range Interpretation Comments

 

             Mean Corpuscular Volume (test code = 82.8                    

         



             Mean Corpuscular Volume)                                        



[facility]2018-03-04 05:00:00





             Test Item    Value        Reference Range Interpretation Comments

 

             Mean Corpuscular Hemoglobin Concent 32.8         32.0-36.0         

        



             (test code = Mean Corpuscular                                      

  



             Hemoglobin Concent)                                        



[facility]2018-03-04 05:00:00





             Test Item    Value        Reference Range Interpretation Comments

 

             Mean Corpuscular Hemoglobin (test 27.1 pg      27.0-35.0           

      



             code = Mean Corpuscular Hemoglobin)                                

        



[facility]2018-03-04 05:00:00





             Test Item    Value        Reference Range Interpretation Comments

 

             Lymphocytes % (test code = Lymphocytes 35.1         15.3-44.8      

           



             %)                                                  



[facility]2018-03-04 05:00:00





             Test Item    Value        Reference Range Interpretation Comments

 

             Hemoglobin (test code = Hemoglobin) 8.6          12.0-15.0         

        



[facility]2018-03-04 05:00:00





             Test Item    Value        Reference Range Interpretation Comments

 

             Hematocrit (test code = Hematocrit) 26.3         36.0-45.0         

        



[facility]2018-03-04 05:00:00





             Test Item    Value        Reference Range Interpretation Comments

 

             Eosinophils % (test code 5.0          See_Comment                [A

utomated message] The



             = Eosinophils %)                                        system [facility] generated



                                                                 this result tra

nsmitted



                                                                 reference range

: <=4.4.



                                                                 The reference r

sharona was



                                                                 not used to int

erpret



                                                                 this result as



                                                                 normal/abnormal

.



[facility]2018-03-04 05:00:00





             Test Item    Value        Reference Range Interpretation Comments

 

             Basophils % (test code 0.9          See_Comment                [Aut

omated message] The



             = Basophils %)                                        system which 

generated



                                                                 this result tra

nsmitted



                                                                 reference range

: <=1.3.



                                                                 The reference r

sharona was



                                                                 not used to int

erpret



                                                                 this result as



                                                                 normal/abnormal

.



[facility]2018-03-04 05:00:00





             Test Item    Value        Reference Range Interpretation Comments

 

             Absolute Neutrophil (test code = 1.1          1.8-8.0              

     



             Absolute Neutrophil)                                        



[facility]2018-03-04 05:00:00





             Test Item    Value        Reference Range Interpretation Comments

 

             Absolute Monocytes (CBC) (test code = 0.2          0.1-1.3         

          



             Absolute Monocytes (CBC))                                        



[facility]2018-03-04 05:00:00





             Test Item    Value        Reference Range Interpretation Comments

 

             Absolute Lymphocytes (CBC) (test code = 0.8          0.7-4.9       

            



             Absolute Lymphocytes (CBC))                                        



[facility] [id number] 05:00:00





             Test Item    Value        Reference Range Interpretation Comments

 

             Absolute Eosinophils 0.1          See_Comment                [Autom

ated message] The



             (CBC) (test code =                                        system wh

ich generated



             Absolute Eosinophils                                        this re

sult transmitted



             (CBC))                                              reference range

: <=0.5.



                                                                 The reference r

sharona was



                                                                 not used to int

erpret



                                                                 this result as



                                                                 normal/abnormal

.



[facility]2018-03-04 05:00:00





             Test Item    Value        Reference Range Interpretation Comments

 

             Absolute Basophils 0.0          See_Comment                [Automat

ed message] The



             (CBC) (test code =                                        system wh

ich generated



             Absolute Basophils                                        this resu

lt transmitted



             (CBC))                                              reference range

: <=0.5.



                                                                 The reference r

sharona was



                                                                 not used to int

erpret



                                                                 this result as



                                                                 normal/abnormal

.



[facility]2018-03-04 03:35:00





             Test Item    Value        Reference Range Interpretation Comments

 

             Urine Random Total Protein (test code = 13                         

            



             Urine Random Total Protein)                                        



[facility]2018-03-04 03:35:00





             Test Item    Value        Reference Range Interpretation Comments

 

             Urine Protein/Creatinine Ratio (test 0.09 1                        

         



             code = Urine Protein/Creatinine Ratio)                             

           



[facility]2018-03-04 03:35:00





             Test Item    Value        Reference Range Interpretation Comments

 

             Urine Creatinine (test code = Urine 143.6                          

        



             Creatinine)                                         



[facility]2018-03-02 04:23:00





             Test Item    Value        Reference Range Interpretation Comments

 

             Blood Morphology Blood Morphology                           



             Comment (test code = Comment                                



             Blood Morphology                                        



             Comment)                                            



[facility]2018-03-02 04:23:00





             Test Item    Value        Reference Range Interpretation Comments

 

             B-Type Natriuretic Peptide (test code = 35                         

            



             B-Type Natriuretic Peptide)                                        



[facility]2018-03-01 22:25:00





             Test Item    Value        Reference Range Interpretation Comments

 

             Urine pH (test code = Urine pH) 7.0 1                              

    



[facility] [id number] 22:25:00





             Test Item    Value        Reference Range Interpretation Comments

 

             Urine WBC (test code = Urine WBC) no gt                            

      



[facility] [id number] 22:25:00





             Test Item    Value        Reference Range Interpretation Comments

 

             Urine Urobilinogen (test code = Urine 1.0                          

          



             Urobilinogen)                                        



[facility] [id number] 22:25:00





             Test Item    Value        Reference Range Interpretation Comments

 

             Urine Total Protein (test Urine Total Protein                      

     



             code = Urine Total                                        



             Protein)                                            



[facility] [id number] 22:25:00





             Test Item    Value        Reference Range Interpretation Comments

 

             Urine Squamous Epithelial Cells (test no gt                        

          



             code = Urine Squamous Epithelial Cells)                            

            



[facility]2018-03-01 22:25:00





             Test Item    Value        Reference Range Interpretation Comments

 

             Urine Specific Gravity (test code = 1.010 1                        

        



             Urine Specific Gravity)                                        



[facility]2018-03-01 22:25:00





             Test Item    Value        Reference Range Interpretation Comments

 

             Urine RBC (test code = Urine RBC) Urine RBC                        

      



[facility] [id number] 22:25:00





             Test Item    Value        Reference Range Interpretation Comments

 

             Urine Nitrite (test code = Urine Nitrite                           



             Urine Nitrite)                                        



[facility]2018-03-01 22:25:00





             Test Item    Value        Reference Range Interpretation Comments

 

             Urine Leukocyte Urine Leukocyte                           



             Esterase (test code = Esterase                               



             Urine Leukocyte                                        



             Esterase)                                           



[facility]2018-03-01 22:25:00





             Test Item    Value        Reference Range Interpretation Comments

 

             Urine Ketones (test code = Urine Ketones                           



             Urine Ketones)                                        



[facility]2018-03-01 22:25:00





             Test Item    Value        Reference Range Interpretation Comments

 

             Urine Glucose (test code = Urine Glucose                           



             Urine Glucose)                                        



[facility]2018-03-01 22:25:00





             Test Item    Value        Reference Range Interpretation Comments

 

             Urine Culture Reflexed Urine Culture Reflexed                      

     



             (test code = Urine                                        



             Culture Reflexed)                                        



[facility]2018-03-01 22:25:00





             Test Item    Value        Reference Range Interpretation Comments

 

             Urine Color (test code = Urine Urine Color                         

   



             Color)                                              



[facility]2018-03-01 22:25:00





             Test Item    Value        Reference Range Interpretation Comments

 

             Urine Blood (test code = Urine Urine Blood                         

   



             Blood)                                              



[facility] [id number] 22:25:00





             Test Item    Value        Reference Range Interpretation Comments

 

             Urine Bilirubin (test code = Urine Bilirubin                       

    



             Urine Bilirubin)                                        



[facility]2018-03-01 22:25:00





             Test Item    Value        Reference Range Interpretation Comments

 

             Urine Bacteria (test code = Urine no gt                            

      



             Bacteria)                                           



[facility]2018-03-01 22:25:00





             Test Item    Value        Reference Range Interpretation Comments

 

             Urine Appearance (test code Urine Appearance                       

    



             = Urine Appearance)                                        



[facility]2018-03-01 15:27:00





             Test Item    Value        Reference Range Interpretation Comments

 

             Prothrombin Time (test code = 14.6         9.5-12.5                

  



             Prothrombin Time)                                        



[facility]2018-03-01 15:27:00





             Test Item    Value        Reference Range Interpretation Comments

 

             INR International Normalized Ratio 1.23 1                          

       



             (test code = INR International                                     

   



             Normalized Ratio)                                        



[facility]2018-03-01 15:27:00





             Test Item    Value        Reference Range Interpretation Comments

 

             Activated Partial Thromboplast Time 27.0         24.3-36.9         

        



             (test code = Activated Partial                                     

   



             Thromboplast Time)                                        



[facility]2018-03-01 15:27:00





             Test Item    Value        Reference Range Interpretation Comments

 

             Magnesium Level (test code = Magnesium 1.6          1.8-2.5        

           



             Level)                                              



[facility]2018-03-01 15:27:00





             Test Item    Value        Reference Range Interpretation Comments

 

             Creatine Kinase (test code = Creatine 31                     

          



             Kinase)                                             



[facility]2018-03-01 15:27:00





             Test Item    Value        Reference Range Interpretation Comments

 

             Rapid Troponin I (test code = Rapid no gt                          

        



             Troponin I)                                         



[facility]2018-03-01 15:27:00





             Test Item    Value        Reference Range Interpretation Comments

 

             Creatine Kinase MB (test code = 0.3          0.3-4.0               

    



             Creatine Kinase MB)                                        



[facility]2017-12-22 12:40:00





             Test Item    Value        Reference Range Interpretation Comments

 

             Urine Amorphous Urine Amorphous                           



             Sediment (test code = Sediment                               



             Urine Amorphous                                        



             Sediment)                                           



[facility]2017-12-22 12:08:00





             Test Item    Value        Reference Range Interpretation Comments

 

             Lactic Acid Level (test code = Lactic 10.2         4.5-19.8        

          



             Acid Level)                                         



[facility]2017-12-22 12:08:00





             Test Item    Value        Reference Range Interpretation Comments

 

             Procalcitonin (test code = 0.13                                   



             Procalcitonin)                                        



[facility]2017-12-22 12:08:00





             Test Item    Value        Reference Range Interpretation Comments

 

             Sedimentation Rate, 78           See_Comment                [Automa

kaela message]



             Westergren (test code =                                        The 

system which



             Sedimentation Rate,                                        generate

d this result



             Westergren)                                         transmitted ref

erence



                                                                 range: <=30. Th

e



                                                                 reference range

 was not



                                                                 used to interpr

et this



                                                                 result as



                                                                 normal/abnormal

.



[facility]2017-12-22 12:08:00





             Test Item    Value        Reference Range Interpretation Comments

 

             Total Bilirubin (test code = Total 1.2          0.3-1.2            

       



             Bilirubin)                                          



[facility]2017-12-22 12:08:00





             Test Item    Value        Reference Range Interpretation Comments

 

             Serum Total Protein (test code = Serum 6.2          6.0-8.3        

           



             Total Protein)                                        



[facility]2017-12-22 12:08:00





             Test Item    Value        Reference Range Interpretation Comments

 

             Globulin (test code = Globulin) 3.3          2.3-3.5               

    



[facility]2017-12-22 12:08:00





             Test Item    Value        Reference Range Interpretation Comments

 

             Direct Bilirubin (test 0.2          See_Comment                [Aut

omated message] The



             code = Direct                                        system which g

enerated



             Bilirubin)                                          this result tra

nsmitted



                                                                 reference range

: <=0.2.



                                                                 The reference r

sharona was



                                                                 not used to int

erpret



                                                                 this result as



                                                                 normal/abnormal

.



[facility]2017-12-22 12:08:00





             Test Item    Value        Reference Range Interpretation Comments

 

             Aspartate Amino Transf (AST/SGOT) (test 53           10-42         

            



             code = Aspartate Amino Transf                                      

  



             (AST/SGOT))                                         



[facility]2017-12-22 12:08:00





             Test Item    Value        Reference Range Interpretation Comments

 

             Alkaline Phosphatase (test code = 85                         

      



             Alkaline Phosphatase)                                        



[facility]2017-12-22 12:08:00





             Test Item    Value        Reference Range Interpretation Comments

 

             Albumin/Globulin Ratio (test code = 0.9 1        1.1-1.8           

        



             Albumin/Globulin Ratio)                                        



[facility]2017-12-22 12:08:00





             Test Item    Value        Reference Range Interpretation Comments

 

             Albumin (test code = Albumin) 2.9          3.2-5.5                 

  



[facility]2017-12-22 12:08:00





             Test Item    Value        Reference Range Interpretation Comments

 

             Alanine Aminotransferase (ALT/SGPT) 29           10-60             

        



             (test code = Alanine Aminotransferase                              

          



             (ALT/SGPT))                                         



[id number] 12[facility]08:00





             Test Item    Value        Reference Range Interpretation Comments

 

             Lipase (test code = Lipase) 17           -                     



[facility]

## 2022-08-29 NOTE — ER
Nurse's Notes                                                                                     

 [facility]                                                                 

Name: Deirdre Sauer                                                                               

Age: 88 yrs                                                                                       

Sex: Female                                                                                       

: 1933                                                                                   

MRN: [id number]                                                                                   

Arrival Date: 2022                                                                          

Time: 17:25                                                                                       

Account#: [id number]                                                                            

Bed 4                                                                                             

Private MD: Porfirio Valdez V                                                                      

Diagnosis: Fever, unspecified;Dyspnea, unspecified                                                

                                                                                                  

Presentation:                                                                                     

                                                                                             

17:35 Chief complaint: Patient states: "I just had dialysis today and got sick while being    aa5 

      there, I got really sick and had vomiting". Pt reports she was able to finish dialysis.     

      Pt also reports fever up to 101.0 today. Reports nasal congestion x 2 weeks ago.            

      Coronavirus screen: congestion, cough unrelated to allergies. Ebola Screen: Patient         

      denies travel to an Ebola-affected area in the 21 days before illness onset. Initial        

      Sepsis Screen: Does the patient meet any 2 criteria? HR > 90 bpm. Does the patient have     

      a suspected source of infection? Yes:. Risk Assessment: Do you want to hurt yourself or     

      someone else? Patient reports no desire to harm self or others. Onset of symptoms was       

      2022.                                                                            

17:35 Acuity: MESFIN 3                                                                           aa5 

17:35 Method Of Arrival: Wheelchair                                                           aa5 

                                                                                                  

Historical:                                                                                       

- Allergies:                                                                                      

17:38 blood thinners except ASA;                                                              aa5 

17:38 caffeine;                                                                               aa5 

17:38 Statins-Hmg-Coa Reductase Inhibitors;                                                   aa5 

17:38 Streptomycin;                                                                           aa5 

- Home Meds:                                                                                      

21:03 allopurinol 100 mg Oral tab once daily [Active]; aspirin 81 mg Oral chew 1 tab once     kl  

      daily [Active]; carvedilol 6.25 mg Oral tab 2 times per day [Active]; cetirizine 10 mg      

      Oral tab 1 tab once daily [Active]; Coreg Oral [Active]; furosemide 40 mg Oral tab 1        

      tab once daily [Active]; glimepiride 4 mg Oral tab twice a day [Active]; Claritin-D 24      

      Hour  mg Oral Tb24 1 tab once daily [Active];                                         

- PMHx:                                                                                           

17:38 CHF; CVA; Diabetes - NIDDM; Hyperlipidemia; Hypertension; kidney problems; Dialysis;    aa5 

- PSHx:                                                                                           

17:38 hysterectomy; Dialysis catheter to right upper chest;                                   aa5 

                                                                                                  

- Immunization history:: Adult Immunizations unknown.                                             

- Social history:: Smoking status: Patient denies any tobacco usage or history of.                

                                                                                                  

                                                                                                  

Screenin:17 Abuse screen: Denies threats or abuse. Denies injuries from another. Nutritional        tp1 

      screening: No deficits noted. Tuberculosis screening: No symptoms or risk factors           

      identified. Fall Risk No fall in past 12 months (0 pts). No secondary diagnosis (0          

      pts). IV access (20 points). Ambulatory Aid- None/Bed Rest/Nurse Assist (0 pts). Gait-      

      Normal/Bed Rest/Wheelchair (0 pts) Mental Status- Oriented to own ability (0 pts).          

                                                                                                  

Assessment:                                                                                       

18:12 General: Appears in no apparent distress. ill, Behavior is calm, cooperative. Pain:     tp1 

      Denies pain. Neuro: Level of Consciousness is awake, alert, obeys commands, Oriented to     

      person, place, time, situation. Neuro: Reports. Cardiovascular: Patient's skin is warm      

      and dry. Cardiovascular: Denies chest pain. Cardiovascular: Reports lightheadedness.        

      Cardiovascular: Reports fatigue. Respiratory: Airway is patent Respiratory effort is        

      even, unlabored, Denies shortness of breath. GI: Abdomen is round non-distended, Abd is     

      soft and non tender X 4 quads. GI: Reports nausea, vomiting. : No signs and/or            

      symptoms were reported regarding the genitourinary system. EENT: No signs and/or            

      symptoms were reported regarding the EENT system. Derm: Skin is pink, warm \T\ dry.         

      Musculoskeletal: Circulation, motion, and sensation intact.                                 

20:26 Reassessment: Patient appears in no apparent distress at this time. Patient is alert,   kl  

      oriented x 3, equal unlabored respirations, skin warm/dry/pink. Patient states symptoms     

      have improved.                                                                              

                                                                                                  

Vital Signs:                                                                                      

17:35  / 51; Pulse 105; Resp 18 S; Temp 99.2(O); Pulse Ox 97% on R/A;                   aa5 

18:10  / 46; Pulse 100; Resp 18; Pulse Ox 98% on R/A;                                   tp1 

20:25  / 55; Pulse 99; Resp 20; Temp 99(O); Pulse Ox 97% on R/A; Pain 0/10;             kl  

                                                                                                  

ED Course:                                                                                        

17:25 Patient arrived in ED.                                                                  as  

17:25 Porfirio Valdez MD is Private Physician.                                                 as  

17:27 [facility]                                                          snw 

17:27 Rony Hoang MD is Attending Physician.                                                snw 

17:35 Arm band placed on.                                                                     aa5 

17:37 Triage completed.                                                                       aa5 

17:58 Placed in gown. Bed in low position. Call light in reach. Side rails up X 1. Door       mb7 

      closed. Noise minimized. Warm blanket given.                                                

17:58 Inserted saline lock: 20 gauge in right antecubital area, using aseptic technique.      mb7 

      Blood collected.                                                                            

17:59 Flu Sent.                                                                               mb7 

17:59 SARS-COV-2 RT PCR (Document "Date of Onset" if Symptomatic) Sent.                       mb7 

17:59 Troponin HS Sent.                                                                       mb7 

17:59 Basic Metabolic Panel Sent.                                                             mb7 

17:59 CBC with Diff Sent.                                                                     mb7 

17:59 D-Dimer Sent.                                                                           mb7 

17:59 Magnesium Sent.                                                                         mb7 

18:08 EKG done, by ED staff, reviewed by Vesna MULTANI.                                 mb7 

18:10 Pulse ox on. NIBP on.                                                                   tp1 

18:12 Nelida Solomon, OTTONIEL is Primary Nurse.                                                   tp1 

18:28 XRAY Chest (1 view) In Process Unspecified.                                             EDMS

19:03 Porfirio Valdez MD is Hospitalizing Provider.                                            snw 

19:11 role handed off by Christine Nunez RN                                                 mw2 

21:02 No provider procedures requiring assistance completed. Patient admitted, IV remains in  kl  

      place.                                                                                      

                                                                                                  

Administered Medications:                                                                         

No medications were administered                                                                  

                                                                                                  

                                                                                                  

Medication:                                                                                       

21:04 VIS not applicable for this client.                                                       

                                                                                                  

Outcome:                                                                                          

19:04 Decision to Hospitalize by Provider.                                                    snw 

21:03 Admitted to Med/surg via wheelchair, room 201, Report called to  Shlomo mccracken  

21:03 Condition: stable                                                                           

21:03 Instructed on the need for admit, Demonstrated understanding of instructions.               

21:46 Patient left the ED.                                                                      

                                                                                                  

Signatures:                                                                                       

Dispatcher [employer]                           EDMS                                                 

Shiela Soria RN RN kl Waters, Shelly, FNP-C FNP-Toya Felton Audri, RN                     RN   aa5                                                  

Murray Prakash                            mw2                                                  

Nelida Solomon, OTTONIEL                     RN   tp1                                                  

Nisa Sanchez                               mb7                                                  

                                                                                                  

**************************************************************************************************

## 2022-08-29 NOTE — EDPHYS
Physician Documentation                                                                           

 [facility]                                                                 

Name: Deirdre Sauer                                                                               

Age: 88 yrs                                                                                       

Sex: Female                                                                                       

: 1933                                                                                   

MRN: [id number]                                                                                   

Arrival Date: 2022                                                                          

Time: 17:25                                                                                       

Account#: [id number]                                                                            

Bed 4                                                                                             

Private MD: Porfirio Valdez V                                                                      

ED Physician Rony Hoang                                                                         

HPI:                                                                                              

                                                                                             

18:10 This 88 yrs old Female presents to ER via Wheelchair with complaints of Fever, Cough,   snw 

      Nausea/Vomiting.                                                                            

18:10 The patient reports fever, that was measured at 101 degrees Fahrenheit. Onset: The      snw 

      symptoms/episode began/occurred suddenly, today. Modifying factors: congestion 2 weeks      

      ago, pt gets dialysis Mon/Wed/Fri - finished dialysis today but became very nauseated       

      and vomited upon getting home. Associated signs and symptoms: Pertinent positives:          

      cough, with clear sputum, sinus congestion. Severity of symptoms: At their worst the        

      symptoms were moderate in the emergency department the symptoms are unchanged. It is        

      unknown whether or not the patient has had similar symptoms in the past. It is unknown      

      whether or not the patient has recently seen a physician.                                   

                                                                                                  

Historical:                                                                                       

- Allergies:                                                                                      

17:38 blood thinners except ASA;                                                              aa5 

17:38 caffeine;                                                                               aa5 

17:38 Statins-Hmg-Coa Reductase Inhibitors;                                                   aa5 

17:38 Streptomycin;                                                                           aa5 

- Home Meds:                                                                                      

21:03 allopurinol 100 mg Oral tab once daily [Active]; aspirin 81 mg Oral chew 1 tab once     kl  

      daily [Active]; carvedilol 6.25 mg Oral tab 2 times per day [Active]; cetirizine 10 mg      

      Oral tab 1 tab once daily [Active]; Coreg Oral [Active]; furosemide 40 mg Oral tab 1        

      tab once daily [Active]; glimepiride 4 mg Oral tab twice a day [Active]; Claritin-D 24      

      Hour  mg Oral Tb24 1 tab once daily [Active];                                         

- PMHx:                                                                                           

17:38 CHF; CVA; Diabetes - NIDDM; Hyperlipidemia; Hypertension; kidney problems; Dialysis;    aa5 

- PSHx:                                                                                           

17:38 hysterectomy; Dialysis catheter to right upper chest;                                   aa5 

                                                                                                  

- Immunization history:: Adult Immunizations unknown.                                             

- Social history:: Smoking status: Patient denies any tobacco usage or history of.                

                                                                                                  

                                                                                                  

ROS:                                                                                              

18:05 Eyes: Negative for injury, pain, redness, and discharge, ENT: Negative for injury,      snw 

      pain, and discharge, Neck: Negative for injury, pain, and swelling, Cardiovascular:         

      Negative for chest pain, palpitations, and edema, Respiratory: Negative for shortness       

      of breath, cough, wheezing, and pleuritic chest pain.                                       

18:05 Back: Negative for injury and pain, : Negative for injury, bleeding, discharge, and       

      swelling, MS/Extremity: Negative for injury and deformity, Skin: Negative for injury,       

      rash, and discoloration, Neuro: Negative for headache, weakness, numbness, tingling,        

      and seizure, Psych: Negative for depression, anxiety, suicide ideation, homicidal           

      ideation, and hallucinations.                                                               

18:05 Constitutional: Positive for fever, poor PO intake, Nausea.                                 

18:05 Abdomen/GI: Positive for nausea and vomiting.                                               

                                                                                                  

Exam:                                                                                             

18:02 Constitutional:  This is a well developed, well nourished patient who is awake, alert,  snw 

      and in no acute distress. Head/Face:  Normocephalic, atraumatic. Eyes:  Pupils equal        

      round and reactive to light, extra-ocular motions intact.  Lids and lashes normal.          

      Conjunctiva and sclera are non-icteric and not injected.  Cornea within normal limits.      

      Periorbital areas with no swelling, redness, or edema. ENT:  Nares patent. No nasal         

      discharge, no septal abnormalities noted.  Tympanic membranes are normal and external       

      auditory canals are clear.  Oropharynx with no redness, swelling, or masses, exudates,      

      or evidence of obstruction, uvula midline.  Mucous membranes moist. Neck:  Trachea          

      midline, no thyromegaly or masses palpated, and no cervical lymphadenopathy.  Supple,       

      full range of motion without nuchal rigidity, or vertebral point tenderness.  No            

      Meningismus. Chest/axilla:  Normal chest wall appearance and motion.  Nontender with no     

      deformity.  No lesions are appreciated.                                                     

18:02 Abdomen/GI:  Soft, non-tender, with normal bowel sounds.  No distension or tympany.  No     

      guarding or rebound.  No evidence of tenderness throughout. Back:  No spinal                

      tenderness.  No costovertebral tenderness.  Full range of motion. Skin:  Warm, dry with     

      normal turgor.  Normal color with no rashes, no lesions, and no evidence of cellulitis.     

      MS/ Extremity:  Pulses equal, no cyanosis.  Neurovascular intact.  Full, normal range       

      of motion. Neuro:  Awake and alert, GCS 15, oriented to person, place, time, and            

      situation.  Cranial nerves II-XII grossly intact.  Motor strength 5/5 in all                

      extremities.  Sensory grossly intact.  Cerebellar exam normal.  Normal gait. Psych:         

      Awake, alert, with orientation to person, place and time.  Behavior, mood, and affect       

      are within normal limits.                                                                   

18:02 Cardiovascular: Rate: tachycardic, Rhythm: regular, Pulses: no pulse deficits are           

      appreciated, Heart sounds: murmur, systolic, Edema: is not appreciated, JVD: is not         

      appreciated, Dialysis shunt: right sided dialysis cath.                                     

18:02 Respiratory: the patient does not display signs of respiratory distress,  Respirations:     

      tachypnea, that is mild, Breath sounds: bronchial sounds, that are mild.                    

                                                                                                  

Vital Signs:                                                                                      

17:35  / 51; Pulse 105; Resp 18 S; Temp 99.2(O); Pulse Ox 97% on R/A;                   aa5 

18:10  / 46; Pulse 100; Resp 18; Pulse Ox 98% on R/A;                                   tp1 

20:25  / 55; Pulse 99; Resp 20; Temp 99(O); Pulse Ox 97% on R/A; Pain 0/10;             kl  

                                                                                                  

MDM:                                                                                              

17:43 Patient medically screened.                                                             snw 

19:02 Data reviewed: vital signs, nurses notes. Data interpreted: Pulse oximetry: on room air snw 

      is 98 %. Interpretation: normal. Counseling: I had a detailed discussion with the           

      patient and/or guardian regarding: the historical points, exam findings, and any            

      diagnostic results supporting the discharge/admit diagnosis, lab results, radiology         

      results, the need for further work-up and treatment in the hospital. Physician              

      consultation: Porfirio Valdez MD was called at 19:03, was contacted at 19:03, regarding        

      admission, to the telemetry unit.                                                           

19:08 ED course: chest x-ray relatively unchanged from . No further thoracentesis. No     snw 

      markers for sepsis noted. Dr. Valdez contacted for admission. Request CT of chest            

      without contrast. Will start rocephin post blood cultures x 2 and sputum culture..          

                                                                                                  

                                                                                             

17: Order name: Basic Metabolic Panel; Complete Time: 18:58                                 snw 

                                                                                             

17: Order name: CBC with Diff; Complete Time: 20:47                                         snw 

                                                                                             

17:29 Order name: D-Dimer; Complete Time: 18:49                                               snw 

                                                                                             

17:29 Order name: Magnesium; Complete Time: 18:58                                             snw 

                                                                                             

17:29 Order name: Troponin HS; Complete Time: 18:58                                           snw 

                                                                                             

17:29 Order name: Flu; Complete Time: 18:49                                                   snw 

                                                                                             

17:29 Order name: XRAY Chest (1 view); Complete Time: 19:41                                   snw 

                                                                                             

17:29 Order name: SARS-COV-2 RT PCR (Document "Date of Onset" if Symptomatic); Complete Time: snw 

      18:49                                                                                       

                                                                                             

18:02 Order name: Urine Culture                                                               snw 

                                                                                             

18:02 Order name: Urine Microscopic Only                                                      snw 

                                                                                             

19:02 Order name: Blood Culture Adult (2)                                                     w 

                                                                                             

19:02 Order name: Sputum Culture                                                              snw 

                                                                                             

20:43 Order name: CBC Smear Scan; Complete Time: 20:47                                        EDMS

                                                                                             

17:29 Order name: EKG; Complete Time: 17:30                                                   snw 

                                                                                             

17:29 Order name: Cardiac monitoring; Complete Time: 17:59                                    snw 

                                                                                             

17:29 Order name: EKG - Nurse/Tech; Complete Time: 18:08                                      snw 

                                                                                             

17:29 Order name: IV Saline Lock; Complete Time: 17:59                                        snw 

                                                                                             

17:29 Order name: Labs collected and sent; Complete Time: 17:59                               snw 

                                                                                             

17:29 Order name: O2 Per Protocol; Complete Time: 17:59                                       snw 

                                                                                             

17:29 Order name: O2 Sat Monitoring; Complete Time: 17:59                                     snw 

                                                                                             

19:06 Order name: Thorax Wo Con; Complete Time: 20:33                                         EDMS

                                                                                                  

EC:09 Rate is 101 beats/min. Rhythm is regular. SD interval is prolonged. QRS interval is     snw 

      normal. QT interval is normal. No Q waves. Clinical impression: NSR w/ Non-specific         

      ST/T Changes.                                                                               

                                                                                                  

Administered Medications:                                                                         

No medications were administered                                                                  

                                                                                                  

                                                                                                  

Disposition Summary:                                                                              

22 19:04                                                                                    

Hospitalization Ordered                                                                           

      Hospitalization Status: Inpatient Admission                                             snw 

      Provider: Porfirio Valdez                                                                 snw 

      Condition: Fair                                                                         snw 

      Problem: an acute exacerbation                                                          snw 

      Symptoms: have worsened                                                                 snw 

      Bed/Room Type: Standard                                                                 snw 

      Location: Telemetry/MedSurg (Inpatient)(22 20:15)                                 mw  

      Room Assignment: 201(22 20:15)                                                    mw  

      Diagnosis                                                                                   

        - Fever, unspecified                                                                  snw 

        - Dyspnea, unspecified                                                                snw 

      Forms:                                                                                      

        - Medication Reconciliation Form                                                      snw 

        - SBAR form                                                                           snw 

Addendum:                                                                                         

2022                                                                                        

     00:00 Co-signature as Attending Physician, Rony Hoang MD I was immediately available on-site r
n

           in the Emergency Department for consultation in the care of the patient..              

                                                                                                  

Signatures:                                                                                       

Dispatcher [employer]                           EDShiela Pop RN                     Jamila Ch RN                        Vesna Herrera, FNP-C                   FNP-Csnw                                                  

Rony Hoang MD MD rn Calderon, Audri RN                     RN   aa5                                                  

                                                                                                  

Corrections: (The following items were deleted from the chart)                                    

                                                                                             

19:08 19:04 Telemetry/MedSurg (Inpatient) snw                                                 mw  

19:08 19:04 snw                                                                               mw  

20:15 19:08 [facility] ER HOLD mw                                                                   mw  

20:15 19:08 ERHOLD- mw                                                                        mw  

                                                                                                  

**************************************************************************************************

## 2022-08-29 NOTE — RAD REPORT
EXAM DESCRIPTION:  RAD - Chest Single View - 8/29/2022 6:27 pm

 

CLINICAL HISTORY:  CONGESTION

 

COMPARISON:  Single-view chest 05/02/2022, CT chest 05/02/2022

 

TECHNIQUE:  AP portable chest image was obtained 8/29/2022 6:27 pm .

 

FINDINGS:  Large left pleural effusion is present substantially increased from the May 2nd imaging. C
hronic interstitial opacification is present. Left side of the heart is obscured by the pleural effus
ion. Vasculature is mildly prominent. Right-sided dialysis catheter is in place. No consolidation of 
the right lung field. No right-sided pleural effusion and no pneumothorax is present. No acute bony a
bnormality seen. No acute aortic findings suspected.

 

IMPRESSION:  Large left pleural effusion substantially increased from May 2 imaging.

## 2022-08-30 LAB
BUN BLD-MCNC: 41 MG/DL (ref 7–18)
GLUCOSE SERPLBLD-MCNC: 178 MG/DL (ref 74–106)
HCT VFR BLD CALC: 27.2 % (ref 36–45)
INR BLD: 1.2
LYMPHOCYTES # SPEC AUTO: 0.5 K/UL (ref 0.7–4.9)
MCV RBC: 91.6 FL (ref 80–100)
NT-PROBNP SERPL-MCNC: 929 PG/ML (ref ?–450)
PMV BLD: 7.3 FL (ref 7.6–11.3)
POTASSIUM SERPL-SCNC: 3.7 MMOL/L (ref 3.5–5.1)
RBC # BLD: 2.97 M/UL (ref 3.86–4.86)

## 2022-08-30 RX ADMIN — Medication SCH ML: at 21:54

## 2022-08-30 RX ADMIN — CEFTRIAXONE SCH MLS: 1 INJECTION, POWDER, FOR SOLUTION INTRAMUSCULAR; INTRAVENOUS at 21:51

## 2022-08-30 RX ADMIN — CEFTRIAXONE SCH MLS: 1 INJECTION, POWDER, FOR SOLUTION INTRAMUSCULAR; INTRAVENOUS at 07:54

## 2022-08-30 RX ADMIN — Medication SCH ML: at 07:55

## 2022-08-30 NOTE — P.CNS
Date of Consult: 08/30/22


Reason for Consult: Pleural effusion


Chief Complaint: Nausea


History of Present Illness: 


Patient is 88 years of age on hemodialysis became sick after hemodialysis 

started vomiting was able to finish her dialysis is started complaining some 

fever nasal congestion for the past 2-week.  To the hospital denies any 

shortness of breath or chest pain was found to have a moderate left-sided 

pleural effusion





Allergies





blood thinner Allergy (Mild, Uncoded 02/27/20 21:12)


   swelling


blood thinners Allergy (Uncoded 02/27/20 21:12)


   Unknown


Statins-Hmg-Coa Redu Allergy (Uncoded 02/27/20 21:12)


   Unknown





Home Medications: 








Glimepiride [Amaryl] 4 mg PO BID 03/02/18 


Pravastatin Sodium 10 mg PO BEDTIME 03/02/18 


carvediloL [Coreg*] 6.25 mg PO BID 03/02/18 


Allopurinol 100 mg PO DAILY 02/27/20 


Linagliptin [Tradjenta] 1 tab PO DAILY 02/27/20 


Montelukast Sodium 10 mg PO DAILY 07/03/21 


Ubidecarenone [Co Q-10] 200 mg PO DAILY 07/03/21 


Vit C/Tariq AC/Lut/Copper/Znox [Preservision Lutein Softgel] 2 each PO BID 0

7/03/21 


Cholecalciferol (Vitamin D3) [Vitamin D3] 5,000 unit PO DAILY #30 07/04/21 


Aspirin [Vazalore] 81 mg PO ONCE 04/27/22 


Cyanocobalamin (Vitamin B-12) [Vitamin B12] 1,000 mcg PO DAILY 04/27/22 


Linagliptin [Tradjenta] 5 mg PO ONCE 04/27/22 


levoFLOXacin [Levaquin*] 750 mg PO Q48H #3 tab 05/02/22 








- Past Medical/Surgical History


Diabetic: Yes


-: DM


-: HTN


-: Hyperlipidemia


-: CVA 2015


-: Gastric Ulcer


-: Chronic renal failure


-: Diastolic heart failure


-: COVID infection July 2021


-: dilaysis MWF


-: Full Hysterectomy


-: R upper chest dialysis cath





- Family History


  ** Mother


Medical History: Diabetes





- Social History


Alcohol use: No


CD- Drugs: No


Caffeine use: No


Place of Residence: Home





Review of Systems


General: Weakness


Gastrointestinal: Nausea





Physical Examination














Temp Pulse Resp BP Pulse Ox


 


 98.4 F   92 H  20   126/59 L  99 


 


 08/30/22 08:00  08/30/22 08:00  08/30/22 08:00  08/30/22 08:00  08/30/22 08:00








General: Alert, In no apparent distress, Oriented x3


Neck: Supple


Respiratory: Clear to auscultation bilaterally, Diminished (The left base)


Cardiovascular: No edema, Regular rate/rhythm


Gastrointestinal: Normal bowel sounds, Soft and benign


Laboratory Data (last 24 hrs)





08/29/22 17:52: WBC 4.20 L, Hgb 10.1 L, Hct 31.2 L, Plt Count 89 L


08/29/22 17:52: Sodium 135 L, Potassium 3.8, BUN 33 H, Creatinine 2.50 H, 

Glucose 186 H, Magnesium 2.0








- Problems


(1) Pleural effusion


Current Visit: Yes   Status: Acute   


Plan: 


Patient is 88 years of age with a history of renal failure hemodialysis admitted

 with nausea she has a moderate left-sided pleural effusion pleural effusion is 

new was not present in May 2022 patient has pancytopenia agree with 

thoracentesis Edenilson's have been ordered oxygenation blood pressure stable mild 

fever since admission possible discharge tomorrow

## 2022-08-30 NOTE — RAD REPORT
EXAM DESCRIPTION:  NM - Vent Perfusion VQ Scan - 8/30/2022 10:11 am

 

CLINICAL HISTORY:   Shortness of breath

 

COMPARISON:  Chest x-ray August 29, 2022

 

TECHNIQUE:

21.7 Mci [id number]  was administered by inhalation. First breath, equilibrium, and washout images of the 
lungs obtained

 

6.6 millicuries Technetium-99 MAA was administered intravenously. Anterior, posterior, lateral and ob
lique views of the lungs were taken.

 

FINDINGS:  Marked diminished radiotracer activity within the left lung secondary to the patient's kno
wn pleural effusion.

 

Right lung demonstrates relatively homogeneous radiotracer activity.

 

 No mismatched segmental or lobar perfusion defects are seen.

 

IMPRESSION:   No evidence of a pulmonary embolus

## 2022-08-30 NOTE — EKG
Test Date:    2022-08-29               Test Time:    18:08:36

Technician:   MB                                     

                                                     

MEASUREMENT RESULTS:                                       

Intervals:                                           

Rate:         101                                    

RI:           198                                    

QRSD:         82                                     

QT:           366                                    

QTc:          474                                    

Axis:                                                

P:            56                                     

RI:           198                                    

QRS:          20                                     

T:            48                                     

                                                     

INTERPRETIVE STATEMENTS:                                       

                                                     

Sinus tachycardia

Right atrial enlargement

Cannot rule out Anterior infarct, age undetermined

Abnormal ECG

Compared to ECG 04/26/2022 22:56:55

Myocardial infarct finding now present

Sinus rhythm no longer present

Ventricular premature complex(es) no longer present

First degree AV block no longer present



Electronically Signed On 08-30-22 08:12:03 CDT by Raúl Hilario

## 2022-08-30 NOTE — P.HP
Certification for Inpatient


Patient admitted to: Inpatient


With expected LOS: >2 Midnights


Practitioner: I am a practitioner with admitting privileges, knowledge of 

patient current condition, hospital course, and medical plan of care.


Services: Services provided to patient in accordance with Admission requirements

found in Title 42 Section 412.3 of the Code of Federal Regulations





Patient History


Date of Service: 08/30/22


Reason for admission: NAUSEA, FEVER


History of Present Illness: 





FABIOLA IS A DM PATIENT WITH CKD5 ON HD.  AFTER HD SHE GOT NASUEAOUS ANDHAD LOW 

GRADE FEVER.  I ASKED NP TO DO CT SCAN THAT REVEALED LARGE EFFUSION ON L SIDE 

AGAIN. 


Allergies





blood thinner Allergy (Mild, Uncoded 02/27/20 21:12)


   swelling


blood thinners Allergy (Uncoded 02/27/20 21:12)


   Unknown


Statins-Hmg-Coa Redu Allergy (Uncoded 02/27/20 21:12)


   Unknown





Home medications list reviewed: Yes


Home Medications: 








Pravastatin Sodium 10 mg PO BEDTIME 03/02/18 


carvediloL [Coreg*] 6.25 mg PO BID 03/02/18 


Cholecalciferol (Vitamin D3) [Vitamin D3] 5,000 unit PO DAILY #30 07/04/21 


Aspirin [Vazalore] 81 mg PO ONCE 04/27/22 


Cetirizine HCl 1 tab PO DAILY 08/30/22 


Cyanocobalamin (Vitamin B-12) [Vitamin B12] 1 tab PO DAILY 08/30/22 


Ezetimibe 1 tab PO DAILY 08/30/22 


Fluticasone Propionate [Flonase Allergy Relief] 1 appl AGUEDA BID 08/30/22 


Insulin Aspart [Novolog Flexpen] 7 units SQ SEECOM 08/30/22 


Insulin Detemir [Levemir Flextouch] 15 units SQ DAILY AT SUPPER 08/30/22 


Melatonin 1 tab PO BEDTIME 08/30/22 


Pantoprazole [Protonix Tab*] 1 tab PO SEECOM 08/30/22 


Simethicone [Gas-X] 1 tab PO DAILY PRN 08/30/22 


Vit A/Vit C/Vit E/Zinc/Copper [Preservision Areds Softgel] 2 cap PO DAILY 

08/30/22 








- Past Medical/Surgical History


Has patient received pneumonia vaccine in the past: Yes


Diabetic: Yes


-: DM


-: HTN


-: Hyperlipidemia


-: CVA 2015


-: Gastric Ulcer


-: Chronic renal failure


-: Diastolic heart failure


-: COVID infection July 2021


-: dilaysis MWF


-: Full Hysterectomy


-: R upper chest dialysis cath





- Family History


  ** Mother


-: Diabetes





- Social History


Smoking Status: Never smoker


Alcohol use: No


CD- Drugs: No


Caffeine use: No


Place of Residence: Home





Review of Systems


10-point ROS is otherwise unremarkable


General: Weakness





Physical Examination





- Vital Signs


Temperature: 97.8 F


Blood Pressure: 105/61


Pulse: 77


Respirations: 16


Pulse Ox (%): 99





- Physical Exam


General: Oriented x3, Mild distress


HEENT: Atraumatic, PERRLA, Mucous membr. moist/pink, EOMI, Sclerae nonicteric


Neck: Supple, 2+ carotid pulse no bruit, No LAD, Without JVD or thyroid 

abnormality


Respiratory: Diminished (L SIDE LOWER HALF.)


Cardiovascular: Regular rate/rhythm, Normal S1 S2


Gastrointestinal: Normal bowel sounds, No tenderness


Musculoskeletal: No tenderness


Integumentary: No rashes


Neurological: Normal gait, Normal speech, Normal strength at 5/5 x4 extr, Normal

tone, Normal affect


Lymphatics: No axilla or inguinal lymphadenopathy





- Studies


Laboratory Data (last 24 hrs)





08/29/22 17:52: WBC 4.20 L, Hgb 10.1 L, Hct 31.2 L, Plt Count 89 L





Microbiology Data (last 24 hrs): 








08/29/22 17:37   Nasopharnyx   Influenza Type A Antigen Screen - Final


08/29/22 17:37   Nasopharnyx   Influenza Type B Antigen Screen - Final








Assessment and Plan





- Problems (Diagnosis)


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


Current Visit: Yes   Status: Acute   


Plan: 


ON HD


CONSULT DR BENNETT








(2) Bacterial pneumonia


Current Visit: Yes   Status: Acute   


Plan: 


FEVER POSSIBLE FROM THIS


CONTINUE ROCEPHIN.


PLEURAL TAP IN AM.








(3) Pleural effusion


Current Visit: Yes   Status: Acute   





(4) Secondary thrombocytopenia


Current Visit: Yes   Status: Chronic   


Plan: 


UNCLEAR ETIOLOGY


IT MAY BE FROM MDS.





BASELINE IS ABOUT 90K.


WILL REDO LAB AFTER SEPSIS IMPROVES. 








- Advance Directives


Does patient have a Living Will: Yes


Does patient have a Durable POA for Healthcare: Yes

## 2022-08-31 VITALS — DIASTOLIC BLOOD PRESSURE: 58 MMHG | SYSTOLIC BLOOD PRESSURE: 114 MMHG | TEMPERATURE: 97.8 F

## 2022-08-31 VITALS — OXYGEN SATURATION: 98 %

## 2022-08-31 LAB
BUN BLD-MCNC: 60 MG/DL (ref 7–18)
GLUCOSE SERPLBLD-MCNC: 216 MG/DL (ref 74–106)
HCT VFR BLD CALC: 24.3 % (ref 36–45)
LYMPHOCYTES # SPEC AUTO: 0.5 K/UL (ref 0.7–4.9)
MCV RBC: 91.4 FL (ref 80–100)
PMV BLD: 7 FL (ref 7.6–11.3)
POTASSIUM SERPL-SCNC: 3.8 MMOL/L (ref 3.5–5.1)
RBC # BLD: 2.66 M/UL (ref 3.86–4.86)

## 2022-08-31 PROCEDURE — 30233R1 TRANSFUSION OF NONAUTOLOGOUS PLATELETS INTO PERIPHERAL VEIN, PERCUTANEOUS APPROACH: ICD-10-PCS

## 2022-08-31 PROCEDURE — 0W9B3ZZ DRAINAGE OF LEFT PLEURAL CAVITY, PERCUTANEOUS APPROACH: ICD-10-PCS

## 2022-08-31 PROCEDURE — 5A1D70Z PERFORMANCE OF URINARY FILTRATION, INTERMITTENT, LESS THAN 6 HOURS PER DAY: ICD-10-PCS

## 2022-08-31 RX ADMIN — INSULIN LISPRO SCH: 100 INJECTION, SOLUTION INTRAVENOUS; SUBCUTANEOUS at 17:00

## 2022-08-31 RX ADMIN — CEFTRIAXONE SCH: 1 INJECTION, POWDER, FOR SOLUTION INTRAMUSCULAR; INTRAVENOUS at 09:00

## 2022-08-31 RX ADMIN — Medication SCH: at 09:00

## 2022-08-31 RX ADMIN — INSULIN LISPRO SCH: 100 INJECTION, SOLUTION INTRAVENOUS; SUBCUTANEOUS at 11:43

## 2022-08-31 NOTE — CON
Date of Consultation:  08/31/2022



Reason For Consultation:  Over volume, end-stage renal disease, elevation in BUN
and creatinine.



History Of Present Illness:  This is a pleasant 88-year-old female, well known 
to me from dialysis with significant past medical history of hypertension, 
cirrhosis, hyperlipidemia, chronic kidney disease, end-stage renal disease, on 
hemodialysis, the patient came to the hospital because of shortness of breath, 
found to have pleural effusion.  The patient is scheduled for thoracocentesis.  
The patient's last dialysis was Monday.  The patient found to have elevation in 
BUN and creatinine.  For that reason, we have been consulted with hyponatremia.



Past Medical History:  Includes;

1.   Hypertension.

2.   Cirrhosis.

3.   Hyperlipidemia.

4.   End-stage renal disease.

5.   Edema.



Allergies:  NO KNOWN DRUG ALLERGIES.



Social History:  Denied smoking.  Denied drinking.  Denied drugs abuse.



Family History:  Positive for hypertension.



Home Medications:  Include pravastatin, carvedilol, cholecalciferol, cetirizine,
breathing treatment, insulin, pantoprazole, multivitamin.



Review of Systems:

Head and Neck:  No red eye.  No ear pain. 

GI:  No nausea.  No vomiting. 

:  No polyuria.  No dysuria.  No hematuria. 

Gyn:  No vaginal discharge. 

Respiratory:  Has shortness breath. 

Cardiovascular:  Has leg swelling. 

Endocrine:  No polydipsia. 

Skin:  No rash. 

Neuro:  Generalized weakness. 

Musculoskeletal:  Low back pain.



Medications:  Current medications in the hospital include ceftriaxone, 
atorvastatin, carvedilol 6.25 b.i.d., Zetia, ipratropium, pantoprazole, 
simethicone.



Physical Examination:

Vital Signs:  When I saw the patient; blood pressure 129/58, pulse of 79, 
afebrile. 

Chest:  Decreased entry on the left base. 

Heart:  S1, S2.  Systolic murmur. 

Abdomen:  Soft, nontender.  Ascites. 

Extremity:  Plus edema. 

Neuro:  Alert, oriented x3.  No focal.



Laboratory Data:  Sodium 135, potassium 3.8, bicarb 27, BUN 60, creatinine 3.6, 
calcium 8.2.  WBC 2.7, H and H 8.1/24.3.



Current Medications:  The patient on include;

1.   Ceftriaxone.

2.   Atorvastatin.

3.   Carvedilol.

4.   Zetia.

5.   Breathing treatment.

6.   Pantoprazole.

7.   Simethicone.

8.   Melatonin.

9.   Cholecalciferol.



Assessment And Plan:  

1.   End-stage renal disease, over volume.  We will arrange for the dialysis 
today.  The patient is Monday, Wednesday, Friday.  We will challenge the 
patient.

2.   Respiratory failure, multifactorial, secondary to over volume/pleural 
effusion.  We will follow up after the pleural effusion.  We will challenge the 
patient on the dialysis.

3.   Hyponatremia, dilutional, secondary to renal failure and cirrhosis.  We 
will continue to monitor the patient.  The patient is going to be corrected with
dialysis.

4.   Anemia of chronic kidney disease.  Resume PEMA.

5.   Secondary hyperparathyroidism, stable.

6.   Hypokalemia.  The patient is going to be dialyzed on high potassium bath.



Time spent examining the patient face-to-face, reviewing the data lab and 
radiology, placing orders, discussing with the patient, explaining risks, 
benefits, alternatives, discussing with the staff member including nursing 
discussing with the hospitalist more than 65 minutes.

KIAN

DD:  08/31/2022 11:30:23   Voice ID:  [id number]

DT:  08/31/2022 12:06:18   Report ID:  [id number]

MTDD

## 2022-08-31 NOTE — RAD REPORT
EXAM DESCRIPTION:  US - Thoracentesis w/ US Guide - 8/31/2022 11:09 am

 

CLINICAL HISTORY:  Pleural effusion.

pleural effusion

 

COMPARISON:  Vent Perfusion VQ Scan dated 8/30/2022

 

FINDINGS:  Preoperative diagnosis: Left pleural effusion

Post operative diagnosis: Same

Conscious Sedation: None.

Estimated blood loss: Minimal

Specimens:A small volume of fluid was sent for requested lab studies.

 

The patient was placed in the upright recumbent position and the left posterior chest was prepped and
 draped in the usual sterile fashion.  1% Lidocaine was infiltrated into the soft tissues for local a
nesthesia.  Under sonographic guidance, a thoracentesis needle and 6 [place] catheter was advanced int
o the left pleural space. Approximately 1.5 liters of yellow slightly cloudy fluid was aspirated. Rahul
ples were sent to pathology for requested analysis. The patient tolerated the procedure without immed
iate complication and transferred to the floor in stable condition.

 

 

IMPRESSION:  Successful ultrasound-guided thoracentesis as detailed.

## 2022-08-31 NOTE — P.DS
Admission Date: 08/29/22


Discharge Date: 08/31/22


Disposition: ROUTINE DISCHARGE


Discharge Condition: FAIR


Reason for Admission: NAUSEA, FEVER





- Problems


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


Current Visit: Yes   Status: Acute   





(2) Bacterial pneumonia


Current Visit: Yes   Status: Acute   





(3) Pleural effusion


Current Visit: Yes   Status: Acute   





(4) Secondary thrombocytopenia


Current Visit: Yes   Status: Chronic   


Brief History of Present Illness: 





FABIOLA IS A DM PATIENT WITH CKD5 ON HD.  AFTER HD SHE GOT NASUEAOUS ANDHAD LOW 

GRADE FEVER.  I ASKED NP TO DO CT SCAN THAT REVEALED LARGE EFFUSION ON L SIDE 

AGAIN. 


Hospital Course: 





MS CRANE HAS HISTORY OF CKD ON HD.  SHE COMES WITH LOW GRADE FEVER AND NAUSEA. I

ASKED HER TO COME TO ER.  SHE HAD LARGE PLEURAL EFFUSION THAT IS DRAINED AND 1.5

LT OF FLUID WAS TAKEN OUT.  SHE HAD ONE DAY OF HD TODAY.  SHE IS STABLE ON ORAL 

ANTIBIOTICS FOR POSSIBEL PNEUMONIA. I DON'T SEE ANY OTHER SOURCE OF FEVER.   SHE

HAS PANCYTOPENIA POSSIBLE FROM MDS.  SHE WILL GO TO HEMATOLOGIST ON OUTPATIENT 

BASIS. UNFORTUNATELY DR. BENÍTEZ IS ALONE HERE AND IS BUSY WITH ONCOLOGY PATIENTS. 

SHE MAY HAVE TO GO TO [place] IN THIS CASE.  SHE IS STABLE FOR DISCHARGE WITH 

ORAL MEDS. 


Vital Signs/Physical Exam: 














Temp Pulse Resp BP Pulse Ox


 


 97.8 F   84   19   114/58 L  100 


 


 08/31/22 19:38  08/31/22 19:38  08/31/22 19:38  08/31/22 19:38  08/31/22 19:38








Laboratory Data at Discharge: 














WBC  Cancelled   08/31/22  11:05    


 


Hgb  Cancelled   08/31/22  11:05    


 


Hct  Cancelled   08/31/22  11:05    


 


Plt Count  Cancelled   08/31/22  11:05    


 


PT  13.2 SECONDS (9.5-12.5)  H  08/30/22  10:20    


 


INR  1.20   08/30/22  10:20    


 


APTT  29.0 SECONDS (24.3-36.9)   08/30/22  10:20    


 


Sodium  135 mmol/L (136-145)  L  08/31/22  05:41    


 


Potassium  3.8 mmol/L (3.5-5.1)   08/31/22  05:41    


 


BUN  60 mg/dL (7-18)  H  08/31/22  05:41    


 


Creatinine  3.60 mg/dL (0.55-1.3)  H  08/31/22  05:41    


 


Glucose  216 mg/dL ()  H  08/31/22  05:41    


 


Magnesium  2.0 mg/dL (1.8-2.4)   08/29/22  17:52    


 


Cholesterol  84 mg/dL (<200)   08/30/22  20:28    








Home Medications: 








Pravastatin Sodium 10 mg PO BEDTIME 03/02/18 


carvediloL [Coreg*] 6.25 mg PO BID 03/02/18 


Cholecalciferol (Vitamin D3) [Vitamin D3] 5,000 unit PO DAILY #30 07/04/21 


Aspirin [Vazalore] 81 mg PO DAILY 04/27/22 


Cetirizine HCl 1 tab PO DAILY 08/30/22 


Cyanocobalamin (Vitamin B-12) [Vitamin B12] 1 tab PO DAILY 08/30/22 


Ezetimibe 1 tab PO DAILY 08/30/22 


Fluticasone Propionate [Flonase Allergy Relief] 1 appl AGUEDA BID 08/30/22 


Insulin Aspart [Novolog Flexpen] 7 units SQ SEECOM 08/30/22 


Insulin Detemir [Levemir Flextouch] 15 units SQ DAILY AT SUPPER 08/30/22 


Melatonin 1 tab PO BEDTIME 08/30/22 


Pantoprazole [Protonix Tab*] 1 tab PO SEECOM 08/30/22 


Simethicone [Gas-X] 1 tab PO DAILY PRN 08/30/22 


Vit A/Vit C/Vit E/Zinc/Copper [Preservision Areds Softgel] 2 cap PO DAILY 

08/30/22 


levoFLOXacin [Levaquin*] 250 mg PO DAILY #5 tab 08/31/22 





New Medications: 


levoFLOXacin [Levaquin*] 250 mg PO DAILY #5 tab


Followup: 


Michael Morales MD [ACTIVE - CAN ADMIT] - 


Porfirio Valdez MD [Primary Care Provider] -

## 2022-08-31 NOTE — RAD REPORT
EXAM DESCRIPTION:  RAD - Chest Single View - 8/31/2022 12:04 pm

 

CLINICAL HISTORY:  Status Post Thorocentesis 8/31

Chest pain.

 

COMPARISON:  Chest Single View dated 8/29/2022; Chest Single View dated 5/2/2022; Chest Single View d
ated 5/2/2022; Chest Single View dated 4/26/2022

 

FINDINGS:  Portable technique limits examination quality.

 

Significant reduction is seen in the left pleural effusion. No pneumothorax. The heart is normal in s
ize. Right-sided venous catheter is unchanged.

 

IMPRESSION:  No postprocedure pneumothorax.

## 2022-09-04 LAB
LDH PLR-CCNC: 91 U/L
PROT PLR-MCNC: <3 G/DL

## 2022-09-05 ENCOUNTER — HOSPITAL ENCOUNTER (INPATIENT)
Dept: HOSPITAL 97 - ER | Age: 87
LOS: 5 days | Discharge: NURSING, REHAB OR LONG-TERM CARE | DRG: 309 | End: 2022-09-10
Attending: INTERNAL MEDICINE | Admitting: INTERNAL MEDICINE
Payer: COMMERCIAL

## 2022-09-05 VITALS — BODY MASS INDEX: 26.9 KG/M2

## 2022-09-05 DIAGNOSIS — R53.81: ICD-10-CM

## 2022-09-05 DIAGNOSIS — E87.6: ICD-10-CM

## 2022-09-05 DIAGNOSIS — E87.5: ICD-10-CM

## 2022-09-05 DIAGNOSIS — N25.81: ICD-10-CM

## 2022-09-05 DIAGNOSIS — Z88.5: ICD-10-CM

## 2022-09-05 DIAGNOSIS — D63.1: ICD-10-CM

## 2022-09-05 DIAGNOSIS — Z90.710: ICD-10-CM

## 2022-09-05 DIAGNOSIS — Z79.01: ICD-10-CM

## 2022-09-05 DIAGNOSIS — Z79.4: ICD-10-CM

## 2022-09-05 DIAGNOSIS — Z88.8: ICD-10-CM

## 2022-09-05 DIAGNOSIS — Z79.899: ICD-10-CM

## 2022-09-05 DIAGNOSIS — I13.2: ICD-10-CM

## 2022-09-05 DIAGNOSIS — E11.22: ICD-10-CM

## 2022-09-05 DIAGNOSIS — Z99.2: ICD-10-CM

## 2022-09-05 DIAGNOSIS — I50.32: ICD-10-CM

## 2022-09-05 DIAGNOSIS — I25.10: ICD-10-CM

## 2022-09-05 DIAGNOSIS — Z20.822: ICD-10-CM

## 2022-09-05 DIAGNOSIS — I24.8: ICD-10-CM

## 2022-09-05 DIAGNOSIS — N17.9: ICD-10-CM

## 2022-09-05 DIAGNOSIS — I48.19: Primary | ICD-10-CM

## 2022-09-05 DIAGNOSIS — N18.5: ICD-10-CM

## 2022-09-05 DIAGNOSIS — J90: ICD-10-CM

## 2022-09-05 DIAGNOSIS — Z91.018: ICD-10-CM

## 2022-09-05 DIAGNOSIS — Z88.1: ICD-10-CM

## 2022-09-05 DIAGNOSIS — E78.5: ICD-10-CM

## 2022-09-05 DIAGNOSIS — Z86.73: ICD-10-CM

## 2022-09-05 DIAGNOSIS — Z86.16: ICD-10-CM

## 2022-09-05 DIAGNOSIS — I48.92: ICD-10-CM

## 2022-09-05 DIAGNOSIS — Z79.82: ICD-10-CM

## 2022-09-05 LAB
BUN BLD-MCNC: 24 MG/DL (ref 7–18)
GLUCOSE SERPLBLD-MCNC: 183 MG/DL (ref 74–106)
HCT VFR BLD CALC: 34.1 % (ref 36–45)
LYMPHOCYTES # SPEC AUTO: 1.1 K/UL (ref 0.7–4.9)
MCV RBC: 91.8 FL (ref 80–100)
PMV BLD: 8 FL (ref 7.6–11.3)
POTASSIUM SERPL-SCNC: 3.2 MMOL/L (ref 3.5–5.1)
RBC # BLD: 3.72 M/UL (ref 3.86–4.86)
TROPONIN I SERPL HS-MCNC: 88.5 PG/ML (ref ?–58.9)

## 2022-09-05 PROCEDURE — 87070 CULTURE OTHR SPECIMN AEROBIC: CPT

## 2022-09-05 PROCEDURE — 84157 ASSAY OF PROTEIN OTHER: CPT

## 2022-09-05 PROCEDURE — 36415 COLL VENOUS BLD VENIPUNCTURE: CPT

## 2022-09-05 PROCEDURE — 97161 PT EVAL LOW COMPLEX 20 MIN: CPT

## 2022-09-05 PROCEDURE — 83615 LACTATE (LD) (LDH) ENZYME: CPT

## 2022-09-05 PROCEDURE — 99285 EMERGENCY DEPT VISIT HI MDM: CPT

## 2022-09-05 PROCEDURE — 71045 X-RAY EXAM CHEST 1 VIEW: CPT

## 2022-09-05 PROCEDURE — 89050 BODY FLUID CELL COUNT: CPT

## 2022-09-05 PROCEDURE — 83880 ASSAY OF NATRIURETIC PEPTIDE: CPT

## 2022-09-05 PROCEDURE — 87102 FUNGUS ISOLATION CULTURE: CPT

## 2022-09-05 PROCEDURE — 82947 ASSAY GLUCOSE BLOOD QUANT: CPT

## 2022-09-05 PROCEDURE — 96375 TX/PRO/DX INJ NEW DRUG ADDON: CPT

## 2022-09-05 PROCEDURE — 85025 COMPLETE CBC W/AUTO DIFF WBC: CPT

## 2022-09-05 PROCEDURE — 87015 SPECIMEN INFECT AGNT CONCNTJ: CPT

## 2022-09-05 PROCEDURE — 97530 THERAPEUTIC ACTIVITIES: CPT

## 2022-09-05 PROCEDURE — 82945 GLUCOSE OTHER FLUID: CPT

## 2022-09-05 PROCEDURE — 87206 SMEAR FLUORESCENT/ACID STAI: CPT

## 2022-09-05 PROCEDURE — 93306 TTE W/DOPPLER COMPLETE: CPT

## 2022-09-05 PROCEDURE — 94640 AIRWAY INHALATION TREATMENT: CPT

## 2022-09-05 PROCEDURE — 80048 BASIC METABOLIC PNL TOTAL CA: CPT

## 2022-09-05 PROCEDURE — 90935 HEMODIALYSIS ONE EVALUATION: CPT

## 2022-09-05 PROCEDURE — 88108 CYTOPATH CONCENTRATE TECH: CPT

## 2022-09-05 PROCEDURE — 84484 ASSAY OF TROPONIN QUANT: CPT

## 2022-09-05 PROCEDURE — 85610 PROTHROMBIN TIME: CPT

## 2022-09-05 PROCEDURE — 88305 TISSUE EXAM BY PATHOLOGIST: CPT

## 2022-09-05 PROCEDURE — 87116 MYCOBACTERIA CULTURE: CPT

## 2022-09-05 PROCEDURE — 87811 SARS-COV-2 COVID19 W/OPTIC: CPT

## 2022-09-05 PROCEDURE — 96365 THER/PROPH/DIAG IV INF INIT: CPT

## 2022-09-05 PROCEDURE — 80069 RENAL FUNCTION PANEL: CPT

## 2022-09-05 PROCEDURE — 97116 GAIT TRAINING THERAPY: CPT

## 2022-09-05 PROCEDURE — 71046 X-RAY EXAM CHEST 2 VIEWS: CPT

## 2022-09-05 PROCEDURE — 93005 ELECTROCARDIOGRAM TRACING: CPT

## 2022-09-05 RX ADMIN — POTASSIUM CHLORIDE SCH MEQ: 10 TABLET, FILM COATED, EXTENDED RELEASE ORAL at 22:37

## 2022-09-05 RX ADMIN — Medication SCH ML: at 19:46

## 2022-09-05 NOTE — ER
Nurse's Notes                                                                                     

 [facility]                                                                 

Name: Deirdre Sauer                                                                               

Age: 88 yrs                                                                                       

Sex: Female                                                                                       

: 1933                                                                                   

MRN: [id number]                                                                                   

Arrival Date: 2022                                                                          

Time: 11:02                                                                                       

Account#: [id number]                                                                            

Bed 6                                                                                             

Private MD:                                                                                       

Diagnosis: Persistent atrial fibrillation-Rapid ventricular response                              

                                                                                                  

Presentation:                                                                                     

                                                                                             

10:55 Chief complaint: EMS states: toned out to dialysis center for tachycardia and           tp1 

      hypotension. Reported 0.2 L removed. Stated PT was released a few days ago from             

      hospital with pneumonia with amoxicillin. reported irregular HR, -150, BP 90/60,      

      T 97.6.                                                                                     

10:55 Coronavirus screen: Vaccine status: Patient reports receiving the 2nd dose of the covid tp1 

      vaccine. Ebola Screen: Patient negative for fever greater than or equal to 101.5            

      degrees Fahrenheit, and additional compatible Ebola Virus Disease symptoms Patient          

      denies exposure to infectious person. Patient denies travel to an Ebola-affected area       

      in the 21 days before illness onset. Initial Sepsis Screen: Does the patient meet any 2     

      criteria? HR > 90 bpm. Does the patient have a suspected source of infection? No.           

      Patient's initial sepsis screen is negative. Risk Assessment: Do you want to hurt           

      yourself or someone else? Patient reports no desire to harm self or others. Onset of        

      symptoms was 2022.                                                            

10:55 Method Of Arrival: EMS: [facility]                                                tp1 

10:55 Acuity: MESFIN 2                                                                           tp1 

                                                                                                  

Triage Assessment:                                                                                

10:55 General: Appears in no apparent distress. comfortable, Behavior is calm, cooperative.   tp1 

      Pain: Denies pain. EENT: No deficits noted. Neuro: Olsen Agitation-Sedation Scale        

      (RASS): 0 - Alert and Calm Level of Consciousness is awake, alert, obeys commands,          

      Oriented to person, place, time, situation. Cardiovascular: Denies chest pain, nausea,      

      vomiting, Patient's skin is warm and dry. dialysis port \T\ right subclavian dressing dry     

      and intact. Respiratory: Airway is patent Respiratory effort is even, unlabored, Denies     

      shortness of breath. GI: Abdomen is round non-distended, Abd is soft and non tender X 4     

      quads. : No signs and/or symptoms were reported regarding the genitourinary system.       

      Derm: Skin is pink, warm \T\ dry. Musculoskeletal: Circulation, motion, and sensation       

      intact.                                                                                     

                                                                                                  

Historical:                                                                                       

- Allergies:                                                                                      

11:21 Streptomycin;                                                                           tp1 

11:21 Statins-Hmg-Coa Reductase Inhibitors;                                                   tp1 

11:21 caffeine;                                                                               tp1 

11:21 blood thinners except ASA;                                                              tp1 

11:21 Alteplase;                                                                              tp1 

11:21 hydrocodone;                                                                            tp1 

11:21 Iodine;                                                                                 tp1 

- Home Meds:                                                                                      

11:21 aspirin 81 mg Oral chew 1 tab once daily [Active]; Claritin-D 24 Hour  mg Oral    tp1 

      Tb24 1 tab once daily [Active]; Coreg Oral [Active]; Potassium Chloride Oral [Active];      

      Augmentin Oral [Active]; pantoprazole oral [Active]; pravastatin oral [Active]; Zetia       

      Oral [Active]; Insulin Glargine Sub-Q [Active];                                             

- PMHx:                                                                                           

11:21 CHF; CVA; Diabetes - NIDDM; Dialysis; Hyperlipidemia; Hypertension; kidney problems;    tp1 

      Anemia; Cellulitis; hyperparathyroidism; malnutition; hernia;                               

                                                                                                  

- Immunization history:: Client reports receiving the 2nd dose of the Covid vaccine.              

- Social history:: Smoking status: Patient denies any tobacco usage or history of.                

                                                                                                  

                                                                                                  

Screenin:49 Abuse screen: Denies threats or abuse. Denies injuries from another. Nutritional        tp1 

      screening: No deficits noted. Tuberculosis screening: No symptoms or risk factors           

      identified. Fall Risk No fall in past 12 months (0 pts). No secondary diagnosis (0          

      pts). IV access (20 points). Ambulatory Aid- None/Bed Rest/Nurse Assist (0 pts). Gait-      

      Normal/Bed Rest/Wheelchair (0 pts) Mental Status- Oriented to own ability (0 pts).          

      Total Mckoy Fall Scale indicates No Risk (0-24 pts).                                        

                                                                                                  

Assessment:                                                                                       

10:55 General: See triage notes.                                                              tp1 

11:30 Reassessment: /53, Lopressor held, provider notifies.                             tp1 

12:00 Reassessment: received VO from Dr. Murdock for NS 250mL bolus IV.                       tp1 

12:02 Reassessment: defibrillator pads applied.                                               tp1 

12:14 Reassessment: Shock delivered, 200 joules.                                              tp1 

12:19 Reassessment: shock delivered, 200 Joules.                                              tp1 

12:44 Reassessment: Patient appears in no apparent distress at this time. No changes from     tp1 

      previously documented assessment. resting with eyes closed.                                 

13:47 Reassessment: Patient appears in no apparent distress at this time. No changes from     tp1 

      previously documented assessment. Patient is alert, oriented x 3, equal unlabored           

      respirations, skin warm/dry/pink. denies chest pain or SOB.                                 

15:47 Reassessment: Patient appears in no apparent distress at this time. No changes from     tp1 

      previously documented assessment. resting in bed with eyes closed. respirations even        

      and unlabored.                                                                              

                                                                                                  

Vital Signs:                                                                                      

10:55  / 77; Pulse 140; Resp 12; Temp 97.6; Pulse Ox 100% ; Weight 52.62 kg; Height 5   tp1 

      ft. 2 in. (157.48 cm);                                                                      

11:30  / 53; Pulse 148;                                                                 tp1 

12:00  / 62; Pulse 139; Resp 24; Pulse Ox 99% on R/A;                                   tp1 

12:38 BP 86 / 48; Pulse 138; Resp 17; Pulse Ox 99% on 2 lpm NC;                               tp1 

12:50 BP 96 / 51; Pulse 114; Resp 23; Pulse Ox 100% on 2 lpm NC;                              tp1 

13:00 BP 99 / 59; Pulse 125; Resp 23; Pulse Ox 100% on 2 lpm NC;                              tp1 

13:15  / 67; Pulse 122; Resp 17; Pulse Ox 100% on 2 lpm NC;                             tp1 

13:25 BP 98 / 57; Pulse 128; Resp 17; Pulse Ox 100% on 2 lpm NC;                              tp1 

13:30 BP 99 / 52; Pulse 76; Resp 17; Pulse Ox 100% on 2 lpm NC;                               tp1 

14:00  / 44; Pulse 72; Resp 20; Pulse Ox 100% on 2 lpm NC;                              tp1 

14:30  / 48; Pulse 72; Resp 20; Pulse Ox 100% on 2 lpm NC;                              tp1 

15:00  / 47; Pulse 71; Resp 20; Pulse Ox 100% on 2 lpm NC;                              tp1 

15:30  / 47; Pulse 74; Resp 20; Pulse Ox 100% on 2 lpm NC;                              tp1 

16:00  / 47; Pulse 77; Resp 20; Pulse Ox 100% on 2 lpm NC;                              tp1 

17:00  / 40; Pulse 76; Resp 21; Pulse Ox 100% on 2 lpm NC;                              tp1 

10:55 Body Mass Index 21.22 (52.62 kg, 157.48 cm)                                             tp1 

                                                                                                  

ED Course:                                                                                        

10:55 Arm band placed on.                                                                     tp1 

11:00 Patient has correct armband on for positive identification. Placed in gown. Bed in low  tp1 

      position. Call light in reach. Side rails up X2.                                            

11:02 Patient arrived in ED.                                                                  eb  

11:05 Dmitry Murdock MD is Attending Physician.                                              kdr 

11:07 EKG done, by ED staff.                                                                  tp1 

11:10 Inserted saline lock: 20 gauge in left forearm, using aseptic technique. Blood          tp1 

      collected.                                                                                  

11:16 Nelida Solomon RN is Primary Nurse.                                                   tp1 

11:21 Triage completed.                                                                       tp1 

11:59 XRAY Chest (1 view) In Process Unspecified.                                             EDMS

12:08 granddaughter Geraldine Kamara called wanting an update/ to please call her at [phone].[phone] 

12:21 EKG done, by ED staff.                                                                  tp1 

12:55 EKG done, by ED staff.                                                                  tp1 

13:25 Porfirio Valdez MD is Hospitalizing Provider.                                            kdr 

13:31 EKG done, by ED staff.                                                                  tp1 

17:11 No provider procedures requiring assistance completed.                                  tp1 

19:22 role handed off by Christine Nunez RN                                                 [id number] 

19:22 Primary Nurse role handed off by Nelida Solomon RN                                    mw2 

19:22 Attending Physician role handed off by Dmitry Murdock MD                               cookie 

19:22 Abraham Goldberg MD is Attending Physician.                                             cookie 

19:47 John Guzman RN is Primary Nurse.                                                    as6 

                                                                                                  

Administered Medications:                                                                         

19:22 Discontinued: amiodarone 900 mg, D5W 500 ml IVPB at 1 mg/min continuous; for 6 hrs,     cookie 

      then change to 0.5 mg/min                                                                   

12:00 Drug: fentaNYL (PF) 100 mcg Route: IVP; Site: left forearm;                             tp1 

16:07 Follow up: Response: No adverse reaction                                                tp1 

12:02 Drug: NS 0.9% 250 ml Route: IV; Rate: bolus; Site: left forearm;                        tp1 

13:00 Follow up: IV Status: Completed infusion; IV Intake: 125ml                              tp1 

12:07 Drug: Versed (midazolam) 2 mg Route: IVP; Site: left forearm;                           tp1 

16:07 Follow up: Response: No adverse reaction                                                tp1 

12:41 Drug: amiodarone 150 mg Route: IVP; Site: left forearm;                                 tp1 

16:03 Follow up: Response: No change in condition                                             tp1 

13:00 Drug: amiodarone 900 mg, D5W 500 ml Route: IVPB; Rate: 1 mg/min; Site: left forearm;    tp1 

13:20 Not Given (Physician Discretion): Lovenox (enoxaparin) 1 mg/kg Sub-Q once               tp1 

16:01 Not Given (Physician Discretion): Lopressor (metoprolol) 5 mg IVP every 5 minutes; Hold tp1 

      for SBP < 100 or HR < 60. x3                                                                

19:31 Drug: amiodarone 400 mg Route: PO;                                                      aa9 

                                                                                                  

                                                                                                  

Medication:                                                                                       

17:11 VIS not applicable for this client.                                                     tp1 

                                                                                                  

Intake:                                                                                           

13:00 IV: 125ml; Total: 125ml.                                                                tp1 

                                                                                                  

Outcome:                                                                                          

13:24 Discharge ordered by MD.                                                                kdr 

13:26 Decision to Hospitalize by Provider.                                                    kdr 

20:39 Patient left the ED.                                                                    vc1 

                                                                                                  

Signatures:                                                                                       

Dispatcher [employer]                           EDMS                                                 

Abraham Goldberg MD MD cha Rittger, Kevin, MD MD kdr Herrera, Deanna                              3                                                  

Murray Prakash                            2                                                  

Bibiana Jean Baptiste Ashby, RN                      RN   as6                                                  

Nelida Solomon RN RN   tp1                                                  

Anu Garcia RN RN   vc1                                                  

Yasmin Duron RN                       RN   aa9                                                  

                                                                                                  

Corrections: (The following items were deleted from the chart)                                    

11 11:21 Home Meds: allopurinol 100 mg Oral tab once daily; tp1                            tp1 

11:28 11:21 Home Meds: carvedilol 6.25 mg Oral tab 2 times per day; tp1                       tp1 

:28 11:21 Home Meds: cetirizine 10 mg Oral tab 1 tab once daily; tp1                        tp1 

11:28 11:21 Home Meds: furosemide 40 mg Oral tab 1 tab once daily; tp1                        tp1 

11:28 11:21 Home Meds: glimepiride 4 mg Oral tab twice a day; tp1                             tp1 

11:47 10:55 Cardiovascular: Denies chest pain, nausea, vomiting, Patient's skin is warm and   tp1 

      dry. tp1                                                                                    

13:26 12:44 Reassessment: Patient appears in no apparent distress at this time. No changes    tp1 

      from previously documented assessment. resting with eyes closed. tp1                        

13:59 12:38 BP 86 / 48; Pulse 138bpm; Resp 17bpm; Pulse Ox 99% RA; tp1                        tp1 

13:59 13:00 BP 99 / 59; Pulse 125bpm; Resp 23bpm; Pulse Ox 100% RA; tp1                       tp1 

16:07 13:57  / 44; Pulse 72bpm; Resp 20bpm; Pulse Ox 100% 2 lpm Nasal Cannula; tp1      tp1 

                                                                                                  

**************************************************************************************************

## 2022-09-05 NOTE — XMS REPORT
Continuity of Care Document

                          Created on:2022



Patient:FABIOLA CRANE

Sex:Female

:1933

External Reference #:[id number]





Demographics







                          Address                   [address]



                                                    [place] [address]

 

                          Home Phone                [phone]

 

                          Email Address             [email or web address]

 

                          Preferred Language        English

 

                          Marital Status            Unknown

 

                          Synagogue Affiliation     Unknown

 

                          Race                      Unknown

 

                          Additional Race(s)        Unavailable



                                                    Unavailable

 

                          Ethnic Group              Unknown









Author







                          Organization              [facility]

t

 

                          Address                   [address]



                                                    [place] [address]

 

                          Phone                     [phone]









Support







                Name            Relationship    Address         Phone

 

                NEGIN KING   GC              [address]  [phone]



                                                [place] [address] 

 

                MAYE ROJAS GC              Unavailable     [phone]

 

                PATTY KING    RELA            Unavailable     [phone]









Care Team Providers







                    Name                Role                Phone

 

                    Lyndsay Love Anavella Attending Clinician Unava

ilable

 

                    Alma Mirza Attending Clinician Unavailable

 

                    ТАТЬЯНА_BAHC_Awais_MARCEL      Attending Clinician Unavailable

 

                    ANA OVALLES        Attending Clinician Unavailable

 

                    Chuy Love Anav Admitting Clinician Unavailable

 

                    Porfirio Valdez     Admitting Clinician Unavailable

 

                    Alma Mirza Admitting Clinician Unavailable

 

                    ТАТЬЯНА_BAHC_Todd_MARCEL      Admitting Clinician Unavailable

 

                    ELISEO WOMACK    Admitting Clinician Unavailable









Payers







           Payer Name Policy Type Policy Number Effective Date Expiration Date S

eriberto

 

           [facility]        [id number]                       

 

           AET        AET        [id number]                       

 

           MEDICARE B-[place]:            [id number] 1998-10-01            



           [employer]                       00:00:00              







Problems







       Condition Condition Condition Status Onset  Resolution Last   Treating Co

mments 

Source



       Name   Details Category        Date   Date   Treatment Clinician        



                                                 Date                 

 

       Acute on  Acute on Finding Active -2018               

[facility]



       chronic chronic               3-          19:36:50               l



       congestive congestive               00:00:                             He

rmann



       heart  heart                00                                 



       failure failure                                                  



              Active                                                  



              2018                                                  



              Finding                                                  



              2018                                                  



              [facility]                                                  



              [place]                                                  



                                                                      

 

       Acute on  Acute on Problem                      2021               

[facility]



       chronic chronic                             19:18:00               l



       congestive congestive                                                  He

rmann



       heart  heart                                                   



       failure failure                                                  



              Problem                                                  



              2021                                                  



              [facility]                                                  



              [facility]                                                  



              [place]                                                  

 

       Elevated  Elevated Problem                      2021               

[facility]



       procalcito procalcito                             19:18:00               

l



       isa    isa                                                     Tha



              Problem                                                  



              2021                                                  



              [facility]                                                  



              [facility]                                                  



              [place]                                                  

 

       Acute on  Acute on Problem                      2021               

[facility]



       chronic chronic                             19:18:00               l



       renal  renal                                                   Tha



       insufficie insufficie                                                  



       ncy    ncy                                                     



              Problem                                                  



              2021                                                  



               [facility]                                                  



              [facility]                                                  



              [place]                                                  

 

       Pneumonia  Pneumonia Problem                      2021             

  [facility]



               Problem                             19:18:00               l



              2021                                                  [facility]

n



              [facility]                                                  



              [facility]                                                  



              [place]                                                  

 

       Acute on  Acute on Condition                      2020             

  [facility]



       chronic chronic                             17:37:00               l



       heart  heart                                                   [place]



       failure failure                                                  



              Condition                                                  



              2020                                                  



              [facility]                                                  



              [facility]                                                  



              [place]                                                  

 

       Mild renal   Mild Condition                      2020              

 [facility]



       insufficie renal                              17:37:00               l



       ncy    insufficie                                                  [facility]

n



              ncy                                                     



              Condition                                                  



              2020                                                  



              [facility]                                                  



              [facility]                                                  



              [place]                                                  

 

       Elevated  Elevated Condition                      2020             

  [facility]



       brain  brain                              17:37:00               l



       natriureti natriureti                                                  He

rmann



       c peptide c peptide                                                  



       (BNP)  (BNP)                                                   



       level  level                                                   



              Condition                                                  



              2020                                                  



              [facility]                                                  



              [facility]                                                  



              [place]                                                  

 

       Postobstru  Postobstr Condition                      2020          

     [facility]



       ctive  uctive                             17:37:00               l



       pneumonia pneumonia                                                  [facility]

estevan



              Condition                                                  



              2020                                                  



              [facility]                                                  



              [facility]                                                  



              [place]                                                  

 

       Pneumonia  Pneumonia Problem                      2021             

  [facility]



       due to due to                             19:18:00               l



       COVID-19 COVID-19                                                  [facility]

n



       virus  virus                                                   



              Problem                                                  



              2021                                                  



              [facility]                                                  



              [facility]                                                  



              [place]                                                  

 

       Stage 3   Stage 3 Problem                      2021               M

emoria



       chronic chronic                             19:18:00               l



       kidney kidney                                                  Tha



       disease disease                                                  



              Problem                                                  



              2021                                                  



               [facility]                                                  



              [facility]                                                  



              [place]                                                  

 

       Pancytopen  Pancytope Problem                      2021            

   [facility]



       ia     lolly                                19:18:00               l



              Problem                                                  [place]



              2021                                                  



              [facility]                                                  



              [facility]                                                  



              [place]                                                  

 

       Elevated  Elevated Problem                      2021               

[facility]



       troponin troponin                             19:18:00               l



       level  level                                                   [place]



              Problem                                                  



              2021                                                  



               [facility]                                                  



              [facility]                                                  



              [place]                                                  

 

       Stage 3  Stage 3 Condition                      2021               

[facility]



       chronic chronic                             19:18:00               l



       kidney kidney                                                  [place]



       disease disease                                                  



              Condition                                                  



              2021                                                  



              [facility]                                                  



              [facility]                                                  



              [place]                                                  

 

       Type 2  Type 2 Condition                      2021               Me

moria



       diabetes diabetes                             19:18:00               l



       mellitus mellitus                                                  [facility]

n



              Condition                                                  



              2021                                                  



              [facility]                                                  



              [facility]                                                  



              [place]                                                  

 

       Elevated  Elevated Condition                      2021             

  [facility]



       troponin I troponin I                             19:18:00               

l



       level  level                                                   [place]



              Condition                                                  



              2021                                                  



              [facility]                                                  



              [facility]                                                  



              [place]                                                  

 

       Acquired   Acquired Condition                      2021            

   [facility]



       pancytopen pancytopen                             19:18:00               

l



       ia     ia                                                      Tha



              Condition                                                  



              2021                                                  



              [facility]                                                  



              [facility]                                                  



              [place]                                                  

 

       Infection  Infection Condition                      2021           

    [facility]



       due to due to                             19:18:00               l



       2019 novel 2019 novel                                                  Akin valle



       coronaviru coronaviru                                                  



       s      s                                                       



              Condition                                                  



              2021                                                  



              [facility]                                                  



              [facility]                                                  



              [place]                                                  



                                                                      







History of Past Illness







       Condition Condition Condition Status Onset  Resolution Last   Treating Co

mments 

Source



       Name   Details Category        Date   Date   Treatment Clinician        



                                                 Date                 

 

       Abnormal   Abnormal Problem        2021          

     [facility]



       kidney kidney               3-02   19:18:00 19:18:00               l



       function function               00:00:                             [facility]

n



              2018               00                                 



              Problem                                                  



              2021                                                  



               [facility]                                                  



              [facility]                                                  



              [place]                                                  







Allergies, Adverse Reactions, Alerts







       Allergy Allergy Status Severity Reaction(s) Onset  Inactive Treating Comm

ents 

Source



       Name   Type                        Date   Date   Clinician        

 

       Statins- Statins- Active               2020-0                      [facility]

a



       Hmg-Coa Hmg-Coa                      2-28                        l



       Redu   Redu                        03:12:                      Tha



                                          33                          

 

       blood  blood  Active Mild          2020-0                      [facility]



       thinner thinner                      2-28                        l



                                          03:12:                      Tha



                                          33                          

 

       blood  blood  Active               2020-0                      [facility]



       thinners thinners                      2-28                        l



                                          03:12:                      Tha



                                          33                          







Social History







           Social Habit Start Date Stop Date  Quantity   Comments   Source

 

           Social History 2021                       [facility]

viri



                      19:20:00   19:20:00                         







Medications







       Ordered Filled Start  Stop   Current Ordering Indication Dosage Frequency

 Signature

                    Comments            Components          Source



     Medication Medication Date Date Medication? Clinician                (SIG) 

          



     Name Name                                                   

 

     Cholecalcif            Yes                                     [facility]

a



     clarita      7-04                                              l



     (Vitamin      18:00:                                              Tha



     D3)       12                                                



     (Vitamin                                                        



     D3) 125 MCG                                                        



     Capsule                                                        

 

     Cyanocobala            Yes                                     [facility]

a



     min       7-04                                              l



     (Vitamin      18:00:                                              Tha



     B-12)      12                                                



     (Vitamin                                                        



     B12) 2,500                                                        



     MCG Tablet                                                        

 

     Prednisone            Yes                                     [facility]



     (Deltasone*      7-04                                              l



     ) 10 MG Tab      17:59:                                              Fritz garcia



               25                                                

 

     Furosemide            Yes                                     [facility]



     (Lasix*) 40      7-04                                              l



     MG Tab      05:00:                                              Tha



               45                                                

 

     Ascorbic            Yes                                     [facility]



     Acid      7-04                                              l



     (Vitamin C)      04:47:                                              Fritz

n



     1,000 MG      30                                                



     Tablet                                                        

 

     Cholecalcif            Yes                                     [facility]

a



     clarita      7-04                                              l



     (Vitamin      04:47:                                              Tha



     D3)       30                                                



     (Vitamin                                                        



     D3) 125 MCG                                                        



     Capsule                                                        

 

     Cyanocobala            Yes                                     [facility]

a



     min       7-04                                              l



     (Vitamin      04:47:                                              Tha



     B-12)      30                                                



     (Vitamin                                                        



     B12) 2,500                                                        



     MCG Tablet                                                        

 

     Ferrous            Yes                                     [facility]



     Sulfate      7-04                                              l



     (Ferrous      04:47:                                              Tha



     Sulfate*)      30                                                



     325 MG Tab                                                        

 

     Montelukast            Yes                                     [facility]

a



     Sodium      7-04                                              l



               04:47:                                              Tha



               30                                                

 

     Ubidecareno            Yes                                     [facility]

a



     ne (Co      7-04                                              l



     Q-10) 200      04:47:                                              Tha



     MG Capsule      30                                                

 

     Vit C/Tariq            Yes                                     [facility]



     Ac/Lut/Cruz      7-04                                              l



     er/Znox      04:47:                                              Tha



     (Preservisi      30                                                



     on Lutein                                                        



     Softgel) 1                                                        



     EACH                                                        



     Capsule                                                        

 

     Furosemide            Yes                                     [facility]



     (Lasix*) 40      2-29                                              l



     MG Tab      14:39:                                              Tha



               48                                                

 

     Cetirizine      -0      Yes                                     [facility]



     Hcl       2-28                                              l



               03:32:                                              Tha



               57                                                

 

     Cetirizine      -0      Yes                                     [facility]



     Hcl       2-28                                              l



               03:32:                                              Tha



               00                                                

 

     Allopurinol      -0      Yes                                     [facility]

a



               2-28                                              l



               03:25:                                              Tha



               13                                                

 

     Linagliptin      -0      Yes                                     [facility]

a



     (Tradjenta)      2-28                                              l



     5 MG Tablet      03:25:                                              Fritz garcia



               13                                                

 

     Linagliptin      -0      Yes                                     [facility]

a



               2-28                                              l



               03:25:                                              [facility]a



               00                                                

 

     Spironolact            Yes                                     [facility]

a



     one       3-04                                              l



     (Aldactone*      19:31:                                              Fritz

n



     ) 25 MG Tab      53                                                

 

     Spironolact      2018-0      Yes  Mouin F                TWICE           Me

moria



     one       3-04           Callie                DAILY           l



               00:00:                                                                                              

 

     Carvedilol      2018-0      Yes                                     [facility]



     (Coreg*)      3-02                                              l



     6.25 MG Tab      06:00:                                              Fritz garcia



               08                                                

 

     Glimepiride      2018-0      Yes                                     [facility]

a



     (Amaryl) 4      3-02                                              l



     MG Tablet      06:00:                                              [place]



               08                                                

 

     Furosemide      2018-0      Yes                                     [facility]



     (Lasix*) 40      3-02                                              l



     MG Tab      06:00:                                              [facility]



                                                               

 

     Multivitami      2018-0      Yes                                     [facility]

a



     n (Multiple      3-02                                              l



     Vitamins)      06:00:                                              [place]



     Tablet      08                                                

 

     Omeprazole      2018-0      Yes                                     [facility]



     (Prilosec)      3-02                                              l



     40 MG      06:00:                                              [place]



     Capsule.      08                                                

 

     Pravastatin      2018-0      Yes                                     [facility]

a



     Sodium      3-02                                              l



               06:00:                                              [facility]                                                

 

     Ezetimibe      2018-0      Yes                                     [facility]



     (Zetia*) 10      3-02                                              l



     MG Tab      06:00:                                              [facility]



                                                               

 

     Aspirin      2018-0      Yes                                     [facility]



               3-02                                              l



               06:00:                                                                                              

 

     Carvedilol      2018-0      Yes                                     [facility]



               3-02                                              l



               06:00:                                                                                              

 

     Glimepiride      2018-0      Yes                                     [facility]

a



               3-02                                              l



               06:00:                                                                                              

 

     Furosemide      2018-0      Yes                                     [facility]



               3-02                                              l



               06:00:                                                                                              

 

     Multivitami      2018-0      Yes                                     [facility]

a



     n         3-02                                              l



               06:00:                                                                                              

 

     Omeprazole      2018-0      Yes                                     [facility]



               3-02                                              l



               06:00:                                                                                              

 

     Pravastatin      2018-0      Yes                                     [facility]

a



     Sodium      3-02                                              l



               06:00:                                                                                              

 

     Ezetimibe      2018-0      Yes                                     [facility]



               3-02                                              l



               06:00:                                                                                              

 

     Aspirin      2018-0      Yes                      DAILY           [facility]



               3-02                                              l



               00:00:                                                                                              

 

     Carvedilol      2018-0      Yes                      AT BEDTIME           M

[facility]



               3-02                                              l



               00:00:                                                                                              

 

     Glimepiride      2018-0      Yes                      TWICE           [facility]

ia



               3-02                               DAILY           l



               00:00:                                                                                              

 

     Furosemide      2018-0      Yes                      DAILY           [facility]

a



               3-02                                              l



               00:00:                                                                                              

 

     Multivitami      2018-0      Yes                      DAILY           [facility]

ia



     n         3-02                                              l



               00:00:                                              [place]



               00                                                

 

     Omeprazole      2018-0      Yes                      DAILY           [facility]

a



               3-02                                              l



               00:00:                                              [facility]



               00                                                

 

     Pravastatin      2018-0      Yes                      AT BEDTIME           

[facility]



     Sodium      3-02                                              l



               00:00:                                              [place]



               00                                                

 

     Ezetimibe      2018-0      Yes                      DAILY           [facility]



               3-02                                              l



               00:00:                                              [place]



               00                                                







Vital Signs







             Vital Name   Observation Time Observation Value Comments     Source

 

             Temperature Oral (F) 2021 17:00:00 98.4 F                    

[facility]

 

             Heart Rate   2021 17:00:00                           [facility]

 [place]

 

             Respitory Rate 2021 17:00:00                           [facility]

al [place]

 

             Systolic (mm Hg) 2021 17:00:00                           [facility]

[facility] [place]

 

             Diastolic (mm Hg) 2021 17:00:00                           [facility]

[facility] [place]

 

             Height       2021 10:00:00                           [facility]

 [facility]

 

             Weight       2021 10:00:00                           [facility]

 [place]

 

             Heart Rate   2020 14:14:00                           [facility]

 [facility]

 

             Systolic (mm Hg) 2020 14:14:00                           [facility]

[facility] [place]

 

             Diastolic (mm Hg) 2020 14:14:00                           [facility]

[facility] [place]

 

             Temperature Oral (F) 2020 13:49:00 98.2 F                    

[facility]

 

             Respitory Rate 2020 13:49:00                           [facility]

[facility]

 

             Height       2020 14:51:00                           [facility]

 [place]

 

             Weight       2020 14:51:00                           [facility]

 [facility]

 

             Heart Rate   2018 08:41:00                           [facility]

 [place]

 

             Systolic (mm Hg) 2018 08:41:00                           [facility]

[facility]

 

             Diastolic (mm Hg) 2018 08:41:00                           [facility]

[facility] [place]

 

             Temperature Oral (F) 2018 07:45:00 97.6 F                    

[facility]

 

             Respitory Rate 2018 07:45:00                           [facility]

[facility] [place]

 

             Weight       2018 05:00:00                           [facility]

 [place]

 

             Height       2018 12:23:00                           [facility]

 [place]







Procedures







                Procedure       Date / Time Performed Performing Clinician Tyler mckenna

 

                [id number]         2022 00:00:00                 [facility]

 

                Chest Single View 2021 20:48:00                 [facility]

[facility]

 

                Anaerobic Blood Culture 2021 00:00:00                 Angelgarret kim [place]

 

                Aerobic Blood Culture 2021 00:00:00                 Angelica Baer

 

                Coronavirus COVID-19 PCR 2021 00:00:00                 [facility]

[facility]

 

                [place] Count    2021 00:00:00                 [facility] 

curiel

 

                Chest Pa And Lat (2 2020 00:00:00                 [facility]



                Views)                                          

 

                Influenza Type B Antigen 2020 00:00:00                 [facility]

[facility]



                Screen                                          

 

                Influenza Type A Antigen 2020 00:00:00                 [facility]

[facility]



                Screen                                          

 

                [place] Count    2020 00:00:00                 [facility] curiel

 

                [id number]       2018 00:00:00                 [facility] curiel

 

                [place] Count    2018 00:00:00                 [facility]

curiel







Plan of Care







             Planned Activity Planned Date Details      Comments     Source

 

             Future Scheduled Test              Instructions Creatinine         

     [facility]



                                       Congestive Heart Failure              



                                       [code = Instructions              



                                       Creatinine Congestive              



                                       Heart Failure]              

 

             Future Scheduled Test              Instructions Creatinine         

     [facility]



                                       Congestive Heart Failure              



                                       [code = Instructions              



                                       Creatinine Congestive              



                                       Heart Failure]              







Encounters







        Start   End     Encounter Admission Attending Care    Care    Encounter 

Source



        Date/Time Date/Time Type    Type    Clinicians Facility Department ID   

   

 

        2022 Inpatient 3       [facility]   CVA     [id number]

 [facility]



        17:30:00 18:40:00                 yashira                    JimRuben Umana                         

 

        2022 Outpatient         [facility]    [id number]

[facility]



        12:05:00 12:05:00                 Merna Gillis



                                                                        [facility]

 

        2022 Outpatient         [facility] PR[facility]    PRIV    241

[id number] [facility]



        09:56:00 09:56:00                 d_J                     [id number] [facility]

l

 

        2022 Outpatient         [facility] PR[facility]    PRIV    241

[id number] [facility]



        04:43:00 04:43:00                 dCINDY                     [id number] [facility]

l

 

        2022 Outpatient         [facility] PR[facility]    PRIV    241

[id number] [facility]



        11:14:00 11:14:00                 dCINDY                     [id number] [facility]

l

 

        2022 Outpatient         [facility]    PRIV    241

[id number] [facility]



        05:23:00 05:23:00                 d_J                     [id number] [facility]

l

 

        2022 Inpatient ASHKAN OVALLES  GHAZALA    MED     [id number]    

BL



        14:52:00 19:20:00                 ANA                           

 

        2022 Inpatient 3       [facility]   CVA     [id number]

 E[facility]



        18:20:00 10:58:00                 hez,                    [id number]    



                                        Anavella                         

 

        2021 Discharged                 [address] 

[address] [facility]



        23:24:00 19:17:00 Inpatient                 r       [facility]      l



                                                        [facility]

[facility]



                                                        INTENSIVE         



                                                        CARE UNIT         

 

        2021 Registered                 [address] 

[id number] [facility]



        18:23:00 18:23:00 Referred                 r       [facility]  [facility]      l



                                                        [facility]

[facility]



                                                        LABORATORY         



                                                        NON-PATIENT         

 

        2020 Discharged                 [facility] [address] 

[address] [facility]



        02:48:00 17:37:00 Inpatient                 r       [facility]  [facility]      l



                                                        [facility]

[place]



                                                        MED/SURG         



                                                        FLOOR           

 

        2019 Registered                 nullFlavo [address] 

[id number] [facility]



        18:59:00 18:59:00 Referred                 r       [facility]  78      l



                                                        [facility]

[facility]



                                                        LABORATORY         



                                                        NON-PATIENT         

 

        2018 Discharged                 nullFlavo [address] 

[address] [facility]



        16:10:00 13:36:00 Inpatient                 r       [facility]  76      l



                                                        [facility]         Florina

nn

 

        2017 Departed                 [address] [id number]

480 [facility]



        10:18:00 16:46:00 Emergency                 r       [facility]  20      l



                                                        [facility]         Florina

nn







Results







           Test Description Test Time  Test Comments Results    Result Comments 

Source









                    CBC W/AUTO DIFF     2022 15:06:00 









                      Test Item  Value      Reference Range Interpretation Comme

nts









             WHITE BLOOD CELL (test code = 2.3 K/mm3    3.5-11.0     LL         

  



             WBC)                                                

 

             RED BLOOD CELL (test code = 2.82 M/mm3   4.70-6.10    L            



             RBC)                                                

 

             HEMOGLOBIN (test code = HGB) 7.3 G/DL     10.4-14.9    L           

 

 

             HEMATOCRIT (test code = HCT) 24.6 %       31.5-44.1    L           

 

 

             MEAN CELL VOLUME (test code = 87.2 Fl      84.5-98.6    N          

  



             MCV)                                                

 

             MEAN CELL HGB (test code = 25.9 pg      27.0-34.2    L            



             MCH)                                                

 

             MEAN CELL HGB CONCETRATION 29.7 G/DL    31.5-34.0    L            



             (test code = MCHC)                                        

 

             RED CELL DISTRIBUTION WIDTH 15.7 SD      11.5-14.5    H            



             (test code = RDW)                                        

 

             PLATELET COUNT (test code = 78 K/mm3     150-450      L            



             PLT)                                                

 

             MEAN PLATELET VOLUME (test 11.00 fL     7.0-10.5     H            



             code = MPV)                                         

 

             NEUTROPHIL % (test code = NT%) 68.6 %       40-76        N         

   

 

             IMMATURE GRANULOCYTE % (test 0.0 %        0.0-5.0      N           

 



             code = IG%)                                         

 

             LYMPHOCYTE % (test code = LY%) 18.1 %       20.5-51.1    L         

   

 

             MONOCYTE % (test code = MO%) 9.5 %        1.7-9.3      H           

 

 

             EOSINOPHIL % (test code = EO%) 3.4 %        0.0-6.0      N         

   

 

             BASOPHIL % (test code = BA%) 0.4 %        0.0-2.0      N           

 

 

             NUCLEATED RBC % (test code = 0.0 /100WBC% 0.0-1.0      N           

 



             NRBC%)                                              

 

             NEUTROPHIL # (test code = NT#) 1.6 K/mm3    1.8-7.6      L         

   

 

             IMMATURE GRANULOCYTE # (test 0.00 x10 3/uL 0.00-0.03    N          

  



             code = IG#)                                         

 

             LYMPHOCYTE # (test code = LY#) 0.4 K/mm3    0.6-3.2      L         

   

 

             MONOCYTE # (test code = MO#) 0.2 K/mm3    0.3-1.1      L           

 

 

             EOSINOPHIL # (test code = EO#) 0.1 K/mm3    0.0-0.4      N         

   

 

             BASOPHIL # (test code = BA#) 0.0 K/mm3    0.0-0.1      N           

 

 

             NUCLEATED RBC # (test code = 0.0 K/mm3    0.0-0.1      N           

 



             NRBC#)                                              

 

             MANUAL DIFF REQUIRED (test NO DIFF/SCN  CRITERIA                  S

LIDE REVIEW CONSISTANT WITH



             code = MDIFF)                                        AUTO DIFFERENT

IAL.



RBC [id number] 15:06:00





             Test Item    Value        Reference Range Interpretation Comments

 

             PLATELET ESTIMATE DECREASED THOUSAND ADEQUATE                  PLAT

ELET COUNT



             (test code =                                        REVIEWED AND



             PLTEST)                                             VERIFIED.PLT ES

T



                                                                 []

 

             PLATELET MORPHOLOGY NORMAL                                 



             (test code =                                        



             PLTMORPH)                                           



CBC W/AUTO [id number] 15:04:00





             Test Item    Value        Reference Range Interpretation Comments

 

             WHITE BLOOD CELL (test code = 3.0 K/mm3    3.5-11.0     L          

  



             WBC)                                                

 

             RED BLOOD CELL (test code = 2.82 M/mm3   4.70-6.10    L            



             RBC)                                                

 

             HEMOGLOBIN (test code = HGB) 7.4 G/DL     10.4-14.9    L           

 

 

             HEMATOCRIT (test code = HCT) 24.8 %       31.5-44.1    L           

 

 

             MEAN CELL VOLUME (test code = 87.9 Fl      84.5-98.6    N          

  



             MCV)                                                

 

             MEAN CELL HGB (test code = MCH) 26.2 pg      27.0-34.2    L        

    

 

             MEAN CELL HGB CONCETRATION 29.8 G/DL    31.5-34.0    L            



             (test code = MCHC)                                        

 

             RED CELL DISTRIBUTION WIDTH 15.9 SD      11.5-14.5    H            



             (test code = RDW)                                        

 

             PLATELET COUNT (test code = 73 K/mm3     150-450      L            



             PLT)                                                

 

             MEAN PLATELET VOLUME (test code 11.10 fL     7.0-10.5     H        

    



             = MPV)                                              

 

             NEUTROPHIL % (test code = NT%) 62.0 %       40-76        N         

   

 

             IMMATURE GRANULOCYTE % (test 1.3 %        0.0-5.0      N           

 



             code = IG%)                                         

 

             LYMPHOCYTE % (test code = LY%) 22.7 %       20.5-51.1    N         

   

 

             MONOCYTE % (test code = MO%) 10.0 %       1.7-9.3      H           

 

 

             EOSINOPHIL % (test code = EO%) 3.0 %        0.0-6.0      N         

   

 

             BASOPHIL % (test code = BA%) 1.0 %        0.0-2.0      N           

 

 

             NUCLEATED RBC % (test code = 0.0 /100WBC% 0.0-1.0      N           

 



             NRBC%)                                              

 

             NEUTROPHIL # (test code = NT#) 1.9 K/mm3    1.8-7.6      N         

   

 

             IMMATURE GRANULOCYTE # (test 0.04 x10 3/uL 0.00-0.03    H          

  



             code = IG#)                                         

 

             LYMPHOCYTE # (test code = LY#) 0.7 K/mm3    0.6-3.2      N         

   

 

             MONOCYTE # (test code = MO#) 0.3 K/mm3    0.3-1.1      N           

 

 

             EOSINOPHIL # (test code = EO#) 0.1 K/mm3    0.0-0.4      N         

   

 

             BASOPHIL # (test code = BA#) 0.0 K/mm3    0.0-0.1      N           

 

 

             NUCLEATED RBC # (test code = 0.0 K/mm3    0.0-0.1      N           

 



             NRBC#)                                              

 

             MANUAL DIFF REQUIRED (test code NO DIFF/SCN  CRITERIA              

    



             = MDIFF)                                            



RBC [id number] 15:04:00





             Test Item    Value        Reference Range Interpretation Comments

 

             ANISOCYTOSIS (test NORMAL       NONE                      



             code = ANISO)                                        

 

             PLATELET ESTIMATE DECREASED    ADEQUATE                  PLT EST CO

UNT



             (test code = PLTEST) THOUSAND                               36,000-

45,000

 

             PLATELET MORPHOLOGY NORMAL                                 



             (test code =                                        



             PLTMORPH)                                           



BASIC METABOLIC [id number] 13:19:00





             Test Item    Value        Reference Range Interpretation Comments

 

             SODIUM (test code = NA) 140 mmol/L   134-147      N            

 

             POTASSIUM (test code = K) 4.1 mmol/L   3.4-5.0      N            

 

             CHLORIDE (test code = CL) 104 mmol/L   100-108      N            

 

             CARBON DIOXIDE (test code = CO2) 28 mmol/L    21-32        N       

     

 

             ANION GAP (test code = GAP) 8.0 GAP calc 4.0-15.0     N            

 

             GLUCOSE (test code = GLU) 314 MG/DL           H            

 

             BLOOD UREA NITROGEN (test code = 57 MG/DL     7-18         H       

     



             BUN)                                                

 

             GLOMERULAR FILTRATION RATE (test 15 estGFR    >60          L       

     



             code = GFR)                                         

 

             CREATININE (test code = CREAT) 3.1 MG/DL    0.6-1.0      H         

   

 

             CALCIUM (test code = CA) 8.6 MG/DL    8.5-10.1     N            



Absolute lymphocyte [id number] 10:01:00





             Test Item    Value        Reference Range Interpretation Comments

 

             Absolute lymphocyte count (test code = 0.4          0.7-4.9        

           



             Absolute lymphocyte count)                                        



[facility] Serum or Plasma by Bromocresol purple 
(BCP) dye binding [id number] 10:01:00





             Test Item    Value        Reference Range Interpretation Comments

 

             Albumin [Mass/volume] in Serum or 2.5          3.4-5.0             

      



             Plasma by Bromocresol purple (BCP) dye                             

           



             binding meth (test code = Albumin                                  

      



             [Mass/volume] in Serum or Plasma by                                

        



             Bromocresol purple (BCP) dye binding                               

         



             meth)                                               



[facility] %20[facility] 10:01[facility]00





             Test Item    Value        Reference Range Interpretation Comments

 

             Basophil % (test code = 0.2          See_Comment                [Au

tomated message] The



             Basophil %)                                         system which ge

nerated



                                                                 this result tra

nsmitted



                                                                 reference range

: <=1.3.



                                                                 The reference r

sharona was



                                                                 not used to int

erpret



                                                                 this result as



                                                                 normal/abnormal

.



[facility] (number/volume[facility]:01:00





             Test Item    Value        Reference Range Interpretation Comments

 

             Blood erythrocytes count 3.38         3.86-4.86                 



             (number/volume) (test code = Blood                                 

       



             erythrocytes count (number/volume))                                

        



[facility]annBlood hematocrit (volume fraction)2021 10:01:00





             Test Item    Value        Reference Range Interpretation Comments

 

             Blood hematocrit (volume fraction) 30.8         36.0-45.0          

       



             (test code = Blood hematocrit (volume                              

          



             fraction))                                          



[facility]annBlood [facility] mean [id number] 10:01:00





             Test Item    Value        Reference Range Interpretation Comments

 

             Blood platelet mean volume (test code = 8.8          7.6-11.3      

            



             Blood platelet mean volume)                                        



[facility]annC [facility] protein [Mass/volume] in Serum or [id number] 
10:01:00





             Test Item    Value        Reference Range Interpretation Comments

 

             C reactive protein [Mass/volume] in 16.00                          

        



             Serum or Plasma (test code = C reactive                            

            



             protein [Mass/volume] in Serum or                                  

      



             Plasma)                                             



[facility]annCalcium [Mass/volume] in Serum or [id number] 10:01:00





             Test Item    Value        Reference Range Interpretation Comments

 

             Calcium [Mass/volume] in Serum or 7.7          8.5-10.1            

      



             Plasma (test code = Calcium                                        



             [Mass/volume] in Serum or Plasma)                                  

      



[facility]annCarbon dioxide, total [Moles/volume] in Serum or Plasma
2021 10:01:00





             Test Item    Value        Reference Range Interpretation Comments

 

             Carbon dioxide, total [Moles/volume] in 30           21-32         

            



             Serum or Plasma (test code = Carbon                                

        



             dioxide, total [Moles/volume] in Serum                             

           



             or Plasma)                                          



[facility]Chemistry [id number] 10:01:00





             Test Item    Value        Reference Range Interpretation Comments

 

             Chemistry procedure (test code = 91.1                        

     



             Chemistry procedure)                                        



[facility]annChloride [Moles/volume] in Serum or [id number] 10:01:00





             Test Item    Value        Reference Range Interpretation Comments

 

             Chloride [Moles/volume] in Serum or 104                      

        



             Plasma (test code = Chloride                                       

 



             [Moles/volume] in Serum or Plasma)                                 

       



[facility]annCreatinine [Mass/volume] in Serum or [id number] 10:01:00





             Test Item    Value        Reference Range Interpretation Comments

 

             Creatinine [Mass/volume] in Serum or 2.11         0.55-1.3         

         



             Plasma (test code = Creatinine                                     

   



             [Mass/volume] in Serum or Plasma)                                  

      



[facility]annFerritin [Mass/volume] in Serum or [id number] 10:01:00





             Test Item    Value        Reference Range Interpretation Comments

 

             Ferritin [Mass/volume] in Serum or 82.0         8-388              

       



             Plasma (test code = Ferritin                                       

 



             [Mass/volume] in Serum or Plasma)                                  

      



[facility]annGlucose [Mass/volume] in Serum or [id number] 10:01:00





             Test Item    Value        Reference Range Interpretation Comments

 

             Glucose [Mass/volume] in Serum or 236                        

      



             Plasma (test code = Glucose                                        



             [Mass/volume] in Serum or Plasma)                                  

      



[facility]annLymphocyte [id number] 10:01:00





             Test Item    Value        Reference Range Interpretation Comments

 

             Lymphocyte percent (test code = 3.30         4.3-10.9              

    



             Lymphocyte percent)                                        



[facility]annPhosphate [Mass/volume] in Serum or [id number] 10:01:00





             Test Item    Value        Reference Range Interpretation Comments

 

             Phosphate [Mass/volume] in Serum or 3.0          2.5-4.9           

        



             Plasma (test code = Phosphate                                      

  



             [Mass/volume] in Serum or Plasma)                                  

      



[facility]annPotassium [Moles/volume] in Serum or [id number] 10:01:00





             Test Item    Value        Reference Range Interpretation Comments

 

             Potassium [Moles/volume] in Serum or 4.2          3.5-5.1          

         



             Plasma (test code = Potassium                                      

  



             [Moles/volume] in Serum or Plasma)                                 

       



[facility]annSer[facility] or plasma sodium measurement (moles/volume)2021 
10:01:00





             Test Item    Value        Reference Range Interpretation Comments

 

             Serum or plasma sodium measurement 139          136-145            

       



             (moles/volume) (test code = Serum or                               

         



             plasma sodium measurement                                        



             (moles/volume))                                        



[facility]annUrea nitrogen [Mass/volume] in Serum or [id number] 
10:01:00





             Test Item    Value        Reference Range Interpretation Comments

 

             Urea nitrogen [Mass/volume] in Serum or 69           7-18          

            



             Plasma (test code = Urea nitrogen                                  

      



             [Mass/volume] in Serum or Plasma)                                  

      



[facility]annTotal cholesterol/cholesterol in HDL (percentile)2021 
09:51:00





             Test Item    Value        Reference Range Interpretation Comments

 

             Total cholesterol/cholesterol in HDL 4.25 1                        

         



             (percentile) (test code = Total                                    

    



             cholesterol/cholesterol in HDL                                     

   



             (percentile))                                        



[facility]annTriglyceride [Mass/volume] in Serum or [id number] 09:51:00





             Test Item    Value        Reference Range Interpretation Comments

 

             Triglyceride [Mass/volume] in Serum or 91                          

           



             Plasma (test code = Triglyceride                                   

     



             [Mass/volume] in Serum or Plasma)                                  

      



[facility]Blood anisocytosis [id number] 09:51:00





             Test Item    Value        Reference Range Interpretation Comments

 

             Blood anisocytosis detection (test code 1+                         

            



             = Blood anisocytosis detection)                                    

    



[facility]annBlood morphology interpretation [id number] 09:51:00





             Test Item    Value        Reference Range Interpretation Comments

 

             Blood morphology interpretation Noted                              

    



             narrative (test code = Blood morphology                            

            



             interpretation narrative)                                        



[facility]annCholesterol in HDL [Mass/volume] in Serum or [id number] 
09:51:00





             Test Item    Value        Reference Range Interpretation Comments

 

             Cholesterol in HDL [Mass/volume] in 28           40-60             

        



             Serum or Plasma (test code =                                       

 



             Cholesterol in HDL [Mass/volume] in                                

        



             Serum or Plasma)                                        



[facility]annCholesterol [Mass/volume] in Serum or [id number] 09:51:00





             Test Item    Value        Reference Range Interpretation Comments

 

             Cholesterol [Mass/volume] in Serum or 119                          

          



             Plasma (test code = Cholesterol                                    

    



             [Mass/volume] in Serum or Plasma)                                  

      



[facility]annLymphocyte [id number] 09:51:00





             Test Item    Value        Reference Range Interpretation Comments

 

             Lymphocyte percent (test code = 21           15-42                 

    



             Lymphocyte percent)                                        



[facility]annMagnesium [Mass/volume] in Serum or [id number] 09:51:00





             Test Item    Value        Reference Range Interpretation Comments

 

             Magnesium [Mass/volume] in Serum or 1.9          1.8-2.4           

        



             Plasma (test code = Magnesium                                      

  



             [Mass/volume] in Serum or Plasma)                                  

      



[facility]annPlatelet [id number] 09:51:00





             Test Item    Value        Reference Range Interpretation Comments

 

             Platelet estimate (test code = Platelet Decr                       

            



             estimate)                                           



[facility]annSerum or plasma cholesterol in LDL measurement by calculation 
(mass/volume)2021 09:51:00





             Test Item    Value        Reference Range Interpretation Comments

 

             Serum or plasma cholesterol in LDL 73 1                            

       



             measurement by calculation                                        



             (mass/volume) (test code = Serum or                                

        



             plasma cholesterol in LDL measurement                              

          



             by calculation (mass/volume))                                      

  



[facility]annTroponin I.cardiac [Mass/volume] in Serum or [id number] 
05:11:00





             Test Item    Value        Reference Range Interpretation Comments

 

             Troponin I.cardiac 0.08         See_Comment                [Automat

ed message] The



             [Mass/volume] in Serum                                        syste

m which generated



             or Plasma (test code =                                        this 

result transmitted



             Troponin I.cardiac                                        reference

 range: <=0.045.



             [Mass/volume] in Serum                                        The r

eference range was



             or Plasma)                                          not used to int

erpret



                                                                 this result as



                                                                 normal/abnormal

.



[facility]ounkvCV[facility] [Presence] in Respiratory specimen by 
ANJUM with probe [id number] 21:20:00





             Test Item    Value        Reference Range Interpretation Comments

 

             SARS-CoV-2 (COVID-19) RNA [Presence] Positive                      

         



             in Respiratory specimen by ANJUM with                                

        



             probe detect (test code = SARS-CoV-2                               

         



             (COVID-19) RNA [Presence] in                                       

 



             Respiratory specimen by ANJUM with                                   

     



             probe detect)                                        



[facility]annAlanine aminotransferase [Enzymatic activity/volume] in Serum or
Plasma by With P-5'-2021 21:09:00





             Test Item    Value        Reference Range Interpretation Comments

 

             Alanine aminotransferase [Enzymatic 22           12-78             

        



             activity/volume] in Serum or Plasma by                             

           



             With P-5'- (test code = Alanine                                    

    



             aminotransferase [Enzymatic                                        



             activity/volume] in Serum or Plasma by                             

           



             With P-5'-)                                         



[facility] NoahannAlkaline phosphatase [Enzymatic activity/volume] in Serum or 
[id number] 21:09:00





             Test Item    Value        Reference Range Interpretation Comments

 

             Alkaline phosphatase [Enzymatic 71                           

    



             activity/volume] in Serum or Plasma                                

        



             (test code = Alkaline phosphatase                                  

      



             [Enzymatic activity/volume] in Serum or                            

            



             Plasma)                                             



[facility]annAspartate aminotransferase [Enzymatic activity/volume] in Serum 
or Plasma by With [id number] 21:09:00





             Test Item    Value        Reference Range Interpretation Comments

 

             Aspartate aminotransferase [Enzymatic 36           15-37           

          



             activity/volume] in Serum or Plasma by                             

           



             With P-5 (test code = Aspartate                                    

    



             aminotransferase [Enzymatic                                        



             activity/volume] in Serum or Plasma by                             

           



             With P-5)                                           



[facility]annBilirubin.direct [Mass/volume] in Serum or [id number] 
21:09:00





             Test Item    Value        Reference Range Interpretation Comments

 

             Bilirubin.direct 0.3          See_Comment                [Automated

 message] The



             [Mass/volume] in Serum                                        syste

m which generated



             or Plasma (test code =                                        this 

result transmitted



             Bilirubin.direct                                        reference r

sharona: <=0.2.



             [Mass/volume] in Serum                                        The r

eference range was



             or Plasma)                                          not used to int

erpret this



                                                                 result as fabiano

l/abnormal.



[facility]annBilirubin.total [Mass/volume] in Serum or [id number] 
21:09:00





             Test Item    Value        Reference Range Interpretation Comments

 

             Bilirubin.total [Mass/volume] in Serum 0.7          0.2-1.0        

           



             or Plasma (test code = Bilirubin.total                             

           



             [Mass/volume] in Serum or Plasma)                                  

      



[facility]annINR in Blood by Coagulation [id number] 21:09:00





             Test Item    Value        Reference Range Interpretation Comments

 

             INR in Blood by Coagulation assay 1.28 1                           

      



             (test code = INR in Blood by                                       

 



             Coagulation assay)                                        



[facility]annNatriuretic peptide.B prohormone N-Terminal [Mass/volume] in 
Serum or [id number] 21:09:00





             Test Item    Value        Reference Range Interpretation Comments

 

             Natriuretic peptide.B prohormone 2354                              

     



             N-Terminal [Mass/volume] in Serum or                               

         



             Plasma (test code = Natriuretic                                    

    



             peptide.B prohormone N-Terminal                                    

    



             [Mass/volume] in Serum or Plasma)                                  

      



[facility]annPeripheral blood smear examination by light [id number]
 21:09:00





             Test Item    Value        Reference Range Interpretation Comments

 

             Peripheral blood smear examination by Ok                           

          



             light microscopy (test code =                                      

  



             Peripheral blood smear examination by                              

          



             light microscopy)                                        



[facility]annProtein [Mass/volume] in Serum or [id number] 21:09:00





             Test Item    Value        Reference Range Interpretation Comments

 

             Protein [Mass/volume] in Serum or 6.6          6.4-8.2             

      



             Plasma (test code = Protein                                        



             [Mass/volume] in Serum or Plasma)                                  

      



[facility]annTroponin I.cardiac [Mass/volume] in Serum or [id number] 
21:09:00





             Test Item    Value        Reference Range Interpretation Comments

 

             Troponin I.cardiac 0.13         See_Comment                [Automat

ed message] The



             [Mass/volume] in Serum                                        syste

m which generated



             or Plasma (test code =                                        this 

result transmitted



             Troponin I.cardiac                                        reference

 range: <=0.045.



             [Mass/volume] in Serum                                        The r

eference range was



             or Plasma)                                          not used to int

erpret



                                                                 this result as



                                                                 normal/abnormal

.



[facility]annAlbumin [Mass/volume] in Serum or Plasma by Bromocresol purple 
(BCP) dye binding [id number] 17:30:00





             Test Item    Value        Reference Range Interpretation Comments

 

             Albumin [Mass/volume] in Serum or 2.8          3.4-5.0             

      



             Plasma by Bromocresol purple (BCP) dye                             

           



             binding meth (test code = Albumin                                  

      



             [Mass/volume] in Serum or Plasma by                                

        



             Bromocresol purple (BCP) dye binding                               

         



             meth)                                               



[facility]annBacteria detection in urine sediment by light microscopy
2021 17:30:00





             Test Item    Value        Reference Range Interpretation Comments

 

             Bacteria detection in urine sediment <20 /HPF                      

         



             by light microscopy (test code =                                   

     



             Bacteria detection in urine sediment                               

         



             by light microscopy)                                        



[facility]annCalcium [Mass/volume] in Serum or [id number] 17:30:00





             Test Item    Value        Reference Range Interpretation Comments

 

             Calcium [Mass/volume] in Serum or 8.2          8.5-10.1            

      



             Plasma (test code = Calcium                                        



             [Mass/volume] in Serum or Plasma)                                  

      



[facility]annCarbon dioxide, total [Moles/volume] in Serum or Plasma
2021 17:30:00





             Test Item    Value        Reference Range Interpretation Comments

 

             Carbon dioxide, total [Moles/volume] in 32           21-32         

            



             Serum or Plasma (test code = Carbon                                

        



             dioxide, total [Moles/volume] in Serum                             

           



             or Plasma)                                          



[facility]Chloride [Moles/volume] in Serum or [id number] 17:30:00





             Test Item    Value        Reference Range Interpretation Comments

 

             Chloride [Moles/volume] in Serum or 107                      

        



             Plasma (test code = Chloride                                       

 



             [Moles/volume] in Serum or Plasma)                                 

       



[facility]/volume] in Serum or [id number] 17:30:00





             Test Item    Value        Reference Range Interpretation Comments

 

             Creatinine [Mass/volume] in Serum or 1.51         0.55-1.3         

         



             Plasma (test code = Creatinine                                     

   



             [Mass/volume] in Serum or Plasma)                                  

      



[facility]annGlucose [Mass/volume] in Serum or [id number] 17:30:00





             Test Item    Value        Reference Range Interpretation Comments

 

             Glucose [Mass/volume] in Serum or 133                        

      



             Plasma (test code = Glucose                                        



             [Mass/volume] in Serum or Plasma)                                  

      



[facility]annLymphocyte [id number] 17:30:00





             Test Item    Value        Reference Range Interpretation Comments

 

             Lymphocyte percent (test BETWEEN 10,000 and                        

   



             code = Lymphocyte 100,000 CFU/ML                           



             percent)                                            



[facility]annPhosphate [Mass/volume] in Serum or [id number] 17:30:00





             Test Item    Value        Reference Range Interpretation Comments

 

             Phosphate [Mass/volume] in Serum or 3.0          2.5-4.9           

        



             Plasma (test code = Phosphate                                      

  



             [Mass/volume] in Serum or Plasma)                                  

      



[facility]annPotassium [Moles/volume] in Serum or [id number] 17:30:00





             Test Item    Value        Reference Range Interpretation Comments

 

             Potassium [Moles/volume] in Serum or 3.6          3.5-5.1          

         



             Plasma (test code = Potassium                                      

  



             [Moles/volume] in Serum or Plasma)                                 

       



[facility]annProtein [Mass/volume] in [id number] 17:30:00





             Test Item    Value        Reference Range Interpretation Comments

 

             Protein [Mass/volume] in Urine (test 20                            

         



             code = Protein [Mass/volume] in Urine)                             

           



[facility]annSerum or plasma sodium measurement (moles/volume)2021 
17:30:00





             Test Item    Value        Reference Range Interpretation Comments

 

             Serum or plasma sodium measurement 144          136-145            

       



             (moles/volume) (test code = Serum or                               

         



             plasma sodium measurement                                        



             (moles/volume))                                        



[facility]annUrate [Mass/volume] in Serum or [id number] 17:30:00





             Test Item    Value        Reference Range Interpretation Comments

 

             Urate [Mass/volume] in Serum or Plasma 5.8          2.6-6.0        

           



             (test code = Urate [Mass/volume] in                                

        



             Serum or Plasma)                                        



[facility]annUrea nitrogen [Mass/volume] in Serum or [id number] 
17:30:00





             Test Item    Value        Reference Range Interpretation Comments

 

             Urea nitrogen [Mass/volume] in Serum or 36           7-18          

            



             Plasma (test code = Urea nitrogen                                  

      



             [Mass/volume] in Serum or Plasma)                                  

      



[facility]annUrinalysis with [id number] 17:30:00





             Test Item    Value        Reference Range Interpretation Comments

 

             Urinalysis with microscopy (test Negative                          

     



             code = Urinalysis with microscopy)                                 

       



[facility]annU[facility] appearance [id number] 17:30:00





             Test Item    Value        Reference Range Interpretation Comments

 

             Urine appearance determination (test Clear                         

         



             code = Urine appearance determination)                             

           



[facility] bil[facility] [id number] 17:30:00





             Test Item    Value        Reference Range Interpretation Comments

 

             Urine bilirubin detection (test code Negative                      

         



             = Urine bilirubin detection)                                       

 



[facility] blood [id number] 17:30:00





             Test Item    Value        Reference Range Interpretation Comments

 

             Urine blood detection (test code = Negative                        

       



             Urine blood detection)                                        



[facility] [id number] 17:30:00





             Test Item    Value        Reference Range Interpretation Comments

 

             Urine color (test code = Urine color) Yellow                       

          



[facility] glucose [id number] 17:30:00





             Test Item    Value        Reference Range Interpretation Comments

 

             Urine glucose detection (test code = Negative                      

         



             Urine glucose detection)                                        



[facility] [id number] 17:30:00





             Test Item    Value        Reference Range Interpretation Comments

 

             Urine pH (test code = Urine pH) 5.5 1                              

    



[facility] sediment erythrocyte count by microscopy (number/high 
power field)2021 17:30:00





             Test Item    Value        Reference Range Interpretation Comments

 

             Urine sediment erythrocyte None seen /HPF                          

 



             count by microscopy                                        



             (number/high power field)                                        



             (test code = Urine sediment                                        



             erythrocyte count by                                        



             microscopy (number/high power                                      

  



             field))                                             



[facility] specific gravity [id number] 17:30:00





             Test Item    Value        Reference Range Interpretation Comments

 

             Urine specific gravity measurement 1.015 1                         

       



             (test code = Urine specific gravity                                

        



             measurement)                                        



[facility]annUrine urobilinogen [id number] 17:30:00





             Test Item    Value        Reference Range Interpretation Comments

 

             Urine urobilinogen detection (test code 0.2                        

            



             = Urine urobilinogen detection)                                    

    



[facility]annUrine [id number] 17:30:00





             Test Item    Value        Reference Range Interpretation Comments

 

             Urine culture (test code = Urine MIXED KEVIN.                      

     



             culture)                                            



[facility]annAbsolute lymphocyte [id number] 11:13:00





             Test Item    Value        Reference Range Interpretation Comments

 

             Absolute lymphocyte count (test code = 0.7          0.7-4.9        

           



             Absolute lymphocyte count)                                        



[facility]annBasophil %2020 11:13:00





             Test Item    Value        Reference Range Interpretation Comments

 

             Basophil % (test code = 0.6          See_Comment                [Au

tomated message] The



             Basophil %)                                         system which ge

nerated



                                                                 this result tra

nsmitted



                                                                 reference range

: <=1.3.



                                                                 The reference r

sharona was



                                                                 not used to int

erpret



                                                                 this result as



                                                                 normal/abnormal

.



[facility]annBlood erythrocytes count (number/volume)2020 11:13:00





             Test Item    Value        Reference Range Interpretation Comments

 

             Blood erythrocytes count 3.31         3.86-4.86                 



             (number/volume) (test code = Blood                                 

       



             erythrocytes count (number/volume))                                

        



[facility]annBlood hematocrit (volume fraction)2020 11:13:00





             Test Item    Value        Reference Range Interpretation Comments

 

             Blood hematocrit (volume fraction) 29.3         36.0-45.0          

       



             (test code = Blood hematocrit (volume                              

          



             fraction))                                          



[facility]annBlood platelet mean [id number] 11:13:00





             Test Item    Value        Reference Range Interpretation Comments

 

             Blood platelet mean volume (test code = 9.4          7.6-11.3      

            



             Blood platelet mean volume)                                        



[facility]annCalcium [Mass/volume] in Serum or [id number] 11:13:00





             Test Item    Value        Reference Range Interpretation Comments

 

             Calcium [Mass/volume] in Serum or 7.8          8.5-10.1            

      



             Plasma (test code = Calcium                                        



             [Mass/volume] in Serum or Plasma)                                  

      



[facility]annCarbon dioxide, total [Moles/volume] in Serum or Plasma
2020 11:13:00





             Test Item    Value        Reference Range Interpretation Comments

 

             Carbon dioxide, total [Moles/volume] in 33           21-32         

            



             Serum or Plasma (test code = Carbon                                

        



             dioxide, total [Moles/volume] in Serum                             

           



             or Plasma)                                          



[facility]annChemistry [id number] 11:13:00





             Test Item    Value        Reference Range Interpretation Comments

 

             Chemistry procedure (test code = 88.6                        

     



             Chemistry procedure)                                        



[facility]annChloride [Moles/volume] in Serum or [id number] 11:13:00





             Test Item    Value        Reference Range Interpretation Comments

 

             Chloride [Moles/volume] in Serum or 103                      

        



             Plasma (test code = Chloride                                       

 



             [Moles/volume] in Serum or Plasma)                                 

       



[facility]annCreatinine [Mass/volume] in Serum or [id number] 11:13:00





             Test Item    Value        Reference Range Interpretation Comments

 

             Creatinine [Mass/volume] in Serum or 2.21         0.55-1.3         

         



             Plasma (test code = Creatinine                                     

   



             [Mass/volume] in Serum or Plasma)                                  

      



[facility]annGlucose [Mass/volume] in Serum or [id number] 11:13:00





             Test Item    Value        Reference Range Interpretation Comments

 

             Glucose [Mass/volume] in Serum or 140                        

      



             Plasma (test code = Glucose                                        



             [Mass/volume] in Serum or Plasma)                                  

      



[facility]annMagnesium [Mass/volume] in Serum or [id number] 11:13:00





             Test Item    Value        Reference Range Interpretation Comments

 

             Magnesium [Mass/volume] in Serum or 1.8          1.8-2.4           

        



             Plasma (test code = Magnesium                                      

  



             [Mass/volume] in Serum or Plasma)                                  

      



[facility]annPhosphate [Mass/volume] in Serum or [id number] 11:13:00





             Test Item    Value        Reference Range Interpretation Comments

 

             Phosphate [Mass/volume] in Serum or 2.1          2.5-4.9           

        



             Plasma (test code = Phosphate                                      

  



             [Mass/volume] in Serum or Plasma)                                  

      



[facility]annPotassium [Moles/volume] in Serum or [id number] 11:13:00





             Test Item    Value        Reference Range Interpretation Comments

 

             Potassium [Moles/volume] in Serum or 3.8          3.5-5.1          

         



             Plasma (test code = Potassium                                      

  



             [Moles/volume] in Serum or Plasma)                                 

       



[facility] sodium [facility] (moles/volume)2020 
11:13:00





             Test Item    Value        Reference Range Interpretation Comments

 

             Serum or plasma sodium measurement 141          136-145            

       



             (moles/volume) (test code = Serum or                               

         



             plasma sodium measurement                                        



             (moles/volume))                                        



[facility]annUrea nitrogen [Mass/volume] in Serum or [id number] 
11:13:00





             Test Item    Value        Reference Range Interpretation Comments

 

             Urea nitrogen [Mass/volume] in Serum or 50           7-18          

            



             Plasma (test code = Urea nitrogen                                  

      



             [Mass/volume] in Serum or Plasma)                                  

      



[facility]annUrine dipstick testing at point-of-[id number] 11:13:00





             Test Item    Value        Reference Range Interpretation Comments

 

             Urine dipstick testing at point-of-care 3.2          4.3-10.9      

            



             (test code = Urine dipstick testing at                             

           



             point-of-care)                                        



[facility]annAlanine aminotransferase [Enzymatic activity/volume] in Serum or
Plasma by With P-5'-2020 11:27:00





             Test Item    Value        Reference Range Interpretation Comments

 

             Alanine aminotransferase [Enzymatic 16           12-78             

        



             activity/volume] in Serum or Plasma by                             

           



             With P-5'- (test code = Alanine                                    

    



             aminotransferase [Enzymatic                                        



             activity/volume] in Serum or Plasma by                             

           



             With P-5'-)                                         



[facility]annAlbumin [Mass/volume] in Serum or Plasma by Bromocresol purple 
(BCP) dye binding [id number] 11:27:00





             Test Item    Value        Reference Range Interpretation Comments

 

             Albumin [Mass/volume] in Serum or 2.5          3.4-5.0             

      



             Plasma by Bromocresol purple (BCP) dye                             

           



             binding meth (test code = Albumin                                  

      



             [Mass/volume] in Serum or Plasma by                                

        



             Bromocresol purple (BCP) dye binding                               

         



             meth)                                               



[facility]annAlkaline phosphatase [Enzymatic activity/volume] in Serum or 
[id number] 11:27:00





             Test Item    Value        Reference Range Interpretation Comments

 

             Alkaline phosphatase [Enzymatic 72                           

    



             activity/volume] in Serum or Plasma                                

        



             (test code = Alkaline phosphatase                                  

      



             [Enzymatic activity/volume] in Serum or                            

            



             Plasma)                                             



[facility]annAspartate aminotransferase [Enzymatic activity/volume] in Serum 
or Plasma by With [id number] 11:27:00





             Test Item    Value        Reference Range Interpretation Comments

 

             Aspartate aminotransferase [Enzymatic 23           15-37           

          



             activity/volume] in Serum or Plasma by                             

           



             With P-5 (test code = Aspartate                                    

    



             aminotransferase [Enzymatic                                        



             activity/volume] in Serum or Plasma by                             

           



             With P-5)                                           



[facility]annBilirubin.total [Mass/volume] in Serum or [id number] 
11:27:00





             Test Item    Value        Reference Range Interpretation Comments

 

             Bilirubin.total [Mass/volume] in Serum 1.0          0.2-1.0        

           



             or Plasma (test code = Bilirubin.total                             

           



             [Mass/volume] in Serum or Plasma)                                  

      



[facility]annBlood morphology interpretation [id number] 11:27:00





             Test Item    Value        Reference Range Interpretation Comments

 

             Blood morphology interpretation Not seen                           

    



             narrative (test code = Blood                                       

 



             morphology interpretation narrative)                               

         



[facility]annNatriuretic peptide.B prohormone N-Terminal [Mass/volume] in 
Serum or [id number] 11:27:00





             Test Item    Value        Reference Range Interpretation Comments

 

             Natriuretic peptide.B prohormone 493                               

     



             N-Terminal [Mass/volume] in Serum or                               

         



             Plasma (test code = Natriuretic                                    

    



             peptide.B prohormone N-Terminal                                    

    



             [Mass/volume] in Serum or Plasma)                                  

      



[facility]annProtein [Mass/volume] in Serum or [id number] 11:27:00





             Test Item    Value        Reference Range Interpretation Comments

 

             Protein [Mass/volume] in Serum or 6.7          6.4-8.2             

      



             Plasma (test code = Protein                                        



             [Mass/volume] in Serum or Plasma)                                  

      



[facility]annTroponin I.cardiac [Mass/volume] in Serum or [id number] 
11:27:00





             Test Item    Value        Reference Range Interpretation Comments

 

             Troponin I.cardiac < 0.02       See_Comment                [Automat

ed message] The



             [Mass/volume] in Serum                                        syste

m which generated



             or Plasma (test code =                                        this 

result transmitted



             Troponin I.cardiac                                        reference

 range: <=0.045.



             [Mass/volume] in Serum                                        The r

eference range was



             or Plasma)                                          not used to int

erpret



                                                                 this result as



                                                                 normal/abnormal

.



[facility]Urinal[facility] with [id number] 02:23:00





             Test Item    Value        Reference Range Interpretation Comments

 

             Urinalysis with microscopy (test Negative                          

     



             code = Urinalysis with microscopy)                                 

       



[facility]Urine blood [id number] 02:23:00





             Test Item    Value        Reference Range Interpretation Comments

 

             Urine blood detection (test code = Negative                        

       



             Urine blood detection)                                        



[facility] [id number] 02:23:00





             Test Item    Value        Reference Range Interpretation Comments

 

             Urine glucose detection (test code = Negative                      

         



             Urine glucose detection)                                        



[facility] CenterUrine [id number] 02:23:00





             Test Item    Value        Reference Range Interpretation Comments

 

             Urine pH (test code = Urine pH) 5.0 1                              

    



[facility]annUrine specific gravity [id number] 02:23:00





             Test Item    Value        Reference Range Interpretation Comments

 

             Urine specific gravity measurement 1.015 1                         

       



             (test code = Urine specific gravity                                

        



             measurement)                                        



[facility]annUrinalysis with reflex to [id number] 02:14:00





             Test Item    Value        Reference Range Interpretation Comments

 

             Urinalysis with reflex to culture Reflexed                         

      



             (test code = Urinalysis with reflex                                

        



             to culture)                                         



[facility]annUrine sediment erythrocyte count by microscopy (number/high 
power field)2020 02:14:00





             Test Item    Value        Reference Range Interpretation Comments

 

             Urine sediment erythrocyte count by <5                             

        



             microscopy (number/high power field)                               

         



             (test code = Urine sediment erythrocyte                            

            



             count by microscopy (number/high power                             

           



             field))                                             



[facility]Mass/volume[facility] in Serum or [id number] 
00:14:00





             Test Item    Value        Reference Range Interpretation Comments

 

             Bilirubin.direct 0.3          See_Comment                [Automated

 message] The



             [Mass/volume] in Serum                                        syste

m which generated



             or Plasma (test code =                                        this 

result transmitted



             Bilirubin.direct                                        reference r

sharona: <=0.2.



             [Mass/volume] in Serum                                        The r

eference range was



             or Plasma)                                          not used to int

erpret this



                                                                 result as fabiano

l/abnormal.



[facility] Coagulation [id number] 00:14:00





             Test Item    Value        Reference Range Interpretation Comments

 

             INR in Blood by Coagulation assay 1.30 1                           

      



             (test code = INR in Blood by                                       

 



             Coagulation assay)                                        



[facility]annLactate [Moles/volume] in Serum or [id number] 00:14:00





             Test Item    Value        Reference Range Interpretation Comments

 

             Lactate [Moles/volume] in Serum or 1.1          0.4-2.0            

       



             Plasma (test code = Lactate                                        



             [Moles/volume] in Serum or Plasma)                                 

       



[facility]annTroponin I.cardiac [Mass/volume] in Serum or [id number] 
00:14:00





             Test Item    Value        Reference Range Interpretation Comments

 

             Troponin I.cardiac < 0.02       See_Comment                [Automat

ed message] The



             [Mass/volume] in Serum                                        syste

m which generated



             or Plasma (test code =                                        this 

result transmitted



             Troponin I.cardiac                                        reference

 range: <=0.045.



             [Mass/volume] in Serum                                        The r

eference range was



             or Plasma)                                          not used to int

erpret



                                                                 this result as



                                                                 normal/abnormal

.



[facility]annAlbumin [Mass/volume] in Serum or Plasma by Bromocresol purple 
(BCP) dye binding [id number] 17:00:00





             Test Item    Value        Reference Range Interpretation Comments

 

             Albumin [Mass/volume] in Serum or 2.9          3.4-5.0             

      



             Plasma by Bromocresol purple (BCP) dye                             

           



             binding meth (test code = Albumin                                  

      



             [Mass/volume] in Serum or Plasma by                                

        



             Bromocresol purple (BCP) dye binding                               

         



             meth)                                               



[facility]annBlood hematocrit (volume fraction)2019 17:00:00





             Test Item    Value        Reference Range Interpretation Comments

 

             Blood hematocrit (volume fraction) 31.2         36.0-45.0          

       



             (test code = Blood hematocrit (volume                              

          



             fraction))                                          



[facility]annCalcium [Mass/volume] in Serum or [id number] 17:00:00





             Test Item    Value        Reference Range Interpretation Comments

 

             Calcium [Mass/volume] in Serum or 8.6          8.5-10.1            

      



             Plasma (test code = Calcium                                        



             [Mass/volume] in Serum or Plasma)                                  

      



[facility]annCarbon dioxide, total [Moles/volume] in Serum or Plasma
2019 17:00:00





             Test Item    Value        Reference Range Interpretation Comments

 

             Carbon dioxide, total [Moles/volume] in 31           21-32         

            



             Serum or Plasma (test code = Carbon                                

        



             dioxide, total [Moles/volume] in Serum                             

           



             or Plasma)                                          



[facility]annChloride [Moles/volume] in Serum or [id number] 17:00:00





             Test Item    Value        Reference Range Interpretation Comments

 

             Chloride [Moles/volume] in Serum or 108                      

        



             Plasma (test code = Chloride                                       

 



             [Moles/volume] in Serum or Plasma)                                 

       



[facility]annCreatinine [Mass/volume] in Serum or [id number] 17:00:00





             Test Item    Value        Reference Range Interpretation Comments

 

             Creatinine [Mass/volume] in Serum or 1.83         0.55-1.3         

         



             Plasma (test code = Creatinine                                     

   



             [Mass/volume] in Serum or Plasma)                                  

      



[facility]annCreatinine [Mass/volume] in [id number] 17:00:00





             Test Item    Value        Reference Range Interpretation Comments

 

             Creatinine [Mass/volume] in Urine (test 50.0                 

            



             code = Creatinine [Mass/volume] in                                 

       



             Urine)                                              



[facility]annGlucose [Mass/volume] in Serum or [id number] 17:00:00





             Test Item    Value        Reference Range Interpretation Comments

 

             Glucose [Mass/volume] in Serum or 132                        

      



             Plasma (test code = Glucose                                        



             [Mass/volume] in Serum or Plasma)                                  

      



[facility] HermannParathyrin.intact [Mass/volume] in Serum or [id number] 
17:00:00





             Test Item    Value        Reference Range Interpretation Comments

 

             Parathyrin.intact [Mass/volume] in 59.2         18.4-80.1          

       



             Serum or Plasma (test code =                                       

 



             Parathyrin.intact [Mass/volume] in                                 

       



             Serum or Plasma)                                        



[facility]annPhosphate [Mass/volume] in Serum or [id number] 17:00:00





             Test Item    Value        Reference Range Interpretation Comments

 

             Phosphate [Mass/volume] in Serum or 3.3          2.5-4.9           

        



             Plasma (test code = Phosphate                                      

  



             [Mass/volume] in Serum or Plasma)                                  

      



[facility]annPotassium [Moles/volume] in Serum or [id number] 17:00:00





             Test Item    Value        Reference Range Interpretation Comments

 

             Potassium [Moles/volume] in Serum or 3.5          3.5-5.1          

         



             Plasma (test code = Potassium                                      

  



             [Moles/volume] in Serum or Plasma)                                 

       



[facility]annProtein [Mass/volume] in [id number] 17:00:00





             Test Item    Value        Reference Range Interpretation Comments

 

             Protein [Mass/volume] in Urine (test 6                             

         



             code = Protein [Mass/volume] in Urine)                             

           



[facility]annSerum or plasma sodium measurement (moles/volume)2019 
17:00:00





             Test Item    Value        Reference Range Interpretation Comments

 

             Serum or plasma sodium measurement 145          136-145            

       



             (moles/volume) (test code = Serum or                               

         



             plasma sodium measurement                                        



             (moles/volume))                                        



[facility]annUrate [Mass/volume] in Serum or [id number] 17:00:00





             Test Item    Value        Reference Range Interpretation Comments

 

             Urate [Mass/volume] in Serum or Plasma 10.4         2.6-6.0        

           



             (test code = Urate [Mass/volume] in                                

        



             Serum or Plasma)                                        



[facility] HermannUrea nitrogen [Mass/volume] in Serum or [id number] 
17:00:00





             Test Item    Value        Reference Range Interpretation Comments

 

             Urea nitrogen [Mass/volume] in Serum or 54           7-18          

            



             Plasma (test code = Urea nitrogen                                  

      



             [Mass/volume] in Serum or Plasma)                                  

      



[facility][id number] 17:00:00





             Test Item    Value        Reference Range Interpretation Comments

 

             Urine dipstick testing at point-of-care 10.5         12.0-15.0     

            



             (test code = Urine dipstick testing at                             

           



             point-of-care)                                        



[facility]2018-03-05 11:19:00





             Test Item    Value        Reference Range Interpretation Comments

 

             Bedside Glucose (test code = Bedside 271                     

         



             Glucose)                                            



[facility]2018-03-04 05:00:00





             Test Item    Value        Reference Range Interpretation Comments

 

             Thyroid Stimulating Hormone (TSH) (test 1.28         0.34-5.60     

            



             code = Thyroid Stimulating Hormone                                 

       



             (TSH))                                              



[facility]2018-03-04 05:00:00





             Test Item    Value        Reference Range Interpretation Comments

 

             Sodium Level (test code = Sodium Level) 139          135-145       

            



[facility]2018-03-04 05:00:00





             Test Item    Value        Reference Range Interpretation Comments

 

             Potassium Level (test code = Potassium 3.8          3.6-5.0        

           



             Level)                                              



[facility]2018-03-04 05:00:00





             Test Item    Value        Reference Range Interpretation Comments

 

             Glucose Level (test code = Glucose 116                       

       



             Level)                                              



[facility]2018-03-04 05:00:00





             Test Item    Value        Reference Range Interpretation Comments

 

             Estimat Glomerular 29           See_Comment                [Automat

ed message] The



             Filtration Rate (test                                        system

 which generated



             code = Estimat                                        this result t

ransmitted



             Glomerular Filtration                                        refere

nce range: >=90.



             Rate)                                               The reference r

sharona was



                                                                 not used to int

erpret



                                                                 this result as



                                                                 normal/abnormal

.



[facility]2018-03-04 05:00:00





             Test Item    Value        Reference Range Interpretation Comments

 

             Creatinine (test code = Creatinine) 1.69         0.44-1.00         

        



[facility]2018-03-04 05:00:00





             Test Item    Value        Reference Range Interpretation Comments

 

             Chloride Level (test code = Chloride 102          101-111          

         



             Level)                                              



[facility]2018-03-04 05:00:00





             Test Item    Value        Reference Range Interpretation Comments

 

             Carbon Dioxide Level (test code = 32           21-31               

      



             Carbon Dioxide Level)                                        



[facility]2018-03-04 05:00:00





             Test Item    Value        Reference Range Interpretation Comments

 

             Calcium Level (test code = Calcium 8.4          8.5-10.5           

       



             Level)                                              



[facility]2018-03-04 05:00:00





             Test Item    Value        Reference Range Interpretation Comments

 

             Blood Urea Nitrogen (test code = Blood 33           6-20           

           



             Urea Nitrogen)                                        



[facility]2018-03-04 05:00:00





             Test Item    Value        Reference Range Interpretation Comments

 

             White Blood Count (test code = White 2.2          4.3-10.9         

         



             Blood Count)                                        



[facility]2018-03-04 05:00:00





             Test Item    Value        Reference Range Interpretation Comments

 

             Red Cell Distribution Width (test code 14.5         12.1-15.2      

           



             = Red Cell Distribution Width)                                     

   



[facility]2018-03-04 05:00:00





             Test Item    Value        Reference Range Interpretation Comments

 

             Red Blood Count (test code = Red Blood 3.18         3.86-4.86      

           



             Count)                                              



[facility]2018-03-04 05:00:00





             Test Item    Value        Reference Range Interpretation Comments

 

             Platelet Count (test code = Platelet 95           152-406          

         



             Count)                                              



[facility]2018-03-04 05:00:00





             Test Item    Value        Reference Range Interpretation Comments

 

             Neutrophils % (test code = Neutrophils 48.8         41.7-73.7      

           



             %)                                                  



[facility]2018-03-04 05:00:00





             Test Item    Value        Reference Range Interpretation Comments

 

             Monocytes % (test code = Monocytes %) 10.2         3.3-12.3        

          



[facility]2018-03-04 05:00:00





             Test Item    Value        Reference Range Interpretation Comments

 

             Mean Platelet Volume (test code = Mean 8.3          7.6-11.3       

           



             Platelet Volume)                                        



[facility]2018-03-04 05:00:00





             Test Item    Value        Reference Range Interpretation Comments

 

             Mean Corpuscular Volume (test code = 82.8                    

         



             Mean Corpuscular Volume)                                        



[facility]2018-03-04 05:00:00





             Test Item    Value        Reference Range Interpretation Comments

 

             Mean Corpuscular Hemoglobin Concent 32.8         32.0-36.0         

        



             (test code = Mean Corpuscular                                      

  



             Hemoglobin Concent)                                        



[facility]2018-03-04 05:00:00





             Test Item    Value        Reference Range Interpretation Comments

 

             Mean Corpuscular Hemoglobin (test 27.1 pg      27.0-35.0           

      



             code = Mean Corpuscular Hemoglobin)                                

        



[facility]2018-03-04 05:00:00





             Test Item    Value        Reference Range Interpretation Comments

 

             Lymphocytes % (test code = Lymphocytes 35.1         15.3-44.8      

           



             %)                                                  



[facility]2018-03-04 05:00:00





             Test Item    Value        Reference Range Interpretation Comments

 

             Hemoglobin (test code = Hemoglobin) 8.6          12.0-15.0         

        



[facility]2018-03-04 05:00:00





             Test Item    Value        Reference Range Interpretation Comments

 

             Hematocrit (test code = Hematocrit) 26.3         36.0-45.0         

        



[facility]2018-03-04 05:00:00





             Test Item    Value        Reference Range Interpretation Comments

 

             Eosinophils % (test code 5.0          See_Comment                [A

utomated message] The



             = Eosinophils %)                                        system [facility]

h generated



                                                                 this result tra

nsmitted



                                                                 reference range

: <=4.4.



                                                                 The reference r

sharona was



                                                                 not used to int

erpret



                                                                 this result as



                                                                 normal/abnormal

.



[facility]2018-03-04 05:00:00





             Test Item    Value        Reference Range Interpretation Comments

 

             Basophils % (test code 0.9          See_Comment                [Aut

omated message] The



             = Basophils %)                                        system which 

generated



                                                                 this result tra

nsmitted



                                                                 reference range

: <=1.3.



                                                                 The reference r

shaorna was



                                                                 not used to int

erpret



                                                                 this result as



                                                                 normal/abnormal

.



[facility]2018-03-04 05:00:00





             Test Item    Value        Reference Range Interpretation Comments

 

             Absolute Neutrophil (test code = 1.1          1.8-8.0              

     



             Absolute Neutrophil)                                        



[facility]2018-03-04 05:00:00





             Test Item    Value        Reference Range Interpretation Comments

 

             Absolute Monocytes (CBC) (test code = 0.2          0.1-1.3         

          



             Absolute Monocytes (CBC))                                        



[facility]2018-03-04 05:00:00





             Test Item    Value        Reference Range Interpretation Comments

 

             Absolute Lymphocytes (CBC) (test code = 0.8          0.7-4.9       

            



             Absolute Lymphocytes (CBC))                                        



[facility]2018-03-04 05:00:00





             Test Item    Value        Reference Range Interpretation Comments

 

             Absolute Eosinophils 0.1          See_Comment                [Autom

ated message] The



             (CBC) (test code =                                        system wh

ich generated



             Absolute Eosinophils                                        this re

sult transmitted



             (CBC))                                              reference range

: <=0.5.



                                                                 The reference r

sharona was



                                                                 not used to int

erpret



                                                                 this result as



                                                                 normal/abnormal

.



[facility]2018-03-04 05:00:00





             Test Item    Value        Reference Range Interpretation Comments

 

             Absolute Basophils 0.0          See_Comment                [Automat

ed message] The



             (CBC) (test code =                                        system wh

ich generated



             Absolute Basophils                                        this resu

lt transmitted



             (CBC))                                              reference range

: <=0.5.



                                                                 The reference r

sharona was



                                                                 not used to int

erpret



                                                                 this result as



                                                                 normal/abnormal

.



[facility]2018-03-04 03:35:00





             Test Item    Value        Reference Range Interpretation Comments

 

             Urine Random Total Protein (test code = 13                         

            



             Urine Random Total Protein)                                        



[facility]2018-03-04 03:35:00





             Test Item    Value        Reference Range Interpretation Comments

 

             Urine Protein/Creatinine Ratio (test 0.09 1                        

         



             code = Urine Protein/Creatinine Ratio)                             

           



[facility]2018-03-04 03:35:00





             Test Item    Value        Reference Range Interpretation Comments

 

             Urine Creatinine (test code = Urine 143.6                          

        



             Creatinine)                                         



[facility]2018-03-02 04:23:00





             Test Item    Value        Reference Range Interpretation Comments

 

             Blood Morphology Blood Morphology                           



             Comment (test code = Comment                                



             Blood Morphology                                        



             Comment)                                            



[facility]2018-03-02 04:23:00





             Test Item    Value        Reference Range Interpretation Comments

 

             B-Type Natriuretic Peptide (test code = 35                         

            



             B-Type Natriuretic Peptide)                                        



[facility]2018-03-01 22:25:00





             Test Item    Value        Reference Range Interpretation Comments

 

             Urine pH (test code = Urine pH) 7.0 1                              

    



[facility] [id number] 22:25:00





             Test Item    Value        Reference Range Interpretation Comments

 

             Urine WBC (test code = Urine WBC) no gt                            

      



[facility] [id number] 22:25:00





             Test Item    Value        Reference Range Interpretation Comments

 

             Urine Urobilinogen (test code = Urine 1.0                          

          



             Urobilinogen)                                        



[facility] [id number] 22:25:00





             Test Item    Value        Reference Range Interpretation Comments

 

             Urine Total Protein (test Urine Total Protein                      

     



             code = Urine Total                                        



             Protein)                                            



[facility] [id number] 22:25:00





             Test Item    Value        Reference Range Interpretation Comments

 

             Urine Squamous Epithelial Cells (test no gt                        

          



             code = Urine Squamous Epithelial Cells)                            

            



[facility]2018-03-01 22:25:00





             Test Item    Value        Reference Range Interpretation Comments

 

             Urine Specific Gravity (test code = 1.010 1                        

        



             Urine Specific Gravity)                                        



[facility] [id number] 22:25:00





             Test Item    Value        Reference Range Interpretation Comments

 

             Urine RBC (test code = Urine RBC) Urine RBC                        

      



[facility] [id number] 22:25:00





             Test Item    Value        Reference Range Interpretation Comments

 

             Urine Nitrite (test code = Urine Nitrite                           



             Urine Nitrite)                                        



[facility]2018-03-01 22:25:00





             Test Item    Value        Reference Range Interpretation Comments

 

             Urine Leukocyte Urine Leukocyte                           



             Esterase (test code = Esterase                               



             Urine Leukocyte                                        



             Esterase)                                           



[facility]2018-03-01 22:25:00





             Test Item    Value        Reference Range Interpretation Comments

 

             Urine Ketones (test code = Urine Ketones                           



             Urine Ketones)                                        



[facility]2018-03-01 22:25:00





             Test Item    Value        Reference Range Interpretation Comments

 

             Urine Glucose (test code = Urine Glucose                           



             Urine Glucose)                                        



[facility]2018-03-01 22:25:00





             Test Item    Value        Reference Range Interpretation Comments

 

             Urine Culture Reflexed Urine Culture Reflexed                      

     



             (test code = Urine                                        



             Culture Reflexed)                                        



[facility] [id number] 22:25:00





             Test Item    Value        Reference Range Interpretation Comments

 

             Urine Color (test code = Urine Urine Color                         

   



             Color)                                              



[facility]2018-03-01 22:25:00





             Test Item    Value        Reference Range Interpretation Comments

 

             Urine Blood (test code = Urine Urine Blood                         

   



             Blood)                                              



[facility] [id number] 22:25:00





             Test Item    Value        Reference Range Interpretation Comments

 

             Urine Bilirubin (test code = Urine Bilirubin                       

    



             Urine Bilirubin)                                        



[facility]2018-03-01 22:25:00





             Test Item    Value        Reference Range Interpretation Comments

 

             Urine Bacteria (test code = Urine no gt                            

      



             Bacteria)                                           



[facility]2018-03-01 22:25:00





             Test Item    Value        Reference Range Interpretation Comments

 

             Urine Appearance (test code Urine Appearance                       

    



             = Urine Appearance)                                        



[facility]2018-03-01 15:27:00





             Test Item    Value        Reference Range Interpretation Comments

 

             Prothrombin Time (test code = 14.6         9.5-12.5                

  



             Prothrombin Time)                                        



[facility]2018-03-01 15:27:00





             Test Item    Value        Reference Range Interpretation Comments

 

             INR International Normalized Ratio 1.23 1                          

       



             (test code = INR International                                     

   



             Normalized Ratio)                                        



[facility]2018-03-01 15:27:00





             Test Item    Value        Reference Range Interpretation Comments

 

             Activated Partial Thromboplast Time 27.0         24.3-36.9         

        



             (test code = Activated Partial                                     

   



             Thromboplast Time)                                        



[facility]2018-03-01 15:27:00





             Test Item    Value        Reference Range Interpretation Comments

 

             Magnesium Level (test code = Magnesium 1.6          1.8-2.5        

           



             Level)                                              



[facility]2018-03-01 15:27:00





             Test Item    Value        Reference Range Interpretation Comments

 

             Creatine Kinase (test code = Creatine 31                     

          



             Kinase)                                             



[facility]2018-03-01 15:27:00





             Test Item    Value        Reference Range Interpretation Comments

 

             Rapid Troponin I (test code = Rapid no gt                          

        



             Troponin I)                                         



[facility]2018-03-01 15:27:00





             Test Item    Value        Reference Range Interpretation Comments

 

             Creatine Kinase MB (test code = 0.3          0.3-4.0               

    



             Creatine Kinase MB)                                        



[facility]2017-12-22 12:40:00





             Test Item    Value        Reference Range Interpretation Comments

 

             Urine Amorphous Urine Amorphous                           



             Sediment (test code = Sediment                               



             Urine Amorphous                                        



             Sediment)                                           



[facility]2017-12-22 12:08:00





             Test Item    Value        Reference Range Interpretation Comments

 

             Lactic Acid Level (test code = Lactic 10.2         4.5-19.8        

          



             Acid Level)                                         



[facility]2017-12-22 12:08:00





             Test Item    Value        Reference Range Interpretation Comments

 

             Procalcitonin (test code = 0.13                                   



             Procalcitonin)                                        



[facility]2017-12-22 12:08:00





             Test Item    Value        Reference Range Interpretation Comments

 

             Sedimentation Rate, 78           See_Comment                [Automa

kaela message]



             Westergren (test code =                                        The 

system which



             Sedimentation Rate,                                        generate

d this result



             Westergren)                                         transmitted ref

erence



                                                                 range: <=30. Th

e



                                                                 reference range

 was not



                                                                 used to interpr

et this



                                                                 result as



                                                                 normal/abnormal

.



[facility]2017-12-22 12:08:00





             Test Item    Value        Reference Range Interpretation Comments

 

             Total Bilirubin (test code = Total 1.2          0.3-1.2            

       



             Bilirubin)                                          



[facility]2017-12-22 12:08:00





             Test Item    Value        Reference Range Interpretation Comments

 

             Serum Total Protein (test code = Serum 6.2          6.0-8.3        

           



             Total Protein)                                        



[facility]2017-12-22 12:08:00





             Test Item    Value        Reference Range Interpretation Comments

 

             Globulin (test code = Globulin) 3.3          2.3-3.5               

    



[facility]2017-12-22 12:08:00





             Test Item    Value        Reference Range Interpretation Comments

 

             Direct Bilirubin (test 0.2          See_Comment                [Aut

omated message] The



             code = Direct                                        system which g

enerated



             Bilirubin)                                          this result tra

nsmitted



                                                                 reference range

: <=0.2.



                                                                 The reference r

sharona was



                                                                 not used to int

erpret



                                                                 this result as



                                                                 normal/abnormal

.



[facility]2017-12-22 12:08:00





             Test Item    Value        Reference Range Interpretation Comments

 

             Aspartate Amino Transf (AST/SGOT) (test 53           10-42         

            



             code = Aspartate Amino Transf                                      

  



             (AST/SGOT))                                         



[facility]2017-12-22 12:08:00





             Test Item    Value        Reference Range Interpretation Comments

 

             Alkaline Phosphatase (test code = 85                         

      



             Alkaline Phosphatase)                                        



[facility]2017-12-22 12:08:00





             Test Item    Value        Reference Range Interpretation Comments

 

             Albumin/Globulin Ratio (test code = 0.9 1        1.1-1.8           

        



             Albumin/Globulin Ratio)                                        



[facility]2017-12-22 12:08:00





             Test Item    Value        Reference Range Interpretation Comments

 

             Albumin (test code = Albumin) 2.9          3.2-5.5                 

  



[facility]2017-12-22 12:08:00





             Test Item    Value        Reference Range Interpretation Comments

 

             Alanine Aminotransferase (ALT/SGPT) 29           10-60             

        



             (test code = Alanine Aminotransferase                              

          



             (ALT/SGPT))                                         



[facility] [id number] 12:08:00





             Test Item    Value        Reference Range Interpretation Comments

 

             Lipase (test code = Lipase) 17           -                     



[facility]

## 2022-09-05 NOTE — EDPHYS
Physician Documentation                                                                           

 [facility]                                                                 

Name: Deirdre Sauer                                                                               

Age: 88 yrs                                                                                       

Sex: Female                                                                                       

: 1933                                                                                   

MRN: [id number]                                                                                   

Arrival Date: 2022                                                                          

Time: 11:02                                                                                       

Account#: [id number]                                                                            

Bed 6                                                                                             

Private MD:                                                                                       

ED Physician Abraham Goldberg                                                                      

HPI:                                                                                              

                                                                                             

11:25 This 88 yrs old Female presents to ER via EMS with complaints of Arrhythmia - Increased kdr 

      Heart Rate.                                                                                 

11:25 The patient had been taken to dialysis today. During her initial start up, she was      kdr 

      noted to be tachycardic with a rate in the 140s to 150s. Since it did not resolve           

      immediately, EMS was called and the patient was brought to the ED. The dialysis center      

      had only pulled off approximately point to liters of fluid prior to the patient being       

      discharged to the emergency department. Patient denies any significant or worsening         

      shortness of breath, chest pain or other issues. She appears to have otherwise been in      

      her normal state of health. She currently has no focal needs or issues. Onset: The          

      symptoms/episode began/occurred suddenly, just prior to arrival. Severity of symptoms:      

      At their worst the symptoms were mild in the emergency department the symptoms are          

      unchanged. The patient has experienced similar episodes in the past, multiple times,        

      chronically. The patient had a recent admission for similar problems.                       

                                                                                                  

Historical:                                                                                       

- Allergies:                                                                                      

11:21 Streptomycin;                                                                           tp1 

11:21 Statins-Hmg-Coa Reductase Inhibitors;                                                   tp1 

11:21 caffeine;                                                                               tp1 

11:21 blood thinners except ASA;                                                              tp1 

11:21 Alteplase;                                                                              tp1 

11:21 hydrocodone;                                                                            tp1 

11:21 Iodine;                                                                                 tp1 

- Home Meds:                                                                                      

11:21 aspirin 81 mg Oral chew 1 tab once daily [Active]; Claritin-D 24 Hour  mg Oral    tp1 

      Tb24 1 tab once daily [Active]; Coreg Oral [Active]; Potassium Chloride Oral [Active];      

      Augmentin Oral [Active]; pantoprazole oral [Active]; pravastatin oral [Active]; Zetia       

      Oral [Active]; Insulin Glargine Sub-Q [Active];                                             

- PMHx:                                                                                           

11:21 CHF; CVA; Diabetes - NIDDM; Dialysis; Hyperlipidemia; Hypertension; kidney problems;    tp1 

      Anemia; Cellulitis; hyperparathyroidism; malnutition; hernia;                               

                                                                                                  

- Immunization history:: Client reports receiving the 2nd dose of the Covid vaccine.              

- Social history:: Smoking status: Patient denies any tobacco usage or history of.                

                                                                                                  

                                                                                                  

ROS:                                                                                              

11:25 Constitutional: Negative for fever, chills, and weight loss, Eyes: Negative for injury, kdr 

      pain, redness, and discharge, ENT: Negative for injury, pain, and discharge, Neck:          

      Negative for injury, pain, and swelling, Respiratory: Negative for shortness of breath,     

      cough, wheezing, and pleuritic chest pain, Abdomen/GI: Negative for abdominal pain,         

      nausea, vomiting, diarrhea, and constipation, Back: Negative for injury and pain, :       

      Negative for injury, bleeding, discharge, and swelling, MS/Extremity: Negative for          

      injury and deformity, Skin: Negative for injury, rash, and discoloration, Neuro:            

      Negative for headache, weakness, numbness, tingling, and seizure activity. Psych:           

      Negative for depression, anxiety, suicide ideation, homicidal ideation, and                 

      hallucinations, Allergy/Immunology: Negative for hives, rash, and allergies, Endocrine:     

      Negative for neck swelling, polydipsia, polyuria, polyphagia, and marked weight             

      changes, Hematologic/Lymphatic: Negative for swollen nodes, abnormal bleeding, and          

      unusual bruising.                                                                           

11:25 Cardiovascular: Positive for palpitations, Negative for chest pain, edema, orthopnea,       

      paroxysmal nocturnal dyspnea.                                                               

                                                                                                  

Exam:                                                                                             

11:25 Constitutional:  This is a well developed, well nourished patient who is awake, alert,  kdr 

      and in no acute distress. Head/Face:  Normocephalic, atraumatic. Eyes:  Pupils equal        

      round and reactive to light, extra-ocular motions intact.  Lids and lashes normal.          

      Conjunctiva and sclera are non-icteric and not injected.  Cornea within normal limits.      

      Periorbital areas with no swelling, redness, or edema. Neck:  Trachea midline, no           

      thyromegaly or masses palpated, and no cervical lymphadenopathy.  Supple, full range of     

      motion without nuchal rigidity, or vertebral point tenderness.  No Meningismus.             

      Chest/axilla:  Normal chest wall appearance and motion.  Nontender with no deformity.       

      No lesions are appreciated. Respiratory:  Lungs have equal breath sounds bilaterally,       

      clear to auscultation and percussion.  No rales, rhonchi or wheezes noted.  No              

      increased work of breathing, no retractions or nasal flaring. Abdomen/GI:  Soft,            

      non-tender, with normal bowel sounds.  No distension or tympany.  No guarding or            

      rebound.  No evidence of tenderness throughout. Back:  No spinal tenderness.  No            

      costovertebral tenderness.  Full range of motion. Skin:  Warm, dry with normal turgor.      

      Normal color with no rashes, no lesions, and no evidence of cellulitis.                     

11:25 Cardiovascular: Rate: tachycardic, actual rate is  140 bpm, Rhythm: irregularly             

      irregular, Pulses: no pulse deficits are appreciated, Edema: is not appreciated.            

17:00 ECG was reviewed by the Attending Physician.                                            kdr 

                                                                                                  

Vital Signs:                                                                                      

10:55  / 77; Pulse 140; Resp 12; Temp 97.6; Pulse Ox 100% ; Weight 52.62 kg; Height 5   tp1 

      ft. 2 in. (157.48 cm);                                                                      

11:30  / 53; Pulse 148;                                                                 tp1 

12:00  / 62; Pulse 139; Resp 24; Pulse Ox 99% on R/A;                                   tp1 

12:38 BP 86 / 48; Pulse 138; Resp 17; Pulse Ox 99% on 2 lpm NC;                               tp1 

12:50 BP 96 / 51; Pulse 114; Resp 23; Pulse Ox 100% on 2 lpm NC;                              tp1 

13:00 BP 99 / 59; Pulse 125; Resp 23; Pulse Ox 100% on 2 lpm NC;                              tp1 

13:15  / 67; Pulse 122; Resp 17; Pulse Ox 100% on 2 lpm NC;                             tp1 

13:25 BP 98 / 57; Pulse 128; Resp 17; Pulse Ox 100% on 2 lpm NC;                              tp1 

13:30 BP 99 / 52; Pulse 76; Resp 17; Pulse Ox 100% on 2 lpm NC;                               tp1 

14:00  / 44; Pulse 72; Resp 20; Pulse Ox 100% on 2 lpm NC;                              tp1 

14:30  / 48; Pulse 72; Resp 20; Pulse Ox 100% on 2 lpm NC;                              tp1 

15:00  / 47; Pulse 71; Resp 20; Pulse Ox 100% on 2 lpm NC;                              tp1 

15:30  / 47; Pulse 74; Resp 20; Pulse Ox 100% on 2 lpm NC;                              tp1 

16:00  / 47; Pulse 77; Resp 20; Pulse Ox 100% on 2 lpm NC;                              tp1 

17:00  / 40; Pulse 76; Resp 21; Pulse Ox 100% on 2 lpm NC;                              tp1 

10:55 Body Mass Index 21.22 (52.62 kg, 157.48 cm)                                             tp1 

                                                                                                  

MDM:                                                                                              

11:25 Data reviewed: vital signs, nurses notes, lab test result(s), radiologic studies.       kdr 

      Counseling: I had a detailed discussion with the patient and/or guardian regarding: the     

      historical points, exam findings, and any diagnostic results supporting the                 

      discharge/admit diagnosis, lab results, radiology results, the need for outpatient          

      follow up.                                                                                  

13:24 Patient medically screened.                                                             kdr 

13:26 ED course: I consulted Dr. Hilario on the patient's initial presentation. He suggested  kdr 

      defibrillation and if unsuccessful to follow with an amiodarone drip. Subsequently we       

      attempted to cardiovert the patient and after 2 shocks, there was no improvement. At        

      that time we initiated amiodarone load and drip. Patient tolerated fairly well. She had     

      a fairly wide swings in her rate from 40s and 50s to 130s to 150s. Her systolic blood       

      pressure maintained  most of the time. Occasionally it was somewhat lower but         

      would then rebound. She is to be giving a small to moderate fluid bolus of 250 cc. I        

      again consulted with Dr. Hilario to determine if any further action would be                

      recommended at this time and he indicated that we should continue the amiodarone drip       

      and that he would see the patient shortly..                                                 

                                                                                                  

                                                                                             

11:16 Order name: Basic Metabolic Panel                                                       [facility] 

                                                                                             

11:16 Order name: CBC with Diff                                                               [facility] 

                                                                                             

11:16 Order name: NT PRO-BNP                                                                  [facility] 

                                                                                             

11:16 Order name: Troponin HS                                                                 [facility] 

                                                                                             

12:47 Order name: SARS RAPID                                                                  dh3 

                                                                                             

13:56 Order name: Basic Metabolic Panel                                                       [facility]

                                                                                             

11:16 Order name: XRAY Chest (1 view)                                                         [facility] 

                                                                                             

13:56 Order name: Basic Metabolic Panel                                                       [facility]

                                                                                             

13:56 Order name: CBC with Automated Diff                                                     [facility]

                                                                                             

13:56 Order name: CBC with Automated Diff                                                     [facility]

                                                                                             

11:16 Order name: EKG; Complete Time: 11:17                                                   [facility] 

                                                                                             

11:16 Order name: Cardiac monitoring; Complete Time: 11:28                                    [facility] 

                                                                                             

11:16 Order name: EKG - Nurse/Tech; Complete Time: 11:28                                      kdr 

                                                                                             

11:16 Order name: IV Saline Lock; Complete Time:                                         kdr 

                                                                                             

11:16 Order name: Labs collected and sent; Complete Time:                                kdr 

                                                                                             

11:16 Order name: O2 Per Protocol; Complete Time:                                        kdr 

                                                                                             

11:16 Order name: O2 Sat Monitoring; Complete Time:                                      kdr 

                                                                                             

13:56 Order name: CONS Physician Consult                                                      [facility]

                                                                                             

13:56 Order name: EKG Electrocardiogram                                                       [facility]

                                                                                             

13:56 Order name: EKG Electrocardiogram                                                       [facility]

                                                                                             

13:56 Order name: EKG Electrocardiogram                                                       [facility]

                                                                                             

13:56 Order name: EKG Electrocardiogram                                                       [facility]

                                                                                                  

EC:00 Rate is 75 beats/min. Rhythm is regular, Sinus Rhythm with PACs. QRS Axis is Normal. IL kdr 

      interval is normal. QRS interval is normal. QT interval is normal. Clinical impression:     

      NSR w/ Non-specific ST/T Changes and Sinus arrythmia.                                       

                                                                                                  

Administered Medications:                                                                         

19:22 Discontinued: amiodarone 900 mg, D5W 500 ml IVPB at 1 mg/min continuous; for 6 hrs,     cookie 

      then change to 0.5 mg/min                                                                   

12:00 Drug: fentaNYL (PF) 100 mcg Route: IVP; Site: left forearm;                             tp1 

16:07 Follow up: Response: No adverse reaction                                                tp1 

12:02 Drug: NS 0.9% 250 ml Route: IV; Rate: bolus; Site: left forearm;                        tp1 

13:00 Follow up: IV Status: Completed infusion; IV Intake: 125ml                              tp1 

12:07 Drug: Versed (midazolam) 2 mg Route: IVP; Site: left forearm;                           tp1 

16:07 Follow up: Response: No adverse reaction                                                tp1 

12:41 Drug: amiodarone 150 mg Route: IVP; Site: left forearm;                                 tp1 

16:03 Follow up: Response: No change in condition                                             tp1 

13:00 Drug: amiodarone 900 mg, D5W 500 ml Route: IVPB; Rate: 1 mg/min; Site: left forearm;    tp1 

13:20 Not Given (Physician Discretion): Lovenox (enoxaparin) 1 mg/kg Sub-Q once               tp1 

16:01 Not Given (Physician Discretion): Lopressor (metoprolol) 5 mg IVP every 5 minutes; Hold tp1 

      for SBP < 100 or HR < 60. x3                                                                

19:31 Drug: amiodarone 400 mg Route: PO;                                                      aa9 

                                                                                                  

                                                                                                  

Disposition Summary:                                                                              

22 13:26                                                                                    

Hospitalization Ordered                                                                           

      Hospitalization Status: Inpatient Admission                                             kdr 

      Provider: Porfirio Valdez                                                                 kdr 

      Condition: Serious(22 13:26)                                                      kdr 

      Problem: new(22 13:26)                                                            kdr 

      Symptoms: have improved(22 13:26)                                                 kdr 

      Bed/Room Type: Standard                                                                 kdr 

      Location: Intensive Care Unit(22 19:34)                                           tw5 

      Room Assignment: 5-(22 19:34)                                                     tw5 

      Diagnosis                                                                                   

        - Persistent atrial fibrillation - Rapid ventricular response(22 13:26)         kdr 

      Forms:                                                                                      

        - Medication Reconciliation Form                                                      kdr 

        - SBAR form                                                                           kdr 

Signatures:                                                                                       

Dispatcher [employer]                           EDMS                                                 

Abraham Goldberg MD MD cha Rittger, Kevin, MD MD   kdr                                                  

Justa Bansal RN RN jl7 Wood, Tiffany                                tw5                                                  

Nelida Solomon RN                     RN   tp1                                                  

Yasmin Duron RN                       RN   aa9                                                  

                                                                                                  

Corrections: (The following items were deleted from the chart)                                    

11:28 11:21 Home Meds: allopurinol 100 mg Oral tab once daily; tp1                            tp1 

11:28 11:21 Home Meds: carvedilol 6.25 mg Oral tab 2 times per day; tp1                       tp1 

11:28 11:21 Home Meds: cetirizine 10 mg Oral tab 1 tab once daily; tp1                        tp1 

11:28 11:21 Home Meds: furosemide 40 mg Oral tab 1 tab once daily; tp1                        tp1 

11:28 11:21 Home Meds: glimepiride 4 mg Oral tab twice a day; tp1                             tp1 

13:25 13:24 Home kdr                                                                          kdr 

13:25 13:24 new kdr                                                                           kdr 

13:25 13:24 have improved kdr                                                                 kdr 

13:25 13:24 Stable kdr                                                                        kdr 

13:25 13:24 Persistent atrial fibrillation kdr                                                kdr 

13:25 13:24 Unspecified atrial fibrillation kdr                                               kdr 

16:19 13:26 Intensive Care Unit kdr                                                           jl7 

16:19 13:26 kdr                                                                               jl7 

19:34 16:19 [facility] ER HOLD jl7                                                                  tw5 

19:34 16:19 ERHOLD- jl7                                                                       tw5 

                                                                                                  

**************************************************************************************************

## 2022-09-05 NOTE — P.HP
Certification for Inpatient


Patient admitted to: Inpatient


With expected LOS: >2 Midnights


Practitioner: I am a practitioner with admitting privileges, knowledge of 

patient current condition, hospital course, and medical plan of care.


Services: Services provided to patient in accordance with Admission requirements

found in Title 42 Section 412.3 of the Code of Federal Regulations





Patient History


Date of Service: 09/05/22


Reason for admission: RAPID A FIB


History of Present Illness: 


MS. CRANE HAS CKD 5 ON HD. SHE DURING DIALYSIS WAS NOTED TO HAVE RAPID A FIB AND

BROUGHT TO ER.  SHE WAS GIVEN AMIODARONE IV AND IT CONTROLLED HR DOWN TO NORMAL.

 SHE HAS MILD LOW K.  SHE ALSO HAS RECURRENT EFFUSION THAT IS NOT SYMPTOMATIC 

NOW. 


Allergies





blood thinner Allergy (Mild, Uncoded 02/27/20 21:12)


   swelling


blood thinners Allergy (Uncoded 02/27/20 21:12)


   Unknown


Statins-Hmg-Coa Redu Allergy (Uncoded 02/27/20 21:12)


   Unknown





Home Medications: 








Pravastatin Sodium 10 mg PO BEDTIME 03/02/18 


carvediloL [Coreg*] 6.25 mg PO BID 03/02/18 


Cholecalciferol (Vitamin D3) [Vitamin D3] 5,000 unit PO DAILY #30 07/04/21 


Aspirin [Vazalore] 81 mg PO DAILY 04/27/22 


Cetirizine HCl 1 tab PO DAILY 08/30/22 


Cyanocobalamin (Vitamin B-12) [Vitamin B12] 1 tab PO DAILY 08/30/22 


Ezetimibe 1 tab PO DAILY 08/30/22 


Fluticasone Propionate [Flonase Allergy Relief] 1 appl AGUEDA BID 08/30/22 


Insulin Aspart [Novolog Flexpen] 7 units SQ SEECOM 08/30/22 


Insulin Detemir [Levemir Flextouch] 15 units SQ DAILY AT SUPPER 08/30/22 


Melatonin 1 tab PO BEDTIME 08/30/22 


Pantoprazole [Protonix Tab*] 1 tab PO SEECOM 08/30/22 


Simethicone [Gas-X] 1 tab PO DAILY PRN 08/30/22 


Vit A/Vit C/Vit E/Zinc/Copper [Preservision Areds Softgel] 2 cap PO DAILY 08/3

0/22 


levoFLOXacin [Levaquin*] 250 mg PO DAILY #5 tab 08/31/22 








- Past Medical/Surgical History


Diabetic: Yes


-: DM


-: HTN


-: Hyperlipidemia


-: CVA 2015


-: Gastric Ulcer


-: Chronic renal failure


-: Diastolic heart failure


-: COVID infection July 2021


-: dilaysis MWF


-: Full Hysterectomy


-: R upper chest dialysis cath





- Family History


  ** Mother


-: Diabetes





- Social History


Alcohol use: No


CD- Drugs: No


Caffeine use: No





Review of Systems


10-point ROS is otherwise unremarkable


General: Weakness





Physical Examination





- Vital Signs


Temperature: 97.9 F


Blood Pressure: 117/47


Pulse: 76


Respirations: 26


Pulse Ox (%): 100





- Physical Exam


General: Oriented x3, Mild distress


HEENT: Atraumatic, PERRLA, Mucous membr. moist/pink, EOMI, Sclerae nonicteric


Neck: Supple, 2+ carotid pulse no bruit, No LAD, Without JVD or thyroid 

abnormality


Respiratory: Clear to auscultation bilaterally, Normal air movement


Cardiovascular: Regular rate/rhythm, Normal S1 S2


Gastrointestinal: Normal bowel sounds, No tenderness


Musculoskeletal: No tenderness


Integumentary: No rashes


Neurological: Normal gait, Normal speech, Normal strength at 5/5 x4 extr, Normal

 tone, Normal affect


Lymphatics: No axilla or inguinal lymphadenopathy





- Studies


Laboratory Data (last 24 hrs)





09/05/22 11:15: WBC 8.40  D, Hgb 11.0 L D, Hct 34.1 L D, Plt Count 100 L D


09/05/22 11:15: Sodium 136, Potassium 3.2 L, BUN 24 H D, Creatinine 1.91 H D, 

Glucose 183 H








Assessment and Plan





- Problems (Diagnosis)


(1) Rapid atrial fibrillation


Current Visit: Yes   Status: Acute   


Plan: 


SHE HAS RAPID A FIB AND HAS NOW BEEN CONTROLLED. 


 CONSULTED


MAY NEED LOW DOSE ELIQUIS FOR NOW. 





DR. PARSONS CALLED. 


AMIODARONE 400 BID FOR A WEEK FOLLOWED  MG DAILY.


ELIQUIS BID.








(2) Hypokalemia


Current Visit: Yes   Status: Acute   


Plan: 


REPLACE K.








(3) Pleural effusion


Current Visit: No   Status: Chronic   


Plan: 


RECURRENT A FIB. CYTOLOGY AND CULTURES NEG. 


TRANSUDATE


SHE MAY NEED PLEURODESIS POSSIBLE.





CALLED IN DR. SALAS AGAIN.








- Advance Directives


Does patient have a Living Will: Yes


Does patient have a Durable POA for Healthcare: Yes

## 2022-09-06 LAB
BUN BLD-MCNC: 37 MG/DL (ref 7–18)
GLUCOSE SERPLBLD-MCNC: 179 MG/DL (ref 74–106)
HCT VFR BLD CALC: 27.7 % (ref 36–45)
INR BLD: 1.4
LYMPHOCYTES # SPEC AUTO: 0.5 K/UL (ref 0.7–4.9)
MCV RBC: 91 FL (ref 80–100)
PMV BLD: 7.5 FL (ref 7.6–11.3)
POTASSIUM SERPL-SCNC: 3.3 MMOL/L (ref 3.5–5.1)
RBC # BLD: 3.04 M/UL (ref 3.86–4.86)

## 2022-09-06 RX ADMIN — POTASSIUM CHLORIDE SCH MEQ: 10 TABLET, FILM COATED, EXTENDED RELEASE ORAL at 20:39

## 2022-09-06 RX ADMIN — ACETAMINOPHEN PRN MG: 500 TABLET, FILM COATED ORAL at 08:17

## 2022-09-06 RX ADMIN — POTASSIUM CHLORIDE SCH MEQ: 10 TABLET, FILM COATED, EXTENDED RELEASE ORAL at 07:44

## 2022-09-06 RX ADMIN — Medication SCH ML: at 07:45

## 2022-09-06 RX ADMIN — ASPIRIN SCH MG: 81 TABLET, COATED ORAL at 07:44

## 2022-09-06 RX ADMIN — AMIODARONE HYDROCHLORIDE SCH MG: 200 TABLET ORAL at 07:44

## 2022-09-06 RX ADMIN — Medication SCH ML: at 20:39

## 2022-09-06 RX ADMIN — AMIODARONE HYDROCHLORIDE SCH: 200 TABLET ORAL at 19:04

## 2022-09-06 NOTE — EKG
Test Date:    2022-09-06               Test Time:    07:44:18

Technician:   HATTIE                                     

                                                     

MEASUREMENT RESULTS:                                       

Intervals:                                           

Rate:         118                                    

OK:                                                  

QRSD:         70                                     

QT:           354                                    

QTc:          496                                    

Axis:                                                

P:                                                   

OK:                                                  

QRS:          0                                      

T:            -28                                    

                                                     

INTERPRETIVE STATEMENTS:                                       

                                                     

Atrial flutter with variable AV block

Low voltage QRS

Nonspecific ST and T wave abnormality

Abnormal ECG

Compared to ECG 09/05/2022 13:31:28

ST (T wave) deviation now present

Sinus rhythm no longer present

Atrial premature complex(es) no longer present

Myocardial infarct finding no longer present



Electronically Signed On 09-06-22 14:28:59 CDT by Kevin Contreras

## 2022-09-06 NOTE — CON
Date of Consultation:  09/05/2022



Chief Complaint:  End-stage renal disease, on hemodialysis.



History Of Present Illness:  Patient has history of end-stage renal disease, on dialysis.  During daniel
lysis, she was noted to have rapid atrial fibrillation/tachycardia.  She denied chest pain, palpitati
on.  Patient was brought to emergency room.  She was started on IV amiodarone drip and it controlled 
her heart rate.  Patient was found to have borderline hypokalemia.  She has history of recurrent pleu
ral effusion, and she is symptomatic with some shortness of breath and generalized weakness, although
 she denies chest pain.



Review of Systems:

Constitutional:  Denies fever, chills. 

Eyes:  Denies vision changes. 

Ears, Nose, Mouth, and Throat:  Denies sore throat, earache. 

Respiratory:  She has some dyspnea on exertion.  Currently, she is bedbound. 

GI:  She denies nausea or vomiting. 

:  Denies dysuria, hematuria. 

Musculoskeletal:  Denies muscle aches or joint swelling. 



All other systems reviewed and all are negative.



Past Medical History:  End-stage renal disease, diabetes mellitus, hypertension, hyperlipidemia, CVA,
 gastric ulcer, diastolic congestive heart failure, COVID infection in July 2021, hysterectomy, upper
 chest dialysis catheter with IJ location.



Family History:  Mother, diabetes.



Social History:  Denies tobacco, alcohol, or illicit drugs.



Physical Examination:

General:  Patient is awake, alert.  Follows commands. 

Vital Signs:  Blood pressure 117/50, heart rate is 76, respiratory rate 21, temperature 97.1, SpO2 10
0%. 

Eyes:  Anicteric sclerae.  EOMI. 

Ears, Nose, Mouth, and Throat:  Oral mucosa moist.  No pallor. 

Neck:  Supple.  No bruits. 

Lungs:  Few rhonchi. 

Heart:  S1, S2. 

Abdomen:  Soft, benign. 

Extremities:  No edema.  No clubbing.  No cyanosis.



Laboratory Data:  WBC 8.4, hemoglobin 11, hematocrit 34.1, platelet count 100,000.  BUN 24, creatinin
e 1.91, potassium 3.2, sodium 136, glucose 183.



Impression And Plan:  

1.Patient has rapid atrial fibrillation.  She was started on amiodarone drip.  Heart rate is control
led.  Continue to monitor.

2.Hypokalemia.  Replacement was ordered.

3.Pleural effusion.  Patient has recurrent pleural effusion.  She may need pleurodesis.

4.Anemia of chronic kidney disease.  Continue PEMA as needed.  Monitor hemoglobin level.

5.End-stage renal disease.  Monitor electrolytes and azotemia.  The patient may need 24-hour urine t
o evaluate the renal function.

6.Elevated troponin.  Continue to monitor.  Patient was seen by cardiologist.

7.Elevated BNP.  The patient is on dialysis.  She received dialysis today with ultrafiltration.  Haydee hardy fluid balance.





DANIEL/MODL

DD:  09/05/2022 22:29:45Voice ID:  [id number]

DT:  09/06/2022 00:59:07Report ID:  [id number]

## 2022-09-06 NOTE — EKG
Test Date:    2022-09-05               Test Time:    12:55:47

Technician:   MARIELA                                    

                                                     

MEASUREMENT RESULTS:                                       

Intervals:                                           

Rate:         124                                    

NC:                                                  

QRSD:         92                                     

QT:           358                                    

QTc:          514                                    

Axis:                                                

P:                                                   

NC:                                                  

QRS:          -5                                     

T:            46                                     

                                                     

INTERPRETIVE STATEMENTS:                                       

                                                     

Atrial fibrillation with rapid ventricular response

Low voltage QRS

Cannot rule out Anterior infarct, age undetermined

Abnormal ECG

Compared to ECG 09/05/2022 12:21:48

Myocardial infarct finding now present

Atrial flutter no longer present

T-wave abnormality no longer present



Electronically Signed On 09-06-22 14:30:30 CDT by Kevin Contreras

## 2022-09-06 NOTE — EKG
Test Date:    2022-09-05               Test Time:    13:31:28

Technician:   MARIELA                                    

                                                     

MEASUREMENT RESULTS:                                       

Intervals:                                           

Rate:         75                                     

PA:           196                                    

QRSD:         84                                     

QT:           412                                    

QTc:          460                                    

Axis:                                                

P:            41                                     

PA:           196                                    

QRS:          5                                      

T:            36                                     

                                                     

INTERPRETIVE STATEMENTS:                                       

                                                     

Sinus rhythm with premature atrial complexes

Low voltage QRS

Cannot rule out Anterior infarct, age undetermined

Abnormal ECG

Compared to ECG 09/05/2022 12:55:47

Atrial premature complex(es) now present

Atrial fibrillation no longer present

Myocardial infarct finding still present



Electronically Signed On 09-06-22 14:30:25 CDT by Kevin Contreras

## 2022-09-06 NOTE — EKG
Test Date:    2022-09-05               Test Time:    12:21:48

Technician:   MARIELA                                    

                                                     

MEASUREMENT RESULTS:                                       

Intervals:                                           

Rate:         136                                    

CT:                                                  

QRSD:         78                                     

QT:           262                                    

QTc:          394                                    

Axis:                                                

P:            249                                    

CT:                                                  

QRS:          -4                                     

T:            53                                     

                                                     

INTERPRETIVE STATEMENTS:                                       

                                                     

Atrial flutter with 2:1 AV conduction

Low voltage QRS

Nonspecific T wave abnormality

Abnormal ECG

Compared to ECG 09/05/2022 11:02:36

Low QRS voltage now present

T-wave abnormality now present

ST (T wave) deviation no longer present



Electronically Signed On 09-06-22 14:30:32 CDT by Kevin Contreras

## 2022-09-06 NOTE — PN
Date of Progress Note:  09/06/2022



Subjective:  The patient was admitted with AFib happened by the end of dialysis.
 The patient denied any shortness of breath, but is still requiring oxygen.  The
patient had another episode of AFib today without any dialysis or any intervene.
 Even though the patient was on oral amiodarone.  The patient had also left 
pleural effusion that has been recurrent.



Physical Examination:

Vital Signs:  When I saw the patient; blood pressure 109/50, pulse of 110. 

Chest:  Decreased entry on the left base. 

Heart:  S1, S2.  Tachycardic, irregular. 

Abdomen:  Soft, nontender. 

Extremities:  No edema. 

Neuro:  Alert.  No focality.



Laboratory Data:  WBC 4, H and H 9.1/27.7.  Sodium 137, potassium 3.3, bicarb 
28, BUN 37, creatinine 2.6, calcium 8.  Troponin 88.5.  BNP 6960.



Current Medications:  The patient on include;

1.   Aspirin.

2.   Lovenox.

3.   Amiodarone 400 b.i.d.

4.   Digoxin.

5.   Tylenol.

6.   KCl.



Assessment And Plan:  

1.   End-stage renal disease.  The patient looked to me euvolemic.  No 
hyperkalemia.  No acidosis.  I do not see the need to do dialysis today, 
specifically with the incident of atrial fibrillation and unstable 
hemodynamically and the patient had full dialysis yesterday of 4 hours.  I am 
going to go ahead and arrange for the dialysis tomorrow to back up to her 
schedule as Monday, Wednesday, Friday.

2.   Hypertension, controlled, optimal.  Off blood pressure medications.  We 
will follow up with Cardiology.

3.   Atrial fibrillation with rapid ventricular response as by Cardiology.  Plan
possible for amiodarone loading IV.  We will follow up.

4.   Pleural effusion, recurrent.  The patient may need pleurodesis.  We will 
follow up as outpatient.

5.   Coronary artery disease with congestive heart failure as by Cardiology.  
Looked to me the patient on the dry side right now.  I am going to give the 
patient a single IV fluid of 250 and we will follow up.



time spend exam the patient face to face . reviewing the data lab and radiology 
, placing order , discussing the  case with the patient and staff  including 
nurse and hospitalist 35 min 

KIAN

DD:  09/06/2022 10:41:38   Voice ID:  [id number]

DT:  09/06/2022 12:36:15   Report ID:  [id number]

MTDD

## 2022-09-06 NOTE — P.CNS
Date of Consult: 09/06/22


Reason for Consult: Pleural effusion rapid RIKI fib


Chief Complaint: RAPID A FIB


History of Present Illness: 





Patient is 88 years of age was just recently discharged with a pleural effusion 

s/p thoracentesis admitted again with rapid ALISE. fib with significant pleural 

effusion on the left side


Allergies





blood thinner Allergy (Mild, Uncoded 02/27/20 21:12)


   swelling


blood thinners Allergy (Uncoded 02/27/20 21:12)


   Unknown


Statins-Hmg-Coa Redu Allergy (Uncoded 02/27/20 21:12)


   Unknown





Home Medications: 








Pravastatin Sodium 10 mg PO BEDTIME 03/02/18 


carvediloL [Coreg*] 6.25 mg PO BID 03/02/18 


Cholecalciferol (Vitamin D3) [Vitamin D3] 5,000 unit PO DAILY #30 07/04/21 


Aspirin [Vazalore] 81 mg PO DAILY 04/27/22 


Cetirizine HCl 1 tab PO DAILY 08/30/22 


Cyanocobalamin (Vitamin B-12) [Vitamin B12] 1 tab PO DAILY 08/30/22 


Ezetimibe 1 tab PO DAILY 08/30/22 


Fluticasone Propionate [Flonase Allergy Relief] 1 appl AGUEDA BID 08/30/22 


Insulin Aspart [Novolog Flexpen] 7 units SQ SEECOM 08/30/22 


Insulin Detemir [Levemir Flextouch] 15 units SQ DAILY AT SUPPER 08/30/22 


Melatonin 1 tab PO BEDTIME 08/30/22 


Pantoprazole [Protonix Tab*] 1 tab PO SEECOM 08/30/22 


Simethicone [Gas-X] 1 tab PO DAILY PRN 08/30/22 


Vit A/Vit C/Vit E/Zinc/Copper [Preservision Areds Softgel] 2 cap PO DAILY 

08/30/22 


levoFLOXacin [Levaquin*] 250 mg PO DAILY #5 tab 08/31/22 








- Past Medical/Surgical History


Diabetic: Yes


-: DM


-: HTN


-: Hyperlipidemia


-: CVA 2015


-: Gastric Ulcer


-: Chronic renal failure


-: Diastolic heart failure


-: COVID infection July 2021


-: dilaysis MWF


-: Full Hysterectomy


-: R upper chest dialysis cath





- Family History


  ** Mother


Medical History: Diabetes





- Social History


Alcohol use: No


CD- Drugs: No


Caffeine use: No


Place of Residence: Home





Review of Systems


General: Weakness


Respiratory: Cough, Shortness of Breath





Physical Examination














Temp Pulse Resp BP Pulse Ox


 


 97.0 F   120 H  19   110/53 L  100 


 


 09/06/22 08:00  09/06/22 11:00  09/06/22 11:00  09/06/22 11:00  09/06/22 08:00








General: Alert, In no apparent distress


Respiratory: Clear to auscultation bilaterally, Diminished


Cardiovascular: No edema (Managed on the left side), Regular rate/rhythm, 

Irregular heart rate/rhythm





- Problems


(1) Pleural effusion


Current Visit: No   Status: Chronic   


Plan: 


Patient is 88 years of age was just recently discharged from the hospital s/p 

thoracentesis.  Yet again with pleural effusion affecting three quarters of her 

chest the prior thoracentesis showed a transudate lymphocytic predominant 

effusion no malignant cells identified commend a Pleurx catheter stable for the 

procedure tomorrow change over to Lovenox

## 2022-09-06 NOTE — P.PN
Subjective


Date of Service: 09/06/22


Chief Complaint: RAPID A FIB


Subjective: Worsening





THIS AM AT 6:59 SHE CONVERTED BACK TO A FIB.  DR. MABRY IS AWARE.  HE WILL 

CONTINUE ORAL MEDS.  DR. SALAS ALSO CALLED. HE IS PLANNING FOR PERMANENT 

CATHETER FOR EFFUSION THAT RETURNS OFTEN. SHE HAS NO SS FROM RAPID A FIB.





Review of Systems


10-point ROS is otherwise unremarkable


General: Weakness





Physical Examination





- Vital Signs


Temperature: 97.0 F


Blood Pressure: 110/53


Pulse: 120


Respirations: 19


Pulse Ox (%): 100





- Physical Exam


General: Oriented x3, Mild distress


HEENT: Atraumatic, PERRLA, EOMI


Neck: Supple, JVD not distended


Respiratory: Clear to auscultation bilaterally, Normal air movement


Cardiovascular: Regular rate/rhythm, Normal S1 S2


Gastrointestinal: Normal bowel sounds, No tenderness


Musculoskeletal: No tenderness


Integumentary: No rashes


Neurological: Normal speech, Normal tone, Normal affect


Lymphatics: No axilla or inguinal lymphadenopathy





- Studies


Medications List Reviewed: Yes





Assessment And Plan





- Current Problems (Diagnosis)


(1) Rapid atrial fibrillation


Current Visit: Yes   Status: Acute   


Plan: 








WILL HOLD ELIQUIS UNTIL PLEURAL CATHETER 


DR RIVERA WILL DO THIS. 





DR. MABRY WILL CONTINUE ORAL MEDS. 


A FIB RECURRED HERE. 











(2) Hypokalemia


Current Visit: Yes   Status: Acute   


Plan: 


REPLACE K.








(3) Pleural effusion


Current Visit: No   Status: Chronic   


Plan: 


RECURRENT A FIB. CYTOLOGY AND CULTURES NEG. 


TRANSUDATE


SHE MAY NEED PLEURODESIS POSSIBLE.





CALLED IN DR. SALAS AGAIN.


PLEUROVAC FOR NOW.

## 2022-09-06 NOTE — EKG
Test Date:    2022-09-05               Test Time:    11:02:36

Technician:   TP                                     

                                                     

MEASUREMENT RESULTS:                                       

Intervals:                                           

Rate:         139                                    

AR:                                                  

QRSD:         86                                     

QT:           298                                    

QTc:          453                                    

Axis:                                                

P:            24                                     

AR:                                                  

QRS:          2                                      

T:            43                                     

                                                     

INTERPRETIVE STATEMENTS:                                       

                                                     

Atrial flutter with variable AV block

Nonspecific ST and T wave abnormality

Abnormal ECG

Compared to ECG 08/29/2022 18:08:36

ST (T wave) deviation now present

Sinus tachycardia no longer present

Atrial abnormality no longer present

Myocardial infarct finding no longer present



Electronically Signed On 09-06-22 14:30:35 CDT by Kevin Contreras

## 2022-09-07 PROCEDURE — 02HV33Z INSERTION OF INFUSION DEVICE INTO SUPERIOR VENA CAVA, PERCUTANEOUS APPROACH: ICD-10-PCS

## 2022-09-07 PROCEDURE — 0JH63XZ INSERTION OF TUNNELED VASCULAR ACCESS DEVICE INTO CHEST SUBCUTANEOUS TISSUE AND FASCIA, PERCUTANEOUS APPROACH: ICD-10-PCS

## 2022-09-07 RX ADMIN — Medication SCH ML: at 20:19

## 2022-09-07 RX ADMIN — AMIODARONE HYDROCHLORIDE SCH MG: 200 TABLET ORAL at 08:22

## 2022-09-07 RX ADMIN — AMIODARONE HYDROCHLORIDE SCH MG: 200 TABLET ORAL at 20:19

## 2022-09-07 RX ADMIN — Medication SCH ML: at 08:22

## 2022-09-07 RX ADMIN — METOPROLOL TARTRATE SCH MG: 25 TABLET ORAL at 12:51

## 2022-09-07 RX ADMIN — APIXABAN SCH MG: 2.5 TABLET, FILM COATED ORAL at 08:50

## 2022-09-07 RX ADMIN — APIXABAN SCH MG: 2.5 TABLET, FILM COATED ORAL at 20:19

## 2022-09-07 RX ADMIN — POTASSIUM CHLORIDE SCH MEQ: 10 TABLET, FILM COATED, EXTENDED RELEASE ORAL at 08:21

## 2022-09-07 RX ADMIN — METOPROLOL TARTRATE SCH: 25 TABLET ORAL at 17:38

## 2022-09-07 RX ADMIN — ASPIRIN SCH MG: 81 TABLET, COATED ORAL at 08:21

## 2022-09-07 RX ADMIN — POTASSIUM CHLORIDE SCH MEQ: 10 TABLET, FILM COATED, EXTENDED RELEASE ORAL at 20:19

## 2022-09-07 RX ADMIN — ACETAMINOPHEN PRN MG: 500 TABLET, FILM COATED ORAL at 00:32

## 2022-09-07 NOTE — P.PN
Subjective


Date of Service: 09/07/22


Chief Complaint: Pleural effusion


Subjective: No new changes





THIS AM AT 6:59 SHE CONVERTED BACK TO A FIB.  DR. MABRY IS AWARE.  HE WILL 

CONTINUE ORAL MEDS.  DR. SALAS ALSO CALLED. HE IS PLANNING FOR PERMANENT 

CATHETER FOR EFFUSION THAT RETURNS OFTEN. SHE HAS NO SS FROM RAPID A FIB.





GOT CATH FOR PLEURAL CAVITY TODAY. 


HAS PAIN FROM THE PROCEDURE.





Review of Systems


10-point ROS is otherwise unremarkable


General: Weakness





Physical Examination





- Vital Signs


Temperature: 97.8 F


Blood Pressure: 113/42


Pulse: 59


Respirations: 15


Pulse Ox (%): 100





- Physical Exam


General: Alert, Oriented x3, Moderate distress


HEENT: Atraumatic, PERRLA, EOMI


Neck: Supple, JVD not distended


Respiratory: Clear to auscultation bilaterally, Normal air movement


Cardiovascular: Regular rate/rhythm, Normal S1 S2


Gastrointestinal: Normal bowel sounds, No tenderness


Musculoskeletal: No tenderness


Integumentary: No rashes


Neurological: Normal speech, Normal tone, Normal affect


Lymphatics: No axilla or inguinal lymphadenopathy





- Studies


Medications List Reviewed: Yes





Assessment And Plan





- Current Problems (Diagnosis)


(1) Rapid atrial fibrillation


Current Visit: Yes   Status: Acute   


Plan: 








WILL HOLD ELIQUIS UNTIL PLEURAL CATHETER 


DR RIVERA WILL DO THIS. 





DR. MABRY WILL CONTINUE ORAL MEDS. 


A FIB RECURRED HERE. 








SHE CONVERTED BACK TO NSR THIS PM


START ELIQUIS IN AM.  


SHE HAD PROCEDURE TODAY. 








(2) Hypokalemia


Current Visit: Yes   Status: Acute   


Plan: 


REPLACE K.





RECHECK IN AM. 


HD DONE TODAY. 








(3) Pleural effusion


Current Visit: No   Status: Chronic   


Plan: 


RECURRENT A FIB. CYTOLOGY AND CULTURES NEG. 


TRANSUDATE


SHE MAY NEED PLEURODESIS POSSIBLE.





CALLED IN DR. SALAS AGAIN.


PLEUROVAC FOR NOW. 





CYTOLOGY HAS BEEN NEGATIVE TWICE


WITH RECURRENT EFFUSION SHE NOW HAS PLEURAL CATHETER.  








(4) CKD stage 5 secondary to hypertension


Current Visit: Yes   Status: Chronic   


Plan: 


ONHD. 


CHECK C7 IN AM.

## 2022-09-07 NOTE — CON
Date of Consultation:  09/06/2022



Reason For Consultation:  New onset rapid atrial fibrillation.



History Of Present Illness:  Ms. Saeur is 88 years old.  She has a history of congestive heart failur
e, CVA, diabetes.  She is on hemodialysis.  She had hypertension, cholesterol, history of anemia, sena
lulitis, hyperparathyroidism, malnutrition, as well as hernia.  She came in with new onset atrial fib
rillation at a rate of 140-150, received 2 shocks of 200 joules in the emergency room and did not con
vert.  I suggested amiodarone and she went to sinus rhythm after IV amiodarone.  Not long after her I
V amiodarone was stopped and she was switched to p.o. amiodarone, she went back into atrial fibrillat
ion without much symptoms at this time.



Past Medical History:  As stated above.



Allergies:  SHE IS ALLERGIC TO IODINE, HYDROCODONE, TPA, CAFFEINE, STATIN, AND STREPTOMYCIN.



Medications:  At home include aspirin, Claritin, potassium, Coreg, pantoprazole, pravastatin, and ins
ulin.



Review of Systems:

Negative.



Social History:  Negative.



Family History:  Noncontributory.



Physical Examination:

Vital Signs:  When I did see Ms. Sauer in the morning, she had gone back into atrial fibrillation on 
p.o. amiodarone.  Heart rate was 120-130.  Her vital signs were otherwise stable.  She was afebrile. 


HEENT:  Exam is negative. 

Neck:  Supple without any bruit, lymphadenopathy, JVD, or thyromegaly. 

Chest:  Revealed decreased breath sounds bilaterally. 

Cardiac:  Exam revealed atrial fibrillation. 

Abdomen:  Benign. 

Extremities:  No clubbing, cyanosis, or edema.



Diagnostic Data:  Showed a troponin of 88.  BNP of 6960.  Creatinine is 2.62.  Chest x-ray showed mod
erate left pleural effusion.  EKG showed atrial fibrillation.



Impression And Plan:  

1.New onset atrial fibrillation.

2.History of congestive heart failure.  As she has not had an echo for quite some time, I will repea
t another echo on her.

3.History of cerebrovascular accident.

4.History of diabetes.

5.History of dialysis.

6.History of hypertension.  

I think for now, I will continue the p.o. amiodarone.  I would give her an additional dose of digoxin
 IV and we may repeat that in 6 hours.  If the amiodarone p.o. does not work, we will consider a card
ioversion on amiodarone.  See what the echo shows.  She may need her the pleural effusion addressed d
own the road and this was discussed with Dr. Valdez.  We will continue to follow her.  She definitely 
needs to be on low-dose anticoagulant as well.





NB/GARRICK

DD:  09/07/2022 03:38:26Voice ID:  [id number]

DT:  09/07/2022 04:13:04Report ID:  [id number]

## 2022-09-07 NOTE — ECHO
HEIGHT: 5 ft 2 in   WEIGHT: 116 lb 0 oz   DATE OF STUDY: 09/06/2022   REFER DR: Porfirio Valdez MD

2-DIMENSIONAL: YES

     M.MODE: YES

 DOPPLER: YES

COLOR FLOW: YES



                    TDS:  NO

PORTABLE: YES

 DEFINITY:  NO

BUBBLE STUDY: NO





DIAGNOSIS:  ATRIAL FIBRILLATION WITH RAPID VENTRICULAR RESPONSE



CARDIAC HISTORY:  

CATHERIZATION: 

SURGERY: 

PROSTHETIC VALVE: 

PACEMAKER: 





MEASUREMENTS (cm)

    DIASTOLIC (NORMALS)                 SYSTOLIC (NORMALS)

IVSd                 0.8 (0.6-1.2)                    LA Diam 3.8 (1.9-4.0)     LVEF       
  72%  

LVIDd               3.3 (3.5-5.7)                        LVIDs      2.0 (2.0-3.5)     %FS  
        40%

LVPWd             0.8 (0.6-1.2)

Ao Diam           3.1 (2.0-3.7)



2 DIMENSIONAL ASSESSMENT:

RIGHT ATRIUM:                   NORMAL

LEFT ATRIUM:       NORMAL



RIGHT VENTRICLE:            NORMAL

LEFT VENTRICLE: NORMAL



TRICUSPID VALVE:             NORMAL

MITRAL VALVE:  MITRAL ANNULAR CALCIFICATION



PULMONIC VALVE:             NORMAL

AORTIC VALVE:     NORMAL



PERICARDIAL EFFUSION: MODERATE

AORTIC ROOT:      NORMAL





LEFT VENTRICULAR WALL MOTION:     NORMAL



DOPPLER/COLOR FLOW:     NORMAL



COMMENTS:      NORMAL LEFT VENTRICULAR EJECTION FRACTION 60-65% WITH NORMAL WALL MOTION. 
MODERATE TO LARGE PERICARDIAL EFFUSION. LARGE PLEURAL EFFUSION.



TECHNOLOGIST:   GILLES MOON

## 2022-09-07 NOTE — P.PN
Subjective


Date of Service: 09/07/22


Chief Complaint: Pleural effusion


Subjective: Improving (Patient is improving s/p Pleurx catheter no new 

complaints)





Review of Systems


General: Weakness


Respiratory: Shortness of Breath





Physical Examination





- Vital Signs


Temperature: 97.1 F


Blood Pressure: 126/56


Pulse: 105


Respirations: 21


Pulse Ox (%): 100





- Physical Exam


General: Alert, In no apparent distress, Oriented x3


Respiratory: Diminished (Diminished on the left side)


Cardiovascular: No edema





- Studies


Medications List Reviewed: Yes





Assessment And Plan





- Current Problems (Diagnosis)


(1) Pleural effusion


Current Visit: No   Status: Chronic   


Plan: 


Patient has a Pleurx catheter now shortness of breath is improved chemistries 

and cytologies have been sent again patient has A. fib resume Eliquis patient is

on amiodarone rate seem to be controlled plan to discharge home patient is on 

dialysis

## 2022-09-07 NOTE — PN
Date of Progress Note:  09/07/2022



Subjective:  The patient was admitted with AFib, RVR after dialysis.  The patient is currently in A f
lutter.  The patient has pleural effusion.  Chest tube was inserted today, drained 1300.  The patient
 was placed on oral amiodarone.



Physical Examination:

Vital Signs:  Blood pressure 122/66, pulse irregular of 108. 

Chest:  Decreased entry on the left base with chest tube inserted.  No crackles. 

Heart:  S1, S2.  Irregular. 

Abdomen:  Soft, nontender. 

Extremities:  No edema. 

Neurologic:  Alert.  No focality.



Laboratory Data:  WBC 4, H and H 9.1/27.7, platelet of 83.  Sodium 137, potassium 3.3, bicarb 28, BUN
 37, creatinine 2.6, calcium of 8.  BNP 60,960.  Chest x-ray after chest tube today; no infiltration.
  Echocardiogram, ejection fraction of 72.



Current Medications:  The patient on include;

1.Aspirin.

2.Eliquis 2.5 b.i.d.

3.Amiodarone 400 b.i.d.

4.Digoxin.

5.KCl.



Assessment And Plan:  

1.End-stage renal disease.  We will dialyze the patient today.  The patient is going to be dialyzed 
on high potassium bath and we will follow up the patient.  We are not going to challenge the patient.
  The patient looked to me euvolemic.

2.Hypertension, controlled, optimal with the presence of atrial flutter, normal ejection fraction.  
I am going to go ahead and start the patient on beta-blockers if okay with Cardiology.

3.Anemia of chronic kidney disease.  We will resume the Retacrit for the patient.

4.Secondary hyperparathyroidism, stable.  We will follow up phosphorus.

5.Atrial fibrillation/atrial flutter as by Cardiology.  Agree with current dose of Eliquis.  Add bet
a-blocker if okay with Cardiology.

6.Hypokalemia.  The patient is going to be dialyzed on high potassium bath.

7.Over volume, congestive heart failure.  We will try to establish better volume control for the pat
ient.  Currently, the patient looked to me started has been normal volume.  We are not going to chall
enge the patient on the dialysis.

8.Hyponatremia, dilutional.  Will be corrected with dialysis.

9.Pleural effusion, status post chest tube.  We will follow up with Pulmonary.





MA/MODL

DD:  09/07/2022 11:01:44Voice ID:  [id number]

DT:  09/07/2022 12:43:31Report ID:  [id number]

## 2022-09-07 NOTE — PN
Date of Progress Note:  09/07/2022



Subjective:  She was seen by bedside.  She continues to be in atrial fibrillation, rate is variable. 
 She apparently was cardioverted initially and then went back into atrial flutter.  Rate has been bor
derline around 100 or slightly above.  She was seen by bedside.  Does not have any chest pain, shortn
ess of breath, or palpitations.



Review of Systems:

No chest pain, shortness of breath, orthopnea, cough, nausea, vomiting, diarrhea.  All other systems 
reviewed and they were negative.



Physical Examination:

Vital Signs:  Reviewed.  Temperature is 97.1, heart rate is 105, breathing at 21, blood pressure 126/
56, saturating 100%. 

General:  Pleasant elderly female, no apparent distress. 

Head and Neck:  Pupils are equal, reactive to light.  Intact eye movements.  No JVD.  No cervical lym
phadenopathy.  Neck is supple.  Thyroid is not enlarged. 

Lungs:  Clear to auscultation bilaterally.  No rhonchi, wheezing, or crackles.  No accessory muscle u
se. 

Heart:  Irregularly irregular.  No extra sounds. 

Abdomen:  Soft, nontender.  Bowel sounds positive.  No organomegaly.  No masses or hernia.  No rigidi
ty or rebound. 

Extremities:  No clubbing or cyanosis.  Intact pulses. 

Skin:  No rash. 

Neurologic:  Alert, awake.  No acute focal deficits appreciated.



Investigations:  Her BUN is 37, creatinine is 2.62.  Initial troponin was 88.  Hemoglobin 9.1, white 
blood cell count 4000.



Assessment And Recommendations:  

1.Atrial fibrillation/atrial flutter with rapid ventricular response.  Decrease amiodarone to 200 mg
 twice a day for now.  She is already loaded with IV amiodarone and also recommend to add metoprolol 
25 mg twice a day by mouth and to adjust further as the blood pressure tolerates and for heart rate t
o be less than 100 and agree with Eliquis 2.5 mg twice a day.

2.Troponin leak, likely due to demand.  Repeat the troponin test to assure stability.

3.Acute renal failure.  The patient is being followed and managed by Nephrology.





/GARRICK

DD:  09/07/2022 13:19:10Voice ID:  [id number]

DT:  09/07/2022 15:21:28Report ID:  [id number]

## 2022-09-08 LAB
ALBUMIN SERPL BCP-MCNC: 1.8 G/DL (ref 3.4–5)
BUN BLD-MCNC: 38 MG/DL (ref 7–18)
BUN BLD-MCNC: 54 MG/DL (ref 7–18)
GLUCOSE SERPLBLD-MCNC: 208 MG/DL (ref 74–106)
GLUCOSE SERPLBLD-MCNC: 309 MG/DL (ref 74–106)
HCT VFR BLD CALC: 28.9 % (ref 36–45)
LYMPHOCYTES # SPEC AUTO: 0.7 K/UL (ref 0.7–4.9)
MCV RBC: 91 FL (ref 80–100)
PMV BLD: 6.9 FL (ref 7.6–11.3)
POTASSIUM SERPL-SCNC: 5.4 MMOL/L (ref 3.5–5.1)
POTASSIUM SERPL-SCNC: 5.6 MMOL/L (ref 3.5–5.1)
RBC # BLD: 3.18 M/UL (ref 3.86–4.86)

## 2022-09-08 RX ADMIN — POTASSIUM CHLORIDE SCH: 10 TABLET, FILM COATED, EXTENDED RELEASE ORAL at 09:00

## 2022-09-08 RX ADMIN — HUMAN INSULIN SCH UNIT: 100 INJECTION, SOLUTION SUBCUTANEOUS at 20:44

## 2022-09-08 RX ADMIN — APIXABAN SCH MG: 2.5 TABLET, FILM COATED ORAL at 20:44

## 2022-09-08 RX ADMIN — METOPROLOL TARTRATE SCH MG: 25 TABLET ORAL at 06:30

## 2022-09-08 RX ADMIN — ASPIRIN SCH MG: 81 TABLET, COATED ORAL at 09:44

## 2022-09-08 RX ADMIN — METOPROLOL TARTRATE SCH MG: 25 TABLET ORAL at 17:06

## 2022-09-08 RX ADMIN — FLUTICASONE PROPIONATE SCH: 50 SPRAY, METERED NASAL at 21:48

## 2022-09-08 RX ADMIN — HUMAN INSULIN SCH UNIT: 100 INJECTION, SOLUTION SUBCUTANEOUS at 17:05

## 2022-09-08 RX ADMIN — APIXABAN SCH MG: 2.5 TABLET, FILM COATED ORAL at 09:44

## 2022-09-08 RX ADMIN — Medication SCH ML: at 09:00

## 2022-09-08 RX ADMIN — Medication SCH ML: at 20:44

## 2022-09-08 RX ADMIN — INSULIN GLARGINE SCH UNIT: 100 INJECTION, SOLUTION SUBCUTANEOUS at 21:47

## 2022-09-08 RX ADMIN — AMIODARONE HYDROCHLORIDE SCH MG: 200 TABLET ORAL at 09:45

## 2022-09-08 RX ADMIN — AMIODARONE HYDROCHLORIDE SCH MG: 200 TABLET ORAL at 20:42

## 2022-09-08 RX ADMIN — POTASSIUM CHLORIDE SCH: 10 TABLET, FILM COATED, EXTENDED RELEASE ORAL at 20:44

## 2022-09-08 RX ADMIN — ATORVASTATIN CALCIUM SCH MG: 10 TABLET, FILM COATED ORAL at 21:46

## 2022-09-08 RX ADMIN — ACETAMINOPHEN PRN MG: 500 TABLET, FILM COATED ORAL at 16:17

## 2022-09-08 RX ADMIN — ACETAMINOPHEN PRN MG: 500 TABLET, FILM COATED ORAL at 00:05

## 2022-09-08 RX ADMIN — MELATONIN SCH MG: 3 TAB ORAL at 21:45

## 2022-09-08 RX ADMIN — TRAMADOL HYDROCHLORIDE PRN MG: 50 TABLET, COATED ORAL at 13:50

## 2022-09-08 NOTE — P.PN
Subjective


Date of Service: 09/08/22


Chief Complaint: Pleural effusion


Subjective: Improving





THIS AM AT 6:59 SHE CONVERTED BACK TO A FIB.  DR. MABRY IS AWARE.  HE WILL 

CONTINUE ORAL MEDS.  DR. SALAS ALSO CALLED. HE IS PLANNING FOR PERMANENT 

CATHETER FOR EFFUSION THAT RETURNS OFTEN. SHE HAS NO SS FROM RAPID A FIB.





GOT CATH FOR PLEURAL CAVITY TODAY. 


HAS PAIN FROM THE PROCEDURE.





SHE IS STABLE ,STILL WEAK. 


SHE HAD PLERUOVAC YESTERDAY AND IS IN PAIN. 


NO DYSPNEA. 





Review of Systems


10-point ROS is otherwise unremarkable





Physical Examination





- Vital Signs


Temperature: 97.2 F


Blood Pressure: 95/33


Pulse: 54


Respirations: 16


Pulse Ox (%): 100





- Physical Exam


General: Oriented x3, Mild distress


HEENT: Atraumatic, PERRLA, EOMI


Neck: Supple, JVD not distended


Respiratory: Clear to auscultation bilaterally, Normal air movement


Cardiovascular: Regular rate/rhythm


Gastrointestinal: Normal bowel sounds, No tenderness


Musculoskeletal: No tenderness


Integumentary: No rashes


Neurological: Normal speech, Normal tone, Normal affect


Lymphatics: No axilla or inguinal lymphadenopathy





- Studies


Medications List Reviewed: Yes





Assessment And Plan





- Current Problems (Diagnosis)


(1) Rapid atrial fibrillation


Current Visit: Yes   Status: Acute   


Plan: 








WILL HOLD ELIQUIS UNTIL PLEURAL CATHETER 


DR RIVERA WILL DO THIS. 





DR. MABRY WILL CONTINUE ORAL MEDS. 


A FIB RECURRED HERE. 








SHE CONVERTED BACK TO NSR THIS PM


START ELIQUIS IN AM.  


SHE HAD PROCEDURE TODAY. 





BACK TO SINUS NOW.


STABLE. 


ELIQUIS FOR A FEW MONTHS. 








(2) Hypokalemia


Current Visit: Yes   Status: Acute   


Plan: 


REPLACE K.





RECHECK IN AM. 


HD DONE TODAY. 








(3) Pleural effusion


Current Visit: No   Status: Chronic   


Plan: 


RECURRENT A FIB. CYTOLOGY AND CULTURES NEG. 


TRANSUDATE


SHE MAY NEED PLEURODESIS POSSIBLE.





CALLED IN DR. ASLAS AGAIN.


PLEUROVAC FOR NOW. 





CYTOLOGY HAS BEEN NEGATIVE TWICE


WITH RECURRENT EFFUSION SHE NOW HAS PLEURAL CATHETER.  








(4) CKD stage 5 secondary to hypertension


Current Visit: Yes   Status: Chronic   


Plan: 


ONHD. 


CHECK C7 IN AM.











(5) Hyperkalemia


Current Visit: Yes   Status: Acute   


Plan: 


ORAL K HAS BEEN STOPPED NOW.  


STABLE. 


REDO LAB DAILY. 











(6) Debilitated


Current Visit: Yes   Status: Acute   


Plan: 


WEAK 


USUALLY WC OR WALKER BOUND.


REFER TO REHAB.

## 2022-09-08 NOTE — OP
Date of Procedure:  09/07/2022



Surgeon:  Panchito Reeves MD, MD



Preoperative Diagnosis:  Left recurrent pleural effusion.



Postoperative Diagnosis:  Left recurrent pleural effusion.



Procedure Performed:  Placement of a left PleurX tunneled thoracic catheter.



Anesthesia:  1% lidocaine utilized.



Estimated Blood Loss:  Less than 1 cc.



Specimen:  Pleural fluid, which was straw colored.



Findings:  Straw-colored pleural fluid returned.



Complications:  None.



Drains:  With a PleurX catheter.



Disposition:  The patient remained in the ICU in serious condition throughout the procedure.



Procedure In Detail:  After informed consent was obtained, the patient was prepped and draped in the 
usual sterile fashion.  After adequate anesthesia was achieved on the left chest wall at approximatel
y the 5th-6th intercostal space, I anesthetized the entire tract and overlying the rib for the planne
d placement of the PleurX catheter.  At this point, I anesthetized the entire tract inferior to the i
nsertion site.  I made a small nick incision at the insertion site and going over the rib, I cannulat
ed the thoracic cavity with the introducer sheath.  At this point, the needle was removed and I advan
felicia the wire into the thoracic space.  I then brought the catheter through a separate stab incision i
nferiorly that was previously anesthetized and after passing the tunneling device, I sequentially dil
ated up the insertion site and placed the catheter into the chest wall and secured it.  At this point
, I then evacuated almost 500 cc of straw-colored fluid immediately.  After the introducer sheath was
 removed, the area was cleansed in its entirety and a suture was used to close the insertion site, wh
ich included a 2-0 silk suture and a sterile dressing was placed over top.  The system was hooked up 
to a Pneumovax system.  The patient tolerated procedure well without any evidence of complication and
 remained in the ICU in serious condition throughout the procedure.  All counts were correct at the e
nd of the case.





TK/MODL

DD:  09/08/2022 10:49:19Voice ID:  [id number]

DT:  09/08/2022 21:55:20Report ID:  [id number]

## 2022-09-08 NOTE — P.OP
Preoperative diagnosis: LEFT recurrent pleural effusion


Postoperative diagnosis: LEFT recurrent pleural effusion


Primary procedure: Placement of LEFT Pleur-X Thoracic catheter


Anesthesia: 1% lidocaine


Estimated blood loss: <1cc


Specimen: pleural fluid


Findings: straw colored fluid


Complications: None


Drain(s): Other (PleurX catheter)


Transferred to: ICU


Condition: Serious

## 2022-09-08 NOTE — PN
Date of Progress Note:  09/08/2022



Subjective:  The patient was admitted with pleural effusion, AFib with RVR.  The patient had maintain
ed on amiodarone.  Yesterday, we added beta-blocker.



Physical Examination:

Vital Signs:  When I saw the patient; blood pressure 124/38, pulse of 59, afebrile. 

Chest:  Decreased entry left base.  Chest tube. 

Heart:  S1, S2.  Regular.  Systolic murmur. 

Abdomen:  Soft, nontender. 

Extremity:  No edema. 

Neurologic:  Alert.  No focality.



Laboratory Data:  WBC 6.1, H and H 9.5/28.9.  Sodium 137, potassium 5.6, bicarb 27, BUN 38, creatinin
e 2.8, GFR of 16, calcium 8.1, phosphorus 3.3, albumin 1.8.  Corrected calcium is 9.7.



Current Medications:  The patient on include;

1.Aspirin.

2.Albuterol.

3.Eliquis.

4.Epogen.

5.Amiodarone 200 b.i.d.

6.Metoprolol 25 b.i.d.

7.Tramadol.



Assessment And Plan:  

1.End-stage renal disease.  I am going to continue the patient on dialysis.  The patient will be sonja
eduled for dialysis tomorrow.  The patient cleared from the Renal standpoint for discharge planning t
o continue the dialysis as outpatient.  The patient has interest in home hemodialysis.  We will start
 the process.  We will follow up with .

2.Anemia of chronic kidney disease.  Continue PEMA.

3.Hypertension, controlled, optimal.  Continue current treatment.

4.Pleural effusion, recurrent, status post chest tube.  We will follow up with Surgery.

5.Secondary hyperparathyroidism, stable.

6.Atrial fibrillation and atrial flutter as by Cardiology.  The patient was started on beta-blocker.
  Continue amiodarone.  We will follow up.





MA/GARRICK

DD:  09/08/2022 11:41:36Voice ID:  [id number]

DT:  09/08/2022 13:17:14Report ID:  [id number]

## 2022-09-09 VITALS — OXYGEN SATURATION: 100 %

## 2022-09-09 LAB
ALBUMIN SERPL BCP-MCNC: 1.8 G/DL (ref 3.4–5)
BUN BLD-MCNC: 58 MG/DL (ref 7–18)
GLUCOSE SERPLBLD-MCNC: 188 MG/DL (ref 74–106)
HCT VFR BLD CALC: 30 % (ref 36–45)
LDH PLR-CCNC: 344 U/L
LYMPHOCYTES # SPEC AUTO: 0.9 K/UL (ref 0.7–4.9)
MCV RBC: 92.4 FL (ref 80–100)
PMV BLD: 6.9 FL (ref 7.6–11.3)
POTASSIUM SERPL-SCNC: 5.4 MMOL/L (ref 3.5–5.1)
PROT PLR-MCNC: <3 G/DL
RBC # BLD: 3.25 M/UL (ref 3.86–4.86)

## 2022-09-09 PROCEDURE — 5A1D70Z PERFORMANCE OF URINARY FILTRATION, INTERMITTENT, LESS THAN 6 HOURS PER DAY: ICD-10-PCS

## 2022-09-09 PROCEDURE — 0W9B30Z DRAINAGE OF LEFT PLEURAL CAVITY WITH DRAINAGE DEVICE, PERCUTANEOUS APPROACH: ICD-10-PCS

## 2022-09-09 RX ADMIN — INSULIN LISPRO SCH UNIT: 100 INJECTION, SOLUTION INTRAVENOUS; SUBCUTANEOUS at 17:25

## 2022-09-09 RX ADMIN — ASPIRIN SCH MG: 81 TABLET, COATED ORAL at 08:27

## 2022-09-09 RX ADMIN — FLUTICASONE PROPIONATE SCH SPRAY: 50 SPRAY, METERED NASAL at 22:05

## 2022-09-09 RX ADMIN — HUMAN INSULIN SCH: 100 INJECTION, SOLUTION SUBCUTANEOUS at 21:00

## 2022-09-09 RX ADMIN — Medication SCH ML: at 08:28

## 2022-09-09 RX ADMIN — METOPROLOL TARTRATE SCH: 25 TABLET ORAL at 05:50

## 2022-09-09 RX ADMIN — TRAMADOL HYDROCHLORIDE PRN MG: 50 TABLET, COATED ORAL at 20:06

## 2022-09-09 RX ADMIN — Medication SCH ML: at 22:06

## 2022-09-09 RX ADMIN — POTASSIUM CHLORIDE SCH: 10 TABLET, FILM COATED, EXTENDED RELEASE ORAL at 08:27

## 2022-09-09 RX ADMIN — INSULIN LISPRO SCH: 100 INJECTION, SOLUTION INTRAVENOUS; SUBCUTANEOUS at 12:00

## 2022-09-09 RX ADMIN — AMIODARONE HYDROCHLORIDE SCH MG: 200 TABLET ORAL at 08:26

## 2022-09-09 RX ADMIN — FLUTICASONE PROPIONATE SCH: 50 SPRAY, METERED NASAL at 08:27

## 2022-09-09 RX ADMIN — HUMAN INSULIN SCH: 100 INJECTION, SOLUTION SUBCUTANEOUS at 16:30

## 2022-09-09 RX ADMIN — INSULIN GLARGINE SCH UNIT: 100 INJECTION, SOLUTION SUBCUTANEOUS at 22:06

## 2022-09-09 RX ADMIN — APIXABAN SCH MG: 2.5 TABLET, FILM COATED ORAL at 22:05

## 2022-09-09 RX ADMIN — HUMAN INSULIN SCH: 100 INJECTION, SOLUTION SUBCUTANEOUS at 07:30

## 2022-09-09 RX ADMIN — ATORVASTATIN CALCIUM SCH MG: 10 TABLET, FILM COATED ORAL at 22:06

## 2022-09-09 RX ADMIN — APIXABAN SCH MG: 2.5 TABLET, FILM COATED ORAL at 08:27

## 2022-09-09 RX ADMIN — HUMAN INSULIN SCH: 100 INJECTION, SOLUTION SUBCUTANEOUS at 11:30

## 2022-09-09 RX ADMIN — MELATONIN SCH MG: 3 TAB ORAL at 22:05

## 2022-09-09 NOTE — RAD REPORT
EXAM DESCRIPTION:  Francisco Single View9/5/2022 11:58 am

 

CLINICAL HISTORY:  Atrial fibrillation

 

COMPARISON:  August 2022

 

FINDINGS:   Large left pleural effusion with basilar atelectasis

 

The remainder of the lungs appear clear of acute infiltrate.

 

The heart size is difficult to evaluate secondary to the adjacent pleural effusion

 

IMPRESSION:   Large left pleural effusion
EXAM DESCRIPTION:  RAD - Chest Lateral Decubitus - 9/6/2022 3:25 pm

 

CLINICAL HISTORY:  Pleural Effusions

 

COMPARISON:  Chest Single View dated 9/5/2022; Thoracentesis w/ US Guide dated 8/31/2022; Thorax Wo C
on dated 8/29/2022

 

FINDINGS:  Decubitus radiograph re- demonstrating a layering left pleural effusion that is moderate i
n size.

 

IMPRESSION:  Moderate layering left pleural effusion.
EXAM DESCRIPTION:  RAD - Chest Single View - 9/7/2022 8:49 am

 

CLINICAL HISTORY:  Status Post Thorocentesis

 

COMPARISON:  Chest Single View dated 9/5/2022; Chest Single View dated 8/31/2022; Chest Single View d
ated 8/29/2022; Chest Single View dated 5/2/2022; Chest Lateral Decubitus dated 9/6/2022

 

FINDINGS:  Lines: Right IJ approach dialysis catheter with tip overlying the right atrium.

Lungs: Decrease in size of the left-sided effusion with mild improved aeration of the left lung.

Pleural: Small moderate volume of residual pleural fluid. New small moderate pneumothorax.  A left-si
ded chest tube is been placed.

Cardiac: Cardiomegaly.

Mediastinum: Within normal limits.

Bones: No acute fractures.

Other: None

 

IMPRESSION:  Interval placement of a left-sided chest tube which terminates near the mediastinum at t
he level of the aortic arch. A small to moderate pneumothorax is present and there is a small to mode
rate volume of residual pleural fluid.
EXAM DESCRIPTION:  RAD - Chest Single View - 9/8/2022 5:23 am

 

CLINICAL HISTORY:  LEFT Chest tube placement - effusion

 

COMPARISON:  Chest Single View dated 9/7/2022; Chest Single View dated 9/5/2022; Chest Single View da
kaela 8/31/2022; Chest Single View dated 8/29/2022

 

FINDINGS:  Lines: Right IJ approach dialysis catheter with tip overlying the SVC.

Lungs: No evidence of edema or pneumonia.

Pleural: Very mild residual left pleural fluid. Left-sided chest tube in similar positioning. Pneumot
horax has resolved.

Cardiac: The heart size is within normal limits.

Mediastinum: Within normal limits.

Bones: No acute fractures.

Other: None

 

IMPRESSION:  Decrease in size of the left-sided pneumothorax with left-sided chest tube in similar po
sitioning. The left effusion has decreased in size as well. No acute process otherwise identified.
EXAM DESCRIPTION:  RAD - Chest Single View - 9/9/2022 5:35 am

 

CLINICAL HISTORY:  LEFT Chest tube placement - effusion

Chest pain.

 

COMPARISON:  Chest Single View dated 9/8/2022; Chest Single View dated 9/7/2022; Chest Single View da
kaela 9/5/2022; Chest Single View dated 8/31/2022; Chest Lateral Decubitus dated 9/6/2022

 

FINDINGS:  Portable technique limits examination quality.

 

Small bore left-sided chest tube remains in place. No measurable pneumothorax is seen. Small pleural 
effusions likely present. Heart is moderately enlarged.Right-sided venous catheter is unchanged in po
sition.

 

IMPRESSION:  No measurable pneumothorax seen on the left.
Universal Safety Interventions

## 2022-09-09 NOTE — PN
Date of Progress Note:  09/09/2022



Subjective:  The patient was seen by bedside, stable, still in sinus rhythm, heart rate went down to 
the low 50s, but no dizziness or syncope.  The patient is resting well.



Review of Systems:

No chest pain, shortness of breath, orthopnea, cough.  No nausea, vomiting, or diarrhea.  All other s
ystems reviewed are negative.



Physical Examination:

Vital Signs:  Reviewed. 

Head and Neck:  Pupils are equal, reactive to light.  Intact eye movements.  No JVD.  No cervical lym
phadenopathy. 

Neck:  Supple.  Thyroid is not enlarged. 

Lungs:  Clear to auscultation bilaterally.  No rhonchi, rales, or crackles.  No accessory muscle use.
 

HEART:  Regular rate and rhythm.  No extra sounds. 

Abdomen:  Soft, nontender.  Bowel sounds positive.  No organomegaly.  No masses or hernia.  No rigidi
ty or rebound 

Extremities:  No clubbing, cyanosis. 

Neurologic:  Alert, awake, no acute focal deficits appreciated.



Investigations:  Labs were reviewed.



Assessment And Recommendations:  

1.Atrial fibrillation.  She is in sinus rhythm now.  Decrease amiodarone to 100 mg daily and the met
oprolol to 12.5 mg twice a day as she is going into the karina side.

2.Elevated troponin due to demand ischemia.  No further workup is needed here.





/GARRICK

DD:  09/09/2022 15:22:12Voice ID:  [id number]

DT:  09/09/2022 23:14:30Report ID:  [id number]

## 2022-09-09 NOTE — P.PN
Subjective


Date of Service: 09/09/22


Chief Complaint: Pleural effusion


Subjective: No new changes, Other (Received HD today.)





Physical Examination





- Vital Signs


Temperature: 97.3 F


Blood Pressure: 116/35


Pulse: 52


Respirations: 16


Pulse Ox (%): 100





- Physical Exam


General: Other (Chronically ill appearing)


HEENT: Atraumatic, Normocephalic


Neck: Supple


Respiratory: Other (Symmetric chest expansion)


Cardiovascular: No rubs, No murmurs


Gastrointestinal: Soft and benign, No rebound


Musculoskeletal: No clubbing


Integumentary: No warmth


Neurological: Normal tone


Urinary: Other (No bladder distention)


External genitalia: Deferred


Rectal: Deferred





- Studies


Medications List Reviewed: Yes





Assessment And Plan





- Plan





1.   End-stage renal disease.  Received HD today.  Cont HD qMWF.


2.   Anemia of chronic kidney disease.  Continue PEMA.


3.   Hypertension, controlled, optimal.  Continue current treatment.


4.   L Pleural effusion.  S/p L pleurX catheter placement on 9/8/2022


5.   Secondary hyperparathyroidism, stable.


6.   Atrial fibrillation and atrial flutter.  Per Cardiology.

## 2022-09-10 VITALS — DIASTOLIC BLOOD PRESSURE: 35 MMHG | SYSTOLIC BLOOD PRESSURE: 116 MMHG | TEMPERATURE: 97.3 F

## 2022-09-10 NOTE — P.DS
Admission Date: 09/05/22


Discharge Date: 09/10/22


Disposition: TRANSFER TO INPATIENT REHAB


Reason for Admission: Pleural effusion





- Problems


(1) Rapid atrial fibrillation


Status: Acute   





(2) Hypokalemia


Status: Acute   





(3) Pleural effusion


Status: Chronic   





(4) CKD stage 5 secondary to hypertension


Status: Chronic   





(5) Hyperkalemia


Status: Acute   





(6) Debilitated


Status: Acute   


Brief History of Present Illness: 


MS. CRANE HAS CKD 5 ON HD. SHE DURING DIALYSIS WAS NOTED TO HAVE RAPID A FIB AND

BROUGHT TO ER.  SHE WAS GIVEN AMIODARONE IV AND IT CONTROLLED HR DOWN TO NORMAL.

 SHE HAS MILD LOW K.  SHE ALSO HAS RECURRENT EFFUSION THAT IS NOT SYMPTOMATIC 

NOW. 


Hospital Course: 





FABIOLA HAD RECURRENT EFFUSION AND HAD PLEUROVAC FOR IT.  SHE ALSO HAS RAPID A 

FIB THAT IS NEW. IT TOOK FOUR DAYS TO CONTROL THAT. LATER SHE HAD BRADYCARDIA SO

WE REDUCED MEDS.  SHE IS NOW SENT TO [facility] FOR REHAB.  IDEALLY SHE SHOULD BE

IN [facility] BUT SHE REFUSES TO DO SO.


Vital Signs/Physical Exam: 














Temp Pulse Resp BP Pulse Ox


 


 97.3 F   52   16   116/35 L  100 


 


 09/10/22 08:07  09/10/22 08:07  09/10/22 08:07  09/10/22 08:07  09/10/22 08:07








Laboratory Data at Discharge: 














WBC  Cancelled   09/10/22  05:00    


 


Hgb  Cancelled   09/10/22  05:00    


 


Hct  Cancelled   09/10/22  05:00    


 


Plt Count  Cancelled   09/10/22  05:00    


 


PT  15.5 SECONDS (9.5-12.5)  H  09/06/22  13:46    


 


INR  1.40   09/06/22  13:46    


 


Sodium  Cancelled   09/10/22  05:00    


 


Potassium  Cancelled   09/10/22  05:00    


 


BUN  Cancelled   09/10/22  05:00    


 


Creatinine  Cancelled   09/10/22  05:00    


 


Glucose  Cancelled   09/10/22  05:00    


 


Phosphorus  Cancelled   09/10/22  05:00    


 


Cholesterol  Cancelled   09/07/22  09:48    








Home Medications: 








Pravastatin Sodium 10 mg PO BEDTIME 03/02/18 


Cholecalciferol (Vitamin D3) [Vitamin D3] 5,000 unit PO DAILY #30 07/04/21 


Aspirin [Vazalore] 81 mg PO DAILY 04/27/22 


Cetirizine HCl 1 tab PO DAILY 08/30/22 


Cyanocobalamin (Vitamin B-12) [Vitamin B12] 1 tab PO DAILY 08/30/22 


Ezetimibe 1 tab PO DAILY 08/30/22 


Fluticasone Propionate [Flonase Allergy Relief] 1 appl AGUEDA BID 08/30/22 


Insulin Aspart [Novolog Flexpen] 7 units SQ SEECOM 08/30/22 


Insulin Detemir [Levemir Flextouch] 15 units SQ DAILY AT SUPPER 08/30/22 


Melatonin 1 tab PO BEDTIME 08/30/22 


Pantoprazole [Protonix Tab*] 1 tab PO SEECOM 08/30/22 


Simethicone [Gas-X] 1 tab PO DAILY PRN 08/30/22 


Vit A/Vit C/Vit E/Zinc/Copper [Preservision Areds Softgel] 2 cap PO DAILY 

08/30/22 





Followup: 


Porfirio Valdez MD [ACTIVE - CAN ADMIT] -  (Call to schedule appointment)

## 2022-09-26 ENCOUNTER — HOSPITAL ENCOUNTER (OUTPATIENT)
Dept: HOSPITAL 97 - ER | Age: 87
Setting detail: OBSERVATION
LOS: 1 days | Discharge: HOME HEALTH | End: 2022-09-27
Attending: INTERNAL MEDICINE | Admitting: INTERNAL MEDICINE
Payer: COMMERCIAL

## 2022-09-26 VITALS — BODY MASS INDEX: 26.4 KG/M2

## 2022-09-26 DIAGNOSIS — E78.5: ICD-10-CM

## 2022-09-26 DIAGNOSIS — I13.2: ICD-10-CM

## 2022-09-26 DIAGNOSIS — Z91.09: ICD-10-CM

## 2022-09-26 DIAGNOSIS — E03.9: ICD-10-CM

## 2022-09-26 DIAGNOSIS — J90: ICD-10-CM

## 2022-09-26 DIAGNOSIS — E11.22: ICD-10-CM

## 2022-09-26 DIAGNOSIS — Z20.822: ICD-10-CM

## 2022-09-26 DIAGNOSIS — Z86.73: ICD-10-CM

## 2022-09-26 DIAGNOSIS — Z88.3: ICD-10-CM

## 2022-09-26 DIAGNOSIS — N18.5: ICD-10-CM

## 2022-09-26 DIAGNOSIS — Z88.6: ICD-10-CM

## 2022-09-26 DIAGNOSIS — I48.20: Primary | ICD-10-CM

## 2022-09-26 DIAGNOSIS — N17.9: ICD-10-CM

## 2022-09-26 DIAGNOSIS — Z99.2: ICD-10-CM

## 2022-09-26 DIAGNOSIS — I50.30: ICD-10-CM

## 2022-09-26 DIAGNOSIS — Z86.16: ICD-10-CM

## 2022-09-26 LAB
ALBUMIN SERPL BCP-MCNC: 2.1 G/DL (ref 3.4–5)
ALP SERPL-CCNC: 136 U/L (ref 45–117)
ALT SERPL W P-5'-P-CCNC: 30 U/L (ref 12–78)
AST SERPL W P-5'-P-CCNC: 34 U/L (ref 15–37)
BUN BLD-MCNC: 24 MG/DL (ref 7–18)
GLUCOSE SERPLBLD-MCNC: 127 MG/DL (ref 74–106)
HCT VFR BLD CALC: 32.6 % (ref 36–45)
INR BLD: 1.51
LYMPHOCYTES # SPEC AUTO: 0.6 K/UL (ref 0.7–4.9)
MAGNESIUM SERPL-MCNC: 1.8 MG/DL (ref 1.8–2.4)
MCV RBC: 94.1 FL (ref 80–100)
PMV BLD: 7 FL (ref 7.6–11.3)
POTASSIUM SERPL-SCNC: 3.6 MMOL/L (ref 3.5–5.1)
RBC # BLD: 3.47 M/UL (ref 3.86–4.86)
TROPONIN I SERPL HS-MCNC: 19.9 PG/ML (ref ?–58.9)

## 2022-09-26 PROCEDURE — 96365 THER/PROPH/DIAG IV INF INIT: CPT

## 2022-09-26 PROCEDURE — 85025 COMPLETE CBC W/AUTO DIFF WBC: CPT

## 2022-09-26 PROCEDURE — 93005 ELECTROCARDIOGRAM TRACING: CPT

## 2022-09-26 PROCEDURE — 71045 X-RAY EXAM CHEST 1 VIEW: CPT

## 2022-09-26 PROCEDURE — 96366 THER/PROPH/DIAG IV INF ADDON: CPT

## 2022-09-26 PROCEDURE — 96361 HYDRATE IV INFUSION ADD-ON: CPT

## 2022-09-26 PROCEDURE — 87811 SARS-COV-2 COVID19 W/OPTIC: CPT

## 2022-09-26 PROCEDURE — 80048 BASIC METABOLIC PNL TOTAL CA: CPT

## 2022-09-26 PROCEDURE — 83735 ASSAY OF MAGNESIUM: CPT

## 2022-09-26 PROCEDURE — 96375 TX/PRO/DX INJ NEW DRUG ADDON: CPT

## 2022-09-26 PROCEDURE — 87040 BLOOD CULTURE FOR BACTERIA: CPT

## 2022-09-26 PROCEDURE — 80076 HEPATIC FUNCTION PANEL: CPT

## 2022-09-26 PROCEDURE — 36415 COLL VENOUS BLD VENIPUNCTURE: CPT

## 2022-09-26 PROCEDURE — 83880 ASSAY OF NATRIURETIC PEPTIDE: CPT

## 2022-09-26 PROCEDURE — 85610 PROTHROMBIN TIME: CPT

## 2022-09-26 PROCEDURE — 99285 EMERGENCY DEPT VISIT HI MDM: CPT

## 2022-09-26 PROCEDURE — 84484 ASSAY OF TROPONIN QUANT: CPT

## 2022-09-26 RX ADMIN — Medication SCH ML: at 21:00

## 2022-09-26 NOTE — P.SSS
Patient History


Date of Service: 09/26/22


Reason for admission: PALPITATIONS


History of Present Illness: 





FABIOLA IS A LADY WITH MANY MEDICAL ISSUES INCLUDES RENAL FAILURE ON 

HEMODIALYSIS.  SHE HAD RAPID A FIB LAST ADMISSION AND WAS GIVEN AMIODARONE.  SHE

COMES BACK TO ER SENT BY RENAL DOCTOR SOPHY FOR HYPOTENSION AND WAS FOUND TO HAVE

RAPID A FIB AGAIN.  DR. KENDRA DASILVA WORKED ON HER.  SHE HAD AMIODARONE IV UNDER 

ADVISE OF DR. MORA.  LEVOPHED WAS TAPERED BUT WAS NOT ABLE TO BE TOTALLY OFF. 

SHE WILL BE IN ICU OVERNIGHT.  SHE MAY OR MAY NOT ABLE TO GO HOME IN AM.  


Allergies





blood thinner Allergy (Mild, Uncoded 02/27/20 21:12)


   swelling


blood thinners Allergy (Uncoded 02/27/20 21:12)


   Unknown


Statins-Hmg-Coa Redu Allergy (Uncoded 02/27/20 21:12)


   Unknown





Home Medications: 








Pravastatin Sodium 10 mg PO BEDTIME 03/02/18 


Cholecalciferol (Vitamin D3) [Vitamin D3] 5,000 unit PO DAILY #30 07/04/21 


Aspirin [Vazalore] 81 mg PO DAILY 04/27/22 


Cetirizine HCl 1 tab PO DAILY 08/30/22 


Cyanocobalamin (Vitamin B-12) [Vitamin B12] 1 tab PO DAILY 08/30/22 


Ezetimibe 1 tab PO DAILY 08/30/22 


Fluticasone Propionate [Flonase Allergy Relief] 1 appl AGUEDA BID 08/30/22 


Insulin Aspart [Novolog Flexpen] 7 units SQ SEECOM 08/30/22 


Insulin Detemir [Levemir Flextouch] 15 units SQ DAILY AT SUPPER 08/30/22 


Melatonin 1 tab PO BEDTIME 08/30/22 


Pantoprazole [Protonix Tab*] 1 tab PO SEECOM 08/30/22 


Simethicone [Gas-X] 1 tab PO DAILY PRN 08/30/22 


Vit A/Vit C/Vit E/Zinc/Copper [Preservision Areds Softgel] 2 cap PO DAILY 

08/30/22 








- Past Medical/Surgical History


Diabetic: Yes


-: DM


-: HTN


-: Hyperlipidemia


-: CVA 2015


-: Gastric Ulcer


-: Chronic renal failure


-: Diastolic heart failure


-: COVID infection July 2021


-: dilaysis MWF


-: Full Hysterectomy


-: R upper chest dialysis cath





- Family History


  ** Mother


-: Diabetes





- Social History


Alcohol use: No


CD- Drugs: No


Caffeine use: No





Physical Examination





- Physical Exam


General: Alert, Mild distress


HEENT: Atraumatic, PERRLA, Mucous membr. moist/pink, EOMI, Sclerae nonicteric


Neck: Supple, 2+ carotid pulse no bruit, No LAD, Without JVD or thyroid 

abnormality


Respiratory: Clear to auscultation bilaterally, Normal air movement


Cardiovascular: Regular rate/rhythm, Normal S1 S2


Gastrointestinal: Normal bowel sounds, No tenderness


Musculoskeletal: No tenderness


Integumentary: No rashes


Neurological: Normal gait, Normal speech, Normal strength at 5/5 x4 extr, Normal

tone, Normal affect


Lymphatics: No axilla or inguinal lymphadenopathy





- Studies


Laboratory Data (last 24 hrs)





09/26/22 13:03: PT 16.7 H, INR 1.51


09/26/22 13:03: WBC 5.80, Hgb 10.5 L, Hct 32.6 L, Plt Count 113 L


09/26/22 13:03: Sodium 137, Potassium 3.6, BUN 24 H, Creatinine 2.93 H, Glucose 

127 H, Magnesium 1.8, Total Bilirubin 0.8, AST 34, ALT 30, Alkaline Phosphatase 

136 H








- Diagnosis (Problem(s))


(1) Rapid atrial fibrillation


Current Visit: No   Status: Acute   


Plan: 


AMIODARONE ORAL.


STABLE NOW


PROGNOSIS POOR AS SHE IS VERY FRAIL AND HAS MANY ISSUES RELATED TO DIALYSIS AND 

SO HYPOTENSION. 














(2) CKD stage 5 secondary to hypertension


Current Visit: No   Status: Chronic   


Plan: 


CONTINUE HD. CONSULT DR. BECKETT.








(3) Pleural effusion


Current Visit: No   Status: Chronic   


Plan: 


SHE HAS PLEUROVAC CATH AND WILL CALL IN DR. FLORIAN TO . 








- Disposition


Disposition: ROUTINE DISCHARGE

## 2022-09-26 NOTE — EDPHYS
Physician Documentation                                                                           

 [facility]                                                                 

Name: Deirdre Sauer                                                                               

Age: 88 yrs                                                                                       

Sex: Female                                                                                       

: 1933                                                                                   

MRN: [id number]                                                                                   

Arrival Date: 2022                                                                          

Time: 12:36                                                                                       

Account#: [id number]                                                                            

Bed 2                                                                                             

Private MD:                                                                                       

ED Physician Anders Flores                                                                         

HPI:                                                                                              

                                                                                             

19:28 This 88 yrs old Female presents to ER via EMS with complaints of Blood Pressure Problem ms3 

      - Low.                                                                                      

19:28 88-year-old female with past medical history of hyperlipidemia, kidney problems,        ms3 

      diabetes, CVA, hypertension presents via EMS for hypotension that began during her last     

      10 minutes of dialysis. EMS states patient typically has 5 kg removed and today only 1      

      kg was removed. 10 minutes prior to finishing dialysis patient began complaining of         

      weakness. Dialysis administer 1 L of normal saline prior to EMSs arrival. EMS               

      administered 10 mcg push of epinephrine. EMS notes patient's blood glucose level was        

      136. Patient is without complaint in the emergency department. Patient denies nausea,       

      vomiting, fevers, chills.                                                                   

                                                                                                  

Historical:                                                                                       

- Allergies:                                                                                      

12:39 Alteplase;                                                                              tw2 

12:39 blood thinners except ASA;                                                              tw2 

12:39 caffeine;                                                                               tw2 

12:39 HYDROCODONE;                                                                            tw2 

12:39 Iodine;                                                                                 tw2 

12:39 Statins-Hmg-Coa Reductase Inhibitors;                                                   tw2 

12:39 Streptomycin;                                                                           tw2 

- Home Meds:                                                                                      

13:37 Insulin Glargine Sub-Q [Active]; Zetia 10 mg oral tab 1 tab once daily [Active];        tw2 

      pravastatin 10 mg oral tab 1 tab once daily [Active]; Coreg 3.125 mg oral tab 1 tab         

      [Active]; pantoprazole 10 mg Oral 1 tab [Active]; Claritin-D 24 Hour  mg Oral         

      Tb24 1 tab once daily [Active]; potassium chloride 10 mEq oral TbER 1 tab once daily        

      [Active]; aspirin 81 mg Oral chew 1 tab once daily [Active];                                

14:42 amiodarone 200 mg Oral tab [Active]; midodrine oral [Active];                           tw2 

14:43 Eliquis oral [Active];                                                                  tw2 

- PMHx:                                                                                           

12:39 Hyperlipidemia; kidney problems; Diabetes - NIDDM; CVA; Hypertension; Dialysis; Hernia; tw2 

      CHF; Cellulitis; Anemia; hyperparathyroidism; malnutition;                                  

- PSHx:                                                                                           

12:39 Dialysis catheter to right upper chest; hysterectomy;                                   tw2 

                                                                                                  

- Immunization history:: Adult Immunizations.                                                     

- Social history:: Smoking status: .                                                              

                                                                                                  

                                                                                                  

ROS:                                                                                              

19:28 Neck: Negative for injury, pain, and swelling, Cardiovascular: Negative for chest pain, ms3 

      and palpitations. Respiratory: Negative for shortness of breath, cough, wheezing, and       

      pleuritic chest pain, Abdomen/GI: Negative for abdominal pain, nausea, vomiting,            

      diarrhea, and constipation, Back: Negative for injury and pain, MS/Extremity: Negative      

      for injury and deformity, Skin: Negative for injury, rash, and discoloration, Neuro:        

      Negative for headache, weakness, numbness, tingling. Psych: Negative for depression,        

      anxiety, suicide ideation, homicidal ideation, and hallucinations.                          

19:28 Constitutional: Positive for Generalized weakness.                                          

19:28 All other systems are negative.                                                             

                                                                                                  

Exam:                                                                                             

16:00 ECG was reviewed by the Attending Physician.                                            ms3 

19:28 Constitutional:  This is a well developed, well nourished patient who is awake, alert,  ms3 

      and in no acute distress. Eyes:  Pupils equal round and reactive to light, extra-ocular     

      motions intact.  Lids and lashes normal.  Conjunctiva and sclera are non-icteric and        

      not injected.  Periorbital areas with no swelling, redness, or edema. Neck:  Trachea        

      midline, no cervical lymphadenopathy.  Supple, full range of motion without nuchal          

      rigidity, or vertebral point tenderness.  No Meningismus. Chest/axilla:  Normal chest       

      wall appearance and motion.  Nontender with no deformity.  Right subclavian dialysis        

      catheter. Cardiovascular:  Regular rate and rhythm with a normal S1 and S2.  No             

      gallops, murmurs, or rubs.  Normal PMI, no JVD.  No pulse deficits. Respiratory:  Lungs     

      have equal breath sounds bilaterally, clear to auscultation and percussion.  No rales,      

      rhonchi or wheezes noted.  No increased work of breathing, no retractions or nasal          

      flaring. Abdomen/GI:  Soft, non-tender, with normal bowel sounds.  No distension or         

      tympany.  No guarding or rebound.  No evidence of tenderness throughout. Skin:  Warm,       

      dry with normal turgor.  Normal color with no rashes, no lesions, and no evidence of        

      cellulitis. MS/ Extremity:  Pulses equal, no cyanosis.  Neurovascular intact.  Full,        

      normal range of motion. Psych:  Awake, alert, with orientation to person, place and         

      time.  Behavior, mood, and affect are within normal limits.                                 

                                                                                                  

Vital Signs:                                                                                      

12:34 BP 72 / 47; Pulse 120; Resp 22; Temp 97.7(TE); Pulse Ox 100% on R/A; Weight 70 kg (R);  tw2 

12:45 BP 76 / 48; Pulse 122; Resp 22; Pulse Ox 100% on R/A;                                   tw2 

13:00  / 49; Pulse 124; Resp 22; Pulse Ox 100% on R/A;                                  tw2 

13:10  / 41; Pulse 118; Resp 22; Pulse Ox 100% on R/A;                                  tw2 

13:15  / 48; Pulse 114; Resp 21; Pulse Ox 100% ;                                        tw2 

13:20  / 95; Pulse 116; Resp 19; Pulse Ox 100% on R/A;                                  tw2 

13:25  / 45; Pulse 114; Resp 19; Pulse Ox 100% on R/A;                                  tw2 

13:30 BP 98 / 53; Pulse 106; Resp 19; Pulse Ox 100% on R/A;                                   tw2 

13:35 BP 99 / 59; Pulse 114; Resp 21; Pulse Ox 100% on R/A;                                   tw2 

13:56 BP 87 / 55; Pulse 113; Resp 22; Pulse Ox 100% on R/A;                                   ph  

13:56  / 93; Pulse 113; Resp 18; Pulse Ox 100% on R/A;                                  ph  

14:21  / 67; Pulse 113; Resp 16; Pulse Ox 99% on R/A;                                   ph  

14:25 BP 97 / 60; Pulse 116; Resp 22; Pulse Ox 100% on R/A;                                   tw2 

14:30  / 83; Pulse 113; Resp 22; Pulse Ox 99% on R/A;                                   tw2 

14:35 BP 96 / 48;                                                                             tw2 

14:45 BP 99 / 53; Pulse 113; Resp 22; Pulse Ox 100% on R/A;                                   tw2 

14:50  / 49; Pulse 115; Resp 22; Pulse Ox 100% on R/A;                                  tw2 

14:55 BP 85 / 33; Pulse 117; Resp 17; Pulse Ox 100% on R/A;                                   tw2 

15:00  / 43;                                                                            tw2 

15:05 BP 74 / 48; Pulse 117; Resp 22; Pulse Ox 100% on R/A;                                   tw2 

15:10 BP 77 / 55; Pulse 121;                                                                  tw2 

15:15 BP 85 / 64; Pulse 123; Resp 24; Pulse Ox 100% on R/A;                                   tw2 

15:17 Pulse 127;                                                                              tw2 

15:20  / 54; Pulse 113; Resp 24; Pulse Ox 100% on R/A;                                  tw2 

15:30  / 51; Pulse 129; Resp 30;                                                        tw2 

15:50 BP 99 / 46; Pulse 117; Resp 27; Pulse Ox 100% on R/A;                                   tw2 

15:55  / 49; Pulse 117; Resp 28; Pulse Ox 100% on R/A;                                  tw2 

16:00 BP 92 / 54; Pulse 117; Resp 28; Pulse Ox 100% on R/A;                                   tw2 

16:05  / 42; Pulse 81;                                                                  tw2 

16:30  / 56; Pulse 79;                                                                  tw2 

16:35  / 69; Pulse 75; Resp 22; Pulse Ox 100% on R/A;                                   tw2 

16:40  / 99; Pulse 74; Resp 22; Pulse Ox 100% on R/A;                                   tw2 

16:45  / 107; Pulse 74;                                                                 tw2 

16:50  / 107; Pulse 75;                                                                 tw2 

16:55  / 48; Pulse 75; Resp 22; Pulse Ox 100% on R/A;                                   tw2 

17:00  / 51; Pulse 75; Resp 19; Pulse Ox 98% on R/A;                                    tw2 

17:05  / 45; Pulse 75;                                                                  tw2 

17:10  / 46; Pulse 77;                                                                  tw2 

17:15  / 48; Pulse 75; Resp 19; Pulse Ox 100% on R/A;                                   tw2 

17:20  / 47; Pulse 75; Resp 19; Pulse Ox 100% on R/A;                                   tw2 

12:34 provider notified                                                                       tw2 

13:30 provider notified                                                                       tw2 

13:35 provider notified                                                                       tw2 

14:35 provider aware                                                                          tw2 

14:55 provider notified                                                                       tw2 

15:10 provider aware and decision made for central line at this time                          tw2 

15:30 provider performing central line at this time                                           tw2 

                                                                                                  

Procedures:                                                                                       

16:09 Central Line: the site was prepped with Chlorhexidine, a triple lumen catheter was      ms3 

      inserted, in the left internal jugular vein, in 2 attempts. placement was verified,         

      Unable to establish left IJ central venous access.                                          

16:10 Central Line: the site was prepped with Chlorhexidine, a triple lumen catheter was      ms3 

      inserted, in the right internal jugular vein, in 1 attempts. placement was verified, by     

      blood return, the site was dressed with Tegaderm, the patient tolerated the procedure,      

      well.                                                                                       

                                                                                                  

MDM:                                                                                              

12:36 Patient medically screened.                                                             ms3 

16:13 ED course: Patient seen in the ED by Dr Contreras. Recommends Levophed and Amiodarone      ms3 

      bolus with ggt. .                                                                           

16:50 ED course: Discussed case with Dr Valdez and he accepts patient..                        ms3 

17:40 ED course: R IJ courses into the subclavian. Discussed with Dr Valdez. Levophed paused   ms3 

      at this time. Will d/c line if patient maintains pressure now that she is in NSR..          

17:40 Data interpreted: Cardiac monitor: rate is 72 beats/min, rhythm is normal sinus rhythm, ms3 

      regular, with no ectopy, Interpretation: normal rate, normal rhythm. Counseling: I had      

      a detailed discussion with the patient and/or guardian regarding: the historical            

      points, exam findings, and any diagnostic results supporting the discharge/admit            

      diagnosis, lab results, radiology results, the need for further work-up and treatment       

      in the hospital.                                                                            

17:56 ED course: Discussed with Dr Valdez and order placed for PICC line..                     ms3 

19:26 ED course: Called by ICU and patient unable to receive PICC line due to being a         ms3 

      dialysis patient. Discussed this with Dr Valdez and he suggests running levophed through     

      antecubital PIV..                                                                           

19:28 Data reviewed: vital signs, nurses notes, lab test result(s), EKG, radiologic studies,  ms3 

      and as a result, I will admit patient.                                                      

                                                                                                  

                                                                                             

12:37 Order name: Basic Metabolic Panel; Complete Time: 13:40                                 ms3 

                                                                                             

12:37 Order name: CBC with Diff; Complete Time: 13:40                                         ms3 

                                                                                             

12:37 Order name: LFT's; Complete Time: 13:40                                                 ms3 

                                                                                             

12:37 Order name: Magnesium; Complete Time: 13:40                                             ms3 

                                                                                             

12:37 Order name: NT PRO-BNP; Complete Time: 13:40                                            ms3 

                                                                                             

12:37 Order name: PT-INR; Complete Time: 13:40                                                ms3 

                                                                                             

12:37 Order name: Troponin HS; Complete Time: 13:40                                           ms3 

                                                                                             

12:37 Order name: Blood Culture Adult (2)                                                     ms3 

                                                                                             

12:37 Order name: Urine Microscopic Only                                                      ms3 

                                                                                             

13:41 Order name: SARS RAPID; Complete Time: 16:12                                            ms3 

                                                                                             

16:59 Order name: Basic Metabolic Panel                                                       EDMS

                                                                                             

16:59 Order name: Basic Metabolic Panel                                                       EDMS

                                                                                             

16:59 Order name: CBC with Automated Diff                                                     EDMS

                                                                                             

16:59 Order name: CBC with Automated Diff                                                     EDMS

                                                                                             

12:37 Order name: XRAY Chest (1 view); Complete Time: 13:40                                   ms3 

                                                                                             

12:37 Order name: EKG; Complete Time: 12:38                                                                                                                                                

12:37 Order name: Cardiac monitoring; Complete Time: 13:07                                                                                                                                 

12:37 Order name: EKG - Nurse/Tech; Complete Time: 13:07                                                                                                                                   

12:37 Order name: IV Saline Lock; Complete Time: 13:07                                        ms3 

                                                                                             

12:37 Order name: Labs collected and sent; Complete Time: 13:07                                                                                                                            

12:37 Order name: O2 Per Protocol; Complete Time: 13:08                                                                                                                                    

12:37 Order name: O2 Sat Monitoring; Complete Time: 13:08                                     ms3 

                                                                                             

16:21 Order name: CXR XRAY                                                                    ms3 

                                                                                             

16:56 Order name: CONS Physician Consult                                                      MS

                                                                                             

16:59 Order name: NPO                                                                         EDMS

                                                                                             

16:51 Order name: EKG - Nurse/Tech; Complete Time: 16:51                                      tw2 

                                                                                                  

EC:00 Rate is 114 beats/min. Rhythm is irregularly irregular. QRS Axis is Normal. QRS         ms3 

      interval is normal. Clinical impression: Atrial fibrillation with RVR. Interpreted by       

      me. Reviewed by me.                                                                         

16:36 Rate is 75 beats/min. Rhythm is regular. QRS Axis is Normal. MN interval is prolonged.  ms3 

      QRS interval is normal. Clinical impression: NSR with 1st Degree Block. Interpreted by      

      me. Reviewed by me.                                                                         

                                                                                                  

Administered Medications:                                                                         

16:42 Discontinued: amiodarone 150 mg 100 ml IVPB once over 10 mins; (mix in D5W)             tw2 

13:05 Drug: NS 0.9% 1000 ml Route: IV; Rate: 1 bolus; Site: left antecubital;                 tw2 

13:40 Follow up: Response: No adverse reaction; IV Status: Completed infusion; IV Intake:     tw2 

      1000ml                                                                                      

16:18 Drug: Levophed (norepinephrine) 0.1 mcg/kg/min Route: IV; Rate: calculated rate; Site:  tw2 

      right jugular;                                                                              

16:48 Follow up: Rate change 0.05 mcg/min; d/t pts map                                        tw2 

17:29 Follow up: Rate change 0.1 mcg/kg/min; d/t pts map at or below 65                       tw2 

18:37 Follow up: IV Status: Infusion continued upon admission                                 tw2 

16:34 Drug: amiodarone 150 mg Volume: 100 ml; Route: IVPB; Infused Over: 10 mins; Site: right tw2 

      jugular;                                                                                    

16:42 Follow up: IV Status: Order to discontinue infusion                                     tw2 

16:43 Not Given (Physician Discretion; cancelled per dr. lucas): amiodarone 900 mg, D5W 500   tw2 

      ml IVPB at 1 mg/min continuous; for 6 hrs, then change to 0.5 mg/min                        

                                                                                                  

                                                                                                  

Disposition Summary:                                                                              

22 16:20                                                                                    

Hospitalization Ordered                                                                           

      Hospitalization Status: Inpatient Admission                                             ms3 

      Provider: Porfirio Valdez                                                                 ms3 

      Location: Intensive Care Unit                                                           ms3 

      Condition: Stable                                                                       ms3 

      Problem: new                                                                            ms3 

      Symptoms: are unchanged                                                                 ms3 

      Bed/Room Type: Standard                                                                 ms3 

      Room Assignment: 5-(22 17:11)                                                     bd  

      Diagnosis                                                                                   

        - Hypotension, unspecified                                                            ms3 

        - Chronic atrial fibrillation                                                         ms3 

        - Atrial fibrillation with rapid ventriclar rate                                      ms3 

      Forms:                                                                                      

        - Medication Reconciliation Form                                                      ms3 

        - SBAR form                                                                           ms3 

Critical care time excluding procedures:                                                          

17:41 Critical care time: Bedside Care: 50 minutes, Consultation: 10 minutes, Family          ms3 

      Intervention: 10 minutes. Total time: 70 minutes                                            

                                                                                                  

Signatures:                                                                                       

Dispatcher [employer]                           Khloe Yo Tara RN                          RN   tw2                                                  

Anders Flores DO DO   ms3                                                  

                                                                                                  

Corrections: (The following items were deleted from the chart)                                    

17:11 16:20 ms3                                                                               bd  

                                                                                                  

**************************************************************************************************

## 2022-09-26 NOTE — RAD REPORT
EXAM DESCRIPTION:  Francisco Single View9/26/2022 1:06 pm

 

CLINICAL HISTORY:  Hypotension

 

COMPARISON:  September 9, 2022

 

FINDINGS:   Moderate to large left pleural effusion. Left basilar atelectasis.

 

Right lung appears clear. Heart is enlarged. Central venous catheter in place

 

IMPRESSION:  Moderate to large left pleural effusion is suspected

## 2022-09-26 NOTE — ER
Nurse's Notes                                                                                     

 [facility]                                                                 

Name: Deirdre Sauer                                                                               

Age: 88 yrs                                                                                       

Sex: Female                                                                                       

: 1933                                                                                   

MRN: [id number]                                                                                   

Arrival Date: 2022                                                                          

Time: 12:36                                                                                       

Account#: [id number]                                                                            

Bed 2                                                                                             

Private MD:                                                                                       

Diagnosis: Hypotension, unspecified;Chronic atrial fibrillation;Atrial fibrillation with rapid    

  ventriclar rate                                                                                 

                                                                                                  

Presentation:                                                                                     

                                                                                             

12:33 Acuity: MESFIN 2                                                                           tw2 

12:33 Chief complaint: EMS states: pt was at [facility] dialysis. they called us for hypotension. tw2 

      the target was 5Kg but they reported pulling off 1Kg but that was 10 minutes prior to       

      completion. this is pts 1st time to have dialysis in 3 weeks because she was in a rehab     

      hospital. initial BP was 56/34, now its 70/40. we gave 10 mcg EPI iv. davita gave 1L NS     

      prior to our arrival. 18g RIGHT ac. afib on monitor, hx of afib.                            

13:27 Coronavirus screen: At this time, the client does not indicate any symptoms associated  tw2 

      with coronavirus-19. Ebola Screen: Patient denies travel to an Ebola-affected area in       

      the 21 days before illness onset. Initial Sepsis Screen: Does the patient meet any 2        

      criteria? Mean Arterial Pressure (MAP) < 65. HR > 90 bpm. Does the patient have a           

      suspected source of infection? No. Patient's initial sepsis screen is negative. Risk        

      Assessment: Do you want to hurt yourself or someone else? Patient reports no desire to      

      harm self or others. Onset of symptoms was 2022.                              

13:27 Method Of Arrival: EMS: [facility]                                                tw2 

                                                                                                  

Triage Assessment:                                                                                

12:33 General: Appears in no apparent distress. Behavior is calm, cooperative, appropriate    tw2 

      for age. Pain: Denies pain. Neuro: Level of Consciousness is awake, alert, obeys            

      commands, Oriented to person, place, situation. Cardiovascular: Patient's skin is warm      

      and dry. Rhythm is atrial fibrillation. Respiratory: Airway is patent Respiratory           

      effort is even, unlabored, Respiratory pattern is regular, symmetrical. GI: No signs        

      and/or symptoms were reported involving the gastrointestinal system. Abdomen is round.      

      Musculoskeletal: Range of motion: intact in all extremities.                                

                                                                                                  

Historical:                                                                                       

- Allergies:                                                                                      

12:39 Alteplase;                                                                              tw2 

12:39 blood thinners except ASA;                                                              tw2 

12:39 caffeine;                                                                               tw2 

12:39 HYDROCODONE;                                                                            tw2 

12:39 Iodine;                                                                                 tw2 

12:39 Statins-Hmg-Coa Reductase Inhibitors;                                                   tw2 

12:39 Streptomycin;                                                                           tw2 

- Home Meds:                                                                                      

13:37 Insulin Glargine Sub-Q [Active]; Zetia 10 mg oral tab 1 tab once daily [Active];        tw2 

      pravastatin 10 mg oral tab 1 tab once daily [Active]; Coreg 3.125 mg oral tab 1 tab         

      [Active]; pantoprazole 10 mg Oral 1 tab [Active]; Claritin-D 24 Hour  mg Oral         

      Tb24 1 tab once daily [Active]; potassium chloride 10 mEq oral TbER 1 tab once daily        

      [Active]; aspirin 81 mg Oral chew 1 tab once daily [Active];                                

14:42 amiodarone 200 mg Oral tab [Active]; midodrine oral [Active];                           tw2 

14:43 Eliquis oral [Active];                                                                  tw2 

- PMHx:                                                                                           

12:39 Hyperlipidemia; kidney problems; Diabetes - NIDDM; CVA; Hypertension; Dialysis; Hernia; tw2 

      CHF; Cellulitis; Anemia; hyperparathyroidism; malnutition;                                  

- PSHx:                                                                                           

12:39 Dialysis catheter to right upper chest; hysterectomy;                                   tw2 

                                                                                                  

- Immunization history:: Adult Immunizations.                                                     

- Social history:: Smoking status: .                                                              

                                                                                                  

                                                                                                  

Screenin:28 Abuse screen: Denies threats or abuse. Nutritional screening: No deficits noted.        tw2 

      Tuberculosis screening: No symptoms or risk factors identified. Fall Risk Secondary         

      diagnosis (15 points) impaired mobility.                                                    

                                                                                                  

Assessment:                                                                                       

12:33 Reassessment: see triage assessment.                                                    tw2 

12:34 Reassessment: Carly ZEPEDA - Grand daughter PH#[phone].                                  tw2 

13:25 Reassessment: Patient appears in no apparent distress at this time. No changes from     tw2 

      previously documented assessment. Patient and/or family updated on plan of care and         

      expected duration. Pain level reassessed.                                                   

14:30 Reassessment: Patient appears in no apparent distress at this time. No changes from     tw2 

      previously documented assessment. Patient and/or family updated on plan of care and         

      expected duration. Pain level reassessed.                                                   

15:01 Reassessment: Patient appears in no apparent distress at this time. No changes from     tw2 

      previously documented assessment. Patient and/or family updated on plan of care and         

      expected duration. Pain level reassessed.                                                   

16:00 Reassessment: Patient appears in no apparent distress at this time. No changes from     tw2 

      previously documented assessment. Patient and/or family updated on plan of care and         

      expected duration. Pain level reassessed.                                                   

17:01 Reassessment: Patient appears in no apparent distress at this time. No changes from     tw2 

      previously documented assessment. Patient and/or family updated on plan of care and         

      expected duration. Pain level reassessed.                                                   

17:40 Reassessment: Levophed infusion paused by Dr. Flores.                                     iw  

17:50 Reassessment: Levophed infusion resumed for BP 89/47 per Dr. Flores.                      iw  

                                                                                                  

Vital Signs:                                                                                      

12:34 BP 72 / 47; Pulse 120; Resp 22; Temp 97.7(TE); Pulse Ox 100% on R/A; Weight 70 kg (R);  tw2 

12:45 BP 76 / 48; Pulse 122; Resp 22; Pulse Ox 100% on R/A;                                   tw2 

13:00  / 49; Pulse 124; Resp 22; Pulse Ox 100% on R/A;                                  tw2 

13:10  / 41; Pulse 118; Resp 22; Pulse Ox 100% on R/A;                                  tw2 

13:15  / 48; Pulse 114; Resp 21; Pulse Ox 100% ;                                        tw2 

13:20  / 95; Pulse 116; Resp 19; Pulse Ox 100% on R/A;                                  tw2 

13:25  / 45; Pulse 114; Resp 19; Pulse Ox 100% on R/A;                                  tw2 

13:30 BP 98 / 53; Pulse 106; Resp 19; Pulse Ox 100% on R/A;                                   tw2 

13:35 BP 99 / 59; Pulse 114; Resp 21; Pulse Ox 100% on R/A;                                   tw2 

13:56 BP 87 / 55; Pulse 113; Resp 22; Pulse Ox 100% on R/A;                                   ph  

13:56  / 93; Pulse 113; Resp 18; Pulse Ox 100% on R/A;                                  ph  

14:21  / 67; Pulse 113; Resp 16; Pulse Ox 99% on R/A;                                   ph  

14:25 BP 97 / 60; Pulse 116; Resp 22; Pulse Ox 100% on R/A;                                   tw2 

14:30  / 83; Pulse 113; Resp 22; Pulse Ox 99% on R/A;                                   tw2 

14:35 BP 96 / 48;                                                                             tw2 

14:45 BP 99 / 53; Pulse 113; Resp 22; Pulse Ox 100% on R/A;                                   tw2 

14:50  / 49; Pulse 115; Resp 22; Pulse Ox 100% on R/A;                                  tw2 

14:55 BP 85 / 33; Pulse 117; Resp 17; Pulse Ox 100% on R/A;                                   tw2 

15:00  / 43;                                                                            tw2 

15:05 BP 74 / 48; Pulse 117; Resp 22; Pulse Ox 100% on R/A;                                   tw2 

15:10 BP 77 / 55; Pulse 121;                                                                  tw2 

15:15 BP 85 / 64; Pulse 123; Resp 24; Pulse Ox 100% on R/A;                                   tw2 

15:17 Pulse 127;                                                                              tw2 

15:20  / 54; Pulse 113; Resp 24; Pulse Ox 100% on R/A;                                  tw2 

15:30  / 51; Pulse 129; Resp 30;                                                        tw2 

15:50 BP 99 / 46; Pulse 117; Resp 27; Pulse Ox 100% on R/A;                                   tw2 

15:55  / 49; Pulse 117; Resp 28; Pulse Ox 100% on R/A;                                  tw2 

16:00 BP 92 / 54; Pulse 117; Resp 28; Pulse Ox 100% on R/A;                                   tw2 

16:05  / 42; Pulse 81;                                                                  tw2 

16:30  / 56; Pulse 79;                                                                  tw2 

16:35  / 69; Pulse 75; Resp 22; Pulse Ox 100% on R/A;                                   tw2 

16:40  / 99; Pulse 74; Resp 22; Pulse Ox 100% on R/A;                                   tw2 

16:45  / 107; Pulse 74;                                                                 tw2 

16:50  / 107; Pulse 75;                                                                 tw2 

16:55  / 48; Pulse 75; Resp 22; Pulse Ox 100% on R/A;                                   tw2 

17:00  / 51; Pulse 75; Resp 19; Pulse Ox 98% on R/A;                                    tw2 

17:05  / 45; Pulse 75;                                                                  tw2 

17:10  / 46; Pulse 77;                                                                  tw2 

17:15  / 48; Pulse 75; Resp 19; Pulse Ox 100% on R/A;                                   tw2 

17:20  / 47; Pulse 75; Resp 19; Pulse Ox 100% on R/A;                                   tw2 

12:34 provider notified                                                                       tw2 

13:30 provider notified                                                                       tw2 

13:35 provider notified                                                                       tw2 

14:35 provider aware                                                                          tw2 

14:55 provider notified                                                                       tw2 

15:10 provider aware and decision made for central line at this time                          tw2 

15:30 provider performing central line at this time                                           tw2 

                                                                                                  

ED Course:                                                                                        

12:33 Arm band placed on.                                                                     tw2 

12:33 Bed in low position. Call light in reach. Adult w/ patient. Cardiac monitor on. Pulse   tw2 

      ox on. NIBP on. Warm blanket given.                                                         

12:36 Patient arrived in ED.                                                                  ms3 

12:36 Anders Flores DO is Attending Physician.                                                ms3 

12:39 Sara Crawley RN is Primary Nurse.                                                        tw2 

13:08 XRAY Chest (1 view) In Process Unspecified.                                             EDMS

13:08 Inserted saline lock: 18 gauge in left antecubital area, using aseptic technique. Blood jd3 

      collected.                                                                                  

13:20 Triage completed.                                                                       tw2 

14:49 SARS RAPID Sent.                                                                        em6 

16:15 Assisted provider with central line placement. Set up central line tray. Triple lumen   jd3 

      line placed in right internal jugular. Line placed by Anders Flores DO Placement verified     

      by blood return, Dressed with Tape, Tegaderm, Patient tolerated well.                       

16:19 Porfirio Valdez MD is Hospitalizing Provider.                                            ms3 

17:01 CXR XRAY In Process Unspecified.                                                        EDMS

17:26 Awaiting: unsuccessful attempt to call report at this time. ICU nurse will call me back.tw2 

18:00 Report given to OTTONIEL James.                                                             tw2 

18:37 Patient admitted, IV remains in place.                                                  tw2 

                                                                                                  

Administered Medications:                                                                         

16:42 Discontinued: amiodarone 150 mg 100 ml IVPB once over 10 mins; (mix in D5W)             tw2 

13:05 Drug: NS 0.9% 1000 ml Route: IV; Rate: 1 bolus; Site: left antecubital;                 tw2 

13:40 Follow up: Response: No adverse reaction; IV Status: Completed infusion; IV Intake:     tw2 

      1000ml                                                                                      

16:18 Drug: Levophed (norepinephrine) 0.1 mcg/kg/min Route: IV; Rate: calculated rate; Site:  tw2 

      right jugular;                                                                              

16:48 Follow up: Rate change 0.05 mcg/min; d/t pts map                                        tw2 

17:29 Follow up: Rate change 0.1 mcg/kg/min; d/t pts map at or below 65                       tw2 

18:37 Follow up: IV Status: Infusion continued upon admission                                 tw2 

16:34 Drug: amiodarone 150 mg Volume: 100 ml; Route: IVPB; Infused Over: 10 mins; Site: right tw2 

      jugular;                                                                                    

16:42 Follow up: IV Status: Order to discontinue infusion                                     tw2 

16:43 Not Given (Physician Discretion; cancelled per dr. lucas): amiodarone 900 mg, D5W 500   tw2 

      ml IVPB at 1 mg/min continuous; for 6 hrs, then change to 0.5 mg/min                        

                                                                                                  

                                                                                                  

Medication:                                                                                       

13:28 VIS not applicable for this client.                                                     tw2 

                                                                                                  

Intake:                                                                                           

13:40 IV: 1000ml; Total: 1000ml.                                                              tw2 

                                                                                                  

Outcome:                                                                                          

16:20 Decision to Hospitalize by Provider.                                                    ms3 

18:37 Admitted to ICU accompanied by nurse, via stretcher, room 5, on monitor, with chart,    tw2 

      Report called to  CaesarRN                                                                

18:37 critical                                                                                    

18:37 Instructed on the need for admit.                                                           

18:38 Patient left the ED.                                                                    tw2 

                                                                                                  

Signatures:                                                                                       

Dispatcher [employer]                           EDMS                                                 

Alissa Prabhakar, OTTONIEL ALCAZAR   iw                                                   

Yadira Mayorga, RN                      Sara Mercado ph RN                          OTTONIEL   tw2                                                  

Karthik Charles RN                    RN   jd3                                                  

Anders Flores DO                        DO   ms3                                                  

Lina Foster, RN                     RN   em6                                                  

                                                                                                  

Corrections: (The following items were deleted from the chart)                                    

13:25 13:15  / 41; Pulse 118bpm; Resp 22bpm; Pulse Ox 100% RA; tw2                      tw2 

13:27 12:33 Chief complaint: [facility]        tw2 

      hypotension. the target was 5Kg but they reported pulling off 1Kg but that was 10           

      minutes prior ngoc completion. this is pts 1st time to have dialysis in 3 weeks because      

      she was in a rehab hospital. initial BP was 56/34, now its 70/40. we gave 10 mcg EPI        

      iv. davita gave 1L NS prior to our arrival. 18g RIGHT ac. afib on monitor, hx of afib       

      tw2                                                                                         

13:47 13:35 BP 99 / 59; Pulse 114bpm; Resp 21bpm; Pulse Ox 100% RA; tw2                        

16:14 15:55 BP 99 / 46; Pulse 117bpm; Resp 27bpm; Pulse Ox 100% RA; tw 

                                                                                                  

**************************************************************************************************

## 2022-09-26 NOTE — CON
Date of Consultation:  09/26/2022



Reason For Consultation:  Atrial fibrillation with rapid ventricular response.



History Of Present Illness:  This is an 88-year-old female with history of end-stage renal disease, o
n hemodialysis; also history of chronic atrial fibrillation; history of CVA; diabetes; hypertension; 
dyslipidemia; hypothyroidism.  She was sent from dialysis because blood pressure was very low.  They 
took about 1 kg of fluids today and patient arrived on a very low blood pressure.  Then, she was in a
trial fibrillation with rapid ventricular response as per the ER report.  When I evaluated her, she h
ad a heart rate of 107 appeared to be sinus and blood pressure has improved, she apparently converted
 to sinus rhythm on her own and blood pressure has started to improve.



Past Medical History:  As outlined above in the HPI.



Medications:  Refer reconciliation sheet for detailed list.



Allergies:  IODINE, HYDROCODONE, CAFFEINE, STATIN, AZITHROMYCIN.



Social History:  Does not smoke or drink.  Does not use any drugs.



Family History:  No premature coronary artery disease or cancer.



Review of Systems:

All systems reviewed are negative except as mentioned in HPI.



Physical Examination:

Vital Signs:  Reviewed. 

Head and Neck:  Pupils are equal, reactive to light.  Intact eye movements.  No JVD.  No cervical lym
phadenopathy. 

Neck:  Supple.  Thyroid is not enlarged. 

Lungs:  Clear to auscultation bilaterally.  No rhonchi, rales, or crackles.  No accessory muscle use.
 

Heart:  Regular rate and rhythm.  No extra sounds. 

Abdomen:  Soft, nontender.  Bowel sounds positive.  No organomegaly.  No masses or hernia.  No rigidi
ty or rebound. 

Extremities:  No clubbing, cyanosis.  Intact pulses. 

Skin:  No rashes. 

Neurologic:  Alert, awake.  No acute focal deficits appreciated.



Investigations:  BUN 24, creatinine is 2.93, and NT-proBNP is 2357.  Hemoglobin 10.5.



Assessment And Plan:  

1.Atrial fibrillation.  At this point, she is in sinus rhythm.  Continue beta blocker if the blood p
ressure allows.  If blood pressure continues to be low, then we can start IV amiodarone for heart rat
e control.  I will monitor the patient with you.

2.Hypotension, likely due to severe dehydration happened after dialysis.  However, an infectious lucio
ology is to be ruled out.  Her white blood cell count is normal and no fever.  Discuss further with p
rimary care physician.





SR/MODL

DD:  09/26/2022 17:26:12Voice ID:  [id number]

DT:  09/26/2022 23:41:22Report ID:  [id number]

## 2022-09-26 NOTE — RAD REPORT
EXAM DESCRIPTION:  RAD - Chest Single View - 9/26/2022 4:59 pm

 

CLINICAL HISTORY:  Line placement, hypotensive at dialysis

 

COMPARISON:  AP chest 09/26/2022

 

TECHNIQUE:  AP portable chest image was obtained 9/26/2022 4:59 pm .

 

FINDINGS:  Available history indicates that a right neck jugular vascular access procedure was perfor
med since the earlier examination.

 

Right jugular central line is seen extending inferiorly along the right jugular vein in the neck. The
 line then courses laterally towards the right axilla indicating the line is in the right subclavian 
vein with the tip at the subclavian vein axillary vein junction.

 

Right-sided double-lumen dialysis catheter is in place and unchanged. Left-sided pleural fluid and pr
ominent lung parenchymal opacification have not changed from the earlier study. Heart size is unchang
ed. No pneumothorax. No acute bony abnormality seen. No acute aortic findings suspected.

 

IMPRESSION:  Right neck IJ line courses laterally indicating positioning within the right subclavian 
vein. Tip is at the subclavian -axillary vein junction.

 

No pneumothorax.

## 2022-09-26 NOTE — XMS REPORT
Continuity of Care Document

                          Created on:2022



Patient:FABIOLA CRANE

Sex:Female

:1933

External Reference #:[id number]





Demographics







                          Address                   [address]



                                                    [place] [address]

 

                          Home Phone                [phone]

 

                          Email Address             [email or web address]

 

                          Preferred Language        English

 

                          Marital Status            Unknown

 

                          Jewish Affiliation     Unknown

 

                          Race                      Unknown

 

                          Additional Race(s)        Unavailable



                                                    Unavailable

 

                          Ethnic Group              Unknown









Author







                          Organization              [facility]

t

 

                          Address                   [address]



                                                    [place] [address]

 

                          Phone                     [phone]









Support







                Name            Relationship    Address         Phone

 

                NEGIN KING   GC              [address]  [phone]



                                                [place] [address] 

 

                MAYE ROJAS GC              Unavailable     [phone]

 

                PATTY KING    RELA            Unavailable     [phone]









Care Team Providers







                    Name                Role                Phone

 

                    Lyndsay Love Anavella Attending Clinician Unava

ilable

 

                    Alma Mirza Attending Clinician Unavailable

 

                    ТАТЬЯНА_BAHC_Awais_MARCEL      Attending Clinician Unavailable

 

                    ANA OVALLES        Attending Clinician Unavailable

 

                    Chuy oLve Anav Admitting Clinician Unavailable

 

                    Porfirio Valdez     Admitting Clinician Unavailable

 

                    Alma Mirza Admitting Clinician Unavailable

 

                    ТАТЬЯНА_BAHC_Todd_MARCEL      Admitting Clinician Unavailable

 

                    ELISEO WOMACK    Admitting Clinician Unavailable









Payers







           Payer Name Policy Type Policy Number Effective Date Expiration Date S

eriberto

 

           [facility]        [id number]                       

 

           AET        AET        [id number]                       

 

           MEDICARE B-[place]:            [id number] 1998-10-01            



           [employer]                       00:00:00              







Problems







       Condition Condition Condition Status Onset  Resolution Last   Treating Co

mments 

Source



       Name   Details Category        Date   Date   Treatment Clinician        



                                                 Date                 

 

       Acute on  Acute on Finding Active -2018               

[facility]



       chronic chronic               3-          19:36:50               l



       congestive congestive               00:00:                             He

rmann



       heart  heart                00                                 



       failure failure                                                  



              Active                                                  



              2018                                                  



              Finding                                                  



              2018                                                  



              [facility]                                                  



              [place]                                                  

 

       Acute on  Acute on Problem                      2021               

[facility]



       chronic chronic                             19:18:00               l



       congestive congestive                                                  He

rmann



       heart  heart                                                   



       failure failure                                                  



              Problem                                                  



              2021                                                  



              [facility]                                                  



              [facility]                                                  



              [place]                                                  

 

       Elevated  Elevated Problem                      2021               

[facility]



       procalcito procalcito                             19:18:00               

l



       isa    isa                                                     [place]



              Problem                                                  



              2021                                                  



              [facility]                                                  



              [facility]                                                  



              [place]                                                  

 

       Acute on   Acute on Problem                      2021              

 [facility]



       chronic chronic                             19:18:00               l



       renal  renal                                                   Tha



       insufficie insufficie                                                  



       ncy    ncy                                                     



              Problem                                                  



              2021                                                  



               [facility]                                                  



              [facility]                                                  



              [place]                                                  

 

       Pneumonia  Pneumonia Problem                      2021             

  [facility]



               Problem                             19:18:00               l



              2021                                                  [facility]

n



              [facility]                                                  



              [facility]                                                  



              [place]                                                  

 

       Acute on  Acute on Condition                      2020             

  [facility]



       chronic chronic                             17:37:00               l



       heart  heart                                                   [place]



       failure failure                                                  



              Condition                                                  



              2020                                                  



              [facility]                                                  



              [facility]                                                  



              [place]                                                  

 

       Mild renal  Mild  Condition                      2020              

 [facility]



       insufficie renal                              17:37:00               l



       ncy    insufficie                                                  [facility]

n



              ncy                                                     



              Condition                                                  



              2020                                                  



              [facility]                                                  



              [facility]                                                  



              [place]                                                  

 

       Elevated  Elevated Condition                      2020             

  [facility]



       brain  brain                              17:37:00               l



       natriureti natriureti                                                  He

rmann



       c peptide c peptide                                                  



       (BNP)  (BNP)                                                   



       level  level                                                   



              Condition                                                  



              2020                                                  



              [facility]                                                  



              [facility]                                                  



              [place]                                                  



                                                                      

 

       Postobstru  Postobstr Condition                      2020          

     [facility]



       ctive  uctive                             17:37:00               l



       pneumonia pneumonia                                                  [facility]

estevan



              Condition                                                  



              2020                                                  



              [facility]                                                  



              [facility]                                                  



              [place]                                                  

 

       Pneumonia  Pneumonia Problem                      2021             

  [facility]



       due to due to                             19:18:00               l



       COVID-19 COVID-19                                                  [facility]

n



       virus  virus                                                   



              Problem                                                  



              2021                                                  



               [facility]                                                  



              [facility]                                                  



              [place]                                                  

 

       Stage 3  Stage 3 Problem                      2021               Me

moria



       chronic chronic                             19:18:00               l



       kidney kidney                                                  [place]



       disease disease                                                  



              Problem                                                  



              2021                                                  



              [facility]                                                  



              [facility]                                                  



              [place]                                                  

 

       Pancytopen  Pancytope Problem                      2021            

   [facility]



       ia     lolly                                19:18:00               l



              Problem                                                  [place]



              2021                                                  



              [facility]                                                  



              [facility]                                                  



              [place]                                                  

 

       Elevated  Elevated Problem                      2021               

[facility]



       troponin troponin                             19:18:00               l



       level  level                                                   [facility]



              Problem                                                  



              2021                                                  



               [facility]                                                  



              [facility]                                                  



              [place]                                                  

 

       Stage 3  Stage 3 Condition                      2021               

[facility]



       chronic chronic                             19:18:00               l



       kidney kidney                                                  [place]



       disease disease                                                  



              Condition                                                  



              2021                                                  



              [facility]                                                  



              [place] [facility]                                                  



              [place]                                                  

 

       Type 2  Type 2 Condition                      2021               Me

moria



       diabetes diabetes                             19:18:00               l



       mellitus mellitus                                                  [facility]

n



              Condition                                                  



              2021                                                  



              [facility]                                                  



              [facility]                                                  



              [place]                                                  

 

       Elevated  Elevated Condition                      2021             

  [facility]



       troponin I troponin I                             19:18:00               

l



       level  level                                                   [place]



              Condition                                                  



              2021                                                  



              [facility]                                                  



              [place] [facility]                                                  



              [place]                                                  

 

       Acquired  Acquired Condition                      2021             

  [facility]



       pancytopen pancytopen                             19:18:00               

l



       ia     ia                                                      [place]



              Condition                                                  



              2021                                                  



              [facility]                                                  



              [facility]                                                  



              [place]                                                  

 

       Infection  Infection Condition                      2021           

    [facility]



       due to due to                             19:18:00               l



       2019 novel 2019 novel                                                  Akin valle



       coronaviru coronaviru                                                  



       s      s                                                       



              Condition                                                  



              2021                                                  



              [facility]                                                  



              [facility]                                                  



              [place]                                                  







History of Past Illness







       Condition Condition Condition Status Onset  Resolution Last   Treating Co

mments 

Source



       Name   Details Category        Date   Date   Treatment Clinician        



                                                 Date                 

 

       Abnormal   Abnormal Problem        2021          

     [facility]



       kidney kidney               3-02   19:18:00 19:18:00               l



       function function               00:00:                             [facility]

n



              2018               00                                 



              Problem                                                  



              2021                                                  



               [facility]                                                  



              [facility]                                                  



              [place]                                                  







Allergies, Adverse Reactions, Alerts







       Allergy Allergy Status Severity Reaction(s) Onset  Inactive Treating Comm

ents 

Source



       Name   Type                        Date   Date   Clinician        

 

       Statins- Statins- Active               2020-0                      Mem[facility]

a



       Hmg-Coa Hmg-Coa                      2-28                        l



       Redu   Redu                        03:12:                      Tha



                                          33                          

 

       blood  blood  Active Mild          2020-0                      [facility]



       thinner thinner                      2-28                        l



                                          03:12:                      Tha



                                          33                          

 

       blood  blood  Active               2020-0                      [facility]



       thinners thinners                      2-28                        l



                                          03:12:                      Tha



                                          33                          







Social History







           Social Habit Start Date Stop Date  Quantity   Comments   Source

 

           Social History 2021                       [facility]

viri



                      19:20:00   19:20:00                         







Medications







       Ordered Filled Start  Stop   Current Ordering Indication Dosage Frequency

 Signature

                    Comments            Components          Source



     Medication Medication Date Date Medication? Clinician                (SIG) 

          



     Name Name                                                   

 

     Cholecalcif            Yes                                     [facility]

a



     clarita      7-04                                              l



     (Vitamin      18:00:                                              Tha



     D3)       12                                                



     (Vitamin                                                        



     D3) 125 MCG                                                        



     Capsule                                                        

 

     Cyanocobala            Yes                                     [facility]

a



     min       7-04                                              l



     (Vitamin      18:00:                                              Tha



     B-12)      12                                                



     (Vitamin                                                        



     B12) 2,500                                                        



     MCG Tablet                                                        

 

     Prednisone            Yes                                     [facility]



     (Deltasone*      7-04                                              l



     ) 10 MG Tab      17:59:                                              Fritz garcia



               25                                                

 

     Furosemide            Yes                                     [facility]



     (Lasix*) 40      7-04                                              l



     MG Tab      05:00:                                              Tha



               45                                                

 

     Ascorbic            Yes                                     [facility]



     Acid      7-04                                              l



     (Vitamin C)      04:47:                                              Fritz

n



     1,000 MG      30                                                



     Tablet                                                        

 

     Cholecalcif            Yes                                     [facility]

a



     clarita      7-04                                              l



     (Vitamin      04:47:                                              Tha



     D3)       30                                                



     (Vitamin                                                        



     D3) 125 MCG                                                        



     Capsule                                                        

 

     Cyanocobala            Yes                                     [facility]

a



     min       7-04                                              l



     (Vitamin      04:47:                                              Tha PARSONS-12)      30                                                



     (Vitamin                                                        



     B12) 2,500                                                        



     MCG Tablet                                                        

 

     Ferrous            Yes                                     [facility]



     Sulfate      7-04                                              l



     (Ferrous      04:47:                                              Tha



     Sulfate*)      30                                                



     325 MG Tab                                                        

 

     Montelukast            Yes                                     [facility]

a



     Sodium      7-04                                              l



               04:47:                                              Tha



               30                                                

 

     Ubidecareno            Yes                                     [facility]

a



     ne (Co      7-04                                              l



     Q-10) 200      04:47:                                              Tha



     MG Capsule      30                                                

 

     Vit C/Tariq            Yes                                     [facility]



     Ac/Lut/Cruz      7-04                                              l



     er/Znox      04:47:                                              Tha



     (Preservisi      30                                                



     on Lutein                                                        



     Softgel) 1                                                        



     EACH                                                        



     Capsule                                                        

 

     Furosemide            Yes                                     [facility]



     (Lasix*) 40      2-29                                              l



     MG Tab      14:39:                                              Tha



               48                                                

 

     Cetirizine      -0      Yes                                     [facility]



     Hcl       2-28                                              l



               03:32:                                              Tha



               57                                                

 

     Cetirizine      -0      Yes                                     [facility]



     Hcl       2-28                                              l



               03:32:                                              Tha



               00                                                

 

     Allopurinol      -0      Yes                                     [facility]

a



               2-28                                              l



               03:25:                                              Tha



               13                                                

 

     Linagliptin      -0      Yes                                     [facility]

a



     (Tradjenta)      2-28                                              l



     5 MG Tablet      03:25:                                              F[facility] garcia



               13                                                

 

     Linagliptin      -0      Yes                                     [facility]

a



               2-28                                              l



               03:25:                                              Tha



               00                                                

 

     Spironolact            Yes                                     [facility]

a



     one       3-04                                              l



     (Aldactone*      19:31:                                              Fritz

n



     ) 25 MG Tab      53                                                

 

     Spironolact      2018-0      Yes  Mouin F                TWICE           Me

moria



     one       3-04           Callie                DAILY           l



               00:00:                                                                                              

 

     Carvedilol      2018-0      Yes                                     [facility]



     (Coreg*)      3-02                                              l



     6.25 MG Tab      06:00:                                              Fritz garcia



               08                                                

 

     Glimepiride      2018-0      Yes                                     [facility]

a



     (Amaryl) 4      3-02                                              l



     MG Tablet      06:00:                                              [place]



               08                                                

 

     Furosemide      2018-0      Yes                                     [facility]



     (Lasix*) 40      3-02                                              l



     MG Tab      06:00:                                              [place]



                                                               

 

     Multivitami      2018-0      Yes                                     [facility]

a



     n (Multiple      3-02                                              l



     Vitamins)      06:00:                                              Tah



     Tablet      08                                                

 

     Omeprazole      2018-0      Yes                                     [facility]



     (Prilosec)      3-02                                              l



     40 MG      06:00:                                              [place]



     Capsule.      08                                                

 

     Pravastatin      2018-0      Yes                                     [facility]

a



     Sodium      3-02                                              l



               06:00:                                              Tha                                                

 

     Ezetimibe      2018-0      Yes                                     [facility]



     (Zetia*) 10      3-02                                              l



     MG Tab      06:00:                                              Tha                                                

 

     Aspirin      2018-0      Yes                                     [facility]



               3-02                                              l



               06:00:                                                                                              

 

     Carvedilol      2018-0      Yes                                     [facility]



               3-02                                              l



               06:00:                                                                                              

 

     Glimepiride      2018-0      Yes                                     [facility]

a



               3-02                                              l



               06:00:                                                                                              

 

     Furosemide      2018-0      Yes                                     [facility]



               3-02                                              l



               06:00:                                                                                              

 

     Multivitami      2018-0      Yes                                     [facility]

a



     n         3-02                                              l



               06:00:                                                                                              

 

     Omeprazole      2018-0      Yes                                     [facility]



               3-02                                              l



               06:00:                                                                                              

 

     Pravastatin      2018-0      Yes                                     [facility]

a



     Sodium      3-02                                              l



               06:00:                                                                                              

 

     Ezetimibe      2018-0      Yes                                     [facility]



               3-02                                              l



               06:00:                                                                                              

 

     Aspirin      2018-0      Yes                      DAILY           [facility]



               3-02                                              l



               00:00:                                                                                              

 

     Carvedilol      2018-0      Yes                      AT BEDTIME           M

[facility]



               3-02                                              l



               00:00:                                                                                              

 

     Glimepiride      2018-0      Yes                      TWICE           [facility]

ia



               3-02                               DAILY           l



               00:00:                                                                                              

 

     Furosemide      2018-0      Yes                      DAILY           [facility]

a



               3-02                                              l



               00:00:                                                                                              

 

     Multivitami      2018-0      Yes                      DAILY           [facility]

ia



     n         3-02                                              l



               00:00:                                              [place]



               00                                                

 

     Omeprazole      2018-0      Yes                      DAILY           [facility]

a



               3-02                                              l



               00:00:                                              [facility]a



               00                                                

 

     Pravastatin      2018-0      Yes                      AT BEDTIME           

[facility]



     Sodium      3-02                                              l



               00:00:                                              [facility]a



               00                                                

 

     Ezetimibe      2018-0      Yes                      DAILY           [facility]



               3-02                                              l



               00:00:                                              Tha



               00                                                







Vital Signs







             Vital Name   Observation Time Observation Value Comments     Source

 

             Temperature Oral (F) 2021 17:00:00 98.4 F                    

[facility] [place]

 

             Heart Rate   2021 17:00:00                           [facility]

 [place]

 

             Respitory Rate 2021 17:00:00                           [facility]

al [place]

 

             Systolic (mm Hg) 2021 17:00:00                           [facility]

rial [place]

 

             Diastolic (mm Hg) 2021 17:00:00                           [facility]

[facility] [place]

 

             Height       2021 10:00:00                           [facility]

 [place]

 

             Weight       2021 10:00:00                           [facility]

 [facility]

 

             Heart Rate   2020 14:14:00                           [facility]

 [place]

 

             Systolic (mm Hg) 2020 14:14:00                           [facility]

rial [place]

 

             Diastolic (mm Hg) 2020 14:14:00                           [facility]

[facility] [place]

 

             Temperature Oral (F) 2020 13:49:00 98.2 F                    

[facility] [place]

 

             Respitory Rate 2020 13:49:00                           [facility]

al [facility]

 

             Height       2020 14:51:00                           [facility]

 [facility]

 

             Weight       2020 14:51:00                           [facility]

 [place]

 

             Heart Rate   2018 08:41:00                           [facility]

 [facility]

 

             Systolic (mm Hg) 2018 08:41:00                           [facility]

rial [facility]

 

             Diastolic (mm Hg) 2018 08:41:00                           [facility]

[facility] [place]

 

             Temperature Oral (F) 2018 07:45:00 97.6 F                    

[facility]

 

             Respitory Rate 2018 07:45:00                           [facility]

al [facility]

 

             Weight       2018 05:00:00                           [facility]

 [facility]

 

             Height       2018 12:23:00                           [facility]

 [facility]







Procedures







                Procedure       Date / Time     Performing Clinician Source



                                Performed                       

 

                [id number]         2022 00:00:00                 [facility]

[facility]



                                                                [facility]

[place]

 

                Chest Single View 2021 20:48:00                 [facility] JAYSHREE

ermann

 

                Anaerobic Blood 2021 00:00:00                 [facility] Her curiel



                Culture                                         

 

                Aerobic Blood Culture 2021 00:00:00                 Angelica Baer

 

                Coronavirus COVID-19 2021 00:00:00                 Shayy Almazan



                PCR                                             

 

                Addison Count    2021 00:00:00                 [facility] 

curiel

 

                Chest [facility] And Lat (2 2020 [facility]00:00                 [facility]



                Views)                                          

 

                Influenza Type B 2020 00:00:00                 [facility]

[facility]



                Antigen Screen                                  

 

                Influenza Type A 2020 00:00:00                 [facility]

[facility]



                Antigen Screen                                  

 

                Addison Count    2020 00:00:00                 [facility] Her curiel

 

                [id number]       2018 00:00:00                 [facility] Her curiel

 

                Addison Count    2018 00:00:00                 [facility]

curiel







Plan of Care







             Planned Activity Planned Date Details      Comments     Source

 

             Future Scheduled Test              Instructions Creatinine         

     [facility]



                                       Congestive Heart Failure              



                                       [code = Instructions              



                                       Creatinine Congestive              



                                       Heart Failure]              

 

             Future Scheduled Test              Instructions Creatinine         

     [facility]



                                       Congestive Heart Failure              



                                       [code = Instructions              



                                       Creatinine Congestive              



                                       Heart Failure]              







Encounters







        Start   End     Encounter Admission Attending Care    Care    Encounter 

Source



        Date/Time Date/Time Type    Type    Clinicians Facility Department ID   

   

 

        2022         Outpatient 3       [facility] ENC[facility]   REF     [id number]

 [facility]



        14:58:59                         yashira                    [id number]    [facility]                         Health



                                                                        Rehabil



                                                                        itation



                                                                        [place]



                                                                        d

 

        2022-09-10 2022 Inpatient 3       [facility] ENC[facility]   CRD     [id number]

0 [facility]



        01:22:00 11:11:00                 yashira                    0910    [facility]                         Health



                                                                        Rehabil



                                                                        itation



                                                                        [place]



                                                                        d

 

        2022 Inpatient 3       [facility] ENC[facility]   CVA     [id number]

 [facility]



        17:30:00 18:40:00                 yashira,                    0630    [facility]                         Health



                                                                        Rehabil



                                                                        itation



                                                                        [place]



                                                                        d

 

        2022 Outpatient         [facility]   LABO    [id number]

[id number] [facility]



        12:05:00 12:05:00                 Merna Gillis



                                                                        [facility]

 

        2022 Outpatient         _BAHC_Tod PRIV    PRIV    241

[id number] [facility]



        09:56:00 09:56:00                 d_J                     [id number] [facility]

l

 

        2022 Outpatient         GC_BAHC_Tod PRIV    PRIV    241

[id number] [facility]



        04:43:00 04:43:00                 d_J                     [id number] [facility]

l

 

        2022 Outpatient         [facility]_BA[facility]_Tod PRIV    PRIV    241

[id number] [facility]



        11:14:00 11:14:00                 d_J                     [id number] [facility]

l

 

        2022 Outpatient         GC_BAHC_Tod PRIV    PRIV    241

[id number] [facility]



        05:23:00 05:23:00                 d_J                     [id number] [facility]

l

 

        2022 Inpatient E       GHAZALA OVALLES    MED     [id number]    

[facility]



        14:52:00 19:20:00                 ANA                           

 

        2022 Inpatient 3       [facility] ENCPL   CVA     [id number]

 [facility]



        18:20:00 10:58:00                 yashira,                    0504    [facility]

 

        2021 Discharged                 nullFlavo [facility] 

[id number] [facility]



        23:24:00 19:17:00 Inpatient                 [facility]       [facility]  [facility]      l



                                                        [facility]

[facility]



                                                        INTENSIVE         



                                                        CARE UNIT         

 

        2021 Registered                 nullFlavo [facility] 

[id number] [facility]



        18:23:00 18:23:00 Referred                 r       [facility]  [facility]      l



                                                        [facility]

[place]



                                                        LABORATORY         



                                                        NON-PATIENT         

 

        2020 Discharged                 nullFlavo [facility] 

[id number] [facility]



        02:48:00 17:37:00 Inpatient                 r       [facility]  [facility]      l



                                                        [facility]

[place]



                                                        MED/SURG         



                                                        FLOOR           

 

        2019 Registered                 nullFlavo [facility] 

[id number] [facility]



        18:59:00 18:59:00 Referred                 r       [facility]  78      l



                                                        [facility]

[facility]



                                                        LABORATORY         



                                                        NON-PATIENT         

 

        2018 Discharged                 nullFlavo [facility] 

[id number] [facility]



        16:10:00 13:36:00 Inpatient                 r       [facility]  76      l



                                                        [facility]aton

nn

 

        2017 Departed                 Liseth Blunt [id number]

[id number] [facility]



        10:18:00 16:46:00 Emergency                 r       [facility]  20      l



                                                        [facility]aton

nn







Results







           Test Description Test Time  Test Comments Results    Result Comments 

Source









                    CBC W/AUTO DIFF     2022 15:06:00 









                      Test Item  Value      Reference Range Interpretation Comme

nts









             WHITE BLOOD CELL (test code = 2.3 K/mm3    3.5-11.0     LL         

  



             WBC)                                                

 

             RED BLOOD CELL (test code = 2.82 M/mm3   4.70-6.10    L            



             RBC)                                                

 

             HEMOGLOBIN (test code = HGB) 7.3 G/DL     10.4-14.9    L           

 

 

             HEMATOCRIT (test code = HCT) 24.6 %       31.5-44.1    L           

 

 

             MEAN CELL VOLUME (test code = 87.2 Fl      84.5-98.6    N          

  



             MCV)                                                

 

             MEAN CELL HGB (test code = 25.9 pg      27.0-34.2    L            



             MCH)                                                

 

             MEAN CELL HGB CONCETRATION 29.7 G/DL    31.5-34.0    L            



             (test code = MCHC)                                        

 

             RED CELL DISTRIBUTION WIDTH 15.7 SD      11.5-14.5    H            



             (test code = RDW)                                        

 

             PLATELET COUNT (test code = 78 K/mm3     150-450      L            



             PLT)                                                

 

             MEAN PLATELET VOLUME (test 11.00 fL     7.0-10.5     H            



             code = MPV)                                         

 

             NEUTROPHIL % (test code = NT%) 68.6 %       40-76        N         

   

 

             IMMATURE GRANULOCYTE % (test 0.0 %        0.0-5.0      N           

 



             code = IG%)                                         

 

             LYMPHOCYTE % (test code = LY%) 18.1 %       20.5-51.1    L         

   

 

             MONOCYTE % (test code = MO%) 9.5 %        1.7-9.3      H           

 

 

             EOSINOPHIL % (test code = EO%) 3.4 %        0.0-6.0      N         

   

 

             BASOPHIL % (test code = BA%) 0.4 %        0.0-2.0      N           

 

 

             NUCLEATED RBC % (test code = 0.0 /100WBC% 0.0-1.0      N           

 



             NRBC%)                                              

 

             NEUTROPHIL # (test code = NT#) 1.6 K/mm3    1.8-7.6      L         

   

 

             IMMATURE GRANULOCYTE # (test 0.00 x10 3/uL 0.00-0.03    N          

  



             code = IG#)                                         

 

             LYMPHOCYTE # (test code = LY#) 0.4 K/mm3    0.6-3.2      L         

   

 

             MONOCYTE # (test code = MO#) 0.2 K/mm3    0.3-1.1      L           

 

 

             EOSINOPHIL # (test code = EO#) 0.1 K/mm3    0.0-0.4      N         

   

 

             BASOPHIL # (test code = BA#) 0.0 K/mm3    0.0-0.1      N           

 

 

             NUCLEATED RBC # (test code = 0.0 K/mm3    0.0-0.1      N           

 



             NRBC#)                                              

 

             MANUAL DIFF REQUIRED (test NO DIFF/SCN  CRITERIA                  S

LIDE REVIEW CONSISTANT WITH



             code = MDIFF)                                        AUTO DIFFERENT

IAL.



RBC [id number] 15:06:00





             Test Item    Value        Reference Range Interpretation Comments

 

             PLATELET ESTIMATE DECREASED THOUSAND ADEQUATE                  PLAT

ELET COUNT



             (test code =                                        REVIEWED AND



             PLTEST)                                             VERIFIED.PLT ES

T



                                                                 []

 

             PLATELET MORPHOLOGY NORMAL                                 



             (test code =                                        



             PLTMORPH)                                           



CBC W/AUTO [id number] 15:04:00





             Test Item    Value        Reference Range Interpretation Comments

 

             WHITE BLOOD CELL (test code = 3.0 K/mm3    3.5-11.0     L          

  



             WBC)                                                

 

             RED BLOOD CELL (test code = 2.82 M/mm3   4.70-6.10    L            



             RBC)                                                

 

             HEMOGLOBIN (test code = HGB) 7.4 G/DL     10.4-14.9    L           

 

 

             HEMATOCRIT (test code = HCT) 24.8 %       31.5-44.1    L           

 

 

             MEAN CELL VOLUME (test code = 87.9 Fl      84.5-98.6    N          

  



             MCV)                                                

 

             MEAN CELL HGB (test code = MCH) 26.2 pg      27.0-34.2    L        

    

 

             MEAN CELL HGB CONCETRATION 29.8 G/DL    31.5-34.0    L            



             (test code = MCHC)                                        

 

             RED CELL DISTRIBUTION WIDTH 15.9 SD      11.5-14.5    H            



             (test code = RDW)                                        

 

             PLATELET COUNT (test code = 73 K/mm3     150-450      L            



             PLT)                                                

 

             MEAN PLATELET VOLUME (test code 11.10 fL     7.0-10.5     H        

    



             = MPV)                                              

 

             NEUTROPHIL % (test code = NT%) 62.0 %       40-76        N         

   

 

             IMMATURE GRANULOCYTE % (test 1.3 %        0.0-5.0      N           

 



             code = IG%)                                         

 

             LYMPHOCYTE % (test code = LY%) 22.7 %       20.5-51.1    N         

   

 

             MONOCYTE % (test code = MO%) 10.0 %       1.7-9.3      H           

 

 

             EOSINOPHIL % (test code = EO%) 3.0 %        0.0-6.0      N         

   

 

             BASOPHIL % (test code = BA%) 1.0 %        0.0-2.0      N           

 

 

             NUCLEATED RBC % (test code = 0.0 /100WBC% 0.0-1.0      N           

 



             NRBC%)                                              

 

             NEUTROPHIL # (test code = NT#) 1.9 K/mm3    1.8-7.6      N         

   

 

             IMMATURE GRANULOCYTE # (test 0.04 x10 3/uL 0.00-0.03    H          

  



             code = IG#)                                         

 

             LYMPHOCYTE # (test code = LY#) 0.7 K/mm3    0.6-3.2      N         

   

 

             MONOCYTE # (test code = MO#) 0.3 K/mm3    0.3-1.1      N           

 

 

             EOSINOPHIL # (test code = EO#) 0.1 K/mm3    0.0-0.4      N         

   

 

             BASOPHIL # (test code = BA#) 0.0 K/mm3    0.0-0.1      N           

 

 

             NUCLEATED RBC # (test code = 0.0 K/mm3    0.0-0.1      N           

 



             NRBC#)                                              

 

             MANUAL DIFF REQUIRED (test code NO DIFF/SCN  CRITERIA              

    



             = MDIFF)                                            



RBC [id number] 15:04:00





             Test Item    Value        Reference Range Interpretation Comments

 

             ANISOCYTOSIS (test NORMAL       NONE                      



             code = ANISO)                                        

 

             PLATELET ESTIMATE DECREASED    ADEQUATE                  PLT EST CO

UNT



             (test code = PLTEST) THOUSAND                               36,000-

45,000

 

             PLATELET MORPHOLOGY NORMAL                                 



             (test code =                                        



             PLTMORPH)                                           



BASIC METABOLIC [id number] 13:19:00





             Test Item    Value        Reference Range Interpretation Comments

 

             SODIUM (test code = NA) 140 mmol/L   134-147      N            

 

             POTASSIUM (test code = K) 4.1 mmol/L   3.4-5.0      N            

 

             CHLORIDE (test code = CL) 104 mmol/L   100-108      N            

 

             CARBON DIOXIDE (test code = CO2) 28 mmol/L    21-32        N       

     

 

             ANION GAP (test code = GAP) 8.0 GAP calc 4.0-15.0     N            

 

             GLUCOSE (test code = GLU) 314 MG/DL           H            

 

             BLOOD UREA NITROGEN (test code = 57 MG/DL     7-18         H       

     



             BUN)                                                

 

             GLOMERULAR FILTRATION RATE (test 15 estGFR    >60          L       

     



             code = GFR)                                         

 

             CREATININE (test code = CREAT) 3.1 MG/DL    0.6-1.0      H         

   

 

             CALCIUM (test code = CA) 8.6 MG/DL    8.5-10.1     N            



Absolute lymphocyte [id number] 10:01:00





             Test Item    Value        Reference Range Interpretation Comments

 

             Absolute lymphocyte count (test code = 0.4          0.7-4.9        

           



             Absolute lymphocyte count)                                        



[facility] Serum or Plasma by Bromocresol purple 
(BCP) dye binding [id number] 10:01:00





             Test Item    Value        Reference Range Interpretation Comments

 

             Albumin [Mass/volume] in Serum or 2.5          3.4-5.0             

      



             Plasma by Bromocresol purple (BCP) dye                             

           



             binding meth (test code = Albumin                                  

      



             [Mass/volume] in Serum or Plasma by                                

        



             Bromocresol purple (BCP) dye binding                               

         



             meth)                                               



[facility] %[facility] 10[facility]





             Test Item    Value        Reference Range Interpretation Comments

 

             Basophil % (test code = 0.2          See_Comment                [Au

tomated message] The



             Basophil %)                                         system which ge

nerated



                                                                 this result tra

nsmitted



                                                                 reference range

: <=1.3.



                                                                 The reference r

sharona was



                                                                 not used to int

erpret



                                                                 this result as



                                                                 normal/abnormal

.



[facility]annBlood erythrocytes count (number/volume)2021 10:01:00





             Test Item    Value        Reference Range Interpretation Comments

 

             Blood erythrocytes count 3.38         3.86-4.86                 



             (number/volume) (test code = Blood                                 

       



             erythrocytes count (number/volume))                                

        



[facility]annBlood hematocrit (volume fraction)2021 10:01:00





             Test Item    Value        Reference Range Interpretation Comments

 

             Blood hematocrit (volume fraction) 30.8         36.0-45.0          

       



             (test code = Blood hematocrit (volume                              

          



             fraction))                                          



[facility]Blood platelet mean [id number] 10:01:00





             Test Item    Value        Reference Range Interpretation Comments

 

             Blood platelet mean volume (test code = 8.8          7.6-11.3      

            



             Blood platelet mean volume)                                        



[facility]annC reactive protein [Mass/volume] in Serum or [id number] 
10:01:00





             Test Item    Value        Reference Range Interpretation Comments

 

             C reactive protein [Mass/volume] in 16.00                          

        



             Serum or Plasma (test code = C reactive                            

            



             protein [Mass/volume] in Serum or                                  

      



             Plasma)                                             



[facility]annCalcium [Mass/volume] in Serum or [id number] 10:01:00





             Test Item    Value        Reference Range Interpretation Comments

 

             Calcium [Mass/volume] in Serum or 7.7          8.5-10.1            

      



             Plasma (test code = Calcium                                        



             [Mass/volume] in Serum or Plasma)                                  

      



[facility]annCarbon dioxide, total [Moles/volume] in Serum or Plasma
2021 10:01:00





             Test Item    Value        Reference Range Interpretation Comments

 

             Carbon dioxide, total [Moles/volume] in 30           21-32         

            



             Serum or Plasma (test code = Carbon                                

        



             dioxide, total [Moles/volume] in Serum                             

           



             or Plasma)                                          



[facility]annChemistry [id number] 10:01:00





             Test Item    Value        Reference Range Interpretation Comments

 

             Chemistry procedure (test code = 91.1                        

     



             Chemistry procedure)                                        



[facility]annChloride [Moles/volume] in Serum or [id number] 10:01:00





             Test Item    Value        Reference Range Interpretation Comments

 

             Chloride [Moles/volume] in Serum or 104                      

        



             Plasma (test code = Chloride                                       

 



             [Moles/volume] in Serum or Plasma)                                 

       



[facility]annCreatinine [Mass/volume] in Serum or [id number] 10:01:00





             Test Item    Value        Reference Range Interpretation Comments

 

             Creatinine [Mass/volume] in Serum or 2.11         0.55-1.3         

         



             Plasma (test code = Creatinine                                     

   



             [Mass/volume] in Serum or Plasma)                                  

      



[facility]annFerritin [Mass/volume] in Serum or [id number] 10:01:00





             Test Item    Value        Reference Range Interpretation Comments

 

             Ferritin [Mass/volume] in Serum or 82.0         8-388              

       



             Plasma (test code = Ferritin                                       

 



             [Mass/volume] in Serum or Plasma)                                  

      



[facility]annGlucose [Mass/volume] in Serum or [id number] 10:01:00





             Test Item    Value        Reference Range Interpretation Comments

 

             Glucose [Mass/volume] in Serum or 236                        

      



             Plasma (test code = Glucose                                        



             [Mass/volume] in Serum or Plasma)                                  

      



[facility]annLymphocyte [id number] 10:01:00





             Test Item    Value        Reference Range Interpretation Comments

 

             Lymphocyte percent (test code = 3.30         4.3-10.9              

    



             Lymphocyte percent)                                        



[facility]annPhosphate [Mass/volume] in Serum or [id number] 10:01:00





             Test Item    Value        Reference Range Interpretation Comments

 

             Phosphate [Mass/volume] in Serum or 3.0          2.5-4.9           

        



             Plasma (test code = Phosphate                                      

  



             [Mass/volume] in Serum or Plasma)                                  

      



[facility]annPotassium [Moles/volume] in Serum or [id number] 10:01:00





             Test Item    Value        Reference Range Interpretation Comments

 

             Potassium [Moles/volume] in Serum or 4.2          3.5-5.1          

         



             Plasma (test code = Potassium                                      

  



             [Moles/volume] in Serum or Plasma)                                 

       



[facility]annSerum or plasma sodium measurement (moles/volume)2021 
10:01:00





             Test Item    Value        Reference Range Interpretation Comments

 

             Serum or plasma sodium measurement 139          136-145            

       



             (moles/volume) (test code = Serum or                               

         



             plasma sodium measurement                                        



             (moles/volume))                                        



[facility]annUrea nitrogen [Mass/volume] in Serum or [id number] 
10:01:00





             Test Item    Value        Reference Range Interpretation Comments

 

             Urea nitrogen [Mass/volume] in Serum or 69           7-18          

            



             Plasma (test code = Urea nitrogen                                  

      



             [Mass/volume] in Serum or Plasma)                                  

      



[facility]annTotal cholesterol/cholesterol in HDL (percentile)2021 
09:51:00





             Test Item    Value        Reference Range Interpretation Comments

 

             Total cholesterol/cholesterol in HDL 4.25 1                        

         



             (percentile) (test code = Total                                    

    



             cholesterol/cholesterol in HDL                                     

   



             (percentile))                                        



[facility]annTriglyceride [Mass/volume] in Serum or [id number] 09:51:00





             Test Item    Value        Reference Range Interpretation Comments

 

             Triglyceride [Mass/volume] in Serum or 91                          

           



             Plasma (test code = Triglyceride                                   

     



             [Mass/volume] in Serum or Plasma)                                  

      



[facility]annBlood anisocytosis [id number] 09:51:00





             Test Item    Value        Reference Range Interpretation Comments

 

             Blood anisocytosis detection (test code 1+                         

            



             = Blood anisocytosis detection)                                    

    



[facility]annBlood morphology interpretation [id number] 09:51:00





             Test Item    Value        Reference Range Interpretation Comments

 

             Blood morphology interpretation Noted                              

    



             narrative (test code = Blood morphology                            

            



             interpretation narrative)                                        



[facility]annCholesterol in HDL [Mass/volume] in Serum or [id number] 
09:51:00





             Test Item    Value        Reference Range Interpretation Comments

 

             Cholesterol in HDL [Mass/volume] in 28           40-60             

        



             Serum or Plasma (test code =                                       

 



             Cholesterol in HDL [Mass/volume] in                                

        



             Serum or Plasma)                                        



[facility]annCholesterol [Mass/volume] in Serum or [id number] 09:51:00





             Test Item    Value        Reference Range Interpretation Comments

 

             Cholesterol [Mass/volume] in Serum or 119                          

          



             Plasma (test code = Cholesterol                                    

    



             [Mass/volume] in Serum or Plasma)                                  

      



[facility]Lymphocyte [id number] 09:51:00





             Test Item    Value        Reference Range Interpretation Comments

 

             Lymphocyte percent (test code = 21           15-42                 

    



             Lymphocyte percent)                                        



[facility]annMagnesium [Mass/volume] in Serum or [id number] 09:51:00





             Test Item    Value        Reference Range Interpretation Comments

 

             Magnesium [Mass/volume] in Serum or 1.9          1.8-2.4           

        



             Plasma (test code = Magnesium                                      

  



             [Mass/volume] in Serum or Plasma)                                  

      



[facility] HermannPlatelet [id number] 09:51:00





             Test Item    Value        Reference Range Interpretation Comments

 

             Platelet estimate (test code = Platelet Decr                       

            



             estimate)                                           



[facility]annSerum or plasma cholesterol in LDL measurement by calculation 
(mass/volume)2021 09:51:00





             Test Item    Value        Reference Range Interpretation Comments

 

             Serum or plasma cholesterol in LDL 73 1                            

       



             measurement by calculation                                        



             (mass/volume) (test code = Serum or                                

        



             plasma cholesterol in LDL measurement                              

          



             by calculation (mass/volume))                                      

  



[facility] CenterannTroponin I.cardiac [Mass/volume] in Serum or [id number] 
05:11:00





             Test Item    Value        Reference Range Interpretation Comments

 

             Troponin I.cardiac 0.08         See_Comment                [Automat

ed message] The



             [Mass/volume] in Serum                                        syste

m which generated



             or Plasma (test code =                                        this 

result transmitted



             Troponin I.cardiac                                        reference

 range: <=0.045.



             [Mass/volume] in Serum                                        The r

eference range was



             or Plasma)                                          not used to int

erpret



                                                                 this result as



                                                                 normal/abnormal

.



[facility] CenterKinkanyOSQE-ElA-7 (COVID-19) RNA [Presence] in Respiratory specimen by 
ANJUM with probe [id number] 21:20:00





             Test Item    Value        Reference Range Interpretation Comments

 

             SARS-CoV-2 (COVID-19) RNA [Presence] Positive                      

         



             in Respiratory specimen by ANJUM with                                

        



             probe detect (test code = SARS-CoV-2                               

         



             (COVID-19) RNA [Presence] in                                       

 



             Respiratory specimen by ANJUM with                                   

     



             probe detect)                                        



[facility]annAlanine aminotransferase [Enzymatic activity/volume] in Serum or
Plasma by With P-5'-2021 21:09:00





             Test Item    Value        Reference Range Interpretation Comments

 

             Alanine aminotransferase [Enzymatic 22           12-78             

        



             activity/volume] in Serum or Plasma by                             

           



             With P-5'- (test code = Alanine                                    

    



             aminotransferase [Enzymatic                                        



             activity/volume] in Serum or Plasma by                             

           



             With P-5'-)                                         



[facility]annAlkaline phosphatase [Enzymatic activity/volume] in Serum or 
[id number] 21:09:00





             Test Item    Value        Reference Range Interpretation Comments

 

             Alkaline phosphatase [Enzymatic 71                           

    



             activity/volume] in Serum or Plasma                                

        



             (test code = Alkaline phosphatase                                  

      



             [Enzymatic activity/volume] in Serum or                            

            



             Plasma)                                             



[facility]annAspartate aminotransferase [Enzymatic activity/volume] in Serum 
or Plasma by With [id number] 21:09:00





             Test Item    Value        Reference Range Interpretation Comments

 

             Aspartate aminotransferase [Enzymatic 36           15-37           

          



             activity/volume] in Serum or Plasma by                             

           



             With P-5 (test code = Aspartate                                    

    



             aminotransferase [Enzymatic                                        



             activity/volume] in Serum or Plasma by                             

           



             With P-5)                                           



[facility]annBilirubin.direct [Mass/volume] in Serum or [id number] 
21:09:00





             Test Item    Value        Reference Range Interpretation Comments

 

             Bilirubin.direct 0.3          See_Comment                [Automated

 message] The



             [Mass/volume] in Serum                                        syste

m which generated



             or Plasma (test code =                                        this 

result transmitted



             Bilirubin.direct                                        reference r

sharona: <=0.2.



             [Mass/volume] in Serum                                        The r

eference range was



             or Plasma)                                          not used to int

erpret this



                                                                 result as fabiano

l/abnormal.



[facility]annBilirubin.total [Mass/volume] in Serum or [id number] 
21:09:00





             Test Item    Value        Reference Range Interpretation Comments

 

             Bilirubin.total [Mass/volume] in Serum 0.7          0.2-1.0        

           



             or Plasma (test code = Bilirubin.total                             

           



             [Mass/volume] in Serum or Plasma)                                  

      



[facility]annINR in Blood by Coagulation [id number] 21:09:00





             Test Item    Value        Reference Range Interpretation Comments

 

             INR in Blood by Coagulation assay 1.28 1                           

      



             (test code = INR in Blood by                                       

 



             Coagulation assay)                                        



[facility]annNatriuretic peptide.B prohormone N-Terminal [Mass/volume] in 
Serum or [id number] 21:09:00





             Test Item    Value        Reference Range Interpretation Comments

 

             Natriuretic peptide.B prohormone 2354                              

     



             N-Terminal [Mass/volume] in Serum or                               

         



             Plasma (test code = Natriuretic                                    

    



             peptide.B prohormone N-Terminal                                    

    



             [Mass/volume] in Serum or Plasma)                                  

      



[facility]annPeripheral blood smear examination by light [id number]
 21:09:00





             Test Item    Value        Reference Range Interpretation Comments

 

             Peripheral blood smear examination by Ok                           

          



             light microscopy (test code =                                      

  



             Peripheral blood smear examination by                              

          



             light microscopy)                                        



[facility]annProtein [Mass/volume] in Serum or [id number] 21:09:00





             Test Item    Value        Reference Range Interpretation Comments

 

             Protein [Mass/volume] in Serum or 6.6          6.4-8.2             

      



             Plasma (test code = Protein                                        



             [Mass/volume] in Serum or Plasma)                                  

      



[facility]annTroponin I.cardiac [Mass/volume] in Serum or [id number] 
21:09:00





             Test Item    Value        Reference Range Interpretation Comments

 

             Troponin I.cardiac 0.13         See_Comment                [Automat

ed message] The



             [Mass/volume] in Serum                                        syste

m which generated



             or Plasma (test code =                                        this 

result transmitted



             Troponin I.cardiac                                        reference

 range: <=0.045.



             [Mass/volume] in Serum                                        The r

eference range was



             or Plasma)                                          not used to int

erpret



                                                                 this result as



                                                                 normal/abnormal

.



[facility]annAlbumin [Mass/volume] in Serum or Plasma by Bromocresol purple 
(BCP) dye binding [id number] 17:30:00





             Test Item    Value        Reference Range Interpretation Comments

 

             Albumin [Mass/volume] in Serum or 2.8          3.4-5.0             

      



             Plasma by Bromocresol purple (BCP) dye                             

           



             binding meth (test code = Albumin                                  

      



             [Mass/volume] in Serum or Plasma by                                

        



             Bromocresol purple (BCP) dye binding                               

         



             meth)                                               



[facility] by light microscopy
2021 17:30:00





             Test Item    Value        Reference Range Interpretation Comments

 

             Bacteria detection in urine sediment <20 /HPF                      

         



             by light microscopy (test code =                                   

     



             Bacteria detection in urine sediment                               

         



             by light microscopy)                                        



[facility]annCalcium [Mass/volume] in Serum or [id number] 17:30:00





             Test Item    Value        Reference Range Interpretation Comments

 

             Calcium [Mass/volume] in Serum or 8.2          8.5-10.1            

      



             Plasma (test code = Calcium                                        



             [Mass/volume] in Serum or Plasma)                                  

      



[facility]annCarbon dioxide, total [Moles/volume] in Serum or Plasma
2021 17:30:00





             Test Item    Value        Reference Range Interpretation Comments

 

             Carbon dioxide, total [Moles/volume] in 32           21-32         

            



             Serum or Plasma (test code = Carbon                                

        



             dioxide, total [Moles/volume] in Serum                             

           



             or Plasma)                                          



[facility]annChloride [Moles/volume] in Serum or [id number] 17:30:00





             Test Item    Value        Reference Range Interpretation Comments

 

             Chloride [Moles/volume] in Serum or 107                      

        



             Plasma (test code = Chloride                                       

 



             [Moles/volume] in Serum or Plasma)                                 

       



[facility]annCreatinine [Mass/volume] in Serum or [id number] 17:30:00





             Test Item    Value        Reference Range Interpretation Comments

 

             Creatinine [Mass/volume] in Serum or 1.51         0.55-1.3         

         



             Plasma (test code = Creatinine                                     

   



             [Mass/volume] in Serum or Plasma)                                  

      



[facility]annGlucose [Mass/volume] in Serum or [id number] 17:30:00





             Test Item    Value        Reference Range Interpretation Comments

 

             Glucose [Mass/volume] in Serum or 133                        

      



             Plasma (test code = Glucose                                        



             [Mass/volume] in Serum or Plasma)                                  

      



[facility]annLymphocyte [id number] 17:30:00





             Test Item    Value        Reference Range Interpretation Comments

 

             Lymphocyte percent (test BETWEEN 10,000 and                        

   



             code = Lymphocyte 100,000 CFU/ML                           



             percent)                                            



[facility]annPhosphate [Mass/volume] in Serum or [id number] 17:30:00





             Test Item    Value        Reference Range Interpretation Comments

 

             Phosphate [Mass/volume] in Serum or 3.0          2.5-4.9           

        



             Plasma (test code = Phosphate                                      

  



             [Mass/volume] in Serum or Plasma)                                  

      



[facility]annPotassium [Moles/volume] in Serum or [id number] 17:30:00





             Test Item    Value        Reference Range Interpretation Comments

 

             Potassium [Moles/volume] in Serum or 3.6          3.5-5.1          

         



             Plasma (test code = Potassium                                      

  



             [Moles/volume] in Serum or Plasma)                                 

       



[facility]annProtein [Mass/volume] in [id number] 17:30:00





             Test Item    Value        Reference Range Interpretation Comments

 

             Protein [Mass/volume] in Urine (test 20                            

         



             code = Protein [Mass/volume] in Urine)                             

           



[facility]annSerum or plasma sodium measurement (moles/volume)2021 
17:30:00





             Test Item    Value        Reference Range Interpretation Comments

 

             Serum or plasma sodium measurement 144          136-145            

       



             (moles/volume) (test code = Serum or                               

         



             plasma sodium measurement                                        



             (moles/volume))                                        



[facility]annUrate [Mass/volume] in Serum or [id number] 17:30:00





             Test Item    Value        Reference Range Interpretation Comments

 

             Urate [Mass/volume] in Serum or Plasma 5.8          2.6-6.0        

           



             (test code = Urate [Mass/volume] in                                

        



             Serum or Plasma)                                        



[facility]annUrea nitrogen [Mass/volume] in Serum or [id number] 
17:30:00





             Test Item    Value        Reference Range Interpretation Comments

 

             Urea nitrogen [Mass/volume] in Serum or 36           7-18          

            



             Plasma (test code = Urea nitrogen                                  

      



             [Mass/volume] in Serum or Plasma)                                  

      



[facility]annUr[facility] with [id number] 17:30:00





             Test Item    Value        Reference Range Interpretation Comments

 

             Urinalysis with microscopy (test Negative                          

     



             code = Urinalysis with microscopy)                                 

       



[facility]annUrine appearance [id number] 17:30:00





             Test Item    Value        Reference Range Interpretation Comments

 

             Urine appearance determination (test Clear                         

         



             code = Urine appearance determination)                             

           



[facility]annU[facility] bilirubin [id number] 17:30:00





             Test Item    Value        Reference Range Interpretation Comments

 

             Urine bilirubin detection (test code Negative                      

         



             = Urine bilirubin detection)                                       

 



[facility] [id number] 17:30:00





             Test Item    Value        Reference Range Interpretation Comments

 

             Urine blood detection (test code = Negative                        

       



             Urine blood detection)                                        



[facility]annU[facility] [id number] 17:30:00





             Test Item    Value        Reference Range Interpretation Comments

 

             Urine color (test code = Urine color) Yellow                       

          



[facility] glucose [id number] 17:30:00





             Test Item    Value        Reference Range Interpretation Comments

 

             Urine glucose detection (test code = Negative                      

         



             Urine glucose detection)                                        



[facility] [id number] 17:30:00





             Test Item    Value        Reference Range Interpretation Comments

 

             Urine pH (test code = Urine pH) 5.5 1                              

    



[facility] sediment erythrocyte count by microscopy (number/high 
power field)2021 17:30:00





             Test Item    Value        Reference Range Interpretation Comments

 

             Urine sediment erythrocyte None seen /HPF                          

 



             count by microscopy                                        



             (number/high power field)                                        



             (test code = Urine sediment                                        



             erythrocyte count by                                        



             microscopy (number/high power                                      

  



             field))                                             



[facility]annUrine specific gravity [id number] 17:30:00





             Test Item    Value        Reference Range Interpretation Comments

 

             Urine specific gravity measurement 1.015 1                         

       



             (test code = Urine specific gravity                                

        



             measurement)                                        



[facility]annUrine urobilinogen [id number] 17:30:00





             Test Item    Value        Reference Range Interpretation Comments

 

             Urine urobilinogen detection (test code 0.2                        

            



             = Urine urobilinogen detection)                                    

    



[facility]annUrine [id number] 17:30:00





             Test Item    Value        Reference Range Interpretation Comments

 

             Urine culture (test code = Urine MIXED KEVIN.                      

     



             culture)                                            



[facility]annAbsolute lymphocyte [id number] 11:13:00





             Test Item    Value        Reference Range Interpretation Comments

 

             Absolute lymphocyte count (test code = 0.7          0.7-4.9        

           



             Absolute lymphocyte count)                                        



[facility] HermannBas[facility] %2020 11:13:00





             Test Item    Value        Reference Range Interpretation Comments

 

             Basophil % (test code = 0.6          See_Comment                [Au

tomated message] The



             Basophil %)                                         system which ge

nerated



                                                                 this result tra

nsmitted



                                                                 reference range

: <=1.3.



                                                                 The reference r

sharona was



                                                                 not used to int

erpret



                                                                 this result as



                                                                 normal/abnormal

.



[facility]annBlood erythrocytes count (number/volume)2020 11:13:00





             Test Item    Value        Reference Range Interpretation Comments

 

             Blood erythrocytes count 3.31         3.86-4.86                 



             (number/volume) (test code = Blood                                 

       



             erythrocytes count (number/volume))                                

        



[facility]annBlood hematocrit (volume fraction)2020 11:13:00





             Test Item    Value        Reference Range Interpretation Comments

 

             Blood hematocrit (volume fraction) 29.3         36.0-45.0          

       



             (test code = Blood hematocrit (volume                              

          



             fraction))                                          



[facility]annBlood platelet mean [id number] 11:13:00





             Test Item    Value        Reference Range Interpretation Comments

 

             Blood platelet mean volume (test code = 9.4          7.6-11.3      

            



             Blood platelet mean volume)                                        



[facility] HermannCalcium [Mass/volume] in Serum or [id number] 11:13:00





             Test Item    Value        Reference Range Interpretation Comments

 

             Calcium [Mass/volume] in Serum or 7.8          8.5-10.1            

      



             Plasma (test code = Calcium                                        



             [Mass/volume] in Serum or Plasma)                                  

      



[facility]annCarbon dioxide, total [Moles/volume] in Serum or Plasma
2020 11:13:00





             Test Item    Value        Reference Range Interpretation Comments

 

             Carbon dioxide, total [Moles/volume] in 33           21-32         

            



             Serum or Plasma (test code = Carbon                                

        



             dioxide, total [Moles/volume] in Serum                             

           



             or Plasma)                                          



[facility]annChemistry [id number] 11:13:00





             Test Item    Value        Reference Range Interpretation Comments

 

             Chemistry procedure (test code = 88.6                        

     



             Chemistry procedure)                                        



[facility]annChloride [Moles/volume] in Serum or [id number] 11:13:00





             Test Item    Value        Reference Range Interpretation Comments

 

             Chloride [Moles/volume] in Serum or 103                      

        



             Plasma (test code = Chloride                                       

 



             [Moles/volume] in Serum or Plasma)                                 

       



[facility]annCreatinine [Mass/volume] in Serum or [id number] 11:13:00





             Test Item    Value        Reference Range Interpretation Comments

 

             Creatinine [Mass/volume] in Serum or 2.21         0.55-1.3         

         



             Plasma (test code = Creatinine                                     

   



             [Mass/volume] in Serum or Plasma)                                  

      



[facility]annGlucose [Mass/volume] in Serum or [id number] 11:13:00





             Test Item    Value        Reference Range Interpretation Comments

 

             Glucose [Mass/volume] in Serum or 140                        

      



             Plasma (test code = Glucose                                        



             [Mass/volume] in Serum or Plasma)                                  

      



[facility]annMagnesium [Mass/volume] in Serum or [id number] 11:13:00





             Test Item    Value        Reference Range Interpretation Comments

 

             Magnesium [Mass/volume] in Serum or 1.8          1.8-2.4           

        



             Plasma (test code = Magnesium                                      

  



             [Mass/volume] in Serum or Plasma)                                  

      



[facility]annPhosphate [Mass/volume] in Serum or [id number] 11:13:00





             Test Item    Value        Reference Range Interpretation Comments

 

             Phosphate [Mass/volume] in Serum or 2.1          2.5-4.9           

        



             Plasma (test code = Phosphate                                      

  



             [Mass/volume] in Serum or Plasma)                                  

      



[facility]annPotassium [Moles/volume] in Serum or [id number] 11:13:00





             Test Item    Value        Reference Range Interpretation Comments

 

             Potassium [Moles/volume] in Serum or 3.8          3.5-5.1          

         



             Plasma (test code = Potassium                                      

  



             [Moles/volume] in Serum or Plasma)                                 

       



[facility]annSerum or plasma sodium measurement (moles/volume)2020 
11:13:00





             Test Item    Value        Reference Range Interpretation Comments

 

             Serum or plasma sodium measurement 141          136-145            

       



             (moles/volume) (test code = Serum or                               

         



             plasma sodium measurement                                        



             (moles/volume))                                        



[facility]annUrea nitrogen [Mass/volume] in Serum or [id number] 
11:13:00





             Test Item    Value        Reference Range Interpretation Comments

 

             Urea nitrogen [Mass/volume] in Serum or 50           7-18          

            



             Plasma (test code = Urea nitrogen                                  

      



             [Mass/volume] in Serum or Plasma)                                  

      



[facility]annUrine dipstick testing at point-of-[id number] 11:13:00





             Test Item    Value        Reference Range Interpretation Comments

 

             Urine dipstick testing at point-of-care 3.2          4.3-10.9      

            



             (test code = Urine dipstick testing at                             

           



             point-of-care)                                        



[facility]annAlanine aminotransferase [Enzymatic activity/volume] in Serum or
Plasma by With P-5'-2020 11:27:00





             Test Item    Value        Reference Range Interpretation Comments

 

             Alanine aminotransferase [Enzymatic 16           12-78             

        



             activity/volume] in Serum or Plasma by                             

           



             With P-5'- (test code = Alanine                                    

    



             aminotransferase [Enzymatic                                        



             activity/volume] in Serum or Plasma by                             

           



             With P-5'-)                                         



[facility]annAlbumin [Mass/volume] in Serum or Plasma by Bromocresol purple 
(BCP) dye binding [id number] 11:27:00





             Test Item    Value        Reference Range Interpretation Comments

 

             Albumin [Mass/volume] in Serum or 2.5          3.4-5.0             

      



             Plasma by Bromocresol purple (BCP) dye                             

           



             binding meth (test code = Albumin                                  

      



             [Mass/volume] in Serum or Plasma by                                

        



             Bromocresol purple (BCP) dye binding                               

         



             meth)                                               



[facility]annAlkaline phosphatase [Enzymatic activity/volume] in Serum or 
[id number] 11:27:00





             Test Item    Value        Reference Range Interpretation Comments

 

             Alkaline phosphatase [Enzymatic 72                           

    



             activity/volume] in Serum or Plasma                                

        



             (test code = Alkaline phosphatase                                  

      



             [Enzymatic activity/volume] in Serum or                            

            



             Plasma)                                             



[facility] HermannAspartate aminotransferase [Enzymatic activity/volume] in Serum 
or Plasma by With [id number] 11:27:00





             Test Item    Value        Reference Range Interpretation Comments

 

             Aspartate aminotransferase [Enzymatic 23           15-37           

          



             activity/volume] in Serum or Plasma by                             

           



             With P-5 (test code = Aspartate                                    

    



             aminotransferase [Enzymatic                                        



             activity/volume] in Serum or Plasma by                             

           



             With P-5)                                           



[facility]annBilirubin.total [Mass/volume] in Serum or [id number] 
11:27:00





             Test Item    Value        Reference Range Interpretation Comments

 

             Bilirubin.total [Mass/volume] in Serum 1.0          0.2-1.0        

           



             or Plasma (test code = Bilirubin.total                             

           



             [Mass/volume] in Serum or Plasma)                                  

      



[facility]annBlood morphology interpretation [id number] 11:27:00





             Test Item    Value        Reference Range Interpretation Comments

 

             Blood morphology interpretation Not seen                           

    



             narrative (test code = Blood                                       

 



             morphology interpretation narrative)                               

         



[facility] HermannNatriuretic peptide.B prohormone N-Terminal [Mass/volume] in 
Serum or [id number] 11:27:00





             Test Item    Value        Reference Range Interpretation Comments

 

             Natriuretic peptide.B prohormone 493                               

     



             N-Terminal [Mass/volume] in Serum or                               

         



             Plasma (test code = Natriuretic                                    

    



             peptide.B prohormone N-Terminal                                    

    



             [Mass/volume] in Serum or Plasma)                                  

      



[facility]annProtein [Mass/volume] in Serum or [id number] 11:27:00





             Test Item    Value        Reference Range Interpretation Comments

 

             Protein [Mass/volume] in Serum or 6.7          6.4-8.2             

      



             Plasma (test code = Protein                                        



             [Mass/volume] in Serum or Plasma)                                  

      



[facility] HermannTroponin I.cardiac [Mass/volume] in Serum or [id number] 
11:27:00





             Test Item    Value        Reference Range Interpretation Comments

 

             Troponin I.cardiac < 0.02       See_Comment                [Automat

ed message] The



             [Mass/volume] in Serum                                        syste

m which generated



             or Plasma (test code =                                        this 

result transmitted



             Troponin I.cardiac                                        reference

 range: <=0.045.



             [Mass/volume] in Serum                                        The r

eference range was



             or Plasma)                                          not used to int

erpret



                                                                 this result as



                                                                 normal/abnormal

.



[facility]annUrinalysis with [id number] 02:23:00





             Test Item    Value        Reference Range Interpretation Comments

 

             Urinalysis with microscopy (test Negative                          

     



             code = Urinalysis with microscopy)                                 

       



[facility]annUrine blood [id number] 02:23:00





             Test Item    Value        Reference Range Interpretation Comments

 

             Urine blood detection (test code = Negative                        

       



             Urine blood detection)                                        



[facility]annUrine glucose [id number] 02:23:00





             Test Item    Value        Reference Range Interpretation Comments

 

             Urine glucose detection (test code = Negative                      

         



             Urine glucose detection)                                        



[facility] [id number] 02:23:00





             Test Item    Value        Reference Range Interpretation Comments

 

             Urine pH (test code = Urine pH) 5.0 1                              

    



[facility] specific gravity [id number] 02:23:00





             Test Item    Value        Reference Range Interpretation Comments

 

             Urine specific gravity measurement 1.015 1                         

       



             (test code = Urine specific gravity                                

        



             measurement)                                        



[facility]annUrinalysis with reflex to [id number] 02:14:00





             Test Item    Value        Reference Range Interpretation Comments

 

             Urinalysis with reflex to culture Reflexed                         

      



             (test code = Urinalysis with reflex                                

        



             to culture)                                         



[facility]ann[facility]rine sediment erythrocyte count by microscopy (number/high 
power field)2020 02:14:00





             Test Item    Value        Reference Range Interpretation Comments

 

             Urine sediment erythrocyte count by <5                             

        



             microscopy (number/high power field)                               

         



             (test code = Urine sediment erythrocyte                            

            



             count by microscopy (number/high power                             

           



             field))                                             



[facility]annBilirubin.direct [Mass/volume] in Serum or [id number] 
00:14:00





             Test Item    Value        Reference Range Interpretation Comments

 

             Bilirubin.direct 0.3          See_Comment                [Automated

 message] The



             [Mass/volume] in Serum                                        syste

m which generated



             or Plasma (test code =                                        this 

result transmitted



             Bilirubin.direct                                        reference r

sharona: <=0.2.



             [Mass/volume] in Serum                                        The r

eference range was



             or Plasma)                                          not used to int

erpret this



                                                                 result as fabiano

l/abnormal.



[facility]annINR in Blood by Coagulation [id number] 00:14:00





             Test Item    Value        Reference Range Interpretation Comments

 

             INR in Blood by Coagulation assay 1.30 1                           

      



             (test code = INR in Blood by                                       

 



             Coagulation assay)                                        



[facility] HermannLactate [Moles/volume] in Serum or [id number] 00:14:00





             Test Item    Value        Reference Range Interpretation Comments

 

             Lactate [Moles/volume] in Serum or 1.1          0.4-2.0            

       



             Plasma (test code = Lactate                                        



             [Moles/volume] in Serum or Plasma)                                 

       



[facility] NoahannTroponin I.cardiac [Mass/volume] in Serum or [id number] 
00:14:00





             Test Item    Value        Reference Range Interpretation Comments

 

             Troponin I.cardiac < 0.02       See_Comment                [Automat

ed message] The



             [Mass/volume] in Serum                                        syste

m which generated



             or Plasma (test code =                                        this 

result transmitted



             Troponin I.cardiac                                        reference

 range: <=0.045.



             [Mass/volume] in Serum                                        The r

eference range was



             or Plasma)                                          not used to int

erpret



                                                                 this result as



                                                                 normal/abnormal

.



[facility] HermannAlbumin [Mass/volume] in Serum or Plasma by Bromocresol purple 
(BCP) dye binding [id number] 17:00:00





             Test Item    Value        Reference Range Interpretation Comments

 

             Albumin [Mass/volume] in Serum or 2.9          3.4-5.0             

      



             Plasma by Bromocresol purple (BCP) dye                             

           



             binding meth (test code = Albumin                                  

      



             [Mass/volume] in Serum or Plasma by                                

        



             Bromocresol purple (BCP) dye binding                               

         



             meth)                                               



[facility] HermannBlood hematocrit (volume fraction)2019 17:00:00





             Test Item    Value        Reference Range Interpretation Comments

 

             Blood hematocrit (volume fraction) 31.2         36.0-45.0          

       



             (test code = Blood hematocrit (volume                              

          



             fraction))                                          



[facility] HermannCalcium [Mass/volume] in Serum or [id number] 17:00:00





             Test Item    Value        Reference Range Interpretation Comments

 

             Calcium [Mass/volume] in Serum or 8.6          8.5-10.1            

      



             Plasma (test code = Calcium                                        



             [Mass/volume] in Serum or Plasma)                                  

      



[facility]/volume] [facility] Serum or Plasma
2019 17:00:00





             Test Item    Value        Reference Range Interpretation Comments

 

             Carbon dioxide, total [Moles/volume] in 31           21-32         

            



             Serum or Plasma (test code = Carbon                                

        



             dioxide, total [Moles/volume] in Serum                             

           



             or Plasma)                                          



[facility] HermannChloride [Moles/volume] in Serum or [id number] 17:00:00





             Test Item    Value        Reference Range Interpretation Comments

 

             Chloride [Moles/volume] in Serum or 108                      

        



             Plasma (test code = Chloride                                       

 



             [Moles/volume] in Serum or Plasma)                                 

       



[facility]annCreatinine [Mass/volume] in Serum or [id number] 17:00:00





             Test Item    Value        Reference Range Interpretation Comments

 

             Creatinine [Mass/volume] in Serum or 1.83         0.55-1.3         

         



             Plasma (test code = Creatinine                                     

   



             [Mass/volume] in Serum or Plasma)                                  

      



[facility]annCreatinine [Mass/volume] in [id number] 17:00:00





             Test Item    Value        Reference Range Interpretation Comments

 

             Creatinine [Mass/volume] in Urine (test 50.0                 

            



             code = Creatinine [Mass/volume] in                                 

       



             Urine)                                              



[facility]annGlucose [Mass/volume] in Serum or [id number] 17:00:00





             Test Item    Value        Reference Range Interpretation Comments

 

             Glucose [Mass/volume] in Serum or 132                        

      



             Plasma (test code = Glucose                                        



             [Mass/volume] in Serum or Plasma)                                  

      



[facility] HermannParathyrin.intact [Mass/volume] in Serum or [id number] 
17:00:00





             Test Item    Value        Reference Range Interpretation Comments

 

             Parathyrin.intact [Mass/volume] in 59.2         18.4-80.1          

       



             Serum or Plasma (test code =                                       

 



             Parathyrin.intact [Mass/volume] in                                 

       



             Serum or Plasma)                                        



[facility]annPhosphate [Mass/volume] in Serum or [id number] 17:00:00





             Test Item    Value        Reference Range Interpretation Comments

 

             Phosphate [Mass/volume] in Serum or 3.3          2.5-4.9           

        



             Plasma (test code = Phosphate                                      

  



             [Mass/volume] in Serum or Plasma)                                  

      



[facility]annPotassium [Moles/volume] in Serum or [id number] 17:00:00





             Test Item    Value        Reference Range Interpretation Comments

 

             Potassium [Moles/volume] in Serum or 3.5          3.5-5.1          

         



             Plasma (test code = Potassium                                      

  



             [Moles/volume] in Serum or Plasma)                                 

       



[facility]annProtein [Mass/volume] in [id number] 17:00:00





             Test Item    Value        Reference Range Interpretation Comments

 

             Protein [Mass/volume] in Urine (test 6                             

         



             code = Protein [Mass/volume] in Urine)                             

           



[facility]annSerum or plasma sodium measurement (moles/volume)2019 
17:00:00





             Test Item    Value        Reference Range Interpretation Comments

 

             Serum or plasma sodium measurement 145          136-145            

       



             (moles/volume) (test code = Serum or                               

         



             plasma sodium measurement                                        



             (moles/volume))                                        



[facility] HermannUrate [Mass/volume] in Serum or [id number] 17:00:00





             Test Item    Value        Reference Range Interpretation Comments

 

             Urate [Mass/volume] in Serum or Plasma 10.4         2.6-6.0        

           



             (test code = Urate [Mass/volume] in                                

        



             Serum or Plasma)                                        



[facility] [Mass/volume] in Serum or [id number] 
17:00:00





             Test Item    Value        Reference Range Interpretation Comments

 

             Urea nitrogen [Mass/volume] in Serum or 54           7-18          

            



             Plasma (test code = Urea nitrogen                                  

      



             [Mass/volume] in Serum or Plasma)                                  

      



[facility] dipstick testing at point-of-[id number] 17:00:00





             Test Item    Value        Reference Range Interpretation Comments

 

             Urine dipstick testing at point-of-care 10.5         12.0-15.0     

            



             (test code = Urine dipstick testing at                             

           



             point-of-care)                                        



[facility] [id number] 11:19:00





             Test Item    Value        Reference Range Interpretation Comments

 

             Bedside Glucose (test code = Bedside 271                     

         



             Glucose)                                            



[facility] [id number] 05:00:00





             Test Item    Value        Reference Range Interpretation Comments

 

             Thyroid Stimulating Hormone (TSH) (test 1.28         0.34-5.60     

            



             code = Thyroid Stimulating Hormone                                 

       



             (TSH))                                              



[facility]2018-03-04 05:00:00





             Test Item    Value        Reference Range Interpretation Comments

 

             Sodium Level (test code = Sodium Level) 139          135-145       

            



[facility] [id number] [facility]:00





             Test Item    Value        Reference Range Interpretation Comments

 

             Potassium Level (test code = Potassium 3.8          3.6-5.0        

           



             Level)                                              



[facility]2018-03-04 05:00:00





             Test Item    Value        Reference Range Interpretation Comments

 

             Glucose Level (test code = Glucose 116                       

       



             Level)                                              



[facility]2018-03-04 05:00:00





             Test Item    Value        Reference Range Interpretation Comments

 

             Estimat Glomerular 29           See_Comment                [Automat

ed message] The



             Filtration Rate (test                                        system

 which generated



             code = Estimat                                        this result t

ransmitted



             Glomerular Filtration                                        refere

nce range: >=90.



             Rate)                                               The reference r

sharona was



                                                                 not used to int

erpret



                                                                 this result as



                                                                 normal/abnormal

.



[facility]2018-03-04 05:00:00





             Test Item    Value        Reference Range Interpretation Comments

 

             Creatinine (test code = Creatinine) 1.69         0.44-1.00         

        



[facility]2018-03-04 05:00:00





             Test Item    Value        Reference Range Interpretation Comments

 

             Chloride Level (test code = Chloride 102          101-111          

         



             Level)                                              



[facility]2018-03-04 05:00:00





             Test Item    Value        Reference Range Interpretation Comments

 

             Carbon Dioxide Level (test code = 32           21-31               

      



             Carbon Dioxide Level)                                        



[facility]2018-03-04 05:00:00





             Test Item    Value        Reference Range Interpretation Comments

 

             Calcium Level (test code = Calcium 8.4          8.5-10.5           

       



             Level)                                              



[facility]2018-03-04 05:00:00





             Test Item    Value        Reference Range Interpretation Comments

 

             Blood Urea Nitrogen (test code = Blood 33           6-20           

           



             Urea Nitrogen)                                        



[facility]2018-03-04 05:00:00





             Test Item    Value        Reference Range Interpretation Comments

 

             White Blood Count (test code = White 2.2          4.3-10.9         

         



             Blood Count)                                        



[facility]2018-03-04 05:00:00





             Test Item    Value        Reference Range Interpretation Comments

 

             Red Cell Distribution Width (test code 14.5         12.1-15.2      

           



             = Red Cell Distribution Width)                                     

   



[facility]2018-03-04 05:00:00





             Test Item    Value        Reference Range Interpretation Comments

 

             Red Blood Count (test code = Red Blood 3.18         3.86-4.86      

           



             Count)                                              



[facility]2018-03-04 05:00:00





             Test Item    Value        Reference Range Interpretation Comments

 

             Platelet Count (test code = Platelet 95           152-406          

         



             Count)                                              



[facility]2018-03-04 05:00:00





             Test Item    Value        Reference Range Interpretation Comments

 

             Neutrophils % (test code = Neutrophils 48.8         41.7-73.7      

           



             %)                                                  



[facility]2018-03-04 05:00:00





             Test Item    Value        Reference Range Interpretation Comments

 

             Monocytes % (test code = Monocytes %) 10.2         3.3-12.3        

          



[facility]2018-03-04 05:00:00





             Test Item    Value        Reference Range Interpretation Comments

 

             Mean Platelet Volume (test code = Mean 8.3          7.6-11.3       

           



             Platelet Volume)                                        



[facility]2018-03-04 05:00:00





             Test Item    Value        Reference Range Interpretation Comments

 

             Mean Corpuscular Volume (test code = 82.8                    

         



             Mean Corpuscular Volume)                                        



[facility]2018-03-04 05:00:00





             Test Item    Value        Reference Range Interpretation Comments

 

             Mean Corpuscular Hemoglobin Concent 32.8         32.0-36.0         

        



             (test code = Mean Corpuscular                                      

  



             Hemoglobin Concent)                                        



[facility]2018-03-04 05:00:00





             Test Item    Value        Reference Range Interpretation Comments

 

             Mean Corpuscular Hemoglobin (test 27.1 pg      27.0-35.0           

      



             code = Mean Corpuscular Hemoglobin)                                

        



[facility]2018-03-04 05:00:00





             Test Item    Value        Reference Range Interpretation Comments

 

             Lymphocytes % (test code = Lymphocytes 35.1         15.3-44.8      

           



             %)                                                  



[facility]2018-03-04 05:00:00





             Test Item    Value        Reference Range Interpretation Comments

 

             Hemoglobin (test code = Hemoglobin) 8.6          12.0-15.0         

        



[facility]2018-03-04 05:00:00





             Test Item    Value        Reference Range Interpretation Comments

 

             Hematocrit (test code = Hematocrit) 26.3         36.0-45.0         

        



[facility]2018-03-04 05:00:00





             Test Item    Value        Reference Range Interpretation Comments

 

             Eosinophils % (test code 5.0          See_Comment                [A

utomated message] The



             = Eosinophils %)                                        system [facility] generated



                                                                 this result tra

nsmitted



                                                                 reference range

: <=4.4.



                                                                 The reference r

sharona was



                                                                 not used to int

erpret



                                                                 this result as



                                                                 normal/abnormal

.



[facility]2018-03-04 05:00:00





             Test Item    Value        Reference Range Interpretation Comments

 

             Basophils % (test code 0.9          See_Comment                [Aut

omated message] The



             = Basophils %)                                        system which 

generated



                                                                 this result tra

nsmitted



                                                                 reference range

: <=1.3.



                                                                 The reference r

sharona was



                                                                 not used to int

erpret



                                                                 this result as



                                                                 normal/abnormal

.



[facility]2018-03-04 05:00:00





             Test Item    Value        Reference Range Interpretation Comments

 

             Absolute Neutrophil (test code = 1.1          1.8-8.0              

     



             Absolute Neutrophil)                                        



[facility]2018-03-04 05:00:00





             Test Item    Value        Reference Range Interpretation Comments

 

             Absolute Monocytes (CBC) (test code = 0.2          0.1-1.3         

          



             Absolute Monocytes (CBC))                                        



[facility]2018-03-04 05:00:00





             Test Item    Value        Reference Range Interpretation Comments

 

             Absolute Lymphocytes (CBC) (test code = 0.8          0.7-4.9       

            



             Absolute Lymphocytes (CBC))                                        



[facility]2018-03-04 05:00:00





             Test Item    Value        Reference Range Interpretation Comments

 

             Absolute Eosinophils 0.1          See_Comment                [Autom

ated message] The



             (CBC) (test code =                                        system [employer]

ich generated



             Absolute Eosinophils                                        this re

sult transmitted



             (CBC))                                              reference range

: <=0.5.



                                                                 The reference r

sharona was



                                                                 not used to int

erpret



                                                                 this result as



                                                                 normal/abnormal

.



[facility]2018-03-04 05:00:00





             Test Item    Value        Reference Range Interpretation Comments

 

             Absolute Basophils 0.0          See_Comment                [Automat

ed message] The



             (CBC) (test code =                                        system [employer]

ich generated



             Absolute Basophils                                        this resu

lt transmitted



             (CBC))                                              reference range

: <=0.5.



                                                                 The reference r

sharona was



                                                                 not used to int

erpret



                                                                 this result as



                                                                 normal/abnormal

.



[facility]2018-03-04 03:35:00





             Test Item    Value        Reference Range Interpretation Comments

 

             Urine Random Total Protein (test code = 13                         

            



             Urine Random Total Protein)                                        



[facility]2018-03-04 03:35:00





             Test Item    Value        Reference Range Interpretation Comments

 

             Urine Protein/Creatinine Ratio (test 0.09 1                        

         



             code = Urine Protein/Creatinine Ratio)                             

           



[facility]2018-03-04 03:35:00





             Test Item    Value        Reference Range Interpretation Comments

 

             Urine Creatinine (test code = Urine 143.6                          

        



             Creatinine)                                         



[facility]2018-03-02 04:23:00





             Test Item    Value        Reference Range Interpretation Comments

 

             Blood Morphology Blood Morphology                           



             Comment (test code = Comment                                



             Blood Morphology                                        



             Comment)                                            



[facility]2018-03-02 04:23:00





             Test Item    Value        Reference Range Interpretation Comments

 

             B-Type Natriuretic Peptide (test code = 35                         

            



             B-Type Natriuretic Peptide)                                        



[facility] [id number] 22:25:00





             Test Item    Value        Reference Range Interpretation Comments

 

             Urine pH (test code = Urine pH) 7.0 1                              

    



[facility] [id number] 22:25:00





             Test Item    Value        Reference Range Interpretation Comments

 

             Urine WBC (test code = Urine WBC) no gt                            

      



[facility]2018-03-01 22:25:00





             Test Item    Value        Reference Range Interpretation Comments

 

             Urine Urobilinogen (test code = Urine 1.0                          

          



             Urobilinogen)                                        



[facility]2018-03-01 22:25:00





             Test Item    Value        Reference Range Interpretation Comments

 

             Urine Total Protein (test Urine Total Protein                      

     



             code = Urine Total                                        



             Protein)                                            



[facility]2018-03-01 22:25:00





             Test Item    Value        Reference Range Interpretation Comments

 

             Urine Squamous Epithelial Cells (test no gt                        

          



             code = Urine Squamous Epithelial Cells)                            

            



[facility]2018-03-01 22:25:00





             Test Item    Value        Reference Range Interpretation Comments

 

             Urine Specific Gravity (test code = 1.010 1                        

        



             Urine Specific Gravity)                                        



[facility]2018-03-01 22:25:00





             Test Item    Value        Reference Range Interpretation Comments

 

             Urine RBC (test code = Urine RBC) Urine RBC                        

      



[facility]2018-03-01 22:25:00





             Test Item    Value        Reference Range Interpretation Comments

 

             Urine Nitrite (test code = Urine Nitrite                           



             Urine Nitrite)                                        



[facility]2018-03-01 22:25:00





             Test Item    Value        Reference Range Interpretation Comments

 

             Urine Leukocyte Urine Leukocyte                           



             Esterase (test code = Esterase                               



             Urine Leukocyte                                        



             Esterase)                                           



[facility]2018-03-01 22:25:00





             Test Item    Value        Reference Range Interpretation Comments

 

             Urine Ketones (test code = Urine Ketones                           



             Urine Ketones)                                        



[facility]2018-03-01 22:25:00





             Test Item    Value        Reference Range Interpretation Comments

 

             Urine Glucose (test code = Urine Glucose                           



             Urine Glucose)                                        



[facility]2018-03-01 22:25:00





             Test Item    Value        Reference Range Interpretation Comments

 

             Urine Culture Reflexed Urine Culture Reflexed                      

     



             (test code = Urine                                        



             Culture Reflexed)                                        



[facility]2018-03-01 22:25:00





             Test Item    Value        Reference Range Interpretation Comments

 

             Urine Color (test code = Urine Urine Color                         

   



             Color)                                              



[facility]2018-03-01 22:25:00





             Test Item    Value        Reference Range Interpretation Comments

 

             Urine Blood (test code = Urine Urine Blood                         

   



             Blood)                                              



[facility]2018-03-01 22:25:00





             Test Item    Value        Reference Range Interpretation Comments

 

             Urine Bilirubin (test code = Urine Bilirubin                       

    



             Urine Bilirubin)                                        



[facility]2018-03-01 22:25:00





             Test Item    Value        Reference Range Interpretation Comments

 

             Urine Bacteria (test code = Urine no gt                            

      



             Bacteria)                                           



[facility]2018-03-01 22:25:00





             Test Item    Value        Reference Range Interpretation Comments

 

             Urine Appearance (test code Urine Appearance                       

    



             = Urine Appearance)                                        



[facility]2018-03-01 15:27:00





             Test Item    Value        Reference Range Interpretation Comments

 

             Prothrombin Time (test code = 14.6         9.5-12.5                

  



             Prothrombin Time)                                        



[facility]2018-03-01 15:27:00





             Test Item    Value        Reference Range Interpretation Comments

 

             INR International Normalized Ratio 1.23 1                          

       



             (test code = INR International                                     

   



             Normalized Ratio)                                        



[facility]2018-03-01 15:27:00





             Test Item    Value        Reference Range Interpretation Comments

 

             Activated Partial Thromboplast Time 27.0         24.3-36.9         

        



             (test code = Activated Partial                                     

   



             Thromboplast Time)                                        



[facility]2018-03-01 15:27:00





             Test Item    Value        Reference Range Interpretation Comments

 

             Magnesium Level (test code = Magnesium 1.6          1.8-2.5        

           



             Level)                                              



[facility]2018-03-01 15:27:00





             Test Item    Value        Reference Range Interpretation Comments

 

             Creatine Kinase (test code = Creatine 31                     

          



             Kinase)                                             



[facility]2018-03-01 15:27:00





             Test Item    Value        Reference Range Interpretation Comments

 

             Rapid Troponin I (test code = Rapid no gt                          

        



             Troponin I)                                         



[facility]2018-03-01 15:27:00





             Test Item    Value        Reference Range Interpretation Comments

 

             Creatine Kinase MB (test code = 0.3          0.3-4.0               

    



             Creatine Kinase MB)                                        



[facility]2017-12-22 12:40:00





             Test Item    Value        Reference Range Interpretation Comments

 

             Urine Amorphous Urine Amorphous                           



             Sediment (test code = Sediment                               



             Urine Amorphous                                        



             Sediment)                                           



[facility]2017-12-22 12:08:00





             Test Item    Value        Reference Range Interpretation Comments

 

             Lactic Acid Level (test code = Lactic 10.2         4.5-19.8        

          



             Acid Level)                                         



[facility]2017-12-22 12:08:00





             Test Item    Value        Reference Range Interpretation Comments

 

             Procalcitonin (test code = 0.13                                   



             Procalcitonin)                                        



[facility]2017-12-22 12:08:00





             Test Item    Value        Reference Range Interpretation Comments

 

             Sedimentation Rate, 78           See_Comment                [Automa

kaela message]



             Westergren (test code =                                        The 

system which



             Sedimentation Rate,                                        generate

d this result



             Westergren)                                         transmitted ref

erence



                                                                 range: <=30. Th

e



                                                                 reference range

 was not



                                                                 used to interpr

et this



                                                                 result as



                                                                 normal/abnormal

.



[facility]2017-12-22 12:08:00





             Test Item    Value        Reference Range Interpretation Comments

 

             Total Bilirubin (test code = Total 1.2          0.3-1.2            

       



             Bilirubin)                                          



[facility]2017-12-22 12:08:00





             Test Item    Value        Reference Range Interpretation Comments

 

             Serum Total Protein (test code = Serum 6.2          6.0-8.3        

           



             Total Protein)                                        



[facility]2017-12-22 12:08:00





             Test Item    Value        Reference Range Interpretation Comments

 

             Globulin (test code = Globulin) 3.3          2.3-3.5               

    



[facility]2017-12-22 12:08:00





             Test Item    Value        Reference Range Interpretation Comments

 

             Direct Bilirubin (test 0.2          See_Comment                [Aut

omated message] The



             code = Direct                                        system which g

enerated



             Bilirubin)                                          this result tra

nsmitted



                                                                 reference range

: <=0.2.



                                                                 The reference r

sharona was



                                                                 not used to int

erpret



                                                                 this result as



                                                                 normal/abnormal

.



[facility]2017-12-22 12:08:00





             Test Item    Value        Reference Range Interpretation Comments

 

             Aspartate Amino Transf (AST/SGOT) (test 53           10-42         

            



             code = Aspartate Amino Transf                                      

  



             (AST/SGOT))                                         



[facility]2017-12-22 12:08:00





             Test Item    Value        Reference Range Interpretation Comments

 

             Alkaline Phosphatase (test code = 85                         

      



             Alkaline Phosphatase)                                        



[facility]2017-12-22 12:08:00





             Test Item    Value        Reference Range Interpretation Comments

 

             Albumin/Globulin Ratio (test code = 0.9 1        1.1-1.8           

        



             Albumin/Globulin Ratio)                                        



[facility] [id number] 12:08:00





             Test Item    Value        Reference Range Interpretation Comments

 

             Albumin (test code = Albumin) 2.9          3.2-5.5                 

  



[facility]2017-12-22 12:08:00





             Test Item    Value        Reference Range Interpretation Comments

 

             Alanine Aminotransferase (ALT/SGPT) 29           10-60             

        



             (test code = Alanine Aminotransferase                              

          



             (ALT/SGPT))                                         



[facility] [id number] 12:08:00





             Test Item    Value        Reference Range Interpretation Comments

 

             Lipase (test code = Lipase) 17           22-51                     



[facility]

## 2022-09-27 VITALS — DIASTOLIC BLOOD PRESSURE: 77 MMHG | SYSTOLIC BLOOD PRESSURE: 93 MMHG

## 2022-09-27 VITALS — TEMPERATURE: 97.1 F

## 2022-09-27 VITALS — OXYGEN SATURATION: 99 %

## 2022-09-27 LAB
BUN BLD-MCNC: 35 MG/DL (ref 7–18)
GLUCOSE SERPLBLD-MCNC: 121 MG/DL (ref 74–106)
HCT VFR BLD CALC: 26.6 % (ref 36–45)
LYMPHOCYTES # SPEC AUTO: 0.7 K/UL (ref 0.7–4.9)
MCV RBC: 93.9 FL (ref 80–100)
PMV BLD: 7.5 FL (ref 7.6–11.3)
POTASSIUM SERPL-SCNC: 4.3 MMOL/L (ref 3.5–5.1)
RBC # BLD: 2.83 M/UL (ref 3.86–4.86)

## 2022-09-27 RX ADMIN — Medication SCH ML: at 09:50

## 2022-09-27 NOTE — CON
Date of Consultation:  09/26/2022



Chief Complaint:  Hypotension, tachycardia, end-stage renal disease.  Patient has history of chronic 
atrial fibrillation.



History Of Present Illness:  She presented to the hospital because of borderline hypotension.  She de
veloped hypotension after dialysis and she was transferred from dialysis to emergency room.  She had 
atrial fibrillation with rapid ventricular response.  Blood pressure on arrival to the hospital was 5
6/34 and after she received epinephrine and IV fluids, blood pressure has improved.  The patient is t
ransferred to ICU.  The patient received IV fluids for volume resuscitation.  Patient was consulted b
y cardiologist for atrial fibrillation and Cardiology recommended amiodarone drip.  Patient has histo
ry of end-stage renal disease, previous admission for atrial fibrillation, chronic hypotension, anemi
a, CKD, renal osteodystrophy, congestive heart failure with diastolic dysfunction.  The patient is le
thargic, she cannot provide review of systems.  She has multiple medical problems including history o
f diabetes mellitus, history of pneumonia.



Past Medical History:  Diabetes mellitus; hypertension; hyperlipidemia; CVA in 2015; gastric ulcer; e
nd-stage renal disease; diastolic congestive heart failure; atrial fibrillation; COVID infection in J
uly 2021; end-stage renal disease, dialysis Monday, Wednesday, Friday; hysterectomy; right upper ches
t dialysis catheter.



Family History:  Mother diabetes.



Social History:  Denies tobacco, alcohol, or illicit drugs.



Physical Examination:

General:  Patient is very lethargic. 

Eyes:  Anicteric sclerae.  EOMI. 

Ears, Nose, Mouth, and Throat:  Oral mucosa moist.  No pallor. 

Neck:  Supple. 

Lungs:  Clear to auscultation bilaterally. 

Heart:  S1, S2. 

Abdomen:  Soft. 

Extremities:  No edema, no clubbing, no cyanosis.  Skin:  Warm and dry. 

Neurological:  No tremor.  Cranial nerves intact.



Imaging:  Chest x-ray showed right neck IJ line in lateral position within the right subclavian vein.




Laboratory Data:  Hemoglobin 10.5, WBC 5.8, platelet count 113,000.  Chemistry shows sodium 137, pota
ssium 3.6, chloride 103, CO2 of 24, BUN is 24, glucose 127, creatinine 2.93, total bilirubin 0.8, AP 
136, calcium 7.9, magnesium 1.8.  Albumin level is pending.



Impression:  

1.Hypotension.

2.Atrial fibrillation with rapid ventricular response.

3.Diabetes mellitus.

4.End-stage renal disease, on hemodialysis.  Patient is transferred to the ER from dialysis center b
ecause of hypotension and tachycardia.  Patient received IV fluids.  Continue pressors as needed.  Re
commend to check blood culture and start antibiotics.

5.History of chronic atrial fibrillation.  Continue amiodarone drip as recommended by Cardiology.  M
onitor troponin.

6.Diabetes mellitus.  Continue insulin.

7.Renal osteodystrophy.  Monitor phosphorus level and adjust binders as needed.

8.Hypovolemia.  Continue IV fluids.  Monitor fluid balance.





EB/MODL

DD:  09/26/2022 21:51:32Voice ID:  [id number]

DT:  09/27/2022 03:22:28Report ID:  [id number] [FreeTextEntry1] : Ms. Contreras is a pleasant 79 year-old female with a history of sustained VT s/p ICD placement 5/1/19 with sustained VT s/p ICD shock 5/4/19.  No further VT/VF since Amiodarone was initiated.  We discussed the potential indication for a VT ablation if she has recurrent arrhythmia.  Her Qtc is stable on ECG today.  Discussed need for monitoring for Amio toxicities in great detail.  She has pulmonary follow-up with Dr. Gaston and knows to schedule an eye exam.  She was asked to return for follow-up in 3-4 months and call with any questions or concerns in the interim.

## 2022-09-27 NOTE — P.PN
Subjective


Date of Service: 09/27/22


Chief Complaint: L sided effusion


Subjective: Improving (PT AW rapidA fib. Doing well. No SOB> PleuRx catheter 

littel drainage)





Review of Systems


Unremarkable





Physical Examination





- Vital Signs


Temperature: 97.1 F


Blood Pressure: 147/43


Pulse: 91


Respirations: 21


Pulse Ox (%): 99





- Physical Exam


General: Alert, Oriented x3


Respiratory: Clear to auscultation bilaterally, Diminished (Diminished at the 

left base)


Cardiovascular: No edema, Regular rate/rhythm, Irregular heart rate/rhythm





- Studies


Laboratory Data (last 24 hrs)





09/26/22 13:03: PT 16.7 H, INR 1.51


09/26/22 13:03: WBC 5.80, Hgb 10.5 L, Hct 32.6 L, Plt Count 113 L


09/26/22 13:03: Sodium 137, Potassium 3.6, BUN 24 H, Creatinine 2.93 H, Glucose 

127 H, Magnesium 1.8, Total Bilirubin 0.8, AST 34, ALT 30, Alkaline Phosphatase 

136 H








Assessment And Plan





- Current Problems (Diagnosis)


(1) Pleural effusion


Current Visit: No   Status: Chronic   


Plan: 


Patient is 88 years of age admitted with rapid atrial fibrillation she is 

currently doing well patient was recently discharged from [facility] her 

dressing needed to be changed however very limited drainage from the Pleurx 

catheter chest x-ray reviewed/ Pt has CRF on dialysis. NE of malignancy on 

pleural fluid/ Exufative with predom of lymphocytes. poss malignancy

## 2022-09-27 NOTE — PN
Date of Progress Note:  09/27/2022



Subjective:  The patient was admitted with AFib and RVR with hypotension.  The patient after hydratio
n recovered, did not require any pressor.



Physical Examination:

Vital Signs:  Blood pressure 93/77, pulse of 81.  The patient had full dialysis yesterday, only 10 mi
nutes left before the termination of the dialysis.  The patient feeling well currently. 

Chest:  Clear to auscultation. 

Heart:  S1, S2.  Regular. 

Abdomen:  Ascites. 

Extremity:  Trace edema. 

Neurologic:  Alert.  No focality.



Laboratory Data:  H and H 8.7/26.6.  Sodium 137, potassium 4.3, bicarb 24, BUN 35, creatinine 3, calc
ium of 8.



Current Medications:  The patient on include Tylenol and Zofran.



Assessment And Plan:  

1.End-stage renal disease.  We will continue the patient on dialysis Monday, Wednesday, Friday.  The
 patient is going to be due for dialysis tomorrow.  We will dialyze on low blood flow.  We are not go
ing to challenge the patient as I do not see any sign of over volume and we will follow up.  I had nicole potter discussion with the patient.  The patient's preference is to do home hemodialysis.  We will consul
t  for placement.  We will refer to [facility].

2.Hypertension with incident of low blood pressure.  Keep holding all blood pressure medications.

3.Congestive heart failure, currently normal volume.  We will follow up with dialysis tomorrow.

4.Atrial fibrillation with rapid ventricular response as by Cardiology.

5.Cardiogenic shock, recovered, resolved.  We will follow up with Cardiology.





KIAN

DD:  09/27/2022 13:31:22Voice ID:  [id number]

DT:  09/27/2022 14:35:00Report ID:  [id number]

## 2022-09-27 NOTE — PN
Date of Progress Note:  09/27/2022



Subjective:  Seen by bedside, doing clinically well.  Heart is in sinus and rate is controlled.



Review of Systems:

No chest pain, shortness of breath, orthopnea, cough.  No nausea, vomiting, diarrhea.  All other syst
ems reviewed are negative.



Physical Examination:

Vital Signs:  Reviewed. 

Head and Neck:  Pupils are reactive to light.  Intact eye movements.  No JVD.  No cervical lymphadeno
aaron. 

Neck:  Supple.  Thyroid is not enlarged. 

Lungs:  Clear to auscultation bilaterally.  No rhonchi, rales, or crackles.  No accessory muscle use.
 

Heart:  Regular rate and rhythm.  No extra sounds. 

Abdomen:  Soft, nontender.  Bowel sounds positive.  No organomegaly.  No masses or hernia.  No rigidi
ty or rebound. 

Extremities:  No edema, clubbing, cyanosis. Intact pulses. 

Skin:  No rashes. 

Neurologic:  Alert, awake.  No acute focal deficits appreciated.



Investigations:  BUN 35, creatinine 3.62.  Hemoglobin is 8.7.



Assessment And Recommendation:  

1.Atrial fibrillation with rapid ventricular response.  She converted to sinus rhythm after a bolus 
of amiodarone.  To put her on amiodarone 200 mg daily and this patient will need an anticoagulant lik
e Eliquis 2.5 mg twice a day due to advanced kidney failure and she will be a perfect candidate for l
eft atrial appendage closure, which can be performed as an outpatient.

2.Hypotension due to dehydration and volume depletion.  This has resolved.





SR/MODL

DD:  09/27/2022 15:38:18Voice ID:  [id number]

DT:  09/27/2022 21:07:06Report ID:  [id number]

## 2022-09-28 NOTE — EKG
Test Date:    2022-09-26               Test Time:    16:36:16

Technician:   MAYELIN                                    

                                                     

MEASUREMENT RESULTS:                                       

Intervals:                                           

Rate:         75                                     

ME:           230                                    

QRSD:         80                                     

QT:           378                                    

QTc:          422                                    

Axis:                                                

P:            73                                     

ME:           230                                    

QRS:          49                                     

T:            69                                     

                                                     

INTERPRETIVE STATEMENTS:                                       

                                                     

Sinus rhythm with 1st degree AV block

Low voltage QRS

Cannot rule out Anterior infarct, age undetermined

Abnormal ECG

Compared to ECG 09/06/2022 07:44:18

First degree AV block now present

Myocardial infarct finding now present

Atrial flutter no longer present

ST (T wave) deviation no longer present



Electronically Signed On 09-28-22 13:06:25 CDT by Kevin Contreras

## 2022-09-28 NOTE — EKG
Test Date:    2022-09-26               Test Time:    12:43:53

Technician:   MAYELIN                                    

                                                     

MEASUREMENT RESULTS:                                       

Intervals:                                           

Rate:         114                                    

KS:                                                  

QRSD:         86                                     

QT:           344                                    

QTc:          474                                    

Axis:                                                

P:                                                   

KS:                                                  

QRS:          48                                     

T:            39                                     

                                                     

INTERPRETIVE STATEMENTS:                                       

                                                     

Atrial fibrillation with rapid ventricular response

Low voltage QRS

Nonspecific ST abnormality

Abnormal ECG

Compared to ECG 09/06/2022 07:44:18

Atrial flutter no longer present

ST (T wave) deviation still present



Electronically Signed On 09-28-22 13:06:47 CDT by Kevin Contreras

## 2022-09-29 ENCOUNTER — HOSPITAL ENCOUNTER (OUTPATIENT)
Dept: HOSPITAL 97 - ER | Age: 87
Setting detail: OBSERVATION
LOS: 2 days | Discharge: HOME HEALTH | End: 2022-10-01
Attending: INTERNAL MEDICINE | Admitting: INTERNAL MEDICINE
Payer: COMMERCIAL

## 2022-09-29 VITALS — BODY MASS INDEX: 29.8 KG/M2

## 2022-09-29 DIAGNOSIS — N18.5: ICD-10-CM

## 2022-09-29 DIAGNOSIS — Z91.09: ICD-10-CM

## 2022-09-29 DIAGNOSIS — Z20.822: ICD-10-CM

## 2022-09-29 DIAGNOSIS — Z88.8: ICD-10-CM

## 2022-09-29 DIAGNOSIS — J90: ICD-10-CM

## 2022-09-29 DIAGNOSIS — I13.2: ICD-10-CM

## 2022-09-29 DIAGNOSIS — Z88.6: ICD-10-CM

## 2022-09-29 DIAGNOSIS — Z86.73: ICD-10-CM

## 2022-09-29 DIAGNOSIS — I48.11: Primary | ICD-10-CM

## 2022-09-29 DIAGNOSIS — E11.22: ICD-10-CM

## 2022-09-29 DIAGNOSIS — Z99.2: ICD-10-CM

## 2022-09-29 DIAGNOSIS — E21.3: ICD-10-CM

## 2022-09-29 DIAGNOSIS — I50.9: ICD-10-CM

## 2022-09-29 DIAGNOSIS — D64.9: ICD-10-CM

## 2022-09-29 PROCEDURE — 96366 THER/PROPH/DIAG IV INF ADDON: CPT

## 2022-09-29 PROCEDURE — 83880 ASSAY OF NATRIURETIC PEPTIDE: CPT

## 2022-09-29 PROCEDURE — 90471 IMMUNIZATION ADMIN: CPT

## 2022-09-29 PROCEDURE — 90935 HEMODIALYSIS ONE EVALUATION: CPT

## 2022-09-29 PROCEDURE — 80048 BASIC METABOLIC PNL TOTAL CA: CPT

## 2022-09-29 PROCEDURE — 96365 THER/PROPH/DIAG IV INF INIT: CPT

## 2022-09-29 PROCEDURE — 99285 EMERGENCY DEPT VISIT HI MDM: CPT

## 2022-09-29 PROCEDURE — 85025 COMPLETE CBC W/AUTO DIFF WBC: CPT

## 2022-09-29 PROCEDURE — 96361 HYDRATE IV INFUSION ADD-ON: CPT

## 2022-09-29 PROCEDURE — 71045 X-RAY EXAM CHEST 1 VIEW: CPT

## 2022-09-29 PROCEDURE — 90732 PPSV23 VACC 2 YRS+ SUBQ/IM: CPT

## 2022-09-29 PROCEDURE — 82947 ASSAY GLUCOSE BLOOD QUANT: CPT

## 2022-09-29 PROCEDURE — 36415 COLL VENOUS BLD VENIPUNCTURE: CPT

## 2022-09-29 PROCEDURE — 93005 ELECTROCARDIOGRAM TRACING: CPT

## 2022-09-29 PROCEDURE — 87811 SARS-COV-2 COVID19 W/OPTIC: CPT

## 2022-09-29 PROCEDURE — 84484 ASSAY OF TROPONIN QUANT: CPT

## 2022-09-29 NOTE — XMS REPORT
Continuity of Care Document

                          Created on:2022



Patient:FABIOLA CRANE

Sex:Female

:1933

External Reference #:[id number]





Demographics







                          Address                   [address]



                                                    [place] [address]

 

                          Home Phone                [phone]

 

                          Email Address             [email or web address]

 

                          Preferred Language        English

 

                          Marital Status            Unknown

 

                          Synagogue Affiliation     Unknown

 

                          Race                      Unknown

 

                          Additional Race(s)        Unavailable



                                                    Unavailable

 

                          Ethnic Group              Unknown









Author







                          Organization              [facility]

t

 

                          Address                   [address]



                                                    [place] [address]

 

                          Phone                     [phone]









Support







                Name            Relationship    Address         Phone

 

                NEGIN KING   GC              [address]  [phone]



                                                [place] [address] 

 

                MAYE ROJAS GC              Unavailable     [phone]

 

                PATTY KING    RELA            Unavailable     [phone]









Care Team Providers







                    Name                Role                Phone

 

                    Lyndsay Love Anavella Attending Clinician Unava

ilable

 

                    Alma Mirza Attending Clinician Unavailable

 

                    ТАТЬЯНА_BAHC_Awais_MARCEL      Attending Clinician Unavailable

 

                    ANA OVALLES        Attending Clinician Unavailable

 

                    Chuy Love Anav Admitting Clinician Unavailable

 

                    Porfirio Valdez     Admitting Clinician Unavailable

 

                    Alma Mirza Admitting Clinician Unavailable

 

                    ТАТЬЯНА_BAHC_Todd_MARCEL      Admitting Clinician Unavailable

 

                    ELISEO WOMACK    Admitting Clinician Unavailable









Payers







           Payer Name Policy Type Policy Number Effective Date Expiration Date S

eriberto

 

           [facility]        [id number]                       

 

           AET        AET        [id number]                       

 

           MEDICARE B-[place]:            [id number] 1998-10-01            



           [employer]                       00:00:00              







Problems







       Condition Condition Condition Status Onset  Resolution Last   Treating Co

mments 

Source



       Name   Details Category        Date   Date   Treatment Clinician        



                                                 Date                 

 

       Acute on  Acute on Finding Active -2018               

[facility]



       chronic chronic               3-          19:36:50               l



       congestive congestive               00:00:                             He

rmann



       heart  heart                00                                 



       failure failure                                                  



              Active                                                  



              2018                                                  



              Finding                                                  



              2018                                                  



              [facility]                                                  



              [place]                                                  

 

       Acute on  Acute on Problem                      2021               

[facility]



       chronic chronic                             19:18:00               l



       congestive congestive                                                  He

rmann



       heart  heart                                                   



       failure failure                                                  



              Problem                                                  



              2021                                                  



              [facility]                                                  



              [facility]                                                  



              [place]                                                  

 

       Elevated  Elevated Problem                      2021               

[facility]



       procalcito procalcito                             19:18:00               

l



       isa    isa                                                     Tha



              Problem                                                  



              2021                                                  



              [facility]                                                  



              [facility]                                                  



              [place]                                                  

 

       Acute on   Acute on Problem                      2021              

 [facility]



       chronic chronic                             19:18:00               l



       renal  renal                                                   Tha



       insufficie insufficie                                                  



       ncy    ncy                                                     



              Problem                                                  



              2021                                                  



               [facility]                                                  



              [facility]                                                  



              [place]                                                  

 

       Pneumonia  Pneumonia Problem                      2021             

  [facility]



               Problem                             19:18:00               l



              2021                                                  [facility]

n



              [facility]                                                  



              [facility]                                                  



              [place]                                                  

 

       Acute on  Acute on Condition                      2020             

  [facility]



       chronic chronic                             17:37:00               l



       heart  heart                                                   [place]



       failure failure                                                  



              Condition                                                  



              2020                                                  



              [facility]                                                  



              [facility]                                                  



              [place]                                                  

 

       Mild renal  Mild  Condition                      2020              

 [facility]



       insufficie renal                              17:37:00               l



       ncy    insufficie                                                  [facility]

n



              ncy                                                     



              Condition                                                  



              2020                                                  



              [facility]                                                  



              [facility]                                                  



              [place]                                                  

 

       Elevated  Elevated Condition                      2020             

  [facility]



       brain  brain                              17:37:00               l



       natriureti natriureti                                                  He

rmann



       c peptide c peptide                                                  



       (BNP)  (BNP)                                                   



       level  level                                                   



              Condition                                                  



              2020                                                  



              [facility]                                                  



              [facility]                                                  



              [place]                                                  



                                                                      

 

       Postobstru  Postobstr Condition                      2020          

     [facility]



       ctive  uctive                             17:37:00               l



       pneumonia pneumonia                                                  [facility]

estevan



              Condition                                                  



              2020                                                  



              [facility]                                                  



              [facility]                                                  



              [place]                                                  

 

       Pneumonia  Pneumonia Problem                      2021             

  [facility]



       due to due to                             19:18:00               l



       COVID-19 COVID-19                                                  [facility]

n



       virus  virus                                                   



              Problem                                                  



              2021                                                  



               [facility]                                                  



              [facility]                                                  



              [place]                                                  

 

       Stage 3  Stage 3 Problem                      2021               Me

moria



       chronic chronic                             19:18:00               l



       kidney kidney                                                  [place]



       disease disease                                                  



              Problem                                                  



              2021                                                  



              [facility]                                                  



              [facility]                                                  



              [place]                                                  

 

       Pancytopen  Pancytope Problem                      2021            

   [facility]



       ia     lolly                                19:18:00               l



              Problem                                                  [place]



              2021                                                  



              [facility]                                                  



              [facility]                                                  



              [place]                                                  

 

       Elevated  Elevated Problem                      2021               

[facility]



       troponin troponin                             19:18:00               l



       level  level                                                   [place]



              Problem                                                  



              2021                                                  



               [facility]                                                  



              [facility]                                                  



              [place]                                                  

 

       Stage 3  Stage 3 Condition                      2021               

[facility]



       chronic chronic                             19:18:00               l



       kidney kidney                                                  [place]



       disease disease                                                  



              Condition                                                  



              2021                                                  



              [facility]                                                  



              [facility]                                                  



              [place]                                                  

 

       Type 2  Type 2 Condition                      2021               Me

moria



       diabetes diabetes                             19:18:00               l



       mellitus mellitus                                                  [facility]

n



              Condition                                                  



              2021                                                  



              [facility]                                                  



              [facility]                                                  



              [place]                                                  

 

       Elevated  Elevated Condition                      2021             

  [facility]



       troponin I troponin I                             19:18:00               

l



       level  level                                                   [place]



              Condition                                                  



              2021                                                  



              [facility]                                                  



              [place] [facility]                                                  



              [place]                                                  

 

       Acquired  Acquired Condition                      2021             

  [facility]



       pancytopen pancytopen                             19:18:00               

l



       ia     ia                                                      Tha



              Condition                                                  



              2021                                                  



              [facility]                                                  



              [facility]                                                  



              [place]                                                  

 

       Infection  Infection Condition                      2021           

    [facility]



       due to due to                             19:18:00               l



       2019 novel 2019 novel                                                  [facility]



       coronaviru coronaviru                                                  



       s      s                                                       



              Condition                                                  



              2021                                                  



              [facility]                                                  



              [facility]                                                  



              [place]                                                  







History of Past Illness







       Condition Condition Condition Status Onset  Resolution Last   Treating Co

mments 

Source



       Name   Details Category        Date   Date   Treatment Clinician        



                                                 Date                 

 

       Abnormal   Abnormal Problem        2021          

     [facility]



       kidney kidney               3-02   19:18:00 19:18:00               l



       function function               00:00:                             [facility]

n



              2018               00                                 



              Problem                                                  



              2021                                                  



               [facility]                                                  



              [facility]                                                  



              [place]                                                  







Allergies, Adverse Reactions, Alerts







       Allergy Allergy Status Severity Reaction(s) Onset  Inactive Treating Comm

ents 

Source



       Name   Type                        Date   Date   Clinician        

 

       Statins- Statins- Active               2020-0                      Mem[facility]

a



       Hmg-Coa Hmg-Coa                      2-28                        l



       Redu   Redu                        03:12:                      Tha



                                          33                          

 

       blood  blood  Active Mild          2020-0                      [facility]



       thinner thinner                      2-28                        l



                                          03:12:                      Tha



                                          33                          

 

       blood  blood  Active               2020-0                      [facility]



       thinners thinners                      2-28                        l



                                          03:12:                      Tha



                                          33                          







Social History







           Social Habit Start Date Stop Date  Quantity   Comments   Source

 

           Social History 2021                       [facility]

viri



                      19:20:00   19:20:00                         







Medications







       Ordered Filled Start  Stop   Current Ordering Indication Dosage Frequency

 Signature

                    Comments            Components          Source



     Medication Medication Date Date Medication? Clinician                (SIG) 

          



     Name Name                                                   

 

     Cholecalcif            Yes                                     [facility]

a



     clarita      7-04                                              l



     (Vitamin      18:00:                                              Tha



     D3)       12                                                



     (Vitamin                                                        



     D3) 125 MCG                                                        



     Capsule                                                        

 

     Cyanocobala            Yes                                     [facility]

a



     min       7-04                                              l



     (Vitamin      18:00:                                              Tha



     B-12)      12                                                



     (Vitamin                                                        



     B12) 2,500                                                        



     MCG Tablet                                                        

 

     Prednisone            Yes                                     [facility]



     (Deltasone*      7-04                                              l



     ) 10 MG Tab      17:59:                                              Fritz garcia



               25                                                

 

     Furosemide            Yes                                     [facility]



     (Lasix*) 40      7-04                                              l



     MG Tab      05:00:                                              Tha



               45                                                

 

     Ascorbic            Yes                                     [facility]



     Acid      7-04                                              l



     (Vitamin C)      04:47:                                              Fritz

n



     1,000 MG      30                                                



     Tablet                                                        

 

     Cholecalcif            Yes                                     [facility]

a



     clarita      7-04                                              l



     (Vitamin      04:47:                                              Tha



     D3)       30                                                



     (Vitamin                                                        



     D3) 125 MCG                                                        



     Capsule                                                        

 

     Cyanocobala            Yes                                     [facility]

a



     min       7-04                                              l



     (Vitamin      04:47:                                              Tha PARSONS-12)      30                                                



     (Vitamin                                                        



     B12) 2,500                                                        



     MCG Tablet                                                        

 

     Ferrous            Yes                                     [facility]



     Sulfate      7-04                                              l



     (Ferrous      04:47:                                              Tha



     Sulfate*)      30                                                



     325 MG Tab                                                        

 

     Montelukast            Yes                                     [facility]

a



     Sodium      7-04                                              l



               04:47:                                              Tha



               30                                                

 

     Ubidecareno            Yes                                     [facility]

a



     ne (Co      7-04                                              l



     Q-10) 200      04:47:                                              Tha



     MG Capsule      30                                                

 

     Vit C/Tariq            Yes                                     [facility]



     Ac/Lut/Cruz      7-04                                              l



     er/Znox      04:47:                                              Tha



     (Preservisi      30                                                



     on Lutein                                                        



     Softgel) 1                                                        



     EACH                                                        



     Capsule                                                        

 

     Furosemide            Yes                                     [facility]



     (Lasix*) 40      2-29                                              l



     MG Tab      14:39:                                              Tha



               48                                                

 

     Cetirizine      -0      Yes                                     [facility]



     Hcl       2-28                                              l



               03:32:                                              Tha



               57                                                

 

     Cetirizine      -0      Yes                                     [facility]



     Hcl       2-28                                              l



               03:32:                                              Tha



               00                                                

 

     Allopurinol      -0      Yes                                     [facility]

a



               2-28                                              l



               03:25:                                              Tha



               13                                                

 

     Linagliptin      -0      Yes                                     [facility]

a



     (Tradjenta)      2-28                                              l



     5 MG Tablet      03:25:                                              [facility] garcia



               13                                                

 

     Linagliptin      -0      Yes                                     [facility]

a



               2-28                                              l



               03:25:                                              Tha



               00                                                

 

     Spironolact            Yes                                     [facility]

a



     one       3-04                                              l



     (Aldactone*      19:31:                                              Fritz

n



     ) 25 MG Tab      53                                                

 

     Spironolact      2018-0      Yes  Mouin F                TWICE           Me

moria



     one       3-04           Callie                DAILY           l



               00:00:                                                                                              

 

     Carvedilol      2018-0      Yes                                     [facility]



     (Coreg*)      3-02                                              l



     6.25 MG Tab      06:00:                                              Fritz garcia



               08                                                

 

     Glimepiride      2018-0      Yes                                     [facility]

a



     (Amaryl) 4      3-02                                              l



     MG Tablet      06:00:                                              [place]



               08                                                

 

     Furosemide      2018-0      Yes                                     [facility]



     (Lasix*) 40      3-02                                              l



     MG Tab      06:00:                                              [place]



                                                               

 

     Multivitami      2018-0      Yes                                     [facility]

a



     n (Multiple      3-02                                              l



     Vitamins)      06:00:                                              [facility]



     Tablet      08                                                

 

     Omeprazole      2018-0      Yes                                     [facility]



     (Prilosec)      3-02                                              l



     40 MG      06:00:                                              [facility]



     Capsule.      08                                                

 

     Pravastatin      2018-0      Yes                                     [facility]

a



     Sodium      3-02                                              l



               06:00:                                              [place]                                                

 

     Ezetimibe      2018-0      Yes                                     [facility]



     (Zetia*) 10      3-02                                              l



     MG Tab      06:00:                                              [facility]                                                

 

     Aspirin      2018-0      Yes                                     [facility]



               3-02                                              l



               06:00:                                                                                              

 

     Carvedilol      2018-0      Yes                                     [facility]



               3-02                                              l



               06:00:                                                                                              

 

     Glimepiride      2018-0      Yes                                     [facility]

a



               3-02                                              l



               06:00:                                                                                              

 

     Furosemide      2018-0      Yes                                     [facility]



               3-02                                              l



               06:00:                                                                                              

 

     Multivitami      2018-0      Yes                                     [facility]

a



     n         3-02                                              l



               06:00:                                                                                              

 

     Omeprazole      2018-0      Yes                                     [facility]



               3-02                                              l



               06:00:                                                                                              

 

     Pravastatin      2018-0      Yes                                     [facility]

a



     Sodium      3-02                                              l



               06:00:                                                                                              

 

     Ezetimibe      2018-0      Yes                                     [facility]



               3-02                                              l



               06:00:                                                                                              

 

     Aspirin      2018-0      Yes                      DAILY           [facility]



               3-02                                              l



               00:00:                                                                                              

 

     Carvedilol      2018-0      Yes                      AT BEDTIME           M

[facility]



               3-02                                              l



               00:00:                                                                                              

 

     Glimepiride      2018-0      Yes                      TWICE           [facility]

ia



               3-02                               DAILY           l



               00:00:                                                                                              

 

     Furosemide      2018-0      Yes                      DAILY           [facility]

a



               3-02                                              l



               00:00:                                                                                              

 

     Multivitami      2018-0      Yes                      DAILY           [facility]

ia



     n         3-02                                              l



               00:00:                                              [facility]



               00                                                

 

     Omeprazole      2018-0      Yes                      DAILY           [facility]

a



               3-02                                              l



               00:00:                                              [place]



               00                                                

 

     Pravastatin      2018-0      Yes                      AT BEDTIME           

[facility]



     Sodium      3-02                                              l



               00:00:                                              [place]



               00                                                

 

     Ezetimibe      2018-0      Yes                      DAILY           [facility]



               3-02                                              l



               00:00:                                              [facility]



               00                                                







Vital Signs







             Vital Name   Observation Time Observation Value Comments     Source

 

             Temperature Oral (F) 2021 17:00:00 98.4 F                    

[facility] [place]

 

             Heart Rate   2021 17:00:00                           [facility]

 [facility]

 

             Respitory Rate 2021 17:00:00                           [facility]

al [place]

 

             Systolic (mm Hg) 2021 17:00:00                           [facility]

[facility] [place]

 

             Diastolic (mm Hg) 2021 17:00:00                           [facility]

[facility] [place]

 

             Height       2021 10:00:00                           [facility]

 [place]

 

             Weight       2021 10:00:00                           [facility]

 [facility]

 

             Heart Rate   2020 14:14:00                           [facility]

 [facility]

 

             Systolic (mm Hg) 2020 14:14:00                           [facility]

[facility] [place]

 

             Diastolic (mm Hg) 2020 14:14:00                           [facility]

[facility] [place]

 

             Temperature Oral (F) 2020 13:49:00 98.2 F                    

[facility]

 

             Respitory Rate 2020 13:49:00                           [facility]

al [facility]

 

             Height       2020 14:51:00                           [facility]

 [facility]

 

             Weight       2020 14:51:00                           [facility]

 [place]

 

             Heart Rate   2018 08:41:00                           [facility]

 [place]

 

             Systolic (mm Hg) 2018 08:41:00                           [facility]

rial [facility]

 

             Diastolic (mm Hg) 2018 08:41:00                           [facility]

[facility]

 

             Temperature Oral (F) 2018 07:45:00 97.6 F                    

[facility]

 

             Respitory Rate 2018 07:45:00                           [facility]

al [facility]

 

             Weight       2018 05:00:00                           [facility]

 [place]

 

             Height       2018 12:23:00                           [facility]

 [place]







Procedures







                Procedure       Date / Time     Performing Clinician Source



                                Performed                       

 

                [id number]         2022 00:00:00                 [facility]

[facility]



                                                                [facility]

[place]

 

                [id number]         2022 00:00:00                 [facility]

alth



                                                                Rehabilitation [facility]

[place]

 

                Chest Single View 2021 20:48:00                 [facility] JAYSHREE

ermann

 

                Anaerobic Blood 2021 00:00:00                 [facility] Her curiel



                Culture                                         

 

                Aerobic Blood Culture 2021 00:00:00                 Angelica Baer

 

                Coronavirus COVID-19 2021 00:00:00                 Shayy Zamoraann



                PCR                                             

 

                [place] Count    2021 00:00:00                 [facility] 

curiel

 

                Chest [facility] And Lat (2 2020 [facility]00:00                 [facility]



                Views)                                          

 

                Influenza Type B 2020 00:00:00                 [facility]

rmann



                Antigen Screen                                  

 

                Influenza Type A 2020 00:00:00                 [facility]

[facility]



                Antigen Screen                                  

 

                [place] Count    2020 00:00:00                 [facility] Her curiel

 

                [id number]       2018 00:00:00                 [facility] Her curiel

 

                [place] Count    2018 00:00:00                 Andrea curiel







Plan of Care







             Planned Activity Planned Date Details      Comments     Source

 

             Future Scheduled Test              Instructions Creatinine         

     [facility] Tha



                                       Congestive Heart Failure              



                                       [code = Instructions              



                                       Creatinine Congestive              



                                       Heart Failure]              

 

             Future Scheduled Test              Instructions Creatinine         

     [facility]



                                       Congestive [facility] Failure              



                                       [code = Instructions              



                                       Creatinine Congestive              



                                       Heart Failure]              







Encounters







        Start   End     Encounter Admission Attending Care    Care    Encounter 

Source



        Date/Time Date/Time Type    Type    Clinicians Facility Department ID   

   

 

        2022         Outpatient [address] [place] [address] [facility]   REF     [id number] [facility]



        14:58:59                         yashira                    [id number]    [facility]



                                                                        Rehabil



                                                                        itation



                                                                        [place]



                                                                        d

 

        2022-09-10 2022 Inpatient 3       [facility]   CRD     [id number]

 [facility]



        01:22:00 11:11:00                 yashira                    0910    [facility]                         Health



                                                                        Rehabil



                                                                        itation



                                                                        [place]



                                                                        d

 

        2022 Inpatient 3       [facility]   CVA     [id number]

 [facility]



        17:30:00 18:40:00                 yashira,                    0630    [facility]                         Health



                                                                        Rehabil



                                                                        itation



                                                                        [place]



                                                                        d

 

        2022 Outpatient         [facility]   LABO    [id number]

[id number] [facility]



        12:05:00 12:05:00                 Merna Gillis



                                                                        [facility]

 

        2022 Outpatient         [facility] PRIV    PRIV    241

[id number] [facility]



        09:56:00 09:56:00                 d_J                     [id number] [facility]

l

 

        2022 Outpatient         [facility] PRIV    PRIV    241

[id number] [facility]



        04:43:00 04:43:00                 d_J                     [id number] [facility]

l

 

        2022 Outpatient         [facility] PRIV    PRIV    241

[id number] [facility]



        11:14:00 11:14:00                 d_J                     [id number] [facility]

l

 

        2022 Outpatient         [facility] PRIV    PRIV    241

[id number] [facility]



        05:23:00 05:23:00                 d_J                     [id number] [facility]

l

 

        2022 Inpatient E       JOSR,  [facility]    MED     [address] [place], [place] [address]BL



        14:52:00 19:20:00                 ANA                           

 

        2022 Inpatient 3       [facility]   CVA     [id number]

 [facility]



        18:20:00 10:58:00                 hez,                    0504    ss



                                        [facility]



                                                                        itation



                                                                        [place]



                                                                        d

 

        2021 Discharged                 nullFlavo [facility] 

[id number] [facility]



        23:24:00 19:17:00 Inpatient                 r       [facility]      l



                                                        [facility]

[facility]



                                                        INTENSIVE         



                                                        CARE UNIT         

 

        2021 Registered                 nullFlavo [facility] 

[id number] [facility]



        18:23:00 18:23:00 Referred                 r       [facility]  [facility]      l



                                                        [facility]

[place]



                                                        LABORATORY         



                                                        NON-PATIENT         

 

        2020 Discharged                 nullFlavo [facility] 

[id number] [facility]



        02:48:00 17:37:00 Inpatient                 r       [facility]  [facility]      l



                                                        [facility]

[place]



                                                        MED/SURG         



                                                        FLOOR           

 

        2019 Registered                 nullFlavo [facility] 

[id number] [facility]



        18:59:00 18:59:00 Referred                 r       [facility]  78      l



                                                        [facility]

[facility]



                                                        LABORATORY         



                                                        NON-PATIENT         

 

        2018 Discharged                 nullFlavo [facility] 

[id number] [facility]



        16:10:00 13:36:00 Inpatient                 r       [facility]  [facility]      l



                                                        Josiah Heaton

nn

 

        2017 Departed                 Liseth Blunt [id number]

[address] [facility]



        10:18:00 16:46:00 Emergency                 r       [facility]  [facility]      l



                                                        Josiah Heaton

nn







Results







           Test Description Test Time  Test Comments Results    Result Comments 

Source









                    CBC W/AUTO DIFF     2022 15:06:00 









                      Test Item  Value      Reference Range Interpretation Comme

nts









             WHITE BLOOD CELL (test code = 2.3 K/mm3    3.5-11.0     LL         

  



             WBC)                                                

 

             RED BLOOD CELL (test code = 2.82 M/mm3   4.70-6.10    L            



             RBC)                                                

 

             HEMOGLOBIN (test code = HGB) 7.3 G/DL     10.4-14.9    L           

 

 

             HEMATOCRIT (test code = HCT) 24.6 %       31.5-44.1    L           

 

 

             MEAN CELL VOLUME (test code = 87.2 Fl      84.5-98.6    N          

  



             MCV)                                                

 

             MEAN CELL HGB (test code = 25.9 pg      27.0-34.2    L            



             MCH)                                                

 

             MEAN CELL HGB CONCETRATION 29.7 G/DL    31.5-34.0    L            



             (test code = MCHC)                                        

 

             RED CELL DISTRIBUTION WIDTH 15.7 SD      11.5-14.5    H            



             (test code = RDW)                                        

 

             PLATELET COUNT (test code = 78 K/mm3     150-450      L            



             PLT)                                                

 

             MEAN PLATELET VOLUME (test 11.00 fL     7.0-10.5     H            



             code = MPV)                                         

 

             NEUTROPHIL % (test code = NT%) 68.6 %       40-76        N         

   

 

             IMMATURE GRANULOCYTE % (test 0.0 %        0.0-5.0      N           

 



             code = IG%)                                         

 

             LYMPHOCYTE % (test code = LY%) 18.1 %       20.5-51.1    L         

   

 

             MONOCYTE % (test code = MO%) 9.5 %        1.7-9.3      H           

 

 

             EOSINOPHIL % (test code = EO%) 3.4 %        0.0-6.0      N         

   

 

             BASOPHIL % (test code = BA%) 0.4 %        0.0-2.0      N           

 

 

             NUCLEATED RBC % (test code = 0.0 /100WBC% 0.0-1.0      N           

 



             NRBC%)                                              

 

             NEUTROPHIL # (test code = NT#) 1.6 K/mm3    1.8-7.6      L         

   

 

             IMMATURE GRANULOCYTE # (test 0.00 x10 3/uL 0.00-0.03    N          

  



             code = IG#)                                         

 

             LYMPHOCYTE # (test code = LY#) 0.4 K/mm3    0.6-3.2      L         

   

 

             MONOCYTE # (test code = MO#) 0.2 K/mm3    0.3-1.1      L           

 

 

             EOSINOPHIL # (test code = EO#) 0.1 K/mm3    0.0-0.4      N         

   

 

             BASOPHIL # (test code = BA#) 0.0 K/mm3    0.0-0.1      N           

 

 

             NUCLEATED RBC # (test code = 0.0 K/mm3    0.0-0.1      N           

 



             NRBC#)                                              

 

             MANUAL DIFF REQUIRED (test NO DIFF/SCN  CRITERIA                  S

LIDE REVIEW CONSISTANT WITH



             code = MDIFF)                                        AUTO DIFFERENT

IAL.



RBC [id number] 15:06:00





             Test Item    Value        Reference Range Interpretation Comments

 

             PLATELET ESTIMATE DECREASED THOUSAND ADEQUATE                  PLAT

ELET COUNT



             (test code =                                        REVIEWED AND



             PLTEST)                                             VERIFIED.PLT ES

T



                                                                 []

 

             PLATELET MORPHOLOGY NORMAL                                 



             (test code =                                        



             PLTMORPH)                                           



CBC W/AUTO [id number] 15:04:00





             Test Item    Value        Reference Range Interpretation Comments

 

             WHITE BLOOD CELL (test code = 3.0 K/mm3    3.5-11.0     L          

  



             WBC)                                                

 

             RED BLOOD CELL (test code = 2.82 M/mm3   4.70-6.10    L            



             RBC)                                                

 

             HEMOGLOBIN (test code = HGB) 7.4 G/DL     10.4-14.9    L           

 

 

             HEMATOCRIT (test code = HCT) 24.8 %       31.5-44.1    L           

 

 

             MEAN CELL VOLUME (test code = 87.9 Fl      84.5-98.6    N          

  



             MCV)                                                

 

             MEAN CELL HGB (test code = MCH) 26.2 pg      27.0-34.2    L        

    

 

             MEAN CELL HGB CONCETRATION 29.8 G/DL    31.5-34.0    L            



             (test code = MCHC)                                        

 

             RED CELL DISTRIBUTION WIDTH 15.9 SD      11.5-14.5    H            



             (test code = RDW)                                        

 

             PLATELET COUNT (test code = 73 K/mm3     150-450      L            



             PLT)                                                

 

             MEAN PLATELET VOLUME (test code 11.10 fL     7.0-10.5     H        

    



             = MPV)                                              

 

             NEUTROPHIL % (test code = NT%) 62.0 %       40-76        N         

   

 

             IMMATURE GRANULOCYTE % (test 1.3 %        0.0-5.0      N           

 



             code = IG%)                                         

 

             LYMPHOCYTE % (test code = LY%) 22.7 %       20.5-51.1    N         

   

 

             MONOCYTE % (test code = MO%) 10.0 %       1.7-9.3      H           

 

 

             EOSINOPHIL % (test code = EO%) 3.0 %        0.0-6.0      N         

   

 

             BASOPHIL % (test code = BA%) 1.0 %        0.0-2.0      N           

 

 

             NUCLEATED RBC % (test code = 0.0 /100WBC% 0.0-1.0      N           

 



             NRBC%)                                              

 

             NEUTROPHIL # (test code = NT#) 1.9 K/mm3    1.8-7.6      N         

   

 

             IMMATURE GRANULOCYTE # (test 0.04 x10 3/uL 0.00-0.03    H          

  



             code = IG#)                                         

 

             LYMPHOCYTE # (test code = LY#) 0.7 K/mm3    0.6-3.2      N         

   

 

             MONOCYTE # (test code = MO#) 0.3 K/mm3    0.3-1.1      N           

 

 

             EOSINOPHIL # (test code = EO#) 0.1 K/mm3    0.0-0.4      N         

   

 

             BASOPHIL # (test code = BA#) 0.0 K/mm3    0.0-0.1      N           

 

 

             NUCLEATED RBC # (test code = 0.0 K/mm3    0.0-0.1      N           

 



             NRBC#)                                              

 

             MANUAL DIFF REQUIRED (test code NO DIFF/SCN  CRITERIA              

    



             = MDIFF)                                            



RBC [id number] 15:04:00





             Test Item    Value        Reference Range Interpretation Comments

 

             ANISOCYTOSIS (test NORMAL       NONE                      



             code = ANISO)                                        

 

             PLATELET ESTIMATE DECREASED    ADEQUATE                  PLT EST CO

UNT



             (test code = PLTEST) THOUSAND                               36,000-

45,000

 

             PLATELET MORPHOLOGY NORMAL                                 



             (test code =                                        



             PLTMORPH)                                           



BASIC METABOLIC [id number] 13:19:00





             Test Item    Value        Reference Range Interpretation Comments

 

             SODIUM (test code = NA) 140 mmol/L   134-147      N            

 

             POTASSIUM (test code = K) 4.1 mmol/L   3.4-5.0      N            

 

             CHLORIDE (test code = CL) 104 mmol/L   100-108      N            

 

             CARBON DIOXIDE (test code = CO2) 28 mmol/L    21-32        N       

     

 

             ANION GAP (test code = GAP) 8.0 GAP calc 4.0-15.0     N            

 

             GLUCOSE (test code = GLU) 314 MG/DL           H            

 

             BLOOD UREA NITROGEN (test code = 57 MG/DL     7-18         H       

     



             BUN)                                                

 

             GLOMERULAR FILTRATION RATE (test 15 estGFR    >60          L       

     



             code = GFR)                                         

 

             CREATININE (test code = CREAT) 3.1 MG/DL    0.6-1.0      H         

   

 

             CALCIUM (test code = CA) 8.6 MG/DL    8.5-10.1     N            



Absolute lymphocyte [id number] 10:01:00





             Test Item    Value        Reference Range Interpretation Comments

 

             Absolute lymphocyte count (test code = 0.4          0.7-4.9        

           



             Absolute lymphocyte count)                                        



[facility] Serum or Plasma by Bromocresol purple 
(BCP) dye binding [id number] 10:01:00





             Test Item    Value        Reference Range Interpretation Comments

 

             Albumin [Mass/volume] in Serum or 2.5          3.4-5.0             

      



             Plasma by Bromocresol purple (BCP) dye                             

           



             binding meth (test code = Albumin                                  

      



             [Mass/volume] in Serum or Plasma by                                

        



             Bromocresol purple (BCP) dye binding                               

         



             meth)                                               



[facility]annBasophil %2021 10:01:00





             Test Item    Value        Reference Range Interpretation Comments

 

             Basophil % (test code = 0.2          See_Comment                [Au

tomated message] The



             Basophil %)                                         system which ge

nerated



                                                                 this result tra

nsmitted



                                                                 reference range

: <=1.3.



                                                                 The reference r

sharona was



                                                                 not used to int

erpret



                                                                 this result as



                                                                 normal/abnormal

.



[facility] erythrocytes count (number/volume)2021 10:01:00





             Test Item    Value        Reference Range Interpretation Comments

 

             Blood erythrocytes count 3.38         3.86-4.86                 



             (number/volume) (test code = Blood                                 

       



             erythrocytes count (number/volume))                                

        



[facility]Blood hematocrit (volume fraction)2021 10:01:00





             Test Item    Value        Reference Range Interpretation Comments

 

             Blood hematocrit (volume fraction) 30.8         36.0-45.0          

       



             (test code = Blood hematocrit (volume                              

          



             fraction))                                          



[facility]Blood platelet mean [id number] 10:01:00





             Test Item    Value        Reference Range Interpretation Comments

 

             Blood platelet mean volume (test code = 8.8          7.6-11.3      

            



             Blood platelet mean volume)                                        



[facility] Hermann[facility] reactive protein [Mass/volume] in Serum or [id number] 
10:01:00





             Test Item    Value        Reference Range Interpretation Comments

 

             C reactive protein [Mass/volume] in 16.00                          

        



             Serum or Plasma (test code = C reactive                            

            



             protein [Mass/volume] in Serum or                                  

      



             Plasma)                                             



[facility]annCalcium [Mass/volume] in Serum or [id number] 10:01:00





             Test Item    Value        Reference Range Interpretation Comments

 

             Calcium [Mass/volume] in Serum or 7.7          8.5-10.1            

      



             Plasma (test code = Calcium                                        



             [Mass/volume] in Serum or Plasma)                                  

      



[facility]annCarbon dioxide, total [Moles/volume] in Serum or Plasma
2021 10:01:00





             Test Item    Value        Reference Range Interpretation Comments

 

             Carbon dioxide, total [Moles/volume] in 30           21-32         

            



             Serum or Plasma (test code = Carbon                                

        



             dioxide, total [Moles/volume] in Serum                             

           



             or Plasma)                                          



[facility]annChemistry [id number] 10:01:00





             Test Item    Value        Reference Range Interpretation Comments

 

             Chemistry procedure (test code = 91.1                        

     



             Chemistry procedure)                                        



[facility]annChloride [Moles/volume] in Serum or [id number] 10:01:00





             Test Item    Value        Reference Range Interpretation Comments

 

             Chloride [Moles/volume] in Serum or 104                      

        



             Plasma (test code = Chloride                                       

 



             [Moles/volume] in Serum or Plasma)                                 

       



[facility]annCreatinine [Mass/volume] in Serum or [id number] 10:01:00





             Test Item    Value        Reference Range Interpretation Comments

 

             Creatinine [Mass/volume] in Serum or 2.11         0.55-1.3         

         



             Plasma (test code = Creatinine                                     

   



             [Mass/volume] in Serum or Plasma)                                  

      



[facility]annFerritin [Mass/volume] in Serum or [id number] 10:01:00





             Test Item    Value        Reference Range Interpretation Comments

 

             Ferritin [Mass/volume] in Serum or 82.0         8-388              

       



             Plasma (test code = Ferritin                                       

 



             [Mass/volume] in Serum or Plasma)                                  

      



[facility]annGlucose [Mass/volume] in Serum or [id number] 10:01:00





             Test Item    Value        Reference Range Interpretation Comments

 

             Glucose [Mass/volume] in Serum or 236                        

      



             Plasma (test code = Glucose                                        



             [Mass/volume] in Serum or Plasma)                                  

      



[facility]annLymphocyte [id number] 10:01:00





             Test Item    Value        Reference Range Interpretation Comments

 

             Lymphocyte percent (test code = 3.30         4.3-10.9              

    



             Lymphocyte percent)                                        



[facility]annPhosphate [Mass/volume] in Serum or [id number] 10:01:00





             Test Item    Value        Reference Range Interpretation Comments

 

             Phosphate [Mass/volume] in Serum or 3.0          2.5-4.9           

        



             Plasma (test code = Phosphate                                      

  



             [Mass/volume] in Serum or Plasma)                                  

      



[facility]annPotassium [Moles/volume] in Serum or [id number] 10:01:00





             Test Item    Value        Reference Range Interpretation Comments

 

             Potassium [Moles/volume] in Serum or 4.2          3.5-5.1          

         



             Plasma (test code = Potassium                                      

  



             [Moles/volume] in Serum or Plasma)                                 

       



[facility]annSerum or plasma sodium measurement (moles/volume)2021 
10:01:00





             Test Item    Value        Reference Range Interpretation Comments

 

             Serum or plasma sodium measurement 139          136-145            

       



             (moles/volume) (test code = Serum or                               

         



             plasma sodium measurement                                        



             (moles/volume))                                        



[facility]annUrea nitrogen [Mass/volume] in Serum or [id number] 
10:01:00





             Test Item    Value        Reference Range Interpretation Comments

 

             Urea nitrogen [Mass/volume] in Serum or 69           7-18          

            



             Plasma (test code = Urea nitrogen                                  

      



             [Mass/volume] in Serum or Plasma)                                  

      



[facility]annTotal cholesterol/cholesterol in HDL (percentile)2021 
09:51:00





             Test Item    Value        Reference Range Interpretation Comments

 

             Total cholesterol/cholesterol in HDL 4.25 1                        

         



             (percentile) (test code = Total                                    

    



             cholesterol/cholesterol in HDL                                     

   



             (percentile))                                        



[facility]annTriglyceride [Mass/volume] in Serum or [id number] 09:51:00





             Test Item    Value        Reference Range Interpretation Comments

 

             Triglyceride [Mass/volume] in Serum or 91                          

           



             Plasma (test code = Triglyceride                                   

     



             [Mass/volume] in Serum or Plasma)                                  

      



[facility]annBlood anisocytosis [id number] 09:51:00





             Test Item    Value        Reference Range Interpretation Comments

 

             Blood anisocytosis detection (test code 1+                         

            



             = Blood anisocytosis detection)                                    

    



[facility]annBlood morphology interpretation [id number] 09:51:00





             Test Item    Value        Reference Range Interpretation Comments

 

             Blood morphology interpretation Noted                              

    



             narrative (test code = Blood morphology                            

            



             interpretation narrative)                                        



[facility]annCholesterol in HDL [Mass/volume] in Serum or [id number] 
09:51:00





             Test Item    Value        Reference Range Interpretation Comments

 

             Cholesterol in HDL [Mass/volume] in 28           40-60             

        



             Serum or Plasma (test code =                                       

 



             Cholesterol in HDL [Mass/volume] in                                

        



             Serum or Plasma)                                        



[facility]annCholesterol [Mass/volume] in Serum or [id number] 09:51:00





             Test Item    Value        Reference Range Interpretation Comments

 

             Cholesterol [Mass/volume] in Serum or 119                          

          



             Plasma (test code = Cholesterol                                    

    



             [Mass/volume] in Serum or Plasma)                                  

      



[facility]annLymphocyte [id number] 09:51:00





             Test Item    Value        Reference Range Interpretation Comments

 

             Lymphocyte percent (test code = 21           15-42                 

    



             Lymphocyte percent)                                        



[facility]annMagnesium [Mass/volume] in Serum or [id number] 09:51:00





             Test Item    Value        Reference Range Interpretation Comments

 

             Magnesium [Mass/volume] in Serum or 1.9          1.8-2.4           

        



             Plasma (test code = Magnesium                                      

  



             [Mass/volume] in Serum or Plasma)                                  

      



[facility] HermannPlatelet [id number] 09:51:00





             Test Item    Value        Reference Range Interpretation Comments

 

             Platelet estimate (test code = Platelet Decr                       

            



             estimate)                                           



[facility] cholesterol in LDL measurement by calculation 
(mass/volume)2021 09:51:00





             Test Item    Value        Reference Range Interpretation Comments

 

             Serum or plasma cholesterol in LDL 73 1                            

       



             measurement by calculation                                        



             (mass/volume) (test code = Serum or                                

        



             plasma cholesterol in LDL measurement                              

          



             by calculation (mass/volume))                                      

  



[facility]Troponin I.cardiac [Mass/volume] in Serum or [id number] 
05:11:00





             Test Item    Value        Reference Range Interpretation Comments

 

             Troponin I.cardiac 0.08         See_Comment                [Automat

ed message] The



             [Mass/volume] in Serum                                        syste

m which generated



             or Plasma (test code =                                        this 

result transmitted



             Troponin I.cardiac                                        reference

 range: <=0.045.



             [Mass/volume] in Serum                                        The r

eference range was



             or Plasma)                                          not used to int

erpret



                                                                 this result as



                                                                 normal/abnormal

.



[facility]CwicoidLEKY-OaU-[facility] (COVID-19) RNA [Presence] in Respiratory specimen by 
ANJUM with probe [id number] 21:20:00





             Test Item    Value        Reference Range Interpretation Comments

 

             SARS-CoV-2 (COVID-19) RNA [Presence] Positive                      

         



             in Respiratory specimen by ANJUM with                                

        



             probe detect (test code = SARS-CoV-2                               

         



             (COVID-19) RNA [Presence] in                                       

 



             Respiratory specimen by ANJUM with                                   

     



             probe detect)                                        



[facility]annAlanine aminotransferase [Enzymatic activity/volume] in Serum or
Plasma by With P-5'-2021 21:09:00





             Test Item    Value        Reference Range Interpretation Comments

 

             Alanine aminotransferase [Enzymatic 22           12-78             

        



             activity/volume] in Serum or Plasma by                             

           



             With P-5'- (test code = Alanine                                    

    



             aminotransferase [Enzymatic                                        



             activity/volume] in Serum or Plasma by                             

           



             With P-5'-)                                         



[facility]annAlkaline phosphatase [Enzymatic activity/volume] in Serum or 
[id number] 21:09:00





             Test Item    Value        Reference Range Interpretation Comments

 

             Alkaline phosphatase [Enzymatic 71                           

    



             activity/volume] in Serum or Plasma                                

        



             (test code = Alkaline phosphatase                                  

      



             [Enzymatic activity/volume] in Serum or                            

            



             Plasma)                                             



[facility]annAspartate aminotransferase [Enzymatic activity/volume] in Serum 
or Plasma by With [id number] 21:09:00





             Test Item    Value        Reference Range Interpretation Comments

 

             Aspartate aminotransferase [Enzymatic 36           15-37           

          



             activity/volume] in Serum or Plasma by                             

           



             With P-5 (test code = Aspartate                                    

    



             aminotransferase [Enzymatic                                        



             activity/volume] in Serum or Plasma by                             

           



             With P-5)                                           



[facility]annBilirubin.direct [Mass/volume] in Serum or [id number] 
21:09:00





             Test Item    Value        Reference Range Interpretation Comments

 

             Bilirubin.direct 0.3          See_Comment                [Automated

 message] The



             [Mass/volume] in Serum                                        syste

m which generated



             or Plasma (test code =                                        this 

result transmitted



             Bilirubin.direct                                        reference r

sharona: <=0.2.



             [Mass/volume] in Serum                                        The r

eference range was



             or Plasma)                                          not used to int

erpret this



                                                                 result as fabiano

l/abnormal.



[facility]annBilirubin.total [Mass/volume] in Serum or [id number] 
21:09:00





             Test Item    Value        Reference Range Interpretation Comments

 

             Bilirubin.total [Mass/volume] in Serum 0.7          0.2-1.0        

           



             or Plasma (test code = Bilirubin.total                             

           



             [Mass/volume] in Serum or Plasma)                                  

      



[facility]annINR in Blood by Coagulation [id number] 21:09:00





             Test Item    Value        Reference Range Interpretation Comments

 

             INR in Blood by Coagulation assay 1.28 1                           

      



             (test code = INR in Blood by                                       

 



             Coagulation assay)                                        



[facility] HermannNatriuretic peptide.B prohormone N-Terminal [Mass/volume] in 
Serum or [id number] 21:09:00





             Test Item    Value        Reference Range Interpretation Comments

 

             Natriuretic peptide.B prohormone 2354                              

     



             N-Terminal [Mass/volume] in Serum or                               

         



             Plasma (test code = Natriuretic                                    

    



             peptide.B prohormone N-Terminal                                    

    



             [Mass/volume] in Serum or Plasma)                                  

      



[facility] HermannPeripheral blood smear examination by light [id number]
 21:09:00





             Test Item    Value        Reference Range Interpretation Comments

 

             Peripheral blood smear examination by Ok                           

          



             light microscopy (test code =                                      

  



             Peripheral blood smear examination by                              

          



             light microscopy)                                        



[facility]annProtein [Mass/volume] in Serum or [id number] 21:09:00





             Test Item    Value        Reference Range Interpretation Comments

 

             Protein [Mass/volume] in Serum or 6.6          6.4-8.2             

      



             Plasma (test code = Protein                                        



             [Mass/volume] in Serum or Plasma)                                  

      



[facility] HermannTroponin I.cardiac [Mass/volume] in Serum or [id number] 
21:09:00





             Test Item    Value        Reference Range Interpretation Comments

 

             Troponin I.cardiac 0.13         See_Comment                [Automat

ed message] The



             [Mass/volume] in Serum                                        syste

m which generated



             or Plasma (test code =                                        this 

result transmitted



             Troponin I.cardiac                                        reference

 range: <=0.045.



             [Mass/volume] in Serum                                        The r

eference range was



             or Plasma)                                          not used to int

erpret



                                                                 this result as



                                                                 normal/abnormal

.



[facility] HermannAlbumin [Mass/volume] in Serum or Plasma by Bromocresol purple 
(BCP) dye binding [id number] 17:30:00





             Test Item    Value        Reference Range Interpretation Comments

 

             Albumin [Mass/volume] in Serum or 2.8          3.4-5.0             

      



             Plasma by Bromocresol purple (BCP) dye                             

           



             binding meth (test code = Albumin                                  

      



             [Mass/volume] in Serum or Plasma by                                

        



             Bromocresol purple (BCP) dye binding                               

         



             meth)                                               



[facility] by light microscopy
2021 17:30:00





             Test Item    Value        Reference Range Interpretation Comments

 

             Bacteria detection in urine sediment <20 /HPF                      

         



             by light microscopy (test code =                                   

     



             Bacteria detection in urine sediment                               

         



             by light microscopy)                                        



[facility]annCalcium [Mass/volume] in Serum or [id number] 17:30:00





             Test Item    Value        Reference Range Interpretation Comments

 

             Calcium [Mass/volume] in Serum or 8.2          8.5-10.1            

      



             Plasma (test code = Calcium                                        



             [Mass/volume] in Serum or Plasma)                                  

      



[facility]bon [facility], total [Moles/volume] in Serum or Plasma
2021 17:30:00





             Test Item    Value        Reference Range Interpretation Comments

 

             Carbon dioxide, total [Moles/volume] in 32           21-32         

            



             Serum or Plasma (test code = Carbon                                

        



             dioxide, total [Moles/volume] in Serum                             

           



             or Plasma)                                          



[facility]annChloride [Moles/volume] in Serum or [id number] 17:30:00





             Test Item    Value        Reference Range Interpretation Comments

 

             Chloride [Moles/volume] in Serum or 107                      

        



             Plasma (test code = Chloride                                       

 



             [Moles/volume] in Serum or Plasma)                                 

       



[facility]annCreatinine [Mass/volume] in Serum or [id number] 17:30:00





             Test Item    Value        Reference Range Interpretation Comments

 

             Creatinine [Mass/volume] in Serum or 1.51         0.55-1.3         

         



             Plasma (test code = Creatinine                                     

   



             [Mass/volume] in Serum or Plasma)                                  

      



[facility]annGlucose [Mass/volume] in Serum or [id number] 17:30:00





             Test Item    Value        Reference Range Interpretation Comments

 

             Glucose [Mass/volume] in Serum or 133                        

      



             Plasma (test code = Glucose                                        



             [Mass/volume] in Serum or Plasma)                                  

      



[facility]annLymphocyte [id number] 17:30:00





             Test Item    Value        Reference Range Interpretation Comments

 

             Lymphocyte percent (test BETWEEN 10,000 and                        

   



             code = Lymphocyte 100,000 CFU/ML                           



             percent)                                            



[facility]annPhosphate [Mass/volume] in Serum or [id number] 17:30:00





             Test Item    Value        Reference Range Interpretation Comments

 

             Phosphate [Mass/volume] in Serum or 3.0          2.5-4.9           

        



             Plasma (test code = Phosphate                                      

  



             [Mass/volume] in Serum or Plasma)                                  

      



[facility]annPotassium [Moles/volume] in Serum or [id number] 17:30:00





             Test Item    Value        Reference Range Interpretation Comments

 

             Potassium [Moles/volume] in Serum or 3.6          3.5-5.1          

         



             Plasma (test code = Potassium                                      

  



             [Moles/volume] in Serum or Plasma)                                 

       



[facility]annProtein [Mass/volume] in [id number] 17:30:00





             Test Item    Value        Reference Range Interpretation Comments

 

             Protein [Mass/volume] in Urine (test 20                            

         



             code = Protein [Mass/volume] in Urine)                             

           



[facility]annSerum or plasma sodium measurement (moles/volume)2021 
17:30:00





             Test Item    Value        Reference Range Interpretation Comments

 

             Serum or plasma sodium measurement 144          136-145            

       



             (moles/volume) (test code = Serum or                               

         



             plasma sodium measurement                                        



             (moles/volume))                                        



[facility]annUrate [Mass/volume] in Serum or [id number] 17:30:00





             Test Item    Value        Reference Range Interpretation Comments

 

             Urate [Mass/volume] in Serum or Plasma 5.8          2.6-6.0        

           



             (test code = Urate [Mass/volume] in                                

        



             Serum or Plasma)                                        



[facility]annUrea nitrogen [Mass/volume] in Serum or [id number] 
17:30:00





             Test Item    Value        Reference Range Interpretation Comments

 

             Urea nitrogen [Mass/volume] in Serum or 36           7-18          

            



             Plasma (test code = Urea nitrogen                                  

      



             [Mass/volume] in Serum or Plasma)                                  

      



[facility] HermannUrinalysis with [id number] 17:30:00





             Test Item    Value        Reference Range Interpretation Comments

 

             Urinalysis with microscopy (test Negative                          

     



             code = Urinalysis with microscopy)                                 

       



[facility] [id number] 17:30:00





             Test Item    Value        Reference Range Interpretation Comments

 

             Urine appearance determination (test Clear                         

         



             code = Urine appearance determination)                             

           



[facility] bilirubin [id number] 17:30:00





             Test Item    Value        Reference Range Interpretation Comments

 

             Urine bilirubin detection (test code Negative                      

         



             = Urine bilirubin detection)                                       

 



[facility] blood [id number] 17:30:00





             Test Item    Value        Reference Range Interpretation Comments

 

             Urine blood detection (test code = Negative                        

       



             Urine blood detection)                                        



[facility] [id number] 17:30:00





             Test Item    Value        Reference Range Interpretation Comments

 

             Urine color (test code = Urine color) Yellow                       

          



[facility] glucose [id number] 17:30:00





             Test Item    Value        Reference Range Interpretation Comments

 

             Urine glucose detection (test code = Negative                      

         



             Urine glucose detection)                                        



[facility] [id number] 17:30:00





             Test Item    Value        Reference Range Interpretation Comments

 

             Urine pH (test code = Urine pH) 5.5 1                              

    



[facility]annU[facility] sediment erythrocyte count by microscopy (number/high 
power field)2021 17:30:00





             Test Item    Value        Reference Range Interpretation Comments

 

             Urine sediment erythrocyte None seen /HPF                          

 



             count by microscopy                                        



             (number/high power field)                                        



             (test code = Urine sediment                                        



             erythrocyte count by                                        



             microscopy (number/high power                                      

  



             field))                                             



[facility]annU[facility] specific gravity [id number] 17:30:00





             Test Item    Value        Reference Range Interpretation Comments

 

             Urine specific gravity measurement 1.015 1                         

       



             (test code = Urine specific gravity                                

        



             measurement)                                        



[facility]annUrine urobilinogen [id number] 17:30:00





             Test Item    Value        Reference Range Interpretation Comments

 

             Urine urobilinogen detection (test code 0.2                        

            



             = Urine urobilinogen detection)                                    

    



[facility]annU[facility] [id number] 17:30:00





             Test Item    Value        Reference Range Interpretation Comments

 

             Urine culture (test code = Urine MIXED KEVIN.                      

     



             culture)                                            



[facility]annAbsolute lymphocyte [id number] 11:13:00





             Test Item    Value        Reference Range Interpretation Comments

 

             Absolute lymphocyte count (test code = 0.7          0.7-4.9        

           



             Absolute lymphocyte count)                                        



[facility]annBasophil %2020 11:13:00





             Test Item    Value        Reference Range Interpretation Comments

 

             Basophil % (test code = 0.6          See_Comment                [Au

tomated message] The



             Basophil %)                                         system which ge

nerated



                                                                 this result tra

nsmitted



                                                                 reference range

: <=1.3.



                                                                 The reference r

sharona was



                                                                 not used to int

erpret



                                                                 this result as



                                                                 normal/abnormal

.



[facility]Blood erythrocytes count (number/volume)2020 11:13:00





             Test Item    Value        Reference Range Interpretation Comments

 

             Blood erythrocytes count 3.31         3.86-4.86                 



             (number/volume) (test code = Blood                                 

       



             erythrocytes count (number/volume))                                

        



[facility]annBlood hematocrit (volume fraction)2020 11:13:00





             Test Item    Value        Reference Range Interpretation Comments

 

             Blood hematocrit (volume fraction) 29.3         36.0-45.0          

       



             (test code = Blood hematocrit (volume                              

          



             fraction))                                          



[facility]annBlood platelet mean [id number] 11:13:00





             Test Item    Value        Reference Range Interpretation Comments

 

             Blood platelet mean volume (test code = 9.4          7.6-11.3      

            



             Blood platelet mean volume)                                        



[facility]annCalcium [Mass/volume] in Serum or [id number] 11:13:00





             Test Item    Value        Reference Range Interpretation Comments

 

             Calcium [Mass/volume] in Serum or 7.8          8.5-10.1            

      



             Plasma (test code = Calcium                                        



             [Mass/volume] in Serum or Plasma)                                  

      



[facility]annCarbon dioxide, total [Moles/volume] in Serum or Plasma
2020 11:13:00





             Test Item    Value        Reference Range Interpretation Comments

 

             Carbon dioxide, total [Moles/volume] in 33           21-32         

            



             Serum or Plasma (test code = Carbon                                

        



             dioxide, total [Moles/volume] in Serum                             

           



             or Plasma)                                          



[facility]annChemistry [id number] 11:13:00





             Test Item    Value        Reference Range Interpretation Comments

 

             Chemistry procedure (test code = 88.6                        

     



             Chemistry procedure)                                        



[facility]annChloride [Moles/volume] in Serum or [id number] 11:13:00





             Test Item    Value        Reference Range Interpretation Comments

 

             Chloride [Moles/volume] in Serum or 103                      

        



             Plasma (test code = Chloride                                       

 



             [Moles/volume] in Serum or Plasma)                                 

       



[facility]annCreatinine [Mass/volume] in Serum or [id number] 11:13:00





             Test Item    Value        Reference Range Interpretation Comments

 

             Creatinine [Mass/volume] in Serum or 2.21         0.55-1.3         

         



             Plasma (test code = Creatinine                                     

   



             [Mass/volume] in Serum or Plasma)                                  

      



[facility]annGlucose [Mass/volume] in Serum or [id number] 11:13:00





             Test Item    Value        Reference Range Interpretation Comments

 

             Glucose [Mass/volume] in Serum or 140                        

      



             Plasma (test code = Glucose                                        



             [Mass/volume] in Serum or Plasma)                                  

      



[facility]annMagnesium [Mass/volume] in Serum or [id number] 11:13:00





             Test Item    Value        Reference Range Interpretation Comments

 

             Magnesium [Mass/volume] in Serum or 1.8          1.8-2.4           

        



             Plasma (test code = Magnesium                                      

  



             [Mass/volume] in Serum or Plasma)                                  

      



[facility]annPhosphate [Mass/volume] in Serum or [id number] 11:13:00





             Test Item    Value        Reference Range Interpretation Comments

 

             Phosphate [Mass/volume] in Serum or 2.1          2.5-4.9           

        



             Plasma (test code = Phosphate                                      

  



             [Mass/volume] in Serum or Plasma)                                  

      



[facility]annPotassium [Moles/volume] in Serum or [id number] 11:13:00





             Test Item    Value        Reference Range Interpretation Comments

 

             Potassium [Moles/volume] in Serum or 3.8          3.5-5.1          

         



             Plasma (test code = Potassium                                      

  



             [Moles/volume] in Serum or Plasma)                                 

       



[facility]annSerum or plasma sodium measurement (moles/volume)2020 
11:13:00





             Test Item    Value        Reference Range Interpretation Comments

 

             Serum or plasma sodium measurement 141          136-145            

       



             (moles/volume) (test code = Serum or                               

         



             plasma sodium measurement                                        



             (moles/volume))                                        



[facility]annUrea nitrogen [Mass/volume] in Serum or [id number] 
11:13:00





             Test Item    Value        Reference Range Interpretation Comments

 

             Urea nitrogen [Mass/volume] in Serum or 50           7-18          

            



             Plasma (test code = Urea nitrogen                                  

      



             [Mass/volume] in Serum or Plasma)                                  

      



[facility]annUrine dipstick testing at point-of-vfmc0207-09-41 11:13:00





             Test Item    Value        Reference Range Interpretation Comments

 

             Urine dipstick testing at point-of-care 3.2          4.3-10.9      

            



             (test code = Urine dipstick testing at                             

           



             point-of-care)                                        



[facility]annAlanine aminotransferase [Enzymatic activity/volume] in Serum or
Plasma by With P-5'-2020 11:27:00





             Test Item    Value        Reference Range Interpretation Comments

 

             Alanine aminotransferase [Enzymatic 16           12-78             

        



             activity/volume] in Serum or Plasma by                             

           



             With P-5'- (test code = Alanine                                    

    



             aminotransferase [Enzymatic                                        



             activity/volume] in Serum or Plasma by                             

           



             With P-5'-)                                         



[facility]annAlbumin [Mass/volume] in Serum or Plasma by Bromocresol purple 
(BCP) dye binding [id number] 11:27:00





             Test Item    Value        Reference Range Interpretation Comments

 

             Albumin [Mass/volume] in Serum or 2.5          3.4-5.0             

      



             Plasma by Bromocresol purple (BCP) dye                             

           



             binding meth (test code = Albumin                                  

      



             [Mass/volume] in Serum or Plasma by                                

        



             Bromocresol purple (BCP) dye binding                               

         



             meth)                                               



[facility]annAlkaline phosphatase [Enzymatic activity/volume] in Serum or 
[id number] 11:27:00





             Test Item    Value        Reference Range Interpretation Comments

 

             Alkaline phosphatase [Enzymatic 72                           

    



             activity/volume] in Serum or Plasma                                

        



             (test code = Alkaline phosphatase                                  

      



             [Enzymatic activity/volume] in Serum or                            

            



             Plasma)                                             



[facility]annAspartate aminotransferase [Enzymatic activity/volume] in Serum 
or Plasma by With [id number] 11:27:00





             Test Item    Value        Reference Range Interpretation Comments

 

             Aspartate aminotransferase [Enzymatic 23           15-37           

          



             activity/volume] in Serum or Plasma by                             

           



             With P-5 (test code = Aspartate                                    

    



             aminotransferase [Enzymatic                                        



             activity/volume] in Serum or Plasma by                             

           



             With P-5)                                           



[facility]annBilirubin.total [Mass/volume] in Serum or [id number] 
11:27:00





             Test Item    Value        Reference Range Interpretation Comments

 

             Bilirubin.total [Mass/volume] in Serum 1.0          0.2-1.0        

           



             or Plasma (test code = Bilirubin.total                             

           



             [Mass/volume] in Serum or Plasma)                                  

      



[facility]annBlood morphology interpretation [id number] 11:27:00





             Test Item    Value        Reference Range Interpretation Comments

 

             Blood morphology interpretation Not seen                           

    



             narrative (test code = Blood                                       

 



             morphology interpretation narrative)                               

         



[facility] HermannNatriuretic peptide.B prohormone N-Terminal [Mass/volume] in 
Serum or [id number] 11:27:00





             Test Item    Value        Reference Range Interpretation Comments

 

             Natriuretic peptide.B prohormone 493                               

     



             N-Terminal [Mass/volume] in Serum or                               

         



             Plasma (test code = Natriuretic                                    

    



             peptide.B prohormone N-Terminal                                    

    



             [Mass/volume] in Serum or Plasma)                                  

      



[facility]annProtein [Mass/volume] in Serum or [id number] 11:27:00





             Test Item    Value        Reference Range Interpretation Comments

 

             Protein [Mass/volume] in Serum or 6.7          6.4-8.2             

      



             Plasma (test code = Protein                                        



             [Mass/volume] in Serum or Plasma)                                  

      



[facility]annTroponin I.cardiac [Mass/volume] in Serum or [id number] 
11:27:00





             Test Item    Value        Reference Range Interpretation Comments

 

             Troponin I.cardiac < 0.02       See_Comment                [Automat

ed message] The



             [Mass/volume] in Serum                                        syste

m which generated



             or Plasma (test code =                                        this 

result transmitted



             Troponin I.cardiac                                        reference

 range: <=0.045.



             [Mass/volume] in Serum                                        The r

eference range was



             or Plasma)                                          not used to int

erpret



                                                                 this result as



                                                                 normal/abnormal

.



[facility]annUrinalysis with [id number] 02:23:00





             Test Item    Value        Reference Range Interpretation Comments

 

             Urinalysis with microscopy (test Negative                          

     



             code = Urinalysis with microscopy)                                 

       



[facility]annUrine blood [id number] 02:23:00





             Test Item    Value        Reference Range Interpretation Comments

 

             Urine blood detection (test code = Negative                        

       



             Urine blood detection)                                        



[facility] [id number] 02:23:00





             Test Item    Value        Reference Range Interpretation Comments

 

             Urine glucose detection (test code = Negative                      

         



             Urine glucose detection)                                        



[facility] [id number] 02:23:00





             Test Item    Value        Reference Range Interpretation Comments

 

             Urine pH (test code = Urine pH) 5.0 1                              

    



[facility] specific gravity [id number] 02:23:00





             Test Item    Value        Reference Range Interpretation Comments

 

             Urine specific gravity measurement 1.015 1                         

       



             (test code = Urine specific gravity                                

        



             measurement)                                        



[facility]Urinalysis with reflex to [id number] 02:14:00





             Test Item    Value        Reference Range Interpretation Comments

 

             Urinalysis with reflex to culture Reflexed                         

      



             (test code = Urinalysis with reflex                                

        



             to culture)                                         



[facility] sediment erythrocyte count by microscopy (number/high 
power field)2020 02:14:00





             Test Item    Value        Reference Range Interpretation Comments

 

             Urine sediment erythrocyte count by <5                             

        



             microscopy (number/high power field)                               

         



             (test code = Urine sediment erythrocyte                            

            



             count by microscopy (number/high power                             

           



             field))                                             



[facility]annBilirubin.direct [Mass/volume] in Serum or [id number] 
00:14:00





             Test Item    Value        Reference Range Interpretation Comments

 

             Bilirubin.direct 0.3          See_Comment                [Automated

 message] The



             [Mass/volume] in Serum                                        syste

m which generated



             or Plasma (test code =                                        this 

result transmitted



             Bilirubin.direct                                        reference r

sharona: <=0.2.



             [Mass/volume] in Serum                                        The r

eference range was



             or Plasma)                                          not used to int

erpret this



                                                                 result as fabiano

l/abnormal.



[facility] Blood by Coagulation [id number] 00:14:00





             Test Item    Value        Reference Range Interpretation Comments

 

             INR in Blood by Coagulation assay 1.30 1                           

      



             (test code = INR in Blood by                                       

 



             Coagulation assay)                                        



[facility]annLactate [Moles/volume] in Serum or [id number] 00:14:00





             Test Item    Value        Reference Range Interpretation Comments

 

             Lactate [Moles/volume] in Serum or 1.1          0.4-2.0            

       



             Plasma (test code = Lactate                                        



             [Moles/volume] in Serum or Plasma)                                 

       



[facility] NoahannTroponin I.cardiac [Mass/volume] in Serum or [id number] 
00:14:00





             Test Item    Value        Reference Range Interpretation Comments

 

             Troponin I.cardiac < 0.02       See_Comment                [Automat

ed message] The



             [Mass/volume] in Serum                                        syste

m which generated



             or Plasma (test code =                                        this 

result transmitted



             Troponin I.cardiac                                        reference

 range: <=0.045.



             [Mass/volume] in Serum                                        The r

eference range was



             or Plasma)                                          not used to int

erpret



                                                                 this result as



                                                                 normal/abnormal

.



[facility] HermannAlbumin [Mass/volume] in Serum or Plasma by Bromocresol purple 
(BCP) dye binding [id number] 17:00:00





             Test Item    Value        Reference Range Interpretation Comments

 

             Albumin [Mass/volume] in Serum or 2.9          3.4-5.0             

      



             Plasma by Bromocresol purple (BCP) dye                             

           



             binding meth (test code = Albumin                                  

      



             [Mass/volume] in Serum or Plasma by                                

        



             Bromocresol purple (BCP) dye binding                               

         



             meth)                                               



[facility] HermannBlood hematocrit (volume fraction)2019 17:00:00





             Test Item    Value        Reference Range Interpretation Comments

 

             Blood hematocrit (volume fraction) 31.2         36.0-45.0          

       



             (test code = Blood hematocrit (volume                              

          



             fraction))                                          



[facility] HermannCalcium [Mass/volume] in Serum or [id number] 17:00:00





             Test Item    Value        Reference Range Interpretation Comments

 

             Calcium [Mass/volume] in Serum or 8.6          8.5-10.1            

      



             Plasma (test code = Calcium                                        



             [Mass/volume] in Serum or Plasma)                                  

      



[facility] HermannCarbon dioxide, total [Moles/volume] in Serum or Plasma
2019 17:00:00





             Test Item    Value        Reference Range Interpretation Comments

 

             Carbon dioxide, total [Moles/volume] in 31           21-32         

            



             Serum or Plasma (test code = Carbon                                

        



             dioxide, total [Moles/volume] in Serum                             

           



             or Plasma)                                          



[facility] HermannChloride [Moles/volume] in Serum or [id number] 17:00:00





             Test Item    Value        Reference Range Interpretation Comments

 

             Chloride [Moles/volume] in Serum or 108                      

        



             Plasma (test code = Chloride                                       

 



             [Moles/volume] in Serum or Plasma)                                 

       



[facility]annCreatinine [Mass/volume] in Serum or [id number] 17:00:00





             Test Item    Value        Reference Range Interpretation Comments

 

             Creatinine [Mass/volume] in Serum or 1.83         0.55-1.3         

         



             Plasma (test code = Creatinine                                     

   



             [Mass/volume] in Serum or Plasma)                                  

      



[facility]annCreatinine [Mass/volume] in [id number] 17:00:00





             Test Item    Value        Reference Range Interpretation Comments

 

             Creatinine [Mass/volume] in Urine (test 50.0                 

            



             code = Creatinine [Mass/volume] in                                 

       



             Urine)                                              



[facility]annGlucose [Mass/volume] in Serum or [id number] 17:00:00





             Test Item    Value        Reference Range Interpretation Comments

 

             Glucose [Mass/volume] in Serum or 132                        

      



             Plasma (test code = Glucose                                        



             [Mass/volume] in Serum or Plasma)                                  

      



[facility]annParathyrin.intact [Mass/volume] in Serum or [id number] 
17:00:00





             Test Item    Value        Reference Range Interpretation Comments

 

             Parathyrin.intact [Mass/volume] in 59.2         18.4-80.1          

       



             Serum or Plasma (test code =                                       

 



             Parathyrin.intact [Mass/volume] in                                 

       



             Serum or Plasma)                                        



[facility]annPhosphate [Mass/volume] in Serum or [id number] 17:00:00





             Test Item    Value        Reference Range Interpretation Comments

 

             Phosphate [Mass/volume] in Serum or 3.3          2.5-4.9           

        



             Plasma (test code = Phosphate                                      

  



             [Mass/volume] in Serum or Plasma)                                  

      



[facility]annPotassium [Moles/volume] in Serum or [id number] 17:00:00





             Test Item    Value        Reference Range Interpretation Comments

 

             Potassium [Moles/volume] in Serum or 3.5          3.5-5.1          

         



             Plasma (test code = Potassium                                      

  



             [Moles/volume] in Serum or Plasma)                                 

       



[facility]annProtein [Mass/volume] in [id number] 17:00:00





             Test Item    Value        Reference Range Interpretation Comments

 

             Protein [Mass/volume] in Urine (test 6                             

         



             code = Protein [Mass/volume] in Urine)                             

           



[facility]annSerum or plasma sodium measurement (moles/volume)2019 
17:00:00





             Test Item    Value        Reference Range Interpretation Comments

 

             Serum or plasma sodium measurement 145          136-145            

       



             (moles/volume) (test code = Serum or                               

         



             plasma sodium measurement                                        



             (moles/volume))                                        



[facility] HermannUrate [Mass/volume] in Serum or [id number] 17:00:00





             Test Item    Value        Reference Range Interpretation Comments

 

             Urate [Mass/volume] in Serum or Plasma 10.4         2.6-6.0        

           



             (test code = Urate [Mass/volume] in                                

        



             Serum or Plasma)                                        



[facility] HermannUrea nitrogen [Mass/volume] in Serum or [id number] 
17:00:00





             Test Item    Value        Reference Range Interpretation Comments

 

             Urea nitrogen [Mass/volume] in Serum or 54           7-18          

            



             Plasma (test code = Urea nitrogen                                  

      



             [Mass/volume] in Serum or Plasma)                                  

      



[facility]-of-[id number] 17:00:00





             Test Item    Value        Reference Range Interpretation Comments

 

             Urine dipstick testing at point-of-care 10.5         12.0-15.0     

            



             (test code = Urine dipstick testing at                             

           



             point-of-care)                                        



[facility] [id number] 11:19:00





             Test Item    Value        Reference Range Interpretation Comments

 

             Bedside Glucose (test code = Bedside 271                     

         



             Glucose)                                            



[facility] [id number] 05:00:00





             Test Item    Value        Reference Range Interpretation Comments

 

             Thyroid Stimulating Hormone (TSH) (test 1.28         0.34-5.60     

            



             code = Thyroid Stimulating Hormone                                 

       



             (TSH))                                              



[facility]2018-03-04 05:00:00





             Test Item    Value        Reference Range Interpretation Comments

 

             Sodium Level (test code = Sodium Level) 139          135-145       

            



[facility] [id number] 05:00:00





             Test Item    Value        Reference Range Interpretation Comments

 

             Potassium Level (test code = Potassium 3.8          3.6-5.0        

           



             Level)                                              



[facility]2018-03-04 05:00:00





             Test Item    Value        Reference Range Interpretation Comments

 

             Glucose Level (test code = Glucose 116                       

       



             Level)                                              



[facility]2018-03-04 05:00:00





             Test Item    Value        Reference Range Interpretation Comments

 

             Estimat Glomerular 29           See_Comment                [Automat

ed message] The



             Filtration Rate (test                                        system

 which generated



             code = Estimat                                        this result t

ransmitted



             Glomerular Filtration                                        refere

nce range: >=90.



             Rate)                                               The reference r

sharona was



                                                                 not used to int

erpret



                                                                 this result as



                                                                 normal/abnormal

.



[facility]2018-03-04 05:00:00





             Test Item    Value        Reference Range Interpretation Comments

 

             Creatinine (test code = Creatinine) 1.69         0.44-1.00         

        



[facility]2018-03-04 05:00:00





             Test Item    Value        Reference Range Interpretation Comments

 

             Chloride Level (test code = Chloride 102          101-111          

         



             Level)                                              



[facility]2018-03-04 05:00:00





             Test Item    Value        Reference Range Interpretation Comments

 

             Carbon Dioxide Level (test code = 32           21-31               

      



             Carbon Dioxide Level)                                        



[facility]2018-03-04 05:00:00





             Test Item    Value        Reference Range Interpretation Comments

 

             Calcium Level (test code = Calcium 8.4          8.5-10.5           

       



             Level)                                              



[facility]2018-03-04 05:00:00





             Test Item    Value        Reference Range Interpretation Comments

 

             Blood Urea Nitrogen (test code = Blood 33           6-20           

           



             Urea Nitrogen)                                        



[facility]2018-03-04 05:00:00





             Test Item    Value        Reference Range Interpretation Comments

 

             White Blood Count (test code = White 2.2          4.3-10.9         

         



             Blood Count)                                        



[facility]2018-03-04 05:00:00





             Test Item    Value        Reference Range Interpretation Comments

 

             Red Cell Distribution Width (test code 14.5         12.1-15.2      

           



             = Red Cell Distribution Width)                                     

   



[facility]2018-03-04 05:00:00





             Test Item    Value        Reference Range Interpretation Comments

 

             Red Blood Count (test code = Red Blood 3.18         3.86-4.86      

           



             Count)                                              



[facility]2018-03-04 05:00:00





             Test Item    Value        Reference Range Interpretation Comments

 

             Platelet Count (test code = Platelet 95           152-406          

         



             Count)                                              



[facility]2018-03-04 05:00:00





             Test Item    Value        Reference Range Interpretation Comments

 

             Neutrophils % (test code = Neutrophils 48.8         41.7-73.7      

           



             %)                                                  



[facility]2018-03-04 05:00:00





             Test Item    Value        Reference Range Interpretation Comments

 

             Monocytes % (test code = Monocytes %) 10.2         3.3-12.3        

          



[facility]2018-03-04 05:00:00





             Test Item    Value        Reference Range Interpretation Comments

 

             Mean Platelet Volume (test code = Mean 8.3          7.6-11.3       

           



             Platelet Volume)                                        



[facility]2018-03-04 05:00:00





             Test Item    Value        Reference Range Interpretation Comments

 

             Mean Corpuscular Volume (test code = 82.8                    

         



             Mean Corpuscular Volume)                                        



[facility]2018-03-04 05:00:00





             Test Item    Value        Reference Range Interpretation Comments

 

             Mean Corpuscular Hemoglobin Concent 32.8         32.0-36.0         

        



             (test code = Mean Corpuscular                                      

  



             Hemoglobin Concent)                                        



[facility]2018-03-04 05:00:00





             Test Item    Value        Reference Range Interpretation Comments

 

             Mean Corpuscular Hemoglobin (test 27.1 pg      27.0-35.0           

      



             code = Mean Corpuscular Hemoglobin)                                

        



[facility]2018-03-04 05:00:00





             Test Item    Value        Reference Range Interpretation Comments

 

             Lymphocytes % (test code = Lymphocytes 35.1         15.3-44.8      

           



             %)                                                  



[facility]2018-03-04 05:00:00





             Test Item    Value        Reference Range Interpretation Comments

 

             Hemoglobin (test code = Hemoglobin) 8.6          12.0-15.0         

        



[facility]2018-03-04 05:00:00





             Test Item    Value        Reference Range Interpretation Comments

 

             Hematocrit (test code = Hematocrit) 26.3         36.0-45.0         

        



[facility]2018-03-04 05:00:00





             Test Item    Value        Reference Range Interpretation Comments

 

             Eosinophils % (test code 5.0          See_Comment                [A

utomated message] The



             = Eosinophils %)                                        system ic

h generated



                                                                 this result tra

nsmitted



                                                                 reference range

: <=4.4.



                                                                 The reference r

sharona was



                                                                 not used to int

erpret



                                                                 this result as



                                                                 normal/abnormal

.



[facility]2018-03-04 05:00:00





             Test Item    Value        Reference Range Interpretation Comments

 

             Basophils % (test code 0.9          See_Comment                [Aut

omated message] The



             = Basophils %)                                        system which 

generated



                                                                 this result tra

nsmitted



                                                                 reference range

: <=1.3.



                                                                 The reference r

sharona was



                                                                 not used to int

erpret



                                                                 this result as



                                                                 normal/abnormal

.



[facility] [id number] 05:00:00





             Test Item    Value        Reference Range Interpretation Comments

 

             Absolute Neutrophil (test code = 1.1          1.8-8.0              

     



             Absolute Neutrophil)                                        



[facility]2018-03-04 05:00:00





             Test Item    Value        Reference Range Interpretation Comments

 

             Absolute Monocytes (CBC) (test code = 0.2          0.1-1.3         

          



             Absolute Monocytes (CBC))                                        



[facility]2018-03-04 05:00:00





             Test Item    Value        Reference Range Interpretation Comments

 

             Absolute Lymphocytes (CBC) (test code = 0.8          0.7-4.9       

            



             Absolute Lymphocytes (CBC))                                        



[facility]2018-03-04 05:00:00





             Test Item    Value        Reference Range Interpretation Comments

 

             Absolute Eosinophils 0.1          See_Comment                [Autom

ated message] The



             (CBC) (test code =                                        system wh

ich generated



             Absolute Eosinophils                                        this re

sult transmitted



             (CBC))                                              reference range

: <=0.5.



                                                                 The reference r

sharona was



                                                                 not used to int

erpret



                                                                 this result as



                                                                 normal/abnormal

.



[facility]2018-03-04 05:00:00





             Test Item    Value        Reference Range Interpretation Comments

 

             Absolute Basophils 0.0          See_Comment                [Automat

ed message] The



             (CBC) (test code =                                        system wh

ich generated



             Absolute Basophils                                        this resu

lt transmitted



             (CBC))                                              reference range

: <=0.5.



                                                                 The reference r

sharona was



                                                                 not used to int

erpret



                                                                 this result as



                                                                 normal/abnormal

.



[facility]2018-03-04 03:35:00





             Test Item    Value        Reference Range Interpretation Comments

 

             Urine Random Total Protein (test code = 13                         

            



             Urine Random Total Protein)                                        



[facility]2018-03-04 03:35:00





             Test Item    Value        Reference Range Interpretation Comments

 

             Urine Protein/Creatinine Ratio (test 0.09 1                        

         



             code = Urine Protein/Creatinine Ratio)                             

           



[facility]2018-03-04 03:35:00





             Test Item    Value        Reference Range Interpretation Comments

 

             Urine Creatinine (test code = Urine 143.6                          

        



             Creatinine)                                         



[facility]2018-03-02 04:23:00





             Test Item    Value        Reference Range Interpretation Comments

 

             Blood Morphology Blood Morphology                           



             Comment (test code = Comment                                



             Blood Morphology                                        



             Comment)                                            



[id number]





             Test Item    Value        Reference Range Interpretation Comments

 

             B-Type Natriuretic Peptide (test code = 35                         

            



             B-Type Natriuretic Peptide)                                        



[facility]2018-03-01 22:25:00





             Test Item    Value        Reference Range Interpretation Comments

 

             Urine pH (test code = Urine pH) 7.0 1                              

    



[facility]2018-03-01 22:25:00





             Test Item    Value        Reference Range Interpretation Comments

 

             Urine WBC (test code = Urine WBC) no gt                            

      



[facility]2018-03-01 22:25:00





             Test Item    Value        Reference Range Interpretation Comments

 

             Urine Urobilinogen (test code = Urine 1.0                          

          



             Urobilinogen)                                        



[facility]2018-03-01 22:25:00





             Test Item    Value        Reference Range Interpretation Comments

 

             Urine Total Protein (test Urine Total Protein                      

     



             code = Urine Total                                        



             Protein)                                            



[facility]2018-03-01 22:25:00





             Test Item    Value        Reference Range Interpretation Comments

 

             Urine Squamous Epithelial Cells (test no gt                        

          



             code = Urine Squamous Epithelial Cells)                            

            



[facility]2018-03-01 22:25:00





             Test Item    Value        Reference Range Interpretation Comments

 

             Urine Specific Gravity (test code = 1.010 1                        

        



             Urine Specific Gravity)                                        



[facility]2018-03-01 22:25:00





             Test Item    Value        Reference Range Interpretation Comments

 

             Urine RBC (test code = Urine RBC) Urine RBC                        

      



[facility]2018-03-01 22:25:00





             Test Item    Value        Reference Range Interpretation Comments

 

             Urine Nitrite (test code = Urine Nitrite                           



             Urine Nitrite)                                        



[facility]2018-03-01 22:25:00





             Test Item    Value        Reference Range Interpretation Comments

 

             Urine Leukocyte Urine Leukocyte                           



             Esterase (test code = Esterase                               



             Urine Leukocyte                                        



             Esterase)                                           



[facility]2018-03-01 22:25:00





             Test Item    Value        Reference Range Interpretation Comments

 

             Urine Ketones (test code = Urine Ketones                           



             Urine Ketones)                                        



[facility]2018-03-01 22:25:00





             Test Item    Value        Reference Range Interpretation Comments

 

             Urine Glucose (test code = Urine Glucose                           



             Urine Glucose)                                        



[facility]2018-03-01 22:25:00





             Test Item    Value        Reference Range Interpretation Comments

 

             Urine Culture Reflexed Urine Culture Reflexed                      

     



             (test code = Urine                                        



             Culture Reflexed)                                        



[facility]2018-03-01 22:25:00





             Test Item    Value        Reference Range Interpretation Comments

 

             Urine Color (test code = Urine Urine Color                         

   



             Color)                                              



[facility]2018-03-01 22:25:00





             Test Item    Value        Reference Range Interpretation Comments

 

             Urine Blood (test code = Urine Urine Blood                         

   



             Blood)                                              



[facility]2018-03-01 22:25:00





             Test Item    Value        Reference Range Interpretation Comments

 

             Urine Bilirubin (test code = Urine Bilirubin                       

    



             Urine Bilirubin)                                        



[facility]2018-03-01 22:25:00





             Test Item    Value        Reference Range Interpretation Comments

 

             Urine Bacteria (test code = Urine no gt                            

      



             Bacteria)                                           



[facility]2018-03-01 22:25:00





             Test Item    Value        Reference Range Interpretation Comments

 

             Urine Appearance (test code Urine Appearance                       

    



             = Urine Appearance)                                        



[facility]2018-03-01 15:27:00





             Test Item    Value        Reference Range Interpretation Comments

 

             Prothrombin Time (test code = 14.6         9.5-12.5                

  



             Prothrombin Time)                                        



[facility]2018-03-01 15:27:00





             Test Item    Value        Reference Range Interpretation Comments

 

             INR International Normalized Ratio 1.23 1                          

       



             (test code = INR International                                     

   



             Normalized Ratio)                                        



[facility]2018-03-01 15:27:00





             Test Item    Value        Reference Range Interpretation Comments

 

             Activated Partial Thromboplast Time 27.0         24.3-36.9         

        



             (test code = Activated Partial                                     

   



             Thromboplast Time)                                        



[facility]2018-03-01 15:27:00





             Test Item    Value        Reference Range Interpretation Comments

 

             Magnesium Level (test code = Magnesium 1.6          1.8-2.5        

           



             Level)                                              



[facility]2018-03-01 15:27:00





             Test Item    Value        Reference Range Interpretation Comments

 

             Creatine Kinase (test code = Creatine 31                     

          



             Kinase)                                             



[facility]2018-03-01 15:27:00





             Test Item    Value        Reference Range Interpretation Comments

 

             Rapid Troponin I (test code = Rapid no gt                          

        



             Troponin I)                                         



[facility]2018-03-01 15:27:00





             Test Item    Value        Reference Range Interpretation Comments

 

             Creatine Kinase MB (test code = 0.3          0.3-4.0               

    



             Creatine Kinase MB)                                        



[facility]2017-12-22 12:40:00





             Test Item    Value        Reference Range Interpretation Comments

 

             Urine Amorphous Urine Amorphous                           



             Sediment (test code = Sediment                               



             Urine Amorphous                                        



             Sediment)                                           



[facility]2017-12-22 12:08:00





             Test Item    Value        Reference Range Interpretation Comments

 

             Lactic Acid Level (test code = Lactic 10.2         4.5-19.8        

          



             Acid Level)                                         



[facility]2017-12-22 12:08:00





             Test Item    Value        Reference Range Interpretation Comments

 

             Procalcitonin (test code = 0.13                                   



             Procalcitonin)                                        



[facility]2017-12-22 12:08:00





             Test Item    Value        Reference Range Interpretation Comments

 

             Sedimentation Rate, 78           See_Comment                [Automa

kaela message]



             Westergren (test code =                                        The 

system which



             Sedimentation Rate,                                        generate

d this result



             Westergren)                                         transmitted ref

erence



                                                                 range: <=30. Th

e



                                                                 reference range

 was not



                                                                 used to interpr

et this



                                                                 result as



                                                                 normal/abnormal

.



[facility]2017-12-22 12:08:00





             Test Item    Value        Reference Range Interpretation Comments

 

             Total Bilirubin (test code = Total 1.2          0.3-1.2            

       



             Bilirubin)                                          



[facility]2017-12-22 12:08:00





             Test Item    Value        Reference Range Interpretation Comments

 

             Serum Total Protein (test code = Serum 6.2          6.0-8.3        

           



             Total Protein)                                        



[facility]2017-12-22 12:08:00





             Test Item    Value        Reference Range Interpretation Comments

 

             Globulin (test code = Globulin) 3.3          2.3-3.5               

    



[facility]2017-12-22 12:08:00





             Test Item    Value        Reference Range Interpretation Comments

 

             Direct Bilirubin (test 0.2          See_Comment                [Aut

omated message] The



             code = Direct                                        system which g

enerated



             Bilirubin)                                          this result tra

nsmitted



                                                                 reference range

: <=0.2.



                                                                 The reference r

sharona was



                                                                 not used to int

erpret



                                                                 this result as



                                                                 normal/abnormal

.



[facility]2017-12-22 12:08:00





             Test Item    Value        Reference Range Interpretation Comments

 

             Aspartate Amino Transf (AST/SGOT) (test 53           10-42         

            



             code = Aspartate Amino Transf                                      

  



             (AST/SGOT))                                         



[facility]2017-12-22 12:08:00





             Test Item    Value        Reference Range Interpretation Comments

 

             Alkaline Phosphatase (test code = 85                         

      



             Alkaline Phosphatase)                                        



[facility]2017-12-22 12:08:00





             Test Item    Value        Reference Range Interpretation Comments

 

             Albumin/Globulin Ratio (test code = 0.9 1        1.1-1.8           

        



             Albumin/Globulin Ratio)                                        



[facility]2017-12-22 12:08:00





             Test Item    Value        Reference Range Interpretation Comments

 

             Albumin (test code = Albumin) 2.9          3.2-5.5                 

  



[facility]2017-12-22 12:08:00





             Test Item    Value        Reference Range Interpretation Comments

 

             Alanine Aminotransferase (ALT/SGPT) 29           10-60             

        



             (test code = Alanine Aminotransferase                              

          



             (ALT/SGPT))                                         



[facility]2017-12-22 12:08:00





             Test Item    Value        Reference Range Interpretation Comments

 

             Lipase (test code = Lipase) 17           -51                     



[facility]

## 2022-09-30 LAB
BUN BLD-MCNC: 58 MG/DL (ref 7–18)
GLUCOSE SERPLBLD-MCNC: 145 MG/DL (ref 74–106)
HCT VFR BLD CALC: 28.9 % (ref 36–45)
LYMPHOCYTES # SPEC AUTO: 0.4 K/UL (ref 0.7–4.9)
MCV RBC: 92.2 FL (ref 80–100)
PMV BLD: 7.1 FL (ref 7.6–11.3)
POTASSIUM SERPL-SCNC: 4.3 MMOL/L (ref 3.5–5.1)
RBC # BLD: 3.13 M/UL (ref 3.86–4.86)
TROPONIN I SERPL HS-MCNC: 33.6 PG/ML (ref ?–58.9)

## 2022-09-30 RX ADMIN — APIXABAN SCH MG: 2.5 TABLET, FILM COATED ORAL at 21:20

## 2022-09-30 RX ADMIN — AMIODARONE HYDROCHLORIDE SCH MG: 200 TABLET ORAL at 18:06

## 2022-09-30 RX ADMIN — AMIODARONE HYDROCHLORIDE SCH MG: 200 TABLET ORAL at 18:05

## 2022-09-30 RX ADMIN — Medication SCH ML: at 21:00

## 2022-09-30 RX ADMIN — Medication SCH: at 09:00

## 2022-09-30 RX ADMIN — AMIODARONE HYDROCHLORIDE SCH MG: 200 TABLET ORAL at 21:20

## 2022-09-30 NOTE — P.HP
Certification for Inpatient


Patient admitted to: Observation


Practitioner: I am a practitioner with admitting privileges, knowledge of 

patient current condition, hospital course, and medical plan of care.


Services: Services provided to patient in accordance with Admission requirements

found in Title 42 Section 412.3 of the Code of Federal Regulations





Patient History


Date of Service: 09/30/22


Reason for admission: a fib


History of Present Illness: 





FABIOLA COMES FOR RAPID A FIB AGAIN. SHE CONVERTED WITH CARDIZEM IV. SHE IS 

STABLE. SHE HAS NO CHEST PAIN. 


Allergies





caffeine Adverse Reaction (Verified 09/30/22 06:43)


   Nausea/Vomiting


blood thinner Allergy (Mild, Uncoded 02/27/20 21:12)


   swelling


blood thinners Allergy (Uncoded 02/27/20 21:12)


   Unknown


Statins-Hmg-Coa Redu Allergy (Uncoded 02/27/20 21:12)


   Unknown





Home medications list reviewed: Yes


Home Medications: 








Acetaminophen [Tylenol] 2 tab PO Q4HP PRN 09/30/22 


Amiodarone HCl [Pacerone] 0.5 tab PO DAILY 09/30/22 


Apixaban [Eliquis] 0.5 tab PO BID 09/30/22 


Aspirin [Aspirin EC] 1 tab PO DAILY 09/30/22 


Cetirizine HCl [Zyrtec] 1 tab PO DAILY 09/30/22 


Cholecalciferol (Vitamin D3) [Vitamin D 5,000 IU Cap*] 1 tab PO DAILY 09/30/22 


Ezetimibe [Zetia] 1 tab PO DAILY 09/30/22 


Fluticasone [Flonase 50MCG Nasal Spray*] 1 spray IN BID 09/30/22 


Insulin Aspart [Novolog Flexpen] 10 units SQ BID 09/30/22 


Insulin Detemir [Levemir Flextouch] 10 units SQ DAILY 09/30/22 


Insulin Detemir [Levemir Flextouch] 50 units SQ BEDTIME 09/30/22 


Lactobacillus Acidophilus [Acidophilus Lactobacilli] 1 cap PO TID 09/30/22 


Melatonin [Melatonin*] 3 tab PO BEDTIME 09/30/22 


Midodrine HCl 4 tab PO TID 09/30/22 


Pantoprazole Sodium [Protonix] 1 tab PO DAILY 09/30/22 


Pravastatin Sodium 1 tab PO BEDTIME 09/30/22 


Sennosides 1 tab PO DAILY 09/30/22 


Simethicone 2.25 tab PO TID 09/30/22 


Ubidecarenone [Co Q-10] 2 cap PO DAILY 09/30/22 


Zinc Sulfate [Zinc Sulfate*] 1 tab PO DAILY 09/30/22 








- Past Medical/Surgical History


Has patient received pneumonia vaccine in the past: Yes


Diabetic: Yes


-: DM


-: HTN


-: Hyperlipidemia


-: CVA 2015


-: Gastric Ulcer


-: Chronic renal failure


-: Diastolic heart failure


-: COVID infection July 2021


-: dilaysis MWF


-: Full Hysterectomy


-: R upper chest dialysis cath





- Family History


  ** Mother


-: Diabetes





- Social History


Smoking Status: Never smoker


Alcohol use: No


CD- Drugs: No


Caffeine use: No





Review of Systems


10-point ROS is otherwise unremarkable





Physical Examination





- Vital Signs


Temperature: 97.9 F


Blood Pressure: 120/47


Pulse: 72


Respirations: 18


Pulse Ox (%): 98





- Physical Exam


General: Alert, In no apparent distress


HEENT: Atraumatic, PERRLA, Mucous membr. moist/pink, EOMI, Sclerae nonicteric


Neck: Supple, 2+ carotid pulse no bruit, No LAD, Without JVD or thyroid 

abnormality


Respiratory: Clear to auscultation bilaterally, Normal air movement


Cardiovascular: Regular rate/rhythm, Normal S1 S2


Gastrointestinal: Normal bowel sounds, No tenderness


Musculoskeletal: No tenderness


Integumentary: No rashes


Neurological: Normal gait, Normal speech, Normal strength at 5/5 x4 extr, Normal

tone, Normal affect


Lymphatics: No axilla or inguinal lymphadenopathy





- Studies


Laboratory Data (last 24 hrs)





09/30/22 01:30: Sodium 137, Potassium 4.3, BUN 58 H, Creatinine 4.86 H, Glucose 

145 H


09/30/22 00:36: WBC 6.80, Hgb 9.4 L, Hct 28.9 L, Plt Count 137 L








Assessment and Plan





- Problems (Diagnosis)


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


Current Visit: No   Status: Chronic   


Plan: 


HD TODAY.








(2) Rapid atrial fibrillation


Current Visit: No   Status: Resolved   


Plan: 


I TALKED TO DR. MABRY


RAISE DOSE AMIODARONE 200 MG PO BID.


STABLE FOR NOW.








- Advance Directives


Does patient have a Living Will: No


Does patient have a Durable POA for Healthcare: No

## 2022-09-30 NOTE — P.CNS
Date of Consult: 09/30/22


Reason for Consult: ESRD


Requesting Physician: Porfirio Valdez


Chief Complaint: Palpitations


History of Present Illness: 





88F w/ PMHx of ESRD on HD qMWF, Htn, afib, anemia, & renal osteodystrophy, who 

was BIBA for palpitations, found to have rapid afib.  Now on po amiodarone & HR 

controlled.  She received HD today, was well tolerated.


Allergies





caffeine Adverse Reaction (Verified 09/30/22 06:43)


   Nausea/Vomiting


blood thinner Allergy (Mild, Uncoded 02/27/20 21:12)


   swelling


blood thinners Allergy (Uncoded 02/27/20 21:12)


   Unknown


Statins-Hmg-Coa Redu Allergy (Uncoded 02/27/20 21:12)


   Unknown





Home Medications: 








Acetaminophen [Tylenol] 2 tab PO Q4HP PRN 09/30/22 


Amiodarone HCl [Pacerone] 0.5 tab PO DAILY 09/30/22 


Apixaban [Eliquis] 0.5 tab PO BID 09/30/22 


Aspirin [Aspirin EC] 1 tab PO DAILY 09/30/22 


Cetirizine HCl [Zyrtec] 1 tab PO DAILY 09/30/22 


Cholecalciferol (Vitamin D3) [Vitamin D 5,000 IU Cap*] 1 tab PO DAILY 09/30/22 


Ezetimibe [Zetia] 1 tab PO DAILY 09/30/22 


Fluticasone [Flonase 50MCG Nasal Spray*] 1 spray IN BID 09/30/22 


Insulin Aspart [Novolog Flexpen] 10 units SQ BID 09/30/22 


Insulin Detemir [Levemir Flextouch] 10 units SQ DAILY 09/30/22 


Insulin Detemir [Levemir Flextouch] 50 units SQ BEDTIME 09/30/22 


Lactobacillus Acidophilus [Acidophilus Lactobacilli] 1 cap PO TID 09/30/22 


Melatonin [Melatonin*] 3 tab PO BEDTIME 09/30/22 


Midodrine HCl 4 tab PO TID 09/30/22 


Pantoprazole Sodium [Protonix] 1 tab PO DAILY 09/30/22 


Pravastatin Sodium 1 tab PO BEDTIME 09/30/22 


Sennosides 1 tab PO DAILY 09/30/22 


Simethicone 2.25 tab PO TID 09/30/22 


Ubidecarenone [Co Q-10] 2 cap PO DAILY 09/30/22 


Zinc Sulfate [Zinc Sulfate*] 1 tab PO DAILY 09/30/22 








- Past Medical/Surgical History


Diabetic: Yes


-: DM


-: HTN


-: Hyperlipidemia


-: CVA 2015


-: Gastric Ulcer


-: Chronic renal failure


-: Diastolic heart failure


-: COVID infection July 2021


-: dilaysis MWF


-: Full Hysterectomy


-: R upper chest dialysis cath





- Family History


  ** Mother


Medical History: Diabetes





- Social History


Alcohol use: No


CD- Drugs: No


Caffeine use: No





Review of Systems


General: Weakness


Eyes: Unremarkable


ENT: Unremarkable


Respiratory: Unremarkable


Cardiovascular: Palpitations


Gastrointestinal: Unremarkable


Genitourinary: Unremarkable


Musculoskeletal: Unremarkable


Integumentary: Unremarkable


Neurological: Unremarkable


Lymphatics: Unremarkable





Physical Examination














Temp Pulse Resp BP Pulse Ox


 


 98.7 F   69   20   114/41 L  94 


 


 09/30/22 02:41  09/30/22 02:41  09/30/22 02:41  09/30/22 02:41  09/30/22 02:41








General: In no apparent distress


HEENT: Atraumatic, Normocephalic


Neck: Supple, JVD not distended


Respiratory: Diminished


Cardiovascular: No rubs, No murmurs


Gastrointestinal: Soft and benign, No rebound


Musculoskeletal: No clubbing


Integumentary: No warmth


Neurological: Normal speech, Normal tone


Lymphatics: No axilla or inguinal lymphadenopathy


Urinary: Other (No bladder distention)


External genitalia: Deferred


Rectal: Deferred


Laboratory Data (last 24 hrs)





09/30/22 01:30: Sodium 137, Potassium 4.3, BUN 58 H, Creatinine 4.86 H, Glucose 

145 H


09/30/22 00:36: WBC 6.80, Hgb 9.4 L, Hct 28.9 L, Plt Count 137 L





Conclusions/Impression: 





1.   End-stage renal disease.  Received HD today.  Cont HD qMWF.


2.   Anemia of chronic kidney disease.  H/H below goal.  Continue PEMA.


3.   Hypertension, controlled, optimal.  Continue current med regimen.


4.   L Pleural effusion.  S/p L pleurX catheter placement on 9/8/2022


5.   Secondary hyperparathyroidism, stable.  Monitor Ca & Phos.


6.   Atrial fibrillation w/ RVR.  HR now controlled.  Per other services.  May 

resume low dose eliquis bid.

## 2022-09-30 NOTE — RAD REPORT
EXAM DESCRIPTION:  RAD - Chest Single View - 9/29/2022 11:21 pm

 

CLINICAL HISTORY:  88 years, Female, PALPITATIONS

 

COMPARISON:  4/26/2022

 

FINDINGS:  Single view of the chest was obtained portable. Prior films were compared. There has been 
interval placement of a right IJ tunnel dialysis catheter in good position. There is no evidence for 
pneumothorax. The heart is enlarged. The thoracic aorta demonstrate minimal intimal calcification. Fi
ndings suggest placement of a left chest tube/Pleurx catheter. Findings suggest most likely residual 
small left pleural effusion. Minimal compressive atelectatic changes. The right lung is clear.   The 
rest of the soft tissue and bony structures demonstrate to be unremarkable.

 

IMPRESSION:  Right IJ tunnel dialysis catheter in good position.

Cardiomegaly.

Findings just most likely left Pleurx catheter with small residual left pleural effusion.

 

Electronically signed by:   German Soliz MD   9/29/2022 11:37 PM CDT Workstation: [id number]

 

 

Due to temporary technical issues with the PACS/Fluency reporting system, reports are being signed by
 the in house radiologists without review as a courtesy to insure prompt reporting. The interpreting 
radiologist is fully responsible for the content of the report.

## 2022-09-30 NOTE — ER
Nurse's Notes                                                                                     

 [facility]                                                                 

Name: Deirdre Sauer                                                                               

Age: 88 yrs                                                                                       

Sex: Female                                                                                       

: 1933                                                                                   

MRN: [id number]                                                                                   

Arrival Date: 2022                                                                          

Time: 22:38                                                                                       

Account#: [id number]                                                                            

Bed 13                                                                                            

Private MD:                                                                                       

Diagnosis: Paroxysmal atrial fibrillation;End stage renal disease;Hypotension, unspecified        

                                                                                                  

Presentation:                                                                                     

                                                                                             

22:40 Chief complaint: EMS states: "She is coming from home, she has a high heart rate. Afib  tw5 

      with RVR.".                                                                                 

22:40 Method Of Arrival: EMS: [facility]                                                    tw5 

23:11 Coronavirus screen: At this time, the client does not indicate any symptoms associated  as6 

      with coronavirus-19. Ebola Screen: No symptoms or risks identified at this time.            

      Initial Sepsis Screen: Does the patient meet any 2 criteria? No. Patient's initial          

      sepsis screen is negative. Does the patient have a suspected source of infection? No.       

      Patient's initial sepsis screen is negative. Risk Assessment: Do you want to hurt           

      yourself or someone else? Patient reports no desire to harm self or others. Onset of        

      symptoms was 2022.                                                            

23:11 Acuity: MESFIN 2                                                                           as6 

23:13 Care prior to arrival: Medication(s) given: Cardizem 25mg IV initiated. 22 GA, in the   as6 

      left wrist.                                                                                 

                                                                                                  

Historical:                                                                                       

- Allergies:                                                                                      

23:11 Alteplase;                                                                              as6 

23:11 blood thinners except ASA;                                                              as6 

23:11 caffeine;                                                                               as6 

23:11 HYDROCODONE;                                                                            as6 

23:11 Iodine;                                                                                 as6 

23:11 Statins-Hmg-Coa Reductase Inhibitors;                                                   as6 

23:11 Streptomycin;                                                                           as6 

- Home Meds:                                                                                      

23:11 aspirin 81 mg Oral chew 1 tab once daily [Active]; amiodarone 200 mg Oral tab [Active]; as6 

      Eliquis 2.5 mg oral tab [Active]; midodrine 5 mg oral tab [Active];                         

- PMHx:                                                                                           

23:11 Anemia; kidney problems; Hypertension; hyperparathyroidism; Hyperlipidemia; Dialysis;   as6 

      Diabetes - NIDDM; Hernia; CHF; Cellulitis; CVA; malnutition;                                

- PSHx:                                                                                           

23:11 Dialysis catheter to right upper chest; hysterectomy;                                   as6 

                                                                                                  

- Immunization history:: Adult Immunizations up to date, Client reports receiving the             

  2nd dose of the Covid vaccine, pfizer.                                                          

- Social history:: Smoking status: Patient denies any tobacco usage or history of.                

- Family history:: not pertinent.                                                                 

- Hospitalizations: : The patient was recently seen at [facility].                                                                                         

                                                                                                  

                                                                                                  

Screenin/30                                                                                             

00:14 Abuse screen: Denies threats or abuse. Denies injuries from another. Nutritional        aa9 

      screening: No deficits noted. Tuberculosis screening: No symptoms or risk factors           

      identified. Fall Risk None identified.                                                      

                                                                                                  

Assessment:                                                                                       

                                                                                             

23:49 General: Appears uncomfortable, ill, Behavior is cooperative, anxious, quiet. Pain:     aa9 

      Denies pain. Neuro: Level of Consciousness is awake, alert, obeys commands, Oriented to     

      person, place, time, situation. Cardiovascular: Edema is 1+ to left midcalf, left           

      ankle, left foot, left toes, right midcalf, right ankle, right foot and right toes          

      Dialysis shunt: in the anterior aspect of right upper chest. Respiratory: Airway is         

      patent Respiratory effort is even, unlabored.                                               

23:49 Derm: Skin is thin, with poor turgor.                                                   aa9 

                                                                                                  

Vital Signs:                                                                                      

22:40 BP 92 / 45; Pulse 133; Resp 18; Temp 98.3; Pulse Ox 100% ; Weight 52.62 kg; Height 5    tw5 

      ft. 2 in. (157.48 cm); Pain 0/10;                                                           

22:45 BP 94 / 35; Pulse 53; Pulse Ox 96% on R/A;                                              aa9 

23:08 BP 94 / 35; Pulse 62; Resp 22 S; Temp 98.0(O); Pulse Ox 97% on R/A; Weight 74 kg (M);   as6 

      Height 5 ft. 2 in. (157.48 cm) (R); Pain 0/10;                                              

23:30 BP 99 / 46; Pulse 63; Resp 22 S; Pulse Ox 100% on R/A;                                  aa9 

                                                                                             

00:30  / 42; Pulse 66; Resp 22 S; Pulse Ox 100% on R/A;                                 aa9 

00:45  / 44; Pulse 66; Resp 19 S; Pulse Ox 100% on R/A;                                 aa9 

01:00  / 41; Pulse 67; Resp 19; Pulse Ox 100% on R/A;                                   aa9 

01:15  / 41; Pulse 76; Resp 20 S; Pulse Ox 99% on R/A;                                  aa9 

                                                                                             

23:08 Body Mass Index 29.84 (74.00 kg, 157.48 cm)                                             as6 

                                                                                                  

ED Course:                                                                                        

                                                                                             

22:38 Patient arrived in ED.                                                                  tw5 

22:38 Rony Hoang MD is Attending Physician.                                                rn  

23:11 Triage completed.                                                                       as6 

23:11 Arm band placed on. EKG completed in triage. Results shown to MD.                       as6 

23:14 Placed in gown. Bed in low position. Call light in reach. Side rails up X2. Adult w/    as6 

      patient. Client placed on continuous cardiac and pulse oximetry monitoring. NIBP            

      monitoring applied. Warm blanket given.                                                     

23:19 Yasmin Duron, OTTONIEL is Primary Nurse.                                                     aa9 

23:23 XRAY Chest (1 view) In Process Unspecified.                                             EDMS

23:42 Missed attempt(s): 20 gauge in left forearm. Bleeding controlled, band aid applied,     aa9 

      catheter tip intact.                                                                        

                                                                                             

00:10 Porfirio Valdez MD is Hospitalizing Provider.                                            rn  

00:38 Inserted saline lock: 20 gauge in right antecubital area, using aseptic technique.      as6 

      Blood collected.                                                                            

05:30 No provider procedures requiring assistance completed. Patient admitted, IV remains in  as6 

      place.                                                                                      

                                                                                                  

Administered Medications:                                                                         

00:38 Drug: NS 0.9% 500 ml Route: IV; Rate: bolus; Site: right antecubital;                   as6 

05:30 Follow up: Response: No adverse reaction; IV Status: Completed infusion; IV Intake:     as6 

      500ml                                                                                       

00:38 Drug: Magnesium Sulfate 1 grams Route: IVPB; Infused Over: 1 hrs; Site: right           as6 

      antecubital;                                                                                

04:34 Follow up: IV Status: Completed infusion; IV Intake: 100ml                              aa9 

                                                                                                  

                                                                                                  

Medication:                                                                                       

05:30 VIS not applicable for this client.                                                     as6 

                                                                                                  

Intake:                                                                                           

04:34 IV: 100ml; Total: 100ml.                                                                aa9 

05:30 IV: 500ml; Total: 600ml.                                                                as6 

                                                                                                  

Outcome:                                                                                          

00:10 Decision to Hospitalize by Provider.                                                    rn  

05:30 Admitted to Tele                                                                        as6 

05:30 Condition: stable                                                                           

05:30 Instructed on the need for admit.                                                           

05:53 Patient left the ED.                                                                    aa9 

                                                                                                  

Signatures:                                                                                       

Dispatcher [employer]                           EDMS                                                 

Rony Hoang MD MD rn Wood, Tiffany                                tw5                                                  

Slawson, John, RN                      RN   as6                                                  

Yasmin Duron, RN                       RN   aa9                                                  

                                                                                                  

**************************************************************************************************

## 2022-09-30 NOTE — EDPHYS
Physician Documentation                                                                           

 [facility]                                                                 

Name: Deirdre Sauer                                                                               

Age: 88 yrs                                                                                       

Sex: Female                                                                                       

: 1933                                                                                   

MRN: [id number]                                                                                   

Arrival Date: 2022                                                                          

Time: 22:38                                                                                       

Account#: [id number]                                                                            

Bed 13                                                                                            

Private MD:                                                                                       

ED Physician Rony Hoang                                                                         

HPI:                                                                                              

                                                                                             

23:16 This 88 yrs old Female presents to ER via EMS with complaints of Palpitations.          rn  

23:16 The patient presents with a history of irregular heart beat, heart racing. Context: The rn  

      symptoms occur at rest. Onset: The symptoms/episode began/occurred just prior to            

      arrival. Duration: The patient or guardian reports a single episode. Modifying factors:     

      The symptoms are aggravated by nothing. The symptoms are alleviated by EMS                  

      administration of diltiazem. Associated signs and symptoms: Pertinent negatives: chest      

      pain, fever, syncope. Severity of symptoms: At their worst the symptoms were moderate       

      in the emergency department the symptoms have improved. The patient has experienced         

      similar episodes in the past. The patient has been recently seen by a physician:.           

                                                                                                  

Historical:                                                                                       

- Allergies:                                                                                      

23:11 Alteplase;                                                                              as6 

23:11 blood thinners except ASA;                                                              as6 

23:11 caffeine;                                                                               as6 

23:11 HYDROCODONE;                                                                            as6 

23:11 Iodine;                                                                                 as6 

23:11 Statins-Hmg-Coa Reductase Inhibitors;                                                   as6 

23:11 Streptomycin;                                                                           as6 

- Home Meds:                                                                                      

23:11 aspirin 81 mg Oral chew 1 tab once daily [Active]; amiodarone 200 mg Oral tab [Active]; as6 

      Eliquis 2.5 mg oral tab [Active]; midodrine 5 mg oral tab [Active];                         

- PMHx:                                                                                           

23:11 Anemia; kidney problems; Hypertension; hyperparathyroidism; Hyperlipidemia; Dialysis;   as6 

      Diabetes - NIDDM; Hernia; CHF; Cellulitis; CVA; malnutition;                                

- PSHx:                                                                                           

23:11 Dialysis catheter to right upper chest; hysterectomy;                                   as6 

                                                                                                  

- Immunization history:: Adult Immunizations up to date, Client reports receiving the             

  2nd dose of the Covid vaccine, pfizer.                                                          

- Social history:: Smoking status: Patient denies any tobacco usage or history of.                

- Family history:: not pertinent.                                                                 

- Hospitalizations: : The patient was recently seen at [facility].                                                                                         

                                                                                                  

                                                                                                  

ROS:                                                                                              

23:16 Constitutional: Negative for fever, chills, and weight loss, Eyes: Negative for injury, rn  

      pain, redness, and discharge, Neck: Negative for injury, pain, and swelling,                

      Cardiovascular: + palpitations Respiratory: + sob Abdomen/GI: Negative for abdominal        

      pain, nausea, vomiting, diarrhea, and constipation, MS/Extremity: Negative for injury       

      and deformity, Skin: Negative for injury, rash, and discoloration, Neuro: Negative for      

      headache, weakness, numbness, tingling, and seizure.                                        

                                                                                                  

Exam:                                                                                             

23:16 Constitutional:  This is a well developed, well nourished patient who is awake, alert,  rn  

      and in no acute distress. Head/Face:  Normocephalic, atraumatic. Cardiovascular:            

      Tachycardic, irregular Respiratory:  Mild tachypnea, no retractions Abdomen/GI:  soft,      

      non-tender Skin:  Warm, dry MS/ Extremity:  Pulses equal, no cyanosis.  Neuro:  Awake       

      and alert, GCS 15                                                                           

23:46 ECG was reviewed by the Attending Physician.                                            rn  

                                                                                                  

Vital Signs:                                                                                      

22:40 BP 92 / 45; Pulse 133; Resp 18; Temp 98.3; Pulse Ox 100% ; Weight 52.62 kg; Height 5    tw5 

      ft. 2 in. (157.48 cm); Pain 0/10;                                                           

22:45 BP 94 / 35; Pulse 53; Pulse Ox 96% on R/A;                                              aa9 

23:08 BP 94 / 35; Pulse 62; Resp 22 S; Temp 98.0(O); Pulse Ox 97% on R/A; Weight 74 kg (M);   as6 

      Height 5 ft. 2 in. (157.48 cm) (R); Pain 0/10;                                              

23:30 BP 99 / 46; Pulse 63; Resp 22 S; Pulse Ox 100% on R/A;                                  aa9 

                                                                                             

00:30  / 42; Pulse 66; Resp 22 S; Pulse Ox 100% on R/A;                                 aa9 

00:45  / 44; Pulse 66; Resp 19 S; Pulse Ox 100% on R/A;                                 aa9 

01:00  / 41; Pulse 67; Resp 19; Pulse Ox 100% on R/A;                                   aa9 

01:15  / 41; Pulse 76; Resp 20 S; Pulse Ox 99% on R/A;                                  aa9 

                                                                                             

23:08 Body Mass Index 29.84 (74.00 kg, 157.48 cm)                                             as6 

                                                                                                  

MDM:                                                                                              

                                                                                             

22:38 Patient medically screened.                                                             rn  

23:47 ED course: Pt has since converted back to sinus rhythm, given diltiazem by EMS,         rn  

      magnesium IV here. Denies sob now..                                                         

                                                                                             

00:08 Differential diagnosis: arrythmia, dehydration, stress disorder, afib with RVR, ESRD,   rn  

      electrolyte imbalance. Data reviewed: vital signs, nurses notes, EKG, radiologic            

      studies, and as a result, I will admit patient. Counseling: I had a detailed discussion     

      with the patient and/or guardian regarding: the historical points, exam findings, and       

      any diagnostic results supporting the discharge/admit diagnosis, radiology results, the     

      need for further work-up and treatment in the hospital. Response to treatment: the          

      patient's symptoms have markedly improved after treatment, and as a result, I will          

      admit patient. Admission orders: after a detailed discussion of the patient's condition     

      and case, the admit orders are written by me.                                               

00:08 ED course: Pt reports generalized weakness, BP still borderline, due for dialysis in    rn  

      AM. Will admit to Dr. Valdez. .                                                              

                                                                                                  

                                                                                             

22:42 Order name: Basic Metabolic Panel; Complete Time: 02:11                                 rn  

09/29                                                                                             

22:42 Order name: CBC with Diff; Complete Time: 00:49                                         rn  

                                                                                             

22:42 Order name: NT PRO-BNP; Complete Time: 02:11                                            rn  

09/29                                                                                             

22:42 Order name: Troponin HS; Complete Time: 02:11                                           rn  

09/29                                                                                             

22:42 Order name: XRAY Chest (1 view)                                                         rn  

                                                                                             

22:42 Order name: SARS RAPID; Complete Time: 02:11                                            rn  

09/29                                                                                             

22:42 Order name: EKG; Complete Time: 22:44                                                   rn  

09/29                                                                                             

22:42 Order name: Cardiac monitoring; Complete Time: 23:                                    rn  

                                                                                             

22:42 Order name: EKG - Nurse/Tech; Complete Time: 23:                                      rn  

                                                                                             

22:42 Order name: IV Saline Lock; Complete Time: 23:07                                        rn  

09/29                                                                                             

22:42 Order name: Labs collected and sent; Complete Time: 00:38                               rn  

                                                                                             

22:42 Order name: O2 Per Protocol; Complete Time: 23:07                                       rn  

09/29                                                                                             

22:42 Order name: O2 Sat Monitoring; Complete Time: 23:07                                     rn  

                                                                                                  

EC/29                                                                                             

23:46 Rate is 60 beats/min. Rhythm is regular. QRS Axis is Normal. OH interval is normal. QRS rn  

      interval is normal. QT interval is normal. No Q waves. T waves are Normal. No ST            

      changes noted. Clinical impression: NSR w/ Non-specific ST/T Changes. Interpreted by        

      me. Reviewed by me.                                                                         

                                                                                                  

Administered Medications:                                                                         

                                                                                             

00:38 Drug: NS 0.9% 500 ml Route: IV; Rate: bolus; Site: right antecubital;                   as6 

05:30 Follow up: Response: No adverse reaction; IV Status: Completed infusion; IV Intake:     as6 

      500ml                                                                                       

00:38 Drug: Magnesium Sulfate 1 grams Route: IVPB; Infused Over: 1 hrs; Site: right           as6 

      antecubital;                                                                                

04:34 Follow up: IV Status: Completed infusion; IV Intake: 100ml                              aa9 

                                                                                                  

                                                                                                  

Disposition Summary:                                                                              

22 00:10                                                                                    

Hospitalization Ordered                                                                           

      Hospitalization Status: Observation                                                     rn  

      Provider: Porfirio Valdez                                                                 rn  

      Condition: Stable                                                                       rn  

      Problem: an acute exacerbation                                                          rn  

      Symptoms: have improved                                                                 rn  

      Bed/Room Type: Standard                                                                 rn  

      Location: Telemetry/MedSurg (observation)(22 04:27)                               cg  

      Room Assignment: [facility](22 04:27)                                                    cg  

      Diagnosis                                                                                   

        - Paroxysmal atrial fibrillation                                                      rn  

        - End stage renal disease                                                             rn  

        - Hypotension, unspecified                                                            rn  

      Forms:                                                                                      

        - Medication Reconciliation Form                                                      rn  

        - SBAR form                                                                           rn  

Signatures:                                                                                       

Dispatcher [employer]                           Rony Castano MD MD rn Garcia, Cindy, RN RN cg Slawson, Ashby, RN RN as6 Avalos, Aylin RN   aa9                                                  

                                                                                                  

Corrections: (The following items were deleted from the chart)                                    

02:19 00:10 Telemetry/MedSurg (observation) rn                                                cg  

02:19 00:10 rn                                                                                cg  

04: 02:19 [facility] ER HOLD cg                                                                   cg  

04: 02:19 ERHOLD- cg                                                                        cg  

                                                                                                  

**************************************************************************************************

## 2022-10-01 VITALS — DIASTOLIC BLOOD PRESSURE: 79 MMHG | TEMPERATURE: 98.2 F | SYSTOLIC BLOOD PRESSURE: 146 MMHG

## 2022-10-01 VITALS — OXYGEN SATURATION: 100 %

## 2022-10-01 LAB
BUN BLD-MCNC: 37 MG/DL (ref 7–18)
GLUCOSE SERPLBLD-MCNC: 169 MG/DL (ref 74–106)
HCT VFR BLD CALC: 27.9 % (ref 36–45)
LYMPHOCYTES # SPEC AUTO: 0.5 K/UL (ref 0.7–4.9)
MCV RBC: 93.1 FL (ref 80–100)
PMV BLD: 7.2 FL (ref 7.6–11.3)
POTASSIUM SERPL-SCNC: 3.6 MMOL/L (ref 3.5–5.1)
RBC # BLD: 2.99 M/UL (ref 3.86–4.86)

## 2022-10-01 RX ADMIN — AMIODARONE HYDROCHLORIDE SCH MG: 200 TABLET ORAL at 08:28

## 2022-10-01 RX ADMIN — APIXABAN SCH MG: 2.5 TABLET, FILM COATED ORAL at 08:28

## 2022-10-01 RX ADMIN — Medication SCH ML: at 08:28

## 2022-10-01 NOTE — PN
Date of Progress Note:  10/01/2022



Subjective:  The patient was admitted with AFib and RVR, over volume.  The 
patient was dialyzed yesterday, managed to remove 2 L, tolerated well.  The 
patient feeling better.  Heart rate has been controlled.



Physical Examination:

General:  When I saw the patient; the patient lying in bed, comfortable. 

Vital Signs:  Blood pressure 133/59, pulse of 86, afebrile. 

Chest:  Faint rales on the left base. 

Heart:  S1, S2.  Systolic murmur. 

Abdomen:  Soft, nontender. 

Extremity:  No edema. 

Neurologic:  Alert.  No focal edema.



Laboratory Data:  H and H 8.9/27.9.  Sodium 138, potassium 3.6, bicarb 28, BUN 
37, creatinine 3.4, calcium 7.9.  BNP 4400.



Current Medications:  The patient on include;

1.   Eliquis.

2.   Amiodarone.

3.   Zetia.

4.   Trazodone.

5.   Insulin.



Assessment And Plan:  

1.   End-stage renal disease, over volume, status post dialysis yesterday, 
tolerated well.  We will continue to monitor the patient.  We will backup the 
patient on her schedule Monday, Wednesday, Friday.  The patient already set for 
home hemodialysis.  We will follow up.

2.   Hypertension, controlled, optimal.  We will utilize blood pressure for more
ultrafiltration.  I am going to start the patient on low dose of carvedilol for 
better rate control and we will follow up the patient.

3.   Anemia of chronic kidney disease.  Resume PEMA.

4.   Atrial fibrillation with rapid ventriculare response as by Cardiology and 
Primary.

5.   Congestive heart failure with exacerbation, over volume.  The 

patient challenged back to baseline.  We will follow up.

6.   Alkalosis.  Will be corrected with dialysis.



time spend exam the patient face to face, reviewing data including lab and 
radiology , placing order , discussing the case with patient  and Nursing staff 
and Hospitalist > 35 min 

KIAN

DD:  10/01/2022 09:31:18   Voice ID:  [id number]

DT:  10/01/2022 09:57:57   Report ID:  [id number]

[email or web address]

## 2022-10-01 NOTE — P.DS
Admission Date: 09/30/22


Discharge Date: 10/01/22


Disposition: ROUTINE DISCHARGE


Reason for Admission: Palpitations





- Problems


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


Status: Chronic   





(2) Rapid atrial fibrillation


Status: Resolved   


Brief History of Present Illness: 





FABIOLA COMES FOR RAPID A FIB AGAIN. SHE CONVERTED WITH CARDIZEM IV. SHE IS 

STABLE. SHE HAS NO CHEST PAIN. 


Hospital Course: 





MS CRANE IS FRAIL ELDERLY LADY WHO HAS DM, CKD AND IS ON HEMODIALYSIS.  SHE 

COMES THIS TIME AGAIN WITH RAPID A FIB. DOES WELL WITH CARDIZEM, WAS GIVEN 

HIGHER DOSE OF AMIODARONE 200 MG PO BID AND AFTER 36 HOUR OF BEING STABLE WAS 

DISCHARGED WITH BID AMIODARONE.  SHE IS STABLE.  SHE WILL LIVE WITH GRAND 

DAUGHTER FOR CARE SHE NEEDS. SHE WAS ABLE TO AMBULATE BEOFRE DISCHARGE PER 

NURSE. 


Vital Signs/Physical Exam: 














Temp Pulse Resp BP Pulse Ox


 


 98.2 F   78   16   146/79 H  98 


 


 10/01/22 12:00  10/01/22 12:00  10/01/22 12:00  10/01/22 12:00  10/01/22 12:00








Laboratory Data at Discharge: 














WBC  4.90 K/uL (4.3-10.9)   10/01/22  05:31    


 


Hgb  8.9 g/dL (12.0-15.0)  L  10/01/22  05:31    


 


Hct  27.9 % (36.0-45.0)  L  10/01/22  05:31    


 


Plt Count  108 K/uL (152-406)  L  10/01/22  05:31    


 


Sodium  138 mmol/L (136-145)   10/01/22  05:31    


 


Potassium  3.6 mmol/L (3.5-5.1)   10/01/22  05:31    


 


BUN  37 mg/dL (7-18)  H  10/01/22  05:31    


 


Creatinine  3.41 mg/dL (0.55-1.3)  H  10/01/22  05:31    


 


Glucose  169 mg/dL ()  H  10/01/22  05:31    








Home Medications: 








Acetaminophen [Tylenol] 2 tab PO Q4HP PRN 09/30/22 


Amiodarone HCl [Pacerone] 0.5 tab PO DAILY 09/30/22 


Apixaban [Eliquis] 0.5 tab PO BID 09/30/22 


Aspirin [Aspirin EC] 1 tab PO DAILY 09/30/22 


Cetirizine HCl [Zyrtec] 1 tab PO DAILY 09/30/22 


Cholecalciferol (Vitamin D3) [Vitamin D 5,000 IU Cap*] 1 tab PO DAILY 09/30/22 


Ezetimibe [Zetia] 1 tab PO DAILY 09/30/22 


Fluticasone [Flonase 50MCG Nasal Spray*] 1 spray IN BID 09/30/22 


Insulin Aspart [Novolog Flexpen] 10 units SQ BID 09/30/22 


Insulin Detemir [Levemir Flextouch] 10 units SQ DAILY 09/30/22 


Insulin Detemir [Levemir Flextouch] 50 units SQ BEDTIME 09/30/22 


Lactobacillus Acidophilus [Acidophilus Lactobacilli] 1 cap PO TID 09/30/22 


Melatonin [Melatonin*] 3 tab PO BEDTIME 09/30/22 


Midodrine HCl 4 tab PO TID 09/30/22 


Pantoprazole Sodium [Protonix] 1 tab PO DAILY 09/30/22 


Pravastatin Sodium 1 tab PO BEDTIME 09/30/22 


Sennosides 1 tab PO DAILY 09/30/22 


Simethicone 2.25 tab PO TID 09/30/22 


Ubidecarenone [Co Q-10] 2 cap PO DAILY 09/30/22 


Zinc Sulfate [Zinc Sulfate*] 1 tab PO DAILY 09/30/22 


Amiodarone HCl [Pacerone] 400 mg PO BID 7 Days #14 tab 10/01/22 





New Medications: 


Amiodarone HCl [Pacerone] 400 mg PO BID 7 Days #14 tab


Followup: 


Raúl Hilario MD [ACTIVE - CAN ADMIT] - 10/06/22 1:00 pm


Porfirio Valdez MD [ACTIVE - CAN ADMIT] -  (Follow up in one week, call to 

schedule appointment.)

## 2022-10-02 NOTE — EKG
Test Date:    2022-09-29               Test Time:    22:53:49

Technician:   AS                                     

                                                     

MEASUREMENT RESULTS:                                       

Intervals:                                           

Rate:         60                                     

NM:           206                                    

QRSD:         76                                     

QT:           398                                    

QTc:          398                                    

Axis:                                                

P:            20                                     

NM:           206                                    

QRS:          15                                     

T:            30                                     

                                                     

INTERPRETIVE STATEMENTS:                                       

                                                     

Normal sinus rhythm

Low voltage QRS

Cannot rule out Anterior infarct, age undetermined

Abnormal ECG

Compared to ECG 09/26/2022 16:36:16

First degree AV block no longer present

Myocardial infarct finding still present



Electronically Signed On 10-02-22 07:44:38 CDT by Raúl Hilario

## 2022-10-02 NOTE — CON
Date of Consultation:  10/01/2022



Reason For Consultation:  Recurrent atrial fibrillation.



History Of Present Illness:  Ms. Sauer is 86.  Has a history of end-stage renal disease, on hemodialy
sis, diabetes, congestive heart failure, hypertension, CVA, cellulitis, dyslipidemia, anemia, and hyp
erparathyroidism.  Has had atrial fibrillation.  She was recently discharged on amiodarone 200 mg arian
ly.  Comes back with rapid atrial fibrillation.  She is in sinus rhythm after I saw her.  She receive
d amiodarone 400 mg p.o. after she was admitted.  Other than the palpitations, she did not really hav
e any symptoms.  Denied any syncope, fever or chills.



Allergies:  SHE IS ALLERGIC TO CAFFEINE, BLOOD THINNERS, STATINS, AND IODINE.



Medications:  At home include aspirin, amiodarone, midodrine, and Eliquis.



Review of Systems:

Negative.



Social History:  Negative.



Family History:  Negative.



Physical Examination:

General:  When I saw her, she was pleasant, sinus rhythm 82, no acute distress. 

HEENT:  Negative. 

Neck:  Supple with no bruit. 

Chest:  Clear. 

Cardiac:  Revealed regular rhythm and rate without any murmurs, gallops, or rubs. 

Abdomen:  Benign. 

Extremities:  Revealed no clubbing, cyanosis, or edema.



Diagnostic Data:  Showed a creatinine of 3.41, hemoglobin 8.9.  Her BNP was 4430.



Impression And Plan:  

1.Atrial fibrillation.  I will put her on 400 mg 1 p.o. b.i.d. for a week, then switch her to 200 mg
 b.i.d. after that.  If she continues to have atrial fibrillation, we will consider cardioversion.  M
maynor Sauer says that she is allergic to blood thinners, but I think she is tolerating Eliquis and we w
ill continue that.  If she has any issues with bleeding down the road, we will consider a Watchman.  
From my standpoint, she is in sinus rhythm, asymptomatic, she can go home, and I will see her in the 
office in a week.

2.End-stage renal disease.  At one point, she had pericardial effusion and thoracentesis, probably s
econdary to renal disease.  This is stable at this point.  We will probably do an echocardiogram on h
er down the road.

3.Diabetes.

4.Congestive heart failure, chronic, diastolic, stable.

5.Hypertension, stable.

6.Dyslipidemia, stable.

7.Anemia.

8.History of cellulitis.

9.History of cerebrovascular accident.

10.History of hyperparathyroidism.  Again, I would continue her present regimen.  Continue amiodaron
e 400 b.i.d. for a week.  I will see her after that.  Case was discussed with Dr. Valdez.  She can go 
home today.





NB/GARRICK

DD:  10/02/2022 08:53:18Voice ID:  [id number]

DT:  10/02/2022 10:17:19Report ID:  [id number]

## 2024-03-08 NOTE — EDPHYS
Physician Documentation                                                                           

 [facility]                                                                

Name: Deirdre Sauer                                                                               

Age: 84 yrs                                                                                       

Sex: Female                                                                                       

: 1933                                                                                   

MRN: [id number]                                                                                   

Arrival Date: 2018                                                                          

Time: 12:57                                                                                       

Account#: [id number]                                                                            

Bed 19                                                                                            

Private MD: Amanda Ledesma F                                                                     

ED Physician Abraham Goldberg                                                                      

HPI:                                                                                              

                                                                                             

13:48 This 84 yrs old  Female presents to ER via Ambulatory with complaints of       jmm 

      Weight Gain.                                                                                

13:48 The patient has shortness of breath with light activity. Onset: The symptoms/episode    jmm 

      began/occurred gradually, 3 day(s) ago. Duration: The symptoms are continuous. The          

      patient's shortness of breath is aggravated by walking, is alleviated by rest.              

      Associated signs and symptoms: Pertinent positives: weight gain, abdominal distension.      

      The patient has experienced a previous episode, but today's symptoms are not as bad as      

      this previous episode. This is an 84 year old female that presents to the ED with           

      complaints of abdominal distension, weight gain beginning approx 10 days ago. Patient       

      was advised to discontinue lasix due to decrease in GFR. Patient also complains of          

      dyspnea on walking. Patient denies fever, denies chest pain. Patient states symptoms        

      were much worse during a previous visit. .                                                  

                                                                                                  

Historical:                                                                                       

- Allergies:                                                                                      

12:59 blood thinners except ASA;                                                              la1 

12:59 Statins-Hmg-Coa Reductase Inhibitors;                                                   la1 

- PMHx:                                                                                           

12:59 CVA; Diabetes - NIDDM; Hyperlipidemia; Hypertension;                                    la1 

                                                                                                  

- Immunization history:: Adult Immunizations up to date.                                          

- Social history:: Smoking status: Patient/guardian denies using tobacco.                         

- Ebola Screening: : No symptoms or risks identified at this time.                                

                                                                                                  

                                                                                                  

ROS:                                                                                              

13:48 Constitutional: Negative for fever, chills, and weight loss, Cardiovascular: Negative   jmm 

      for chest pain, palpitations, and edema.                                                    

13:48 Back: Negative for injury and pain, MS/Extremity: Negative for injury and deformity,        

      Skin: Negative for injury, rash, and discoloration, Neuro: Negative for headache,           

      weakness, numbness, tingling, and seizure.                                                  

13:48 Respiratory: Positive for shortness of breath.                                              

13:48 Abdomen/GI: Positive for distension.                                                        

13:48 All other systems are negative.                                                             

                                                                                                  

Exam:                                                                                             

13:48 Head/Face:  atraumatic. Neck:  Trachea midline, Supple Chest/axilla:  Normal chest wall [facility] 

      appearance and motion.                                                                      

13:48 Constitutional: The patient appears in no acute distress, alert, awake.                     

13:48 Cardiovascular: Rate: normal, Rhythm: regular, Pulses: no pulse deficits are                

      appreciated.                                                                                

13:48 Respiratory: the patient does not display signs of respiratory distress,  Respirations:     

      normal, Breath sounds: are clear throughout.                                                

13:48 Abdomen/GI: Inspection: obese Bowel sounds: normal, Palpation: abdomen is soft and          

      non-tender, in all quadrants.                                                               

13:48 Back: ROM is normal.                                                                        

13:48 Musculoskeletal/extremity: ROM: intact in all extremities.                                  

13:48 Skin: Appearance: Color: normal in color.                                                   

13:48 Neuro: Orientation: is normal, Mentation: is normal, Memory: is normal, Gait: is steady.    

13:48 Psych: Behavior/mood is pleasant, cooperative.                                              

                                                                                                  

Vital Signs:                                                                                      

13:03  / 50; Pulse 68; Resp 16; Temp 97.7(O); Pulse Ox 100% on R/A; Weight 83.46 kg;    la1 

14:09  / 62; Pulse 68; Resp 20; Pulse Ox 97% on R/A;                                    aj1 

15:01  / 61; Pulse 63; Resp 16; Pulse Ox 99% on R/A;                                    aj1 

16:15  / 65; Pulse 64; Resp 18; Pulse Ox 97% ;                                          aj1 

17:31  / 55; Pulse 62; Resp 18; Pulse Ox 99% ;                                          aj1 

                                                                                                  

MDM:                                                                                              

13:48 Patient medically screened.                                                             [facility] [place] 

16:51 Data reviewed: vital signs, nurses notes. Counseling: I had a detailed discussion with  laquita 

      the patient and/or guardian regarding: the historical points, exam findings, and any        

      diagnostic results supporting the discharge/admit diagnosis, lab results, radiology         

      results, the need for outpatient follow up, to return to the emergency department if        

      symptoms worsen or persist or if there are any questions or concerns that arise at          

      home. ED course: RT ambulated the patient with pulse oximetry monitoring. O2 was 98% on     

      RA. BNP WNL, CXR did not show a volume overload pattern. Patient advised to follow up       

      with nephrology for reevaluation on Monday. patient is otherwise given strict return        

      precautions. patient understood and agrees with the plan of care. .                         

                                                                                                  

                                                                                             

13:42 Order name: Basic Metabolic Panel; Complete Time: 14:47                                 [facility] 

                                                                                             

13:42 Order name: CBC with Diff                                                               [facility] 

                                                                                             

13:42 Order name: LFT's; Complete Time: 14:47                                                 [facility] 

                                                                                             

13:42 Order name: Magnesium; Complete Time: 14:47                                             [facility] 

                                                                                             

13:42 Order name: NT PRO-BNP; Complete Time: 14:47                                            [facility] 

                                                                                             

13:42 Order name: PT-INR; Complete Time: 14:47                                                [facility] 

                                                                                             

13:42 Order name: Troponin (emerg Dept Use Only); Complete Time: 14:47                        [facility] 

                                                                                             

13:42 Order name: XRAY Chest (1 view)                                                         [facility] 

                                                                                             

13:42 Order name: EKG; Complete Time: 13:42                                                   [facility] 

                                                                                             

13:42 Order name: Cardiac monitoring; Complete Time: 14:17                                    [facility] 

                                                                                             

13:42 Order name: EKG - Nurse/Tech; Complete Time: 14:32                                      [facility] 

                                                                                             

13:42 Order name: IV Saline Lock; Complete Time: 14:17                                        [facility] 

                                                                                             

13:42 Order name: Labs collected and sent; Complete Time: 14:17                               [facility] 

                                                                                             

14:25 Order name: CBC Smear Scan                                                              [facility]

                                                                                             

13:42 Order name: O2 Per Protocol; Complete Time: 14:17                                       [facility] 

                                                                                             

13:42 Order name: O2 Sat Monitoring; Complete Time: 14:17                                     [facility] 

                                                                                                  

Administered Medications:                                                                         

No medications were administered                                                                  

                                                                                                  

                                                                                                  

Disposition:                                                                                      

                                                                                             

12:20 Co-signature as Attending Physician, Abraham Goldberg MD I agree with the assessment and  cookie 

      plan of care.                                                                               

                                                                                                  

Disposition:                                                                                      

18 16:55 Discharged to Home. Impression: Dyspnea, unspecified.                              

- Condition is Stable.                                                                            

- Discharge Instructions: Heart Failure, Shortness of Breath.                                     

                                                                                                  

- Medication Reconciliation Form, Thank You Letter, Antibiotic Education, Prescription            

  Opioid Use form.                                                                                

- Follow up: Maryjo Rayo MD; When: 1 - 2 days; Reason: Recheck today's complaints,           

  Continuance of care, Re-evaluation by your physician.                                           

                                                                                                  

                                                                                                  

                                                                                                  

Signatures:                                                                                       

Dispatcher [employer]                           EDStephanie Murry, RN                     RN   aj1                                                  

Abraham Goldberg MD MD cha Mickail, Joel, PA PA   [facility]                                                  

Gil Parnell RN                         RN   la1                                                  

                                                                                                  

Corrections: (The following items were deleted from the chart)                                    

                                                                                             

17:33 16:55 2018 16:55 Discharged to Home. Impression: Dyspnea, unspecified. Condition  aj1 

      is Stable. Forms are Medication Reconciliation Form, Thank You Letter, Antibiotic           

      Education, Prescription Opioid Use. Follow up: Maryjo Rayo; When: 1 - 2 days;            

      Reason: Recheck today's complaints, Continuance of care, Re-evaluation by your              

      physician. laquita                                                                              

                                                                                                  

**************************************************************************************************
no known precautions/limitations

## 2025-05-09 NOTE — ER
Nurse's Notes                                                                                     

 [facility]                                                                 

Name: Deirdre Sauer                                                                               

Age: 88 yrs                                                                                       

Sex: Female                                                                                       

: 1933                                                                                   

MRN: [id number]                                                                                   

Arrival Date: 2022                                                                          

Time: 22:16                                                                                       

Account#: [id number]                                                                            

Bed 6                                                                                             

Private MD: Porfirio Valdez V                                                                      

Diagnosis: Dyspnea;Pneumonia, unspecified organism;Pleural effusion in other conditions classified

  elsewhere;Unspecified kidney failure-chronic                                                    

                                                                                                  

Presentation:                                                                                     

                                                                                             

22:28 Chief complaint: Patient states: My family has been sick lately and I think I           jb4 

      contracted whatever they had. I started having cough and congestion around 0300 this        

      morning. M sputum at the time was clear, now it is yellow. Coronavirus screen: Client       

      presents with at least one sign or symptom that may indicate coronavirus-19. Provider       

      contacted for isolation considerations. Ebola Screen: No symptoms or risks identified       

      at this time. Initial Sepsis Screen: Does the patient meet any 2 criteria?. Risk            

      Assessment: Do you want to hurt yourself or someone else? Patient reports no desire to      

      harm self or others. Onset of symptoms was 2022. Transition of care: patient      

      was not received from another setting of care.                                              

22:28 Method Of Arrival: Wheelchair                                                           jb4 

22:28 Acuity: MESFIN 3                                                                           jb4 

22:33 Initial Sepsis Screen: Does the patient meet any 2 criteria? RR > 20 per min. HR > 90   jb4 

      bpm. Yes Does the patient have a suspected source of infection? Yes: Productive             

      cough/pneumonia If YES to both, name of provider notified: Abraham Goldberg MD                

                                                                                                  

Historical:                                                                                       

- Allergies:                                                                                      

22:30 blood thinners except ASA;                                                              jb4 

22:30 caffeine;                                                                               jb4 

22:30 Statins-Hmg-Coa Reductase Inhibitors;                                                   jb4 

22:30 Streptomycin;                                                                           jb4 

- PMHx:                                                                                           

22:30 CVA; Hyperlipidemia; Diabetes - NIDDM; Hypertension; CHF; Kidney problems;              jb4 

- PSHx:                                                                                           

22:30 hysterectomy;                                                                           jb4 

                                                                                                  

- Immunization history:: Adult Immunizations up to date.                                          

- Social history:: Smoking status: Patient denies any tobacco usage or history of.                

                                                                                                  

                                                                                                  

Screenin:37 Abuse screen: Denies threats or abuse. Denies injuries from another. Nutritional        as6 

      screening: No deficits noted. On. Tuberculosis screening: No symptoms or risk factors       

      identified. Fall Risk None identified.                                                      

                                                                                                  

Assessment:                                                                                       

23:25 General: Appears uncomfortable, Behavior is calm, cooperative, patient states she feels al4 

      "cruddy" . Pain: Denies pain. Neuro: Level of Consciousness is awake, alert, obeys          

      commands, Oriented to person, place, time. Cardiovascular: Capillary refill < 3 seconds     

      Patient's skin is warm and dry. Respiratory: Airway is patent Respiratory effort is         

      mild labored Respiratory pattern is regular, Sputum is thick, green yellow Breath           

      sounds with rhonchi bilaterally. Breath sounds with wheezes bilaterally.                    

                                                                                             

00:38 Reassessment: Patient appears in no apparent distress at this time. Patient states      al4 

      symptoms have improved.                                                                     

01:03 Reassessment: Patient appears in no apparent distress at this time. Patient denies pain al4 

      at this time.                                                                               

01:23 Reassessment: Report called to OTTONIEL Frank.                                              al4 

                                                                                                  

Vital Signs:                                                                                      

                                                                                             

22:33  / 67; Pulse 92; Resp 29; Temp 98.8(O); Pulse Ox 93% on R/A; Weight 75.3 kg (R);  jb4 

      Height 5 ft. 2 in. (157.48 cm);                                                             

23:30  / 52; Pulse 92; Resp 21; Pulse Ox 100% on 15% Nebulizer Mask;                    al4 

23:45  / 51; Pulse 87; Resp 19 S; Pulse Ox 100% on R/A;                                 al4 

                                                                                             

00:15  / 50; Pulse 85; Resp 16; Pulse Ox 100% ; Pain 0/10;                              al4 

00:30  / 41; Pulse 87; Resp 17; Pulse Ox 99% on R/A; Pain 0/10;                         al4 

01:00  / 92; Pulse 89; Resp 20 S; Pulse Ox 90% on R/A; Pain 0/10;                       al4 

01:30  / 54; Pulse 90; Resp 24; Pulse Ox 98% on 2 lpm NC; Pain 0/10;                    al4 

                                                                                             

22:33 Body Mass Index 30.36 (75.30 kg, 157.48 cm)                                             jb4 

                                                                                             

23:30 patient getting breathing treatment at this time                                        al4 

                                                                                                  

ED Course:                                                                                        

22:16 Patient arrived in ED.                                                                  mr  

22:16 Porfirio Valdez MD is Private Physician.                                                 mr  

22:19 John Guzman RN is Primary Nurse.                                                    as6 

22:30 Triage completed.                                                                       jb4 

22:30 Arm band placed on right wrist.                                                         jb4 

22:36 Placed in gown. Bed in low position. Call light in reach. Side rails up X2. Adult w/    as6 

      patient. Cardiac monitor on. Pulse ox on. NIBP on. Warm blanket given.                      

22:41 Abraham Goldberg MD is Attending Physician.                                             cookie 

22:45 Inserted saline lock: 18 gauge in right antecubital area, using aseptic technique.      jb4 

      Blood collected.                                                                            

22:45 Initial lab(s) drawn, by me, sent to lab. First set of blood cultures drawn by me.      jb4 

23:10 Inserted saline lock: 20 gauge in left antecubital area, using aseptic technique. Blood al4 

      collected.                                                                                  

23:31 XRAY Chest (1 view) In Process Unspecified.                                             EDMS

23:50 Porfirio Valdez MD is Hospitalizing Provider.                                            [facility] 

                                                                                             

00:55 Straight cath inserted, using sterile technique, Specimen obtained. by me, with Vero ROBB RN Returned clear yellow urine. Patient tolerated well.                                  

01:01 Urine Culture Sent.                                                                     al4 

01:06 No provider procedures requiring assistance completed. Patient admitted, IV remains in  al4 

      place.                                                                                      

                                                                                                  

Administered Medications:                                                                         

                                                                                             

23:25 Drug: Zithromax (azithromycin) 500 mg Route: IVPB; Infused Over: 1 hrs; Site: left      al4 

      antecubital;                                                                                

                                                                                             

00:30 Follow up: Response: No adverse reaction; IV Status: Completed infusion; IV Intake:     al4 

      250ml                                                                                       

                                                                                             

23:30 Drug: Xopenex (levalbuterol) 1.25 mg Route: Inhalation;                                 al4 

                                                                                             

00:00 Follow up: Response: No adverse reaction                                                [facility] 

                                                                                             

23:30 Drug: AtroVENT (ipratropium) Aerosol 0.5 mg Route: Inhalation;                          al4 

                                                                                             

00:00 Follow up: Response: No adverse reaction                                                [facility] 

                                                                                             

23:32 Drug: Rocephin (cefTRIAXone) 1 grams Route: IV; Rate: per protocol; Site: right         al4 

      antecubital;                                                                                

                                                                                             

00:00 Follow up: Response: No adverse reaction; IV Status: Completed infusion; IV Intake: 78kqof2 

00:02 CANCELLED (Duplicate Order): SOLU-Medrol (methylPrednisoLONE) 125 mg IVP once           cookie 

00:10 Drug: Xopenex (levalbuterol) 1.25 mg Route: Inhalation;                                 al4 

00:43 Follow up: Response: No adverse reaction                                                al4 

00:10 Drug: Pepcid (famotidine) 20 mg Route: IVP; Site: right antecubital;                    al4 

00:43 Follow up: Response: No adverse reaction                                                al4 

00:13 Drug: Zofran (Ondansetron) 4 mg Route: IVP; Site: right antecubital;                    al4 

00:43 Follow up: Response: No adverse reaction; Marked relief of symptoms                     al4 

00:32 Not Given (Patient Refused): SOLU-Medrol (methylPrednisoLONE) 60 mg IVP once            al4 

                                                                                                  

                                                                                                  

Intake:                                                                                           

00:00 IV: 10ml; Total: 10ml.                                                                  al4 

00:30 IV: 250ml; Total: 260ml.                                                                al4 

                                                                                                  

Outcome:                                                                                          

                                                                                             

23:50 Decision to Hospitalize by Provider.                                                    [facility] 

                                                                                             

01:06 Admitted to Med/surg room 202.                                                          al4 

      Condition: stable                                                                           

      Discharge instructions given to patient, family, Instructed on the need for admit,          

      Demonstrated understanding of instructions.                                                 

01:54 Patient left the ED.                                                                    al4 

                                                                                                  

Signatures:                                                                                       

Dispatcher [employer]                           EDMS                                                 

Abraham Goldberg MD MD cha Rivera, [facility]                                 mr                                                   

Herbert Villalta RN                       RN   jb4                                                  

John Guzman RN RN   as6                                                  

Reynaldo Fountain                            al4                                                  

                                                                                                  

Corrections: (The following items were deleted from the chart)                                    

                                                                                             

22:38 22:33  / 67; Pulse 92bpm; Resp 29bpm; Pulse Ox 93% RA; Temp 97.6F Oral; 75.3 kg   jb4 

      Reported; Height 5 ft. 2 in.; BMI: 30.3; jb4                                                

                                                                                             

00:15 00:10 SOLU-Medrol (methylPrednisoLONE) 60 mg IVP in right antecubital al4               al4 

00:38  23:25 Respiratory: Airway is patent Respiratory effort is unlabored, Respiratory  al4 

      pattern is regular, Breath sounds with wheezes bilaterally. al4                             

                                                                                             

01:19 01:06 Admitted to Med/surg al4                                                          al4 

01:19 01:06 Discharge instructions given to patient, family, Instructed on the need for       al4 

      admit, Demonstrated understanding of instructions, al4                                      

02:02  23:25 General: Appears uncomfortable, Behavior is calm, cooperative, al4          al4 

                                                                                             

02: 23:25 Respiratory: Airway is patent Respiratory effort is mild labored            al4 

      Respiratory pattern is regular, Breath sounds with rhonchi bilaterally. Breath sounds       

      with wheezes bilaterally. al4                                                               

                                                                                                  

************************************************************************************************** [facility]  Orthopaedic Surgery  [address] [place] [id number]  Phone: [phone]  Fax: [phone]  Follow Up Time: